# Patient Record
Sex: FEMALE | Race: WHITE | NOT HISPANIC OR LATINO | Employment: OTHER | URBAN - METROPOLITAN AREA
[De-identification: names, ages, dates, MRNs, and addresses within clinical notes are randomized per-mention and may not be internally consistent; named-entity substitution may affect disease eponyms.]

---

## 2017-01-03 ENCOUNTER — CLINICAL SUPPORT (OUTPATIENT)
Dept: REHABILITATION | Facility: HOSPITAL | Age: 47
End: 2017-01-03
Attending: FAMILY MEDICINE
Payer: COMMERCIAL

## 2017-01-03 DIAGNOSIS — M50.30 DEGENERATION OF CERVICAL INTERVERTEBRAL DISC: Primary | ICD-10-CM

## 2017-01-03 DIAGNOSIS — S39.012A STRAIN OF BACK, INITIAL ENCOUNTER: ICD-10-CM

## 2017-01-03 DIAGNOSIS — M79.18 MYOFASCIAL PAIN: ICD-10-CM

## 2017-01-03 PROCEDURE — 97014 ELECTRIC STIMULATION THERAPY: CPT | Performed by: PHYSICAL THERAPIST

## 2017-01-03 PROCEDURE — 97535 SELF CARE MNGMENT TRAINING: CPT | Performed by: PHYSICAL THERAPIST

## 2017-01-03 NOTE — PROGRESS NOTES
DATE OF THE INITIAL EVALUATION: 12/13/2016     REFERRING PROVIDER: Vibha Jay M.D.     DIAGNOSES: Lumbar strain, cervical degenerative disk disease and injury to   head.     ORDERS: Evaluate and treat as indicated.     SUBJECTIVE:   Patient states she is less symptomatic.  Reports only mild myofascial discomfort in the lumbar paraspinal region (1/10 pain rating).  Myofascial tenderness and tightness present in upper traps and cervical paraspinal musculature (3/10 pain rating).  Patient requesting heat and muscle stimulation; declines manual therapy today.       OBJECTIVE:   Ttransitioning from sit-to-stand no longer uncomfortable.     Standing posture is unremarkable. Gait is unremarkable.       Cervical spine range of motion limited into full right and left rotation with   minimal myofascial tightness and pain throughout the   trapezius, levator and rhomboid groups at the endpoint.   Cervical diagonal flexion is WNL's with discomfort at the end point.    Shoulder range of motion is within normal limits bilaterally with no specific complaints of pain   as she moves through the passive motion.     Lumbar spine range of motion is withhin normal limits into forward flexion with minimal reduction of the lumbar lordotic curve.   Extension remains slightly limited at the endpoint and painful in the central lumbosacral region.   Left and right side bending is slightly limited with reports of myofascial pain at the end point.     Patient has good flexibility of the hamstring groups bilaterally.   Stretching of the piriformis and gluteus medius, is uncomfortable but flexibility is not limited.     SI examination negative for postural rotation.   Straight leg raise test bilaterally is negative.     She does not present with any motor weaknesses or strength Imbalances throughout the extremities.    Patient has mild paraspinal guarding in the lumbar region with diffuse tenderness.   She is slightly guarded and tender in  the cervical paraspinals, traps, and levator muscle groups bilaterally.        ASSESSMENT: The patient is presenting decreased myofascial guarding and   tenderness throughout the lumbar paraspinals, and trapezius, cervical paraspinals, levator scapulae muscle groups bilaterally.  She would benefit from continuing with home stretching program and recommend increasing overall activity level.     TREATMENT PROVIDED: The patient received moist heat and muscle stimulation for   20 minutes applied to the cervical and lumbar paraspinal musculature. This was followed by gentle guided stretching   Of the cervical musculature.  Patient instructed in guidelines and recommendations for a home walking program, and recommended conditioning an strengthening activities at the health club.  Self-care instructions with therapist - 10 mins     PLAN: The patient is scheduled to return to the clinic 1 to 2 times a week to   follow up with manual therapy for myofascial release and gentle spinal   mobilization to improve mobility and decrease pain of needed. She will continue with the   home stretching exercises previously taught to her for the cervical and lumbar   spine muscle groups.       GOALS:  1. The patient will report a myofascial pain rating of 1/10 for the cervical   and lumbar muscle groups.  Goal partially met  2. The patient will be compliant with home stretching exercises working on her   previous program on a daily basis.  3. Resolution of muscle guarding and tenderness throughout the paraspinal   musculature, trapezius and levator and rhomboid muscle groups.     Thank you for this referral.

## 2017-01-11 DIAGNOSIS — M35.9 UNDIFFERENTIATED CONNECTIVE TISSUE DISEASE: Chronic | ICD-10-CM

## 2017-01-11 RX ORDER — HYDROXYCHLOROQUINE SULFATE 200 MG/1
TABLET, FILM COATED ORAL
Qty: 60 TABLET | Refills: 3 | Status: SHIPPED | OUTPATIENT
Start: 2017-01-11 | End: 2017-01-20

## 2017-01-17 ENCOUNTER — PATIENT MESSAGE (OUTPATIENT)
Dept: REHABILITATION | Facility: HOSPITAL | Age: 47
End: 2017-01-17

## 2017-01-20 ENCOUNTER — OFFICE VISIT (OUTPATIENT)
Dept: INTERNAL MEDICINE | Facility: CLINIC | Age: 47
End: 2017-01-20
Payer: COMMERCIAL

## 2017-01-20 VITALS
HEART RATE: 92 BPM | SYSTOLIC BLOOD PRESSURE: 102 MMHG | TEMPERATURE: 97 F | DIASTOLIC BLOOD PRESSURE: 68 MMHG | HEIGHT: 63 IN | BODY MASS INDEX: 22.69 KG/M2 | WEIGHT: 128.06 LBS | OXYGEN SATURATION: 100 %

## 2017-01-20 DIAGNOSIS — E78.00 PURE HYPERCHOLESTEROLEMIA: Primary | ICD-10-CM

## 2017-01-20 PROCEDURE — 99214 OFFICE O/P EST MOD 30 MIN: CPT | Mod: S$GLB,,, | Performed by: FAMILY MEDICINE

## 2017-01-20 PROCEDURE — 99999 PR PBB SHADOW E&M-EST. PATIENT-LVL III: CPT | Mod: PBBFAC,,, | Performed by: FAMILY MEDICINE

## 2017-01-20 PROCEDURE — 1159F MED LIST DOCD IN RCRD: CPT | Mod: S$GLB,,, | Performed by: FAMILY MEDICINE

## 2017-01-20 NOTE — MR AVS SNAPSHOT
[facility]  [address] [facility] [address] [place] [address]  Phone: [phone]  Fax: [phone]                  Sonia Melo   2017 7:20 AM   Office Visit    Description:  Female : 1970   Provider:  Leonardo Frey MD   Department:  [facility]           Reason for Visit     Establish Care           Diagnoses this Visit        Comments    Pure hypercholesterolemia    -  Primary            To Do List           Goals (5 Years of Data)     None      Follow-Up and Disposition     Return in about 1 year (around 2018) for Annual Exam.      [facility] On Call     [facility] On Call Nurse Care Line -  Assistance  Registered nurses in the [facility] On Call Center provide clinical advisement, health education, appointment booking, and other advisory services.  Call for this free service at [phone].             Medications           Message regarding Medications     Verify the changes and/or additions to your medication regime listed below are the same as discussed with your clinician today.  If any of these changes or additions are incorrect, please notify your healthcare provider.        STOP taking these medications     ondansetron (ZOFRAN-ODT) 8 MG TbDL Take 1 tablet (8 mg total) by mouth every 6 (six) hours as needed.           Verify that the below list of medications is an accurate representation of the medications you are currently taking.  If none reported, the list may be blank. If incorrect, please contact your healthcare provider. Carry this list with you in case of emergency.           Current Medications     cyclobenzaprine (FLEXERIL) 5 MG tablet Take 5-10 mg by mouth.    hydroxychloroquine (PLAQUENIL) 200 mg tablet Take 200 mg by mouth 2 (two) times daily.     ibuprofen (ADVIL,MOTRIN) 800 MG tablet Take 1 tablet (800 mg total) by mouth 3 (three) times daily.    levothyroxine (SYNTHROID) 112 MCG tablet Take 1 tablet (112 mcg total) by mouth once daily.  "   multivitamin (ONE DAILY MULTIVITAMIN) per tablet Take 1 tablet by mouth once daily.    penciclovir (DENAVIR) 1 % cream Apply topically every 2 (two) hours.           Clinical Reference Information           Vital Signs - Last Recorded  Most recent update: 1/20/2017  7:39 AM by Selena Torres LPN    BP Pulse Temp Ht    102/68 (BP Location: Right arm, Patient Position: Sitting, BP Method: Manual) 92 97.2 °F (36.2 °C) (Tympanic) 5' 3" (1.6 m)    Wt LMP SpO2 BMI    58.1 kg (128 lb 1.4 oz) 12/30/2016 100% 22.69 kg/m2      Blood Pressure          Most Recent Value    BP  102/68      Allergies as of 1/20/2017     No Known Allergies      Immunizations Administered on Date of Encounter - 1/20/2017     None      "

## 2017-01-20 NOTE — PROGRESS NOTES
"Subjective:   Patient ID: Sonia Melo is a 46 y.o. female.  Chief Complaint:  Establish Care    HPI Comments: Patient presents to establish care with PCP.  Medical history for mixed connective tissue disorder sees rheumatology and iatrogenic hypothyroidism stable on supplementation.  CBC and CMP done December 2016 normal.  TSH, free T4 and free T3 acceptable May 2016.  Lipids in 2015 with 10 year risk less than 1.5%.  Recent lipids at work flags for elevation.  Reports total cholesterol 240-250 range. HDL normal.  - 140. Similar to 2015 results.  No complaints or concerns today.  No recent tetanus booster on file.  Denies ever receiving pneumonia vaccines.  Received flu vaccine this season.  Up-to-date female care with Dr. Lucy Crockett.  No family history colon cancer, colonoscopy not indicated to 50 years old.    Review of Systems   Constitutional: Negative for chills, fatigue and fever.   HENT: Negative for congestion, dental problem, ear pain, postnasal drip, sinus pressure and sore throat.    Eyes: Negative for visual disturbance.   Respiratory: Negative for cough, chest tightness, shortness of breath and wheezing.    Cardiovascular: Negative for chest pain, palpitations and leg swelling.   Gastrointestinal: Negative for abdominal pain, blood in stool, constipation, diarrhea, nausea and vomiting.   Endocrine: Negative for polydipsia, polyphagia and polyuria.   Genitourinary: Negative for difficulty urinating, dysuria, flank pain, hematuria and pelvic pain.   Musculoskeletal: Negative for myalgias.   Skin: Negative for rash.   Neurological: Negative for dizziness, syncope, weakness, light-headedness and headaches.   Hematological: Negative for adenopathy.   Psychiatric/Behavioral: Negative.      Objective:     Visit Vitals    /68 (BP Location: Right arm, Patient Position: Sitting, BP Method: Manual)    Pulse 92    Temp 97.2 °F (36.2 °C) (Tympanic)    Ht 5' 3" (1.6 m)    Wt 58.1 kg (128 lb 1.4 " oz)    LMP 12/30/2016    SpO2 100%    BMI 22.69 kg/m2     Physical Exam   Constitutional: Vital signs are normal. She appears well-developed and well-nourished.   Eyes: No scleral icterus.   Neck: Carotid bruit is not present.   Cardiovascular: Normal rate, regular rhythm and normal heart sounds.  Exam reveals no gallop and no friction rub.    No murmur heard.  Pulmonary/Chest: Effort normal and breath sounds normal. She has no wheezes. She has no rhonchi. She has no rales.   Abdominal: Soft. She exhibits no distension. There is no hepatosplenomegaly. There is no tenderness. There is no rebound and no guarding.   Skin: No rash noted.   Psychiatric: She has a normal mood and affect.     Assessment:     1. Pure hypercholesterolemia      Plan:   Patient is not diabetic, LDL less than 190, no known cardiovascular disease.  Discussed and reviewed AHA/ACC cardiac risk calculator with patient.  Based on reported levels this year, similar to 2015, risk is less than 7.5%.  Daily aspirin or cholesterol medication not indicated.  Still advised low-fat low-cholesterol diet.  Keep any follow-up appointments with her specialist.  Return to clinic 1 year for annual exam or sooner if needed.  Considering tetanus immunization and pneumonia vaccination at her annual exam. Declines today

## 2017-01-30 ENCOUNTER — CLINICAL SUPPORT (OUTPATIENT)
Dept: REHABILITATION | Facility: HOSPITAL | Age: 47
End: 2017-01-30
Attending: FAMILY MEDICINE
Payer: COMMERCIAL

## 2017-01-30 DIAGNOSIS — M79.18 MYOFASCIAL PAIN: ICD-10-CM

## 2017-01-30 DIAGNOSIS — S39.012D STRAIN OF BACK, SUBSEQUENT ENCOUNTER: ICD-10-CM

## 2017-01-30 DIAGNOSIS — M50.30 DEGENERATION OF CERVICAL INTERVERTEBRAL DISC: Primary | ICD-10-CM

## 2017-01-30 PROCEDURE — 97110 THERAPEUTIC EXERCISES: CPT | Performed by: PHYSICAL THERAPIST

## 2017-01-30 PROCEDURE — 97014 ELECTRIC STIMULATION THERAPY: CPT | Performed by: PHYSICAL THERAPIST

## 2017-01-30 PROCEDURE — 97012 MECHANICAL TRACTION THERAPY: CPT | Performed by: PHYSICAL THERAPIST

## 2017-01-31 NOTE — PROGRESS NOTES
DATE OF THE INITIAL EVALUATION: 12/13/2016     REFERRING PROVIDER: Vibha Jay M.D.     DIAGNOSES: Lumbar strain, cervical degenerative disk disease and injury to   head.     ORDERS: Evaluate and treat as indicated.     SUBJECTIVE:   Patient returns to therapy today complaining of lumbar spine stiffness with radiating discomfort into the right LE.   Patient also complaining of myofascial pain in the mid thoracic paraspinal region/rhomboids bilaterally.    Patient states she has continued with the stretching exercises as directed; however, she recently began to experience worsening symtoms.    She specifically request lumbar traction to address lumbar spine stiffness as this was helpful in the past.  Also specifically requesting MFR of the shoulder girdle musculature to address soft tissue pain.    Pain rating for lumbar symptoms:  5/10  Pain rating for scapulothoracic symptoms:  5/10     OBJECTIVE:   Ttransitioning from sit-to-stand reported to be slightly uncomfortable.   Standing posture is unremarkable. Gait is unremarkable.       Cervical spine range of motion slightly limited into full right and left rotation with reported myofascial tightness and discomfort throughout the   trapezius, levator and rhomboid groups bilaterally at the endpoint.   Cervical diagonal flexion is WNL's with discomfort at the end point in the traps.    Shoulder range of motion is within normal limits bilaterally with no specific complaints of pain   as she moves through the passive motion.     Lumbar spine range of motion is limited to 50% of expected motion into forward flexion with minimal reduction of the lumbar lordotic curve.   Extension limited to 50% of expected motion with the endpoint reported to be painful in the central lumbosacral region.   Left and right side bending is slightly limited with reports of lumbar myofascial pain at the end point.     Patient has good flexibility of the hamstring groups bilaterally.    Stretching of the piriformis and gluteus medius, is uncomfortable but flexibility is not limited.     SI examination negative for postural rotation.   Straight leg raise test bilaterally is negative.     She does not present with any motor weaknesses or strength imbalances throughout the extremities.    Patient presents with moderate paraspinal guarding in the lumbar region with diffuse tenderness.   She is slightly guarded and tender in the cervical paraspinals, traps, and levator muscle groups bilaterally.    Patient has moderate muscle guarding with tenderness in the rhomboids bilaterally.      ASSESSMENT:   Patient presents today with moderate muscle guarding with tenderness in the rhomboids bilaterally.  The patient also presents with myofascial guarding and tenderness throughout the lumbar paraspinals.  There is mild muscle guarding and tenderness in the trapezius, cervical paraspinals, levator scapulae muscle groups bilaterally.  Recommend resuming MFR to scapulothoracic muscle groups; gentle lumbar traction to address lumbar spine stiffness.     TREATMENT PROVIDED:  -moist heat and muscle stimulation x 20 minutes applied to the rhomboid and lumbar paraspinal musculature.   -MFR (tool assisted) to rhomboids bilaterally x 15 mins  -gentle lumbar traction x 20 mins at 55 lbs in supine    Patient reported significant decrease in scapulothoracic myofascial pain post treatment.  Patient reported decrease in lumbar spine stiffness following the traction although radiating pain into the right leg did not change.       PLAN: The patient is scheduled to return to the clinic 1 to 2 times a week to   follow up with manual therapy for myofascial release and gentle spinal   Mobilization and traction to improve mobility and decrease pain as needed. She will continue with the   home stretching exercises previously taught to her for the cervical and lumbar   spine muscle groups.       GOALS:  1. The patient will report a  myofascial pain rating of 1/10 for the cervical, thoracolumbar muscle groups.    2. The patient will be compliant with home stretching exercises working on her   previous program on a daily basis.  3. Resolution of muscle guarding and tenderness throughout the paraspinal   musculature, trapezius and levator and rhomboid muscle groups.     Thank you for this referral.

## 2017-02-06 ENCOUNTER — CLINICAL SUPPORT (OUTPATIENT)
Dept: REHABILITATION | Facility: HOSPITAL | Age: 47
End: 2017-02-06
Attending: FAMILY MEDICINE
Payer: COMMERCIAL

## 2017-02-06 DIAGNOSIS — S39.012D STRAIN OF BACK, SUBSEQUENT ENCOUNTER: ICD-10-CM

## 2017-02-06 DIAGNOSIS — M79.18 MYOFASCIAL PAIN: ICD-10-CM

## 2017-02-06 DIAGNOSIS — M50.30 DEGENERATION OF CERVICAL INTERVERTEBRAL DISC: Primary | ICD-10-CM

## 2017-02-06 PROCEDURE — 97014 ELECTRIC STIMULATION THERAPY: CPT | Performed by: PHYSICAL THERAPIST

## 2017-02-06 PROCEDURE — 97140 MANUAL THERAPY 1/> REGIONS: CPT | Performed by: PHYSICAL THERAPIST

## 2017-02-09 NOTE — PROGRESS NOTES
DATE OF THE INITIAL EVALUATION: 12/13/2016     REFERRING PROVIDER: Vibha Jay M.D.     DIAGNOSES: Lumbar strain, cervical degenerative disk disease and injury to   head.     ORDERS: Evaluate and treat as indicated.     SUBJECTIVE:     Patient states she did not experience any rebound soreness from the gentle lumbar traction provided during her last visit.  Although the lumbar stiffness was less post treatment, she did not experience any significant improvement in pain.   Today she is complaining of myofascial pain and tenderness in the right hip girdle musculature with diffuse radiating pain into the right LE.    Patient express concern about recent changes in complete bladder control.  She denies incontinence, but states she has experienced some leaking intermittently which is new.  Patient was instructed to contact her neurosurgeon immediately to discuss her concerns.  Patient reported she would place a call immediately following her PT appt today.    Patient evaluated; negative for motor weaknesses in LE, reflexes normal, negative for saddle paresthesias.    Patient reports she is only experiencing mild myofascial discomfort in the cervical spine and shoulder girdle regions at this time.      Pain rating for hip girdle symptoms:  5/10  Pain rating for scapulothoracic symptoms:  2/10     OBJECTIVE:   Ttransitioning from sit-to-stand reported to be slightly uncomfortable.   Standing posture is unremarkable. Gait is unremarkable.       Cervical spine range of motion slightly limited into full right and left rotation with less myofascial tightness and discomfort throughout the   trapezius, levator and rhomboid groups bilaterally at the endpoint reported.  Cervical diagonal flexion is WNL's with discomfort at the end point in the traps.    Shoulder range of motion is within normal limits bilaterally with no specific complaints of pain   as she moves through the passive motion.       From previous  assessment:  Lumbar spine range of motion is limited to 50% of expected motion into forward flexion with minimal reduction of the lumbar lordotic curve.   Extension limited to 50% of expected motion with the endpoint reported to be painful in the central lumbosacral region.   Left and right side bending is slightly limited with reports of lumbar myofascial pain at the end point.     Patient has good flexibility of the hamstring groups bilaterally.   Stretching of the piriformis and gluteus medius, is uncomfortable but flexibility is not limited.     SI examination negative for postural rotation.   Straight leg raise test bilaterally is negative.     She does not present with any motor weaknesses or strength imbalances throughout the extremities.        Today the patient presents with acute TP in the right gluteus medius muscle belly.    Complains of acute tenderness over the right trochanteric bursa with multiple tender points along the IT Band.      ASSESSMENT:   Patient presents today with acute tenderness in the right gluteus medius muscle belly, over the right trochanteric bursa with multiple tender points along the IT Band.  Recommend MFR to right gluteus medius muscle belly and IT Band to address acute myofascial pain.       TREATMENT PROVIDED:  -moist heat and muscle stimulation x 20 minutes applied to the right gluteus medius and IT Band   -MFR (tool assisted) to right gluteus medius and IT Band x 20 mins  -passive stretching of the right hip girdle musculature and IT Band  (less then 8 mins)    Patient reported significant decrease in right hip girdle and LE myofascial pain post treatment.  Patient was encouraged to continue with passive stretching at home.       PLAN: The patient is scheduled to return to the clinic 1 to 2 times a week to   follow up with manual therapy for myofascial release as indicated to address myofascial pain complaints.    Patient will contact neurosurgeon today.     GOALS:  1. The  patient will report a myofascial pain rating of 1/10 for the cervical, thoracolumbar muscle groups.    2. The patient will be compliant with home stretching exercises working on her   previous program on a daily basis.  3. Resolution of muscle guarding and tenderness throughout the paraspinal   musculature, trapezius and levator and rhomboid muscle groups.  4.  Resolution of myofascial pain in the right hip girdle and LE muscle groups     Thank you for this referral.

## 2017-02-13 ENCOUNTER — CLINICAL SUPPORT (OUTPATIENT)
Dept: REHABILITATION | Facility: HOSPITAL | Age: 47
End: 2017-02-13
Attending: FAMILY MEDICINE
Payer: COMMERCIAL

## 2017-02-13 DIAGNOSIS — M50.30 DEGENERATION OF CERVICAL INTERVERTEBRAL DISC: Primary | ICD-10-CM

## 2017-02-13 DIAGNOSIS — M79.18 MYOFASCIAL PAIN: ICD-10-CM

## 2017-02-13 PROCEDURE — 97014 ELECTRIC STIMULATION THERAPY: CPT | Performed by: PHYSICAL THERAPIST

## 2017-02-13 PROCEDURE — 97140 MANUAL THERAPY 1/> REGIONS: CPT | Performed by: PHYSICAL THERAPIST

## 2017-02-13 NOTE — PROGRESS NOTES
Bladder urgency completely resolved now  Hip myofascial pain minimal  farshad c/o MF pain in thoracic paraspinals   H/E/MT    DATE OF THE INITIAL EVALUATION: 12/13/2016     REFERRING PROVIDER: Vibha Jay M.D.     DIAGNOSES: Lumbar strain, cervical degenerative disk disease and injury to   head.     ORDERS: Evaluate and treat as indicated.     SUBJECTIVE:     Patient states she is now experiencing very little myofascial pain and tenderness in the right hip girdle musculature.     Patient states recent urinary urgency issues have resolved completely.  She denied incontinence, but had reported experiencing some leaking intermittently.  Patient was encouraged to contact her neurosurgeon immediately to discuss her concerns if any symptoms return.      Patient's primary complaint today is myofascial discomfort in the thoracic paraspinal region.    Pain rating for hip girdle symptoms:  1/10  Pain rating for scapulothoracic symptoms:  5/10     OBJECTIVE:   Ttransitioning from sit-to-stand only slightly uncomfortable.   Standing posture is unremarkable. Gait is unremarkable.       Cervical spine range of motion slightly limited into full right and left rotation with less myofascial tightness and discomfort throughout the   trapezius, levator and rhomboid groups bilaterally at the endpoint reported.  Cervical diagonal flexion is WNL's with discomfort at the end point in the traps.    Shoulder range of motion is within normal limits bilaterally with no specific complaints of pain   as she moves through the passive motion.       From previous assessment:  Lumbar spine range of motion is limited to 50% of expected motion into forward flexion with minimal reduction of the lumbar lordotic curve.   Extension limited to 50% of expected motion with the endpoint reported to be painful in the central lumbosacral region.   Left and right side bending is slightly limited with reports of lumbar myofascial pain at the end point.      Patient has good flexibility of the hamstring groups bilaterally.   Stretching of the piriformis and gluteus medius, is uncomfortable but flexibility is not limited.     SI examination negative for postural rotation.   Straight leg raise test bilaterally is negative.     She does not present with any motor weaknesses or strength imbalances throughout the extremities.    Patient presents with muscle guarding throughout the thoracic paraspinal muscle groups including rhomboids/middle traps.    ASSESSMENT:   Patient requesting MFR to thoracic paraspinals and rhomboids to address myofascial pain.       TREATMENT PROVIDED:  -moist heat and muscle stimulation x 20 minutes applied to the thoracic paraspinals and rhomboids bilaterally  -MFR (tool assisted) to x 20 mins to thoracic paraspinals and rhomboids bilaterally    Patient was encouraged to continue with passive stretching at home.       PLAN: The patient is scheduled to return to the clinic 1 to 2 times a week to   follow up with manual therapy for myofascial release as indicated to address myofascial pain complaints.         GOALS:  1. The patient will report a myofascial pain rating of 1/10 for the cervical, thoracolumbar muscle groups.    2. The patient will be compliant with home stretching exercises working on her   previous program on a daily basis.  3. Resolution of muscle guarding and tenderness throughout the paraspinal   musculature, trapezius and levator and rhomboid muscle groups.  4.  Resolution of myofascial pain in the right hip girdle and LE muscle groups     Thank you for this referral.

## 2017-03-01 ENCOUNTER — LAB VISIT (OUTPATIENT)
Dept: LAB | Facility: HOSPITAL | Age: 47
End: 2017-03-01
Attending: PEDIATRICS
Payer: COMMERCIAL

## 2017-03-01 ENCOUNTER — OFFICE VISIT (OUTPATIENT)
Dept: INTERNAL MEDICINE | Facility: CLINIC | Age: 47
End: 2017-03-01
Payer: COMMERCIAL

## 2017-03-01 VITALS
TEMPERATURE: 96 F | WEIGHT: 128.5 LBS | DIASTOLIC BLOOD PRESSURE: 70 MMHG | HEART RATE: 85 BPM | SYSTOLIC BLOOD PRESSURE: 100 MMHG | BODY MASS INDEX: 22.77 KG/M2 | HEIGHT: 63 IN

## 2017-03-01 DIAGNOSIS — N39.0 URINARY TRACT INFECTION WITHOUT HEMATURIA, SITE UNSPECIFIED: Primary | ICD-10-CM

## 2017-03-01 DIAGNOSIS — N39.0 URINARY TRACT INFECTION WITH HEMATURIA, SITE UNSPECIFIED: Primary | ICD-10-CM

## 2017-03-01 DIAGNOSIS — N39.0 URINARY TRACT INFECTION WITHOUT HEMATURIA, SITE UNSPECIFIED: ICD-10-CM

## 2017-03-01 DIAGNOSIS — R31.9 URINARY TRACT INFECTION WITH HEMATURIA, SITE UNSPECIFIED: Primary | ICD-10-CM

## 2017-03-01 LAB
BACTERIA #/AREA URNS HPF: ABNORMAL /HPF
BILIRUB UR QL STRIP: NEGATIVE
CLARITY UR: ABNORMAL
COLOR UR: YELLOW
GLUCOSE UR QL STRIP: NEGATIVE
HGB UR QL STRIP: ABNORMAL
KETONES UR QL STRIP: NEGATIVE
LEUKOCYTE ESTERASE UR QL STRIP: ABNORMAL
MICROSCOPIC COMMENT: ABNORMAL
NITRITE UR QL STRIP: NEGATIVE
PH UR STRIP: 7 [PH] (ref 5–8)
PROT UR QL STRIP: ABNORMAL
RBC #/AREA URNS HPF: >100 /HPF (ref 0–4)
SP GR UR STRIP: >=1.03 (ref 1–1.03)
SQUAMOUS #/AREA URNS HPF: 15 /HPF
URN SPEC COLLECT METH UR: ABNORMAL
WBC #/AREA URNS HPF: 80 /HPF (ref 0–5)

## 2017-03-01 PROCEDURE — 99999 PR PBB SHADOW E&M-EST. PATIENT-LVL III: CPT | Mod: PBBFAC,,, | Performed by: PHYSICIAN ASSISTANT

## 2017-03-01 PROCEDURE — 87088 URINE BACTERIA CULTURE: CPT

## 2017-03-01 PROCEDURE — 87186 SC STD MICRODIL/AGAR DIL: CPT

## 2017-03-01 PROCEDURE — 87077 CULTURE AEROBIC IDENTIFY: CPT

## 2017-03-01 PROCEDURE — 1160F RVW MEDS BY RX/DR IN RCRD: CPT | Mod: S$GLB,,, | Performed by: PHYSICIAN ASSISTANT

## 2017-03-01 PROCEDURE — 87086 URINE CULTURE/COLONY COUNT: CPT

## 2017-03-01 PROCEDURE — 99213 OFFICE O/P EST LOW 20 MIN: CPT | Mod: S$GLB,,, | Performed by: PHYSICIAN ASSISTANT

## 2017-03-01 PROCEDURE — 81000 URINALYSIS NONAUTO W/SCOPE: CPT | Mod: PO

## 2017-03-01 RX ORDER — PHENAZOPYRIDINE HYDROCHLORIDE 200 MG/1
200 TABLET, FILM COATED ORAL EVERY 8 HOURS PRN
Qty: 12 TABLET | Refills: 0 | Status: SHIPPED | OUTPATIENT
Start: 2017-03-01 | End: 2017-05-15

## 2017-03-01 RX ORDER — CYCLOBENZAPRINE HCL 5 MG
5 TABLET ORAL NIGHTLY
COMMUNITY
End: 2017-05-15

## 2017-03-01 RX ORDER — HYDROXYCHLOROQUINE SULFATE 200 MG/1
200 TABLET, FILM COATED ORAL 2 TIMES DAILY
COMMUNITY
End: 2017-06-05 | Stop reason: SDUPTHER

## 2017-03-01 RX ORDER — SULFAMETHOXAZOLE AND TRIMETHOPRIM 800; 160 MG/1; MG/1
1 TABLET ORAL 2 TIMES DAILY
Qty: 20 TABLET | Refills: 0 | Status: SHIPPED | OUTPATIENT
Start: 2017-03-01 | End: 2017-03-06

## 2017-03-01 NOTE — PROGRESS NOTES
Subjective:       Patient ID: Sonia Melo is a 46 y.o.W/ female.    Chief Complaint: Urinary Tract Infection    HPI         She comes in by herself today and has the above problem.  She started having symptoms overnight and had to get up about every 30 minutes after 4 AM to urinate.  She says there has been some frequency and dysuria ever since that time.  She usually has to go about every 15-30 minutes.  She denies any hematuria, pyuria, vaginal itch or discharge, suprapubic or flank pain, fever, sweats, or diaphoresis.        Her last UTI was about 1 year ago and that's about how often she normally has these.  She hasn't been taking any OTC meds such as Azo.    Review of Systems    Otherwise negative concerning the RENAL, , GYN, system review.    Objective:      Physical Exam    There was no physical exam.  Her urinalysis on dipstick show that she had protein, RBCs, and WBC.  Her microscopic showed that she had a large quantity of RBCs greater than 100 and about 80 WBCs per high-powered field.  She also has a moderate amount of bacteria in her urine.    Assessment:       1. Urinary tract infection with hematuria, site unspecified        Plan:     1.  Start her on Pyridium 200 mg every 8 hours as needed for dysuria.  Warned that this will change her urine or just red with sustaining potential for light-colored clothing.  She should wear panty liner to protect clothing.  2.  Start Bactrim  mg twice a day ×10 days.  3.  She should also taken cranberry juice 2 ounces 3 times a day to help acidify the bladder or cranberry tablets.  It was explained to her how this helps the process.  4.  Culture of her urine is pending.

## 2017-03-04 LAB — BACTERIA UR CULT: NORMAL

## 2017-03-06 DIAGNOSIS — N39.0 ESCHERICHIA COLI URINARY TRACT INFECTION: Primary | ICD-10-CM

## 2017-03-06 DIAGNOSIS — B96.20 ESCHERICHIA COLI URINARY TRACT INFECTION: Primary | ICD-10-CM

## 2017-03-06 RX ORDER — CIPROFLOXACIN 500 MG/1
500 TABLET ORAL 2 TIMES DAILY
Qty: 20 TABLET | Refills: 0 | Status: SHIPPED | OUTPATIENT
Start: 2017-03-06 | End: 2017-03-16

## 2017-03-08 ENCOUNTER — PATIENT MESSAGE (OUTPATIENT)
Dept: INTERNAL MEDICINE | Facility: CLINIC | Age: 47
End: 2017-03-08

## 2017-03-22 DIAGNOSIS — E03.9 HYPOTHYROIDISM, UNSPECIFIED TYPE: Chronic | ICD-10-CM

## 2017-03-22 RX ORDER — LEVOTHYROXINE SODIUM 112 UG/1
TABLET ORAL
Qty: 30 TABLET | Refills: 5 | OUTPATIENT
Start: 2017-03-22

## 2017-03-23 RX ORDER — LEVOTHYROXINE SODIUM 112 UG/1
112 TABLET ORAL DAILY
Qty: 90 TABLET | Refills: 1 | Status: SHIPPED | OUTPATIENT
Start: 2017-03-23 | End: 2017-07-28

## 2017-05-15 ENCOUNTER — OFFICE VISIT (OUTPATIENT)
Dept: INTERNAL MEDICINE | Facility: CLINIC | Age: 47
End: 2017-05-15
Payer: COMMERCIAL

## 2017-05-15 VITALS
WEIGHT: 127.19 LBS | TEMPERATURE: 97 F | HEIGHT: 63 IN | SYSTOLIC BLOOD PRESSURE: 110 MMHG | BODY MASS INDEX: 22.54 KG/M2 | DIASTOLIC BLOOD PRESSURE: 62 MMHG

## 2017-05-15 DIAGNOSIS — R11.0 NAUSEA: Primary | ICD-10-CM

## 2017-05-15 PROCEDURE — 99999 PR PBB SHADOW E&M-EST. PATIENT-LVL III: CPT | Mod: PBBFAC,,, | Performed by: FAMILY MEDICINE

## 2017-05-15 PROCEDURE — 1160F RVW MEDS BY RX/DR IN RCRD: CPT | Mod: S$GLB,,, | Performed by: FAMILY MEDICINE

## 2017-05-15 PROCEDURE — 99214 OFFICE O/P EST MOD 30 MIN: CPT | Mod: S$GLB,,, | Performed by: FAMILY MEDICINE

## 2017-05-15 RX ORDER — ONDANSETRON 8 MG/1
8 TABLET, ORALLY DISINTEGRATING ORAL EVERY 6 HOURS PRN
Qty: 6 TABLET | Refills: 3 | Status: SHIPPED | OUTPATIENT
Start: 2017-05-15 | End: 2017-06-15

## 2017-05-15 RX ORDER — ESOMEPRAZOLE MAGNESIUM 40 MG/1
40 CAPSULE, DELAYED RELEASE ORAL
Qty: 60 CAPSULE | Refills: 0 | Status: SHIPPED | OUTPATIENT
Start: 2017-05-15 | End: 2017-08-03

## 2017-05-15 NOTE — PROGRESS NOTES
Subjective:   Patient ID: Sonia Melo is a 47 y.o. female.  Chief Complaint:  Nausea    HPI Comments: Presents with nausea.  No new medications or dose changes  December 2016 evaluated for nausea.  CBC, CMP, and abdominal ultrasound all normal.  Treated and did well with Zofran ODT  Similar pattern  Only related to eating.  Some esophageal dysphasia.  No heartburn or pain.    Nausea   This is a recurrent problem. The current episode started 1 to 4 weeks ago. The problem occurs 2 to 4 times per day. The problem has been unchanged. Associated symptoms include anorexia, myalgias and nausea. Pertinent negatives include no abdominal pain, arthralgias, change in bowel habit, chest pain, chills, congestion, coughing, diaphoresis, fatigue, fever, headaches, joint swelling, neck pain, numbness, rash, sore throat, swollen glands, urinary symptoms, vertigo, visual change, vomiting or weakness. The symptoms are aggravated by eating. She has tried nothing for the symptoms.     Review of Systems   Constitutional: Negative for chills, diaphoresis, fatigue and fever.   HENT: Negative for congestion and sore throat.    Respiratory: Negative for cough.    Cardiovascular: Negative for chest pain.   Gastrointestinal: Positive for anorexia and nausea. Negative for abdominal pain, change in bowel habit, diarrhea and vomiting.   Musculoskeletal: Positive for myalgias. Negative for arthralgias, joint swelling and neck pain.   Skin: Negative for rash.   Neurological: Negative for vertigo, weakness, numbness and headaches.       Current Outpatient Prescriptions:     hydroxychloroquine (PLAQUENIL) 200 mg tablet, Take 200 mg by mouth 2 (two) times daily., Disp: , Rfl:     levothyroxine (SYNTHROID) 112 MCG tablet, Take 1 tablet (112 mcg total) by mouth once daily., Disp: 90 tablet, Rfl: 1    multivitamin (ONE DAILY MULTIVITAMIN) per tablet, Take 1 tablet by mouth once daily., Disp: , Rfl:     penciclovir (DENAVIR) 1 % cream, Apply  "topically every 2 (two) hours., Disp: 1.5 g, Rfl: 2    esomeprazole (NEXIUM) 40 MG capsule, Take 1 capsule (40 mg total) by mouth 2 (two) times daily before meals., Disp: 60 capsule, Rfl: 0    ondansetron (ZOFRAN-ODT) 8 MG TbDL, Take 1 tablet (8 mg total) by mouth every 6 (six) hours as needed., Disp: 6 tablet, Rfl: 3    Objective:   /62 (BP Location: Right arm, Patient Position: Sitting, BP Method: Manual)  Temp 97.1 °F (36.2 °C) (Tympanic)   Ht 5' 3" (1.6 m)  Wt 57.7 kg (127 lb 3.3 oz)  LMP 05/08/2017 (Approximate)  BMI 22.53 kg/m2    Physical Exam   Constitutional: She is oriented to person, place, and time. Vital signs are normal. She appears well-developed and well-nourished.   Neck: No JVD present. No thyroid mass and no thyromegaly present.   Cardiovascular: Normal rate, regular rhythm and normal heart sounds.  Exam reveals no gallop and no friction rub.    No murmur heard.  Pulses:       Radial pulses are 2+ on the right side, and 2+ on the left side.   Pulmonary/Chest: Effort normal and breath sounds normal. She has no wheezes. She has no rhonchi. She has no rales.   Abdominal: Soft. She exhibits no distension. There is no tenderness. There is no rebound, no guarding and no CVA tenderness.   Musculoskeletal: Normal range of motion. She exhibits no edema.   Neurological: She is oriented to person, place, and time. She displays a negative Romberg sign. Coordination and gait normal.   Skin: Skin is warm and dry. No rash noted.   Psychiatric: She has a normal mood and affect. Her behavior is normal. Judgment and thought content normal.   Nursing note and vitals reviewed.    Assessment:     1. Nausea      Plan:   Nausea  -     ondansetron (ZOFRAN-ODT) 8 MG TbDL; Take 1 tablet (8 mg total) by mouth every 6 (six) hours as needed.  Dispense: 6 tablet; Refill: 3  -     esomeprazole (NEXIUM) 40 MG capsule; Take 1 capsule (40 mg total) by mouth 2 (two) times daily before meals.  Dispense: 60 capsule; " Refill: 0    Esophagitis/gastritis versus Biliary dyskinesia versus Esophageal dysmotility related to autoimmune disorder.  Zofran prior to meals for nausea prevention for one week, then try as needed  Double dose proton pump inhibitor for 2 weeks, then daily for 1 month treatment  If unable to stop Zofran after one week PPI treatment, referral to GI.  Otherwise RTC 6 months for annual exam as dwayne

## 2017-05-15 NOTE — MR AVS SNAPSHOT
[facility]  [address]  [place] [address]  Phone: [phone]  Fax: [phone]                  Sonia Melo   5/15/2017 8:00 AM   Office Visit    Description:  Female : 1970   Provider:  Leonardo Frey MD   Department:  [facility]           Reason for Visit     Nausea           Diagnoses this Visit        Comments    Nausea    -  Primary            To Do List           Goals (5 Years of Data)     None       These Medications        Disp Refills Start End    ondansetron (ZOFRAN-ODT) 8 MG TbDL 6 tablet 3 5/15/2017     Take 1 tablet (8 mg total) by mouth every 6 (six) hours as needed. - Oral    Pharmacy: [facility] [address] Ph #: [phone]       esomeprazole (NEXIUM) 40 MG capsule 60 capsule 0 5/15/2017 5/15/2018    Take 1 capsule (40 mg total) by mouth 2 (two) times daily before meals. - Oral    Pharmacy: [facility] [address] Ph #: [phone]         [facility]     [facility] Care [facility] -  Assistance  Unless otherwise directed by your provider, please contact [facility], our nurse care line that is available for  assistance.     Registered nurses in the [facility] Center provide: appointment scheduling, clinical advisement, health education, and other advisory services.  Call: [phone] (toll free)               Medications           Message regarding Medications     Verify the changes and/or additions to your medication regime listed below are the same as discussed with your clinician today.  If any of these changes or additions are incorrect, please notify your healthcare provider.        START taking these NEW medications        Refills    ondansetron (ZOFRAN-ODT) 8 MG TbDL 3    Sig: Take 1 tablet (8 mg total) by mouth every 6 (six) hours as needed.    Class: Normal    Route: Oral    esomeprazole (NEXIUM) 40 MG capsule 0    Sig: Take 1 capsule  "(40 mg total) by mouth 2 (two) times daily before meals.    Class: Normal    Route: Oral      STOP taking these medications     cyclobenzaprine (FLEXERIL) 5 MG tablet Take 5 mg by mouth nightly.    ibuprofen (ADVIL,MOTRIN) 800 MG tablet Take 1 tablet (800 mg total) by mouth 3 (three) times daily.    phenazopyridine (PYRIDIUM) 200 MG tablet Take 1 tablet (200 mg total) by mouth every 8 (eight) hours as needed.           Verify that the below list of medications is an accurate representation of the medications you are currently taking.  If none reported, the list may be blank. If incorrect, please contact your healthcare provider. Carry this list with you in case of emergency.           Current Medications     hydroxychloroquine (PLAQUENIL) 200 mg tablet Take 200 mg by mouth 2 (two) times daily.    levothyroxine (SYNTHROID) 112 MCG tablet Take 1 tablet (112 mcg total) by mouth once daily.    multivitamin (ONE DAILY MULTIVITAMIN) per tablet Take 1 tablet by mouth once daily.    penciclovir (DENAVIR) 1 % cream Apply topically every 2 (two) hours.    esomeprazole (NEXIUM) 40 MG capsule Take 1 capsule (40 mg total) by mouth 2 (two) times daily before meals.    ondansetron (ZOFRAN-ODT) 8 MG TbDL Take 1 tablet (8 mg total) by mouth every 6 (six) hours as needed.           Clinical Reference Information           Your Vitals Were     BP Temp Height    110/62 (BP Location: Right arm, Patient Position: Sitting, BP Method: Manual) 97.1 °F (36.2 °C) (Tympanic) 5' 3" (1.6 m)    Weight Last Period BMI    57.7 kg (127 lb 3.3 oz) 05/08/2017 (Approximate) 22.53 kg/m2      Blood Pressure          Most Recent Value    BP  110/62      Allergies as of 5/15/2017     No Known Allergies      Immunizations Administered on Date of Encounter - 5/15/2017     None      Language Assistance Services     ATTENTION: Language assistance services are available, free of charge. Please call [phone].      ATENCIÓN: chito Kwan " disposición servicios gratuitos de asistencia lingüística. [place] al [phone].     MANJU Ý: N?u b?n nói Ti?ng Vi?t, có các d?ch v? h? tr? ngôn ng? mi?n phí dành cho b?n. G?i s? [phone].         [facility] complies with applicable Federal civil rights laws and does not discriminate on the basis of race, color, national origin, age, disability, or sex.

## 2017-05-23 ENCOUNTER — PATIENT MESSAGE (OUTPATIENT)
Dept: INTERNAL MEDICINE | Facility: CLINIC | Age: 47
End: 2017-05-23

## 2017-05-23 DIAGNOSIS — R39.89 ABNORMAL URINE COLOR: Primary | ICD-10-CM

## 2017-05-23 DIAGNOSIS — Z87.440 HISTORY OF UTI: ICD-10-CM

## 2017-05-24 ENCOUNTER — TELEPHONE (OUTPATIENT)
Dept: INTERNAL MEDICINE | Facility: CLINIC | Age: 47
End: 2017-05-24

## 2017-05-24 ENCOUNTER — LAB VISIT (OUTPATIENT)
Dept: LAB | Facility: HOSPITAL | Age: 47
End: 2017-05-24
Attending: FAMILY MEDICINE
Payer: COMMERCIAL

## 2017-05-24 DIAGNOSIS — R39.89 ABNORMAL URINE COLOR: ICD-10-CM

## 2017-05-24 DIAGNOSIS — Z87.440 HISTORY OF UTI: ICD-10-CM

## 2017-05-24 LAB
BILIRUB UR QL STRIP: NEGATIVE
CLARITY UR: CLEAR
COLOR UR: YELLOW
GLUCOSE UR QL STRIP: NEGATIVE
HGB UR QL STRIP: NEGATIVE
KETONES UR QL STRIP: ABNORMAL
LEUKOCYTE ESTERASE UR QL STRIP: NEGATIVE
NITRITE UR QL STRIP: NEGATIVE
PH UR STRIP: 6 [PH] (ref 5–8)
PROT UR QL STRIP: NEGATIVE
SP GR UR STRIP: >=1.03 (ref 1–1.03)
URN SPEC COLLECT METH UR: ABNORMAL

## 2017-05-24 PROCEDURE — 81003 URINALYSIS AUTO W/O SCOPE: CPT | Mod: PO

## 2017-05-24 PROCEDURE — 87086 URINE CULTURE/COLONY COUNT: CPT

## 2017-05-24 NOTE — TELEPHONE ENCOUNTER
----- Message from Leonardo Frey MD sent at 5/24/2017 12:38 PM CDT -----  Urine is very concentrated but negative for infection on dipstick.  Needs to drink more water.  No indication for antibiotics  Urine culture should be negative, if positive will sen out appropriate antibiotic

## 2017-05-25 ENCOUNTER — PATIENT MESSAGE (OUTPATIENT)
Dept: INTERNAL MEDICINE | Facility: CLINIC | Age: 47
End: 2017-05-25

## 2017-05-25 LAB — BACTERIA UR CULT: NO GROWTH

## 2017-06-05 ENCOUNTER — OFFICE VISIT (OUTPATIENT)
Dept: RHEUMATOLOGY | Facility: CLINIC | Age: 47
End: 2017-06-05
Payer: COMMERCIAL

## 2017-06-05 ENCOUNTER — LAB VISIT (OUTPATIENT)
Dept: LAB | Facility: HOSPITAL | Age: 47
End: 2017-06-05
Attending: INTERNAL MEDICINE
Payer: COMMERCIAL

## 2017-06-05 VITALS
HEART RATE: 82 BPM | WEIGHT: 127.88 LBS | DIASTOLIC BLOOD PRESSURE: 85 MMHG | SYSTOLIC BLOOD PRESSURE: 124 MMHG | BODY MASS INDEX: 22.65 KG/M2

## 2017-06-05 DIAGNOSIS — R13.19 ESOPHAGEAL DYSPHAGIA: ICD-10-CM

## 2017-06-05 DIAGNOSIS — M35.9 UNDIFFERENTIATED CONNECTIVE TISSUE DISEASE: ICD-10-CM

## 2017-06-05 DIAGNOSIS — M35.9 UNDIFFERENTIATED CONNECTIVE TISSUE DISEASE: Primary | ICD-10-CM

## 2017-06-05 LAB
ALBUMIN SERPL BCP-MCNC: 3.9 G/DL
ALP SERPL-CCNC: 47 U/L
ALT SERPL W/O P-5'-P-CCNC: 16 U/L
ANION GAP SERPL CALC-SCNC: 7 MMOL/L
AST SERPL-CCNC: 22 U/L
BASOPHILS # BLD AUTO: 0.02 K/UL
BASOPHILS NFR BLD: 0.2 %
BILIRUB SERPL-MCNC: 0.9 MG/DL
BUN SERPL-MCNC: 13 MG/DL
CALCIUM SERPL-MCNC: 10 MG/DL
CHLORIDE SERPL-SCNC: 105 MMOL/L
CO2 SERPL-SCNC: 29 MMOL/L
CREAT SERPL-MCNC: 0.7 MG/DL
DIFFERENTIAL METHOD: ABNORMAL
EOSINOPHIL # BLD AUTO: 0.1 K/UL
EOSINOPHIL NFR BLD: 1.1 %
ERYTHROCYTE [DISTWIDTH] IN BLOOD BY AUTOMATED COUNT: 12.7 %
EST. GFR  (AFRICAN AMERICAN): >60 ML/MIN/1.73 M^2
EST. GFR  (NON AFRICAN AMERICAN): >60 ML/MIN/1.73 M^2
GLUCOSE SERPL-MCNC: 94 MG/DL
HCT VFR BLD AUTO: 37.5 %
HGB BLD-MCNC: 12.5 G/DL
LYMPHOCYTES # BLD AUTO: 2.1 K/UL
LYMPHOCYTES NFR BLD: 25 %
MCH RBC QN AUTO: 31.6 PG
MCHC RBC AUTO-ENTMCNC: 33.3 %
MCV RBC AUTO: 95 FL
MONOCYTES # BLD AUTO: 0.4 K/UL
MONOCYTES NFR BLD: 4.2 %
NEUTROPHILS # BLD AUTO: 5.7 K/UL
NEUTROPHILS NFR BLD: 69.5 %
PLATELET # BLD AUTO: 214 K/UL
PMV BLD AUTO: 9 FL
POTASSIUM SERPL-SCNC: 4.5 MMOL/L
PROT SERPL-MCNC: 7.1 G/DL
RBC # BLD AUTO: 3.96 M/UL
SODIUM SERPL-SCNC: 141 MMOL/L
WBC # BLD AUTO: 8.27 K/UL

## 2017-06-05 PROCEDURE — 80053 COMPREHEN METABOLIC PANEL: CPT | Mod: PO

## 2017-06-05 PROCEDURE — 86038 ANTINUCLEAR ANTIBODIES: CPT

## 2017-06-05 PROCEDURE — 86039 ANTINUCLEAR ANTIBODIES (ANA): CPT

## 2017-06-05 PROCEDURE — 36415 COLL VENOUS BLD VENIPUNCTURE: CPT | Mod: PO

## 2017-06-05 PROCEDURE — 99214 OFFICE O/P EST MOD 30 MIN: CPT | Mod: S$GLB,,, | Performed by: INTERNAL MEDICINE

## 2017-06-05 PROCEDURE — 99999 PR PBB SHADOW E&M-EST. PATIENT-LVL III: CPT | Mod: PBBFAC,,, | Performed by: INTERNAL MEDICINE

## 2017-06-05 PROCEDURE — 86235 NUCLEAR ANTIGEN ANTIBODY: CPT

## 2017-06-05 PROCEDURE — 86235 NUCLEAR ANTIGEN ANTIBODY: CPT | Mod: 59

## 2017-06-05 PROCEDURE — 85025 COMPLETE CBC W/AUTO DIFF WBC: CPT | Mod: PO

## 2017-06-05 RX ORDER — HYDROXYCHLOROQUINE SULFATE 200 MG/1
200 TABLET, FILM COATED ORAL 2 TIMES DAILY
Qty: 180 TABLET | Refills: 2 | Status: SHIPPED | OUTPATIENT
Start: 2017-06-05 | End: 2017-11-21

## 2017-06-05 NOTE — PROGRESS NOTES
RHEUMATOLOGY CLINIC FOLLOW UP VISIT  Chief complaints:-  To follow up for autoimmune disease.     HPI:-  Sonia Muñoz a 47 y.o. pleasant female comes in for a follow up visit with above chief complaints. 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 She was recently diagnosed with GERD and started on nexium by  which is controlling her symptoms. She denies any changes in her symptoms since last visit. She denies any joint pain or joint swelling. No skin tightening.       Review of Systems   Constitutional: Positive for malaise/fatigue. Negative for chills, fever and weight loss.   HENT: Negative for ear discharge, ear pain, hearing loss, nosebleeds and sore throat.    Eyes: Negative for blurred vision, double vision, photophobia, discharge and redness.   Respiratory: Negative for cough, hemoptysis, sputum production and shortness of breath.    Cardiovascular: Negative for chest pain, palpitations and claudication.   Gastrointestinal: Positive for diarrhea and vomiting. Negative for abdominal pain, constipation, melena and nausea.   Genitourinary: Negative for dysuria, frequency, hematuria and urgency.   Musculoskeletal: Positive for back pain, joint pain, myalgias and neck pain. Negative for falls.   Skin: Negative for itching and rash.   Neurological: Positive for tingling and sensory change. Negative for dizziness, tremors, speech change, focal weakness, seizures, loss of consciousness, weakness and headaches.   Endo/Heme/Allergies: Negative for environmental allergies. Does not bruise/bleed easily.   Psychiatric/Behavioral: Negative for hallucinations and memory loss. The patient does not have insomnia.        Past Medical History:   Diagnosis Date    Fibromyalgia     Hyperthyroidism     Iatrogenic hypothyroidism      Undifferentiated connective tissue disease        Past Surgical History:   Procedure Laterality Date    BREAST IMPLANTS      STAPEDES SURGERY      sMart Stapedes Piston (Safe to 3T magnet)        Social History   Substance Use Topics    Smoking status: Never Smoker    Smokeless tobacco: Never Used    Alcohol use 0.0 oz/week      Comment: socially        Family History   Problem Relation Age of Onset    Hypertension Mother     Hypertension Father     Heart disease Father     Diabetes Maternal Grandmother        Review of patient's allergies indicates:  No Known Allergies        Physical examination:-    Vitals:    06/05/17 1003   BP: 124/85   Pulse: 82   Weight: 58 kg (127 lb 13.9 oz)   PainSc: 0-No pain       Physical Exam   Constitutional: She is oriented to person, place, and time and well-developed, well-nourished, and in no distress. No distress.   HENT:   Head: Normocephalic.   Mouth/Throat: Oropharynx is clear and moist. No oropharyngeal exudate.   Eyes: Conjunctivae and EOM are normal. Pupils are equal, round, and reactive to light. Right eye exhibits no discharge. Left eye exhibits no discharge. No scleral icterus.   Neck: Normal range of motion. Neck supple.   Cardiovascular: Normal rate and intact distal pulses.    Pulmonary/Chest: Effort normal. No respiratory distress. She exhibits no tenderness.   Abdominal: Soft. There is no tenderness.   Musculoskeletal:   Multiple tender points. No synovitis in small joints. No effusion in large joints. No sclerodactyly or telangiectasias.    Lymphadenopathy:     She has no cervical adenopathy.   Neurological: She is alert and oriented to person, place, and time. No cranial nerve deficit.   Skin: Skin is warm. No rash noted. She is not diaphoretic. No erythema. No pallor.   Psychiatric: Affect and judgment normal.   Nursing note and vitals reviewed.      Labs:-  Results for YVES WARD (MRN [id number]) as of 6/5/2017 09:06   Ref. Range 7/21/2016 11:45    SCL-70 Antibody Latest Ref Range: <1.0 (Negative) U >8.0 (H)     Results for YVES WARD (MRN [id number]) as of 6/5/2017 09:06   Ref. Range 6/30/2016 16:28   MARYAM HEP-2 Titer Unknown Positive 1:640 Ho...   Anti-SSA Antibody Latest Ref Range: 0.00 - 19.99 EU 1.64   Anti-SSA Interpretation Latest Ref Range: Negative  Negative   Anti-SSB Antibody Latest Ref Range: 0.00 - 19.99 EU 0.85   Anti-SSB Interpretation Latest Ref Range: Negative  Negative   ds DNA Ab Latest Ref Range: Negative 1:10  Negative 1:10   Anti Sm Antibody Latest Ref Range: 0.00 - 19.99 EU 5.73   Anti-Sm Interpretation Latest Ref Range: Negative  Negative   Anti Sm/RNP Antibody Latest Ref Range: 0.00 - 19.99 EU 0.91   Anti-Sm/RNP Interpretation Latest Ref Range: Negative  Negative       Medication List with Changes/Refills   Current Medications    ESOMEPRAZOLE (NEXIUM) 40 MG CAPSULE    Take 1 capsule (40 mg total) by mouth 2 (two) times daily before meals.    HYDROXYCHLOROQUINE (PLAQUENIL) 200 MG TABLET    Take 200 mg by mouth 2 (two) times daily.    LEVOTHYROXINE (SYNTHROID) 112 MCG TABLET    Take 1 tablet (112 mcg total) by mouth once daily.    MULTIVITAMIN (ONE DAILY MULTIVITAMIN) PER TABLET    Take 1 tablet by mouth once daily.    ONDANSETRON (ZOFRAN-ODT) 8 MG TBDL    Take 1 tablet (8 mg total) by mouth every 6 (six) hours as needed.    PENCICLOVIR (DENAVIR) 1 % CREAM    Apply topically every 2 (two) hours.       Assessment/Plans:-  # Undifferentiated connective tissue disease:-  Positive MARYAM, High titer scleroderma antibody positivity associated with fatigue, arthralgias, myalgias, GERD, normal High res lung CT and PFT. Tolerating plaquenil without any problem. Continue with yearly retinal examination.  Refer to GI for endoscopy and evaluation.  - MARYAM; Future  - Anti-scleroderma antibody; Future  - CBC auto differential; Standing  - Comprehensive metabolic panel; Standing    # Esophageal dysphagia:-  New onset worsening esophageal dysphagia  responding to Nexium . In consideration of her High titer Scleroderma antibody and Undifferentiated connective disease, I would refer her to GI for evaluation of esophageal involvement of her CTD.    - Ambulatory Referral to Gastroenterology    # Return in about 6 months (around 12/5/2017).      Time spent: 30 minutes in face to face discussion concerning diagnosis, prognosis, review of lab and test results, benefits of treatment as well as management of disease, counseling of patient and coordination of care between various health care providers . Greater than half the time spent was used for coordination of care and counseling of patient.      Disclaimer: This note was prepared using voice recognition system and is likely to have sound alike errors and is not proof read.  Please call me with any questions.

## 2017-06-05 NOTE — ASSESSMENT & PLAN NOTE
Positive MARYAM, High titer scleroderma antibody positivity associated with fatigue, arthralgias, myalgias, GERD, normal High res lung CT and PFT. Tolerating plaquenil without any problem. Continue with yearly retinal examination.  Refer to GI for endoscopy and evaluation .

## 2017-06-05 NOTE — ASSESSMENT & PLAN NOTE
New onset worsening esophageal dysphagia responding to Nexium . In consideration of her High titer Scleroderma antibody and Undifferentiated connective disease, I would refer her to GI for evaluation of esophageal involvement of her CTD.

## 2017-06-06 LAB
ANA SER QL IF: POSITIVE
ANA TITR SER IF: NORMAL {TITER}

## 2017-06-07 LAB — ENA SCL70 AB SER-ACNC: 4 UNITS

## 2017-06-08 LAB
ANTI SM ANTIBODY: 1.64 EU
ANTI SM/RNP ANTIBODY: 1.72 EU
ANTI-SM INTERPRETATION: NEGATIVE
ANTI-SM/RNP INTERPRETATION: NEGATIVE
ANTI-SSA ANTIBODY: 7.8 EU
ANTI-SSA INTERPRETATION: NEGATIVE
ANTI-SSB ANTIBODY: 0.67 EU
ANTI-SSB INTERPRETATION: NEGATIVE
DSDNA AB SER-ACNC: NORMAL [IU]/ML

## 2017-06-13 ENCOUNTER — PATIENT MESSAGE (OUTPATIENT)
Dept: RHEUMATOLOGY | Facility: CLINIC | Age: 47
End: 2017-06-13

## 2017-06-15 ENCOUNTER — OFFICE VISIT (OUTPATIENT)
Dept: INTERNAL MEDICINE | Facility: CLINIC | Age: 47
End: 2017-06-15
Payer: COMMERCIAL

## 2017-06-15 ENCOUNTER — LAB VISIT (OUTPATIENT)
Dept: LAB | Facility: HOSPITAL | Age: 47
End: 2017-06-15
Attending: FAMILY MEDICINE
Payer: COMMERCIAL

## 2017-06-15 VITALS
BODY MASS INDEX: 22.19 KG/M2 | HEIGHT: 63 IN | TEMPERATURE: 98 F | SYSTOLIC BLOOD PRESSURE: 120 MMHG | WEIGHT: 125.25 LBS | DIASTOLIC BLOOD PRESSURE: 82 MMHG

## 2017-06-15 DIAGNOSIS — N32.81 OVERACTIVE BLADDER: Primary | ICD-10-CM

## 2017-06-15 DIAGNOSIS — K20.90 ESOPHAGITIS: ICD-10-CM

## 2017-06-15 DIAGNOSIS — N32.81 OVERACTIVE BLADDER: ICD-10-CM

## 2017-06-15 DIAGNOSIS — R05.9 COUGH: ICD-10-CM

## 2017-06-15 DIAGNOSIS — R30.0 DYSURIA: ICD-10-CM

## 2017-06-15 LAB
BILIRUB UR QL STRIP: NEGATIVE
CLARITY UR: CLEAR
COLOR UR: YELLOW
GLUCOSE UR QL STRIP: NEGATIVE
HGB UR QL STRIP: NEGATIVE
KETONES UR QL STRIP: ABNORMAL
LEUKOCYTE ESTERASE UR QL STRIP: ABNORMAL
MICROSCOPIC COMMENT: NORMAL
NITRITE UR QL STRIP: NEGATIVE
PH UR STRIP: 7 [PH] (ref 5–8)
PROT UR QL STRIP: NEGATIVE
SP GR UR STRIP: 1.01 (ref 1–1.03)
SQUAMOUS #/AREA URNS HPF: 3 /HPF
URN SPEC COLLECT METH UR: ABNORMAL
WBC #/AREA URNS HPF: 3 /HPF (ref 0–5)

## 2017-06-15 PROCEDURE — 87086 URINE CULTURE/COLONY COUNT: CPT

## 2017-06-15 PROCEDURE — 81000 URINALYSIS NONAUTO W/SCOPE: CPT | Mod: PO

## 2017-06-15 PROCEDURE — 99999 PR PBB SHADOW E&M-EST. PATIENT-LVL III: CPT | Mod: PBBFAC,,, | Performed by: FAMILY MEDICINE

## 2017-06-15 PROCEDURE — 99214 OFFICE O/P EST MOD 30 MIN: CPT | Mod: S$GLB,,, | Performed by: FAMILY MEDICINE

## 2017-06-15 RX ORDER — PENCICLOVIR 10 MG/G
CREAM TOPICAL
Qty: 1.5 G | Refills: 2 | Status: CANCELLED | OUTPATIENT
Start: 2017-06-15

## 2017-06-15 RX ORDER — BENZONATATE 200 MG/1
200 CAPSULE ORAL 3 TIMES DAILY PRN
Qty: 21 CAPSULE | Refills: 0 | Status: SHIPPED | OUTPATIENT
Start: 2017-06-15 | End: 2017-07-27

## 2017-06-15 NOTE — PROGRESS NOTES
Subjective:   Patient ID: Sonia Melo is a 47 y.o. female.  Chief Complaint:  No chief complaint on file.      Presents for follow-up on chronic recurrent /overactive bladder symptoms.  UTI positive March.  Treated Cipro.  Repeat culture negative.  Recurrent/persistent symptoms with repeat urine dip and culture negative.  Questions what else to do.  Saw rheumatology.  Testing positive for unspecific connective tissue disorder.  Possibly scleroderma.  Initial GI symptoms improved with PPI.  Now with persistent cough and more heartburn substernally.      Review of Systems   Constitutional: Positive for chills.   Cardiovascular: Positive for chest pain. Negative for palpitations and leg swelling.   Gastrointestinal: Positive for nausea. Negative for blood in stool, constipation and vomiting.   Endocrine: Negative for polydipsia, polyphagia and polyuria.   Genitourinary: Positive for dysuria, flank pain, frequency and urgency. Negative for hematuria, pelvic pain, vaginal bleeding, vaginal discharge and vaginal pain.   Musculoskeletal: Negative for myalgias.   Skin: Negative for rash.   Hematological: Negative for adenopathy.   Psychiatric/Behavioral: Negative for sleep disturbance. The patient is not nervous/anxious.        Current Outpatient Prescriptions:     esomeprazole (NEXIUM) 40 MG capsule, Take 1 capsule (40 mg total) by mouth 2 (two) times daily before meals., Disp: 60 capsule, Rfl: 0    hydroxychloroquine (PLAQUENIL) 200 mg tablet, Take 1 tablet (200 mg total) by mouth 2 (two) times daily., Disp: 180 tablet, Rfl: 2    levothyroxine (SYNTHROID) 112 MCG tablet, Take 1 tablet (112 mcg total) by mouth once daily., Disp: 90 tablet, Rfl: 1    multivitamin (ONE DAILY MULTIVITAMIN) per tablet, Take 1 tablet by mouth once daily., Disp: , Rfl:     penciclovir (DENAVIR) 1 % cream, Apply topically every 2 (two) hours., Disp: 1.5 g, Rfl: 2    benzonatate (TESSALON) 200 MG capsule, Take 1 capsule (200 mg total)  "by mouth 3 (three) times daily as needed for Cough., Disp: 21 capsule, Rfl: 0    Objective:   /82 (BP Location: Right arm, Patient Position: Sitting, BP Method: Manual)   Temp 98.3 °F (36.8 °C) (Tympanic)   Ht 5' 3" (1.6 m)   Wt 56.8 kg (125 lb 3.5 oz)   LMP 06/01/2017   BMI 22.18 kg/m²     Physical Exam   Constitutional: She is oriented to person, place, and time. Vital signs are normal. She appears well-developed and well-nourished.   Neck: No JVD present. No thyroid mass and no thyromegaly present.   Cardiovascular: Normal rate, regular rhythm and normal heart sounds.  Exam reveals no gallop and no friction rub.    No murmur heard.  Pulses:       Radial pulses are 2+ on the right side, and 2+ on the left side.   Pulmonary/Chest: Effort normal and breath sounds normal. She has no wheezes. She has no rhonchi. She has no rales.   Abdominal: Soft. She exhibits no distension. There is tenderness in the suprapubic area. There is no rebound, no guarding and no CVA tenderness. No hernia.   Musculoskeletal: Normal range of motion. She exhibits no edema.   Neurological: She is oriented to person, place, and time. She displays a negative Romberg sign. Coordination and gait normal.   Skin: Skin is warm and dry. No rash noted.   Psychiatric: She has a normal mood and affect. Her behavior is normal. Judgment and thought content normal.   Nursing note and vitals reviewed.    Assessment:     1. Overactive bladder    2. Dysuria    3. Cough    4. Esophagitis      Plan:   Overactive bladder/Dysuria  -     Urinalysis; Future; Expected date: 06/15/2017  -     Urine culture; Future; Expected date: 06/15/2017  -     Ambulatory referral to Urology  Persistent symptoms despite resolution of previous UTI and recent normal urinalysis/culture.  Repeat testing today.  Refer to urology for additional evaluation.    Cough/Esophagitis  -     benzonatate (TESSALON) 200 MG capsule; Take 1 capsule (200 mg total) by mouth 3 (three) times " daily as needed for Cough.  Dispense: 21 capsule; Refill: 0  Try Tessalon.  Continue PPI.  Has referral to GI.  Tums/Mylanta/Maalox when necessary.  Questionable esophageal dysphagia/dysmotility related to possible scleroderma.  Continue Plaquenil per rheumatology.    RTC prn

## 2017-06-16 LAB — BACTERIA UR CULT: NORMAL

## 2017-06-20 ENCOUNTER — PATIENT MESSAGE (OUTPATIENT)
Dept: INTERNAL MEDICINE | Facility: CLINIC | Age: 47
End: 2017-06-20

## 2017-06-20 ENCOUNTER — TELEPHONE (OUTPATIENT)
Dept: INTERNAL MEDICINE | Facility: CLINIC | Age: 47
End: 2017-06-20

## 2017-06-20 NOTE — TELEPHONE ENCOUNTER
----- Message from Leonardo Fery MD sent at 6/19/2017  5:41 PM CDT -----  Urinalysis negative.  Urine culture no growth/no infection.  Keep referral with Dr. Rosario as advised.

## 2017-06-21 ENCOUNTER — PATIENT MESSAGE (OUTPATIENT)
Dept: INTERNAL MEDICINE | Facility: CLINIC | Age: 47
End: 2017-06-21

## 2017-06-21 RX ORDER — ALBUTEROL SULFATE 90 UG/1
2 AEROSOL, METERED RESPIRATORY (INHALATION) EVERY 4 HOURS PRN
Qty: 1 INHALER | Refills: 0 | Status: SHIPPED | OUTPATIENT
Start: 2017-06-21 | End: 2017-12-29

## 2017-06-23 ENCOUNTER — TELEPHONE (OUTPATIENT)
Dept: GASTROENTEROLOGY | Facility: CLINIC | Age: 47
End: 2017-06-23

## 2017-06-23 ENCOUNTER — INITIAL CONSULT (OUTPATIENT)
Dept: GASTROENTEROLOGY | Facility: CLINIC | Age: 47
End: 2017-06-23
Payer: COMMERCIAL

## 2017-06-23 VITALS
SYSTOLIC BLOOD PRESSURE: 122 MMHG | WEIGHT: 125.88 LBS | HEART RATE: 80 BPM | DIASTOLIC BLOOD PRESSURE: 80 MMHG | BODY MASS INDEX: 22.3 KG/M2 | HEIGHT: 63 IN

## 2017-06-23 DIAGNOSIS — R13.19 ESOPHAGEAL DYSPHAGIA: Primary | ICD-10-CM

## 2017-06-23 DIAGNOSIS — M15.9 OSTEOARTHRITIS OF MULTIPLE JOINTS, UNSPECIFIED OSTEOARTHRITIS TYPE: ICD-10-CM

## 2017-06-23 DIAGNOSIS — M34.9 SCLERODERMA: ICD-10-CM

## 2017-06-23 PROCEDURE — 99999 PR PBB SHADOW E&M-EST. PATIENT-LVL V: CPT | Mod: PBBFAC,,, | Performed by: INTERNAL MEDICINE

## 2017-06-23 PROCEDURE — 99204 OFFICE O/P NEW MOD 45 MIN: CPT | Mod: S$GLB,,, | Performed by: INTERNAL MEDICINE

## 2017-06-23 RX ORDER — SOLIFENACIN SUCCINATE 10 MG/1
TABLET, FILM COATED ORAL DAILY
COMMUNITY
End: 2017-07-27

## 2017-06-23 NOTE — LETTER
June 23, 2017      Reinier French MD  [address] [place] [address]           [facility]  [address] [place] [address]  [place] [address]  Phone: [phone]  Fax: [phone]          Patient: Sonia Melo   MR Number: [id number]   YOB: 1970   Date of Visit: 6/23/2017       Dear Dr. Reinier French:    Thank you for referring Sonia Melo to me for evaluation. Attached you will find relevant portions of my assessment and plan of care.    If you have questions, please do not hesitate to call me. I look forward to following Sonia Melo along with you.    Sincerely,    Roshan Chopra III, MD    Enclosure  CC:  No Recipients    If you would like to receive this communication electronically, please contact [email or web address] or [phone] to request more information on [employer] Link access.    For providers and/or their staff who would like to refer a patient to [facility], please contact us through our one-stop-shop provider referral line, [facility], at [phone].    If you feel you have received this communication in error or would no longer like to receive these types of communications, please e-mail [email or web address]

## 2017-06-23 NOTE — PROGRESS NOTES
Subjective:       Patient ID: Sonia Melo is a 47 y.o. female.    Chief Complaint: Dysphagia    The patient is presenting with complaint of dysphagia. She is suspected of possibly having Scleroderma, and feels that it may be playing a role in her dysphagia. She is seeing Rheumatology and they have suspected scleroderma and now desire the esophageal assessment. The patient's initial complaints had to do with some facial tingling and eventialy some back issues. On testing for CTD she had high scleroderma antibody. We are asked to evaluate her for esophageal involvement.      Review of Systems   Constitutional: Negative for activity change, appetite change, chills, diaphoresis, fatigue, fever and unexpected weight change.   HENT: Negative for congestion, ear discharge, ear pain, hearing loss, nosebleeds, postnasal drip and tinnitus.    Eyes: Positive for visual disturbance. Negative for photophobia.   Respiratory: Positive for cough. Negative for apnea, choking, chest tightness, shortness of breath and wheezing.    Cardiovascular: Negative for chest pain, palpitations and leg swelling.   Gastrointestinal: Negative for abdominal distention, abdominal pain, anal bleeding, blood in stool, constipation, diarrhea, nausea, rectal pain and vomiting.   Genitourinary: Positive for flank pain. Negative for difficulty urinating, dyspareunia, dysuria, frequency, hematuria, menstrual problem, pelvic pain, urgency, vaginal bleeding and vaginal discharge.   Musculoskeletal: Negative for arthralgias, back pain, gait problem, joint swelling, myalgias and neck stiffness.   Skin: Negative for pallor and rash.   Neurological: Negative for dizziness, tremors, seizures, syncope, speech difficulty, weakness, numbness and headaches.   Hematological: Negative for adenopathy.   Psychiatric/Behavioral: Negative for agitation, confusion, hallucinations, sleep disturbance and suicidal ideas.       Objective:      Physical Exam    Constitutional: She is oriented to person, place, and time. She appears well-developed and well-nourished.   HENT:   Head: Normocephalic and atraumatic.   Bilateral turbinate congestion   Eyes: Conjunctivae and EOM are normal. Pupils are equal, round, and reactive to light. Right eye exhibits no discharge. Left eye exhibits no discharge. No scleral icterus.   Neck: Normal range of motion. Neck supple. No JVD present. No thyromegaly present.   Cardiovascular: Normal rate, regular rhythm, normal heart sounds and intact distal pulses.  Exam reveals no gallop and no friction rub.    No murmur heard.  Pulmonary/Chest: Effort normal and breath sounds normal. No respiratory distress. She has no wheezes. She has no rales. She exhibits no tenderness.   Abdominal: Soft. Bowel sounds are normal. She exhibits no distension and no mass. There is tenderness. There is no rebound and no guarding.   Mild Left sided abdominal trenderness   Musculoskeletal: Normal range of motion. She exhibits no edema.   Lymphadenopathy:     She has no cervical adenopathy.   Neurological: She is alert and oriented to person, place, and time. She has normal reflexes. She exhibits normal muscle tone. Coordination normal.   Skin: Skin is warm and dry. No rash noted. No erythema. No pallor.   Psychiatric: She has a normal mood and affect. Her behavior is normal. Judgment and thought content normal.   Vitals reviewed.      Assessment:    Dysphagia   Undifferentiated CTD          High scleroderma antibody   No diagnosis found.    Plan:     EGD with Manometry   Reviewed testing and why/how it is to be done.

## 2017-06-23 NOTE — TELEPHONE ENCOUNTER
Please call and schedule patient. ESOPHAGOGASTRODUODENOSCOPY (EGD), MANOMETRY-ESOPHAGEAL-HIGH RESOLUTION [GI5

## 2017-06-26 ENCOUNTER — PATIENT MESSAGE (OUTPATIENT)
Dept: GASTROENTEROLOGY | Facility: CLINIC | Age: 47
End: 2017-06-26

## 2017-06-26 ENCOUNTER — PATIENT MESSAGE (OUTPATIENT)
Dept: INTERNAL MEDICINE | Facility: CLINIC | Age: 47
End: 2017-06-26

## 2017-06-26 ENCOUNTER — HOSPITAL ENCOUNTER (OUTPATIENT)
Dept: RADIOLOGY | Facility: HOSPITAL | Age: 47
Discharge: HOME | End: 2017-06-26
Attending: FAMILY MEDICINE
Payer: COMMERCIAL

## 2017-06-26 DIAGNOSIS — R05.9 COUGH: ICD-10-CM

## 2017-06-26 DIAGNOSIS — R05.9 COUGH: Primary | ICD-10-CM

## 2017-06-26 PROCEDURE — 71020 XR CHEST PA AND LATERAL: CPT | Mod: 26,,, | Performed by: RADIOLOGY

## 2017-06-26 PROCEDURE — 71020 XR CHEST PA AND LATERAL: CPT | Mod: TC,PO

## 2017-06-30 ENCOUNTER — PATIENT MESSAGE (OUTPATIENT)
Dept: INTERNAL MEDICINE | Facility: CLINIC | Age: 47
End: 2017-06-30

## 2017-06-30 DIAGNOSIS — R05.9 COUGH: Primary | ICD-10-CM

## 2017-07-05 ENCOUNTER — ANESTHESIA EVENT (OUTPATIENT)
Dept: ENDOSCOPY | Facility: HOSPITAL | Age: 47
End: 2017-07-05
Payer: COMMERCIAL

## 2017-07-05 ENCOUNTER — ANESTHESIA (OUTPATIENT)
Dept: ENDOSCOPY | Facility: HOSPITAL | Age: 47
End: 2017-07-05
Payer: COMMERCIAL

## 2017-07-05 ENCOUNTER — SURGERY (OUTPATIENT)
Age: 47
End: 2017-07-05

## 2017-07-05 ENCOUNTER — HOSPITAL ENCOUNTER (OUTPATIENT)
Facility: HOSPITAL | Age: 47
Discharge: HOME | End: 2017-07-05
Attending: INTERNAL MEDICINE | Admitting: INTERNAL MEDICINE
Payer: COMMERCIAL

## 2017-07-05 VITALS
HEART RATE: 87 BPM | OXYGEN SATURATION: 100 % | SYSTOLIC BLOOD PRESSURE: 121 MMHG | RESPIRATION RATE: 16 BRPM | TEMPERATURE: 98 F | HEIGHT: 63 IN | WEIGHT: 128 LBS | DIASTOLIC BLOOD PRESSURE: 87 MMHG | BODY MASS INDEX: 22.68 KG/M2

## 2017-07-05 DIAGNOSIS — R13.19 ESOPHAGEAL DYSPHAGIA: Primary | ICD-10-CM

## 2017-07-05 DIAGNOSIS — R13.10 DYSPHAGIA: ICD-10-CM

## 2017-07-05 DIAGNOSIS — K29.30 SUPERFICIAL GASTRITIS WITHOUT HEMORRHAGE, UNSPECIFIED CHRONICITY: ICD-10-CM

## 2017-07-05 LAB
B-HCG UR QL: NEGATIVE
CTP QC/QA: YES

## 2017-07-05 PROCEDURE — 88305 TISSUE EXAM BY PATHOLOGIST: CPT | Performed by: PATHOLOGY

## 2017-07-05 PROCEDURE — 63600175 PHARM REV CODE 636 W HCPCS: Performed by: NURSE ANESTHETIST, CERTIFIED REGISTERED

## 2017-07-05 PROCEDURE — 37000009 HC ANESTHESIA EA ADD 15 MINS: Performed by: INTERNAL MEDICINE

## 2017-07-05 PROCEDURE — 91037 ESOPH IMPED FUNCTION TEST: CPT | Performed by: INTERNAL MEDICINE

## 2017-07-05 PROCEDURE — 91010 ESOPHAGUS MOTILITY STUDY: CPT | Performed by: INTERNAL MEDICINE

## 2017-07-05 PROCEDURE — 88305 TISSUE EXAM BY PATHOLOGIST: CPT | Mod: 26,,, | Performed by: PATHOLOGY

## 2017-07-05 PROCEDURE — 81025 URINE PREGNANCY TEST: CPT | Performed by: INTERNAL MEDICINE

## 2017-07-05 PROCEDURE — 27201012 HC FORCEPS, HOT/COLD, DISP: Performed by: INTERNAL MEDICINE

## 2017-07-05 PROCEDURE — 25000003 PHARM REV CODE 250: Performed by: NURSE ANESTHETIST, CERTIFIED REGISTERED

## 2017-07-05 PROCEDURE — 25000003 PHARM REV CODE 250: Performed by: INTERNAL MEDICINE

## 2017-07-05 PROCEDURE — 37000008 HC ANESTHESIA 1ST 15 MINUTES: Performed by: INTERNAL MEDICINE

## 2017-07-05 PROCEDURE — 43239 EGD BIOPSY SINGLE/MULTIPLE: CPT | Performed by: INTERNAL MEDICINE

## 2017-07-05 PROCEDURE — 43239 EGD BIOPSY SINGLE/MULTIPLE: CPT | Mod: ,,, | Performed by: INTERNAL MEDICINE

## 2017-07-05 RX ORDER — SODIUM CHLORIDE, SODIUM LACTATE, POTASSIUM CHLORIDE, CALCIUM CHLORIDE 600; 310; 30; 20 MG/100ML; MG/100ML; MG/100ML; MG/100ML
INJECTION, SOLUTION INTRAVENOUS CONTINUOUS PRN
Status: DISCONTINUED | OUTPATIENT
Start: 2017-07-05 | End: 2017-07-05

## 2017-07-05 RX ORDER — LIDOCAINE HCL/PF 100 MG/5ML
SYRINGE (ML) INTRAVENOUS
Status: DISCONTINUED | OUTPATIENT
Start: 2017-07-05 | End: 2017-07-05

## 2017-07-05 RX ORDER — PROPOFOL 10 MG/ML
INJECTION, EMULSION INTRAVENOUS
Status: DISCONTINUED | OUTPATIENT
Start: 2017-07-05 | End: 2017-07-05

## 2017-07-05 RX ORDER — GLYCOPYRROLATE 0.2 MG/ML
INJECTION INTRAMUSCULAR; INTRAVENOUS
Status: DISCONTINUED | OUTPATIENT
Start: 2017-07-05 | End: 2017-07-05

## 2017-07-05 RX ORDER — SODIUM CHLORIDE, SODIUM LACTATE, POTASSIUM CHLORIDE, CALCIUM CHLORIDE 600; 310; 30; 20 MG/100ML; MG/100ML; MG/100ML; MG/100ML
INJECTION, SOLUTION INTRAVENOUS CONTINUOUS
Status: DISCONTINUED | OUTPATIENT
Start: 2017-07-05 | End: 2017-07-05 | Stop reason: HOSPADM

## 2017-07-05 RX ADMIN — SODIUM CHLORIDE, SODIUM LACTATE, POTASSIUM CHLORIDE, AND CALCIUM CHLORIDE: 600; 310; 30; 20 INJECTION, SOLUTION INTRAVENOUS at 07:07

## 2017-07-05 RX ADMIN — PROPOFOL 40 MG: 10 INJECTION, EMULSION INTRAVENOUS at 08:07

## 2017-07-05 RX ADMIN — LIDOCAINE HYDROCHLORIDE 50 MG: 20 INJECTION, SOLUTION INTRAVENOUS at 07:07

## 2017-07-05 RX ADMIN — GLYCOPYRROLATE 0.2 MG: 0.2 INJECTION INTRAMUSCULAR; INTRAVENOUS at 07:07

## 2017-07-05 RX ADMIN — PROPOFOL 30 MG: 10 INJECTION, EMULSION INTRAVENOUS at 08:07

## 2017-07-05 RX ADMIN — PROPOFOL 40 MG: 10 INJECTION, EMULSION INTRAVENOUS at 07:07

## 2017-07-05 RX ADMIN — PROPOFOL 80 MG: 10 INJECTION, EMULSION INTRAVENOUS at 07:07

## 2017-07-05 NOTE — DISCHARGE INSTRUCTIONS
Gastritis (Adult)    Gastritis is inflammation and irritation of the stomach lining. It can be present for a short time (acute) or be long lasting (chronic). Gastritis is often caused by infection with bacteria called H pylori. More than a third of people in the [place] have this bacteria in their bodies. In many cases, H pylori causes no problems or symptoms. In some people, though, the infection irritates the stomach lining and causes gastritis. Other causes of stomach irritation include drinking alcohol or taking pain-relieving medicines called NSAIDs (such as aspirin or ibuprofen).   Symptoms of gastritis can include:  · Abdominal pain or bloating  · Loss of appetite  · Nausea or vomiting  · Vomiting blood or having black stools  · Feeling more tired than usual  An inflamed and irritated stomach lining is more likely to develop a sore called an ulcer. To help prevent this, gastritis should be treated.  Home care  If needed, medicines may be prescribed. If you have H pylori infection, treating it will likely relieve your symptoms. Other changes can help reduce stomach irritation and help it heal.  · If you have been prescribed medicines for H pylori infection, take them as directed. Take all of the medicine until it is finished or your healthcare provider tells you to stop, even if you feel better.  · Your healthcare provider may recommend avoiding NSAIDs. If you take daily aspirin for your heart or other medical reasons, do not stop without talking to your healthcare provider first.  · Avoid drinking alcohol.  · Stop smoking. Smoking can irritate the stomach and delay healing. As much as possible, stay away from second hand smoke.  Follow-up care  Follow up with your healthcare provider, or as advised by our staff. Testing may be needed to check for inflammation or an ulcer.  When to seek medical advice  Call your healthcare provider for any of the following:  · Stomach pain that gets worse or moves to the lower  right abdomen (appendix area)  · Chest pain that appears or gets worse, or spreads to the back, neck, shoulder, or arm  · Frequent vomiting (cant keep down liquids)  · Blood in the stool or vomit (red or black in color)  · Feeling weak or dizzy  · Fever of 100.4ºF (38ºC) or higher, or as directed by your healthcare provider  Date Last Reviewed: 6/22/2015  © 6026-2673 [employer] [address] [place] [address]. All rights reserved. This information is not intended as a substitute for professional medical care. Always follow your healthcare professional's instructions.

## 2017-07-05 NOTE — ANESTHESIA POSTPROCEDURE EVALUATION
"Anesthesia Post Evaluation    Patient: Sonia Melo    Procedure(s) Performed: Procedure(s) (LRB):  ESOPHAGOGASTRODUODENOSCOPY (EGD) (N/A)  MANOMETRY-ESOPHAGEAL-HIGH RESOLUTION (N/A)    Final Anesthesia Type: MAC  Patient location during evaluation: PACU  Patient participation: Yes- Able to Participate  Level of consciousness: awake, awake and alert and oriented  Post-procedure vital signs: reviewed and stable  Pain management: adequate  Airway patency: patent  PONV status at discharge: No PONV  Anesthetic complications: no      Cardiovascular status: blood pressure returned to baseline  Respiratory status: spontaneous ventilation, unassisted and room air  Hydration status: euvolemic  Follow-up not needed.        Visit Vitals  /71 (BP Location: Left arm, Patient Position: Lying, BP Method: Automatic)   Pulse 77   Temp 36.7 °C (98 °F) (Oral)   Resp 18   Ht 5' 3" (1.6 m)   Wt 58.1 kg (128 lb)   LMP 06/21/2017 (Exact Date)   SpO2 99%   Breastfeeding? No   BMI 22.67 kg/m²       Pain/Thanh Score: Pain Assessment Performed: Yes (7/5/2017  7:17 AM)  Presence of Pain: denies (7/5/2017  7:17 AM)      "

## 2017-07-05 NOTE — INTERVAL H&P NOTE
The patient has been examined and the H&P has been reviewed:  Family History   Problem Relation Age of Onset    Hypertension Mother     Hypertension Father     Heart disease Father     Diabetes Maternal Grandmother     Cancer Maternal Uncle      stomach cancer     Past Medical History:   Diagnosis Date    Fibromyalgia     Hyperthyroidism     Iatrogenic hypothyroidism     Undifferentiated connective tissue disease      Past Surgical History:   Procedure Laterality Date    BREAST IMPLANTS      STAPEDES SURGERY      sMart Stapedes Piston (Safe to 3T magnet)     Social History     Social History    Marital status:      Spouse name: N/A    Number of children: 2    Years of education: N/A     Occupational History     [employer]     Social History Main Topics    Smoking status: Never Smoker    Smokeless tobacco: Never Used    Alcohol use 0.0 oz/week      Comment: socially     Drug use: No    Sexual activity: Yes     Partners: Male     Birth control/ protection: None     Other Topics Concern    Not on file     Social History Narrative    No narrative on file     Review of patient's allergies indicates:   Allergen Reactions    Latex, natural rubber Rash     No current facility-administered medications on file prior to encounter.      Current Outpatient Prescriptions on File Prior to Encounter   Medication Sig Dispense Refill    albuterol 90 mcg/actuation inhaler Inhale 2 puffs into the lungs every 4 (four) hours as needed for Wheezing or Shortness of Breath (Or Cough). 1 Inhaler 0    benzonatate (TESSALON) 200 MG capsule Take 1 capsule (200 mg total) by mouth 3 (three) times daily as needed for Cough. 21 capsule 0    esomeprazole (NEXIUM) 40 MG capsule Take 1 capsule (40 mg total) by mouth 2 (two) times daily before meals. 60 capsule 0    hydroxychloroquine (PLAQUENIL) 200 mg tablet Take 1 tablet (200 mg total) by mouth 2 (two) times daily. 180 tablet 2    levothyroxine  (SYNTHROID) 112 MCG tablet Take 1 tablet (112 mcg total) by mouth once daily. 90 tablet 1    multivitamin (ONE DAILY MULTIVITAMIN) per tablet Take 1 tablet by mouth once daily.      penciclovir (DENAVIR) 1 % cream Apply topically every 2 (two) hours. 1.5 g 2    solifenacin (VESICARE) 10 MG tablet Take by mouth once daily.             Anesthesia/Surgery risks, benefits and alternative options discussed and understood by patient/family.          Active Hospital Problems    Diagnosis  POA    Dysphagia [R13.10]  Yes      Resolved Hospital Problems    Diagnosis Date Resolved POA   No resolved problems to display.

## 2017-07-05 NOTE — DISCHARGE SUMMARY
[facility]  Brief Operative Note     SUMMARY     Surgery Date: 7/5/2017     Surgeon(s) and Role:     * Roshan Chopra III, MD - Primary    Assisting Surgeon: None    Pre-op Diagnosis:  Scleroderma [M34.9]  Esophageal dysphagia [R13.14]    Post-op Diagnosis:  Post-Op Diagnosis Codes:     * Scleroderma [M34.9]     * Esophageal dysphagia [R13.14]      - Gastritis  Procedure(s) (LRB):  ESOPHAGOGASTRODUODENOSCOPY (EGD) (N/A)  MANOMETRY-ESOPHAGEAL-HIGH RESOLUTION (N/A)    Anesthesia: Monitor Anesthesia Care    Description of the findings of the procedure: Procedures completed. See Procedure note for full details.    Findings/Key Components: Procedures completed. See Procedure note for full details.    Prosthetics/Devices: None    Estimated Blood Loss: * No values recorded between 7/5/2017 12:00 AM and 7/5/2017  8:23 AM *         Specimens:   Specimen (12h ago through future)    Start     Ordered    07/05/17 0804  Specimen to Pathology - Surgery  Once     Comments:  1. Antrum biopsies- r/o H. Pylori, gastrits      07/05/17 0809          Discharge Note    SUMMARY     Admit Date: 7/5/2017    Discharge Date and Time: 7/5/2017    Hospital Course (synopsis of major diagnoses, care, treatment, and services provided during the course of the hospital stay):  Procedures completed. See Procedure note for full details. Discharge patient when discharge criteria met.    Final Diagnosis: Post-Op Diagnosis Codes:     * Scleroderma [M34.9]     * Esophageal dysphagia [R13.14]     - Gastritis  Disposition: Discharge patient when discharge criteria met.    Follow Up/Patient Instructions:       Medications:  Reconciled Home Medications: Current Discharge Medication List      CONTINUE these medications which have NOT CHANGED    Details   albuterol 90 mcg/actuation inhaler Inhale 2 puffs into the lungs every 4 (four) hours as needed for Wheezing or Shortness of Breath (Or Cough).  Qty: 1 Inhaler, Refills: 0      benzonatate  (TESSALON) 200 MG capsule Take 1 capsule (200 mg total) by mouth 3 (three) times daily as needed for Cough.  Qty: 21 capsule, Refills: 0    Associated Diagnoses: Cough      esomeprazole (NEXIUM) 40 MG capsule Take 1 capsule (40 mg total) by mouth 2 (two) times daily before meals.  Qty: 60 capsule, Refills: 0    Associated Diagnoses: Nausea      hydroxychloroquine (PLAQUENIL) 200 mg tablet Take 1 tablet (200 mg total) by mouth 2 (two) times daily.  Qty: 180 tablet, Refills: 2      levothyroxine (SYNTHROID) 112 MCG tablet Take 1 tablet (112 mcg total) by mouth once daily.  Qty: 90 tablet, Refills: 1    Associated Diagnoses: Hypothyroidism, unspecified type      multivitamin (ONE DAILY MULTIVITAMIN) per tablet Take 1 tablet by mouth once daily.      penciclovir (DENAVIR) 1 % cream Apply topically every 2 (two) hours.  Qty: 1.5 g, Refills: 2    Associated Diagnoses: Routine general medical examination at a health care facility; Hypothyroidism due to acquired atrophy of thyroid; Mixed hyperlipidemia      solifenacin (VESICARE) 10 MG tablet Take by mouth once daily.              Discharge Procedure Orders  Diet general     Activity as tolerated

## 2017-07-05 NOTE — INTERVAL H&P NOTE
The patient has been examined and the H&P has been reviewed:I have reviewed this note and I agree with this assessment. The patient was seen in the GI office and remains stable for endoscopy at the time of this present evaluation.         Anesthesia/Surgery risks, benefits and alternative options discussed and understood by patient/family.          Active Hospital Problems    Diagnosis  POA    Dysphagia [R13.10]  Yes      Resolved Hospital Problems    Diagnosis Date Resolved POA   No resolved problems to display.

## 2017-07-05 NOTE — OR NURSING
Manometry probe placed during EGD. Pt finished with recovery, will begin manometry.     Manometry probe [id number] inserted into pt's R nares and advanced to position while asleep during EGD. Pt tolerated insertion well, no immediate complications noted. Procedure was completed. Manometry probe was then removed, intact, and no immediate complications noted. Pt tolerated procedure well.

## 2017-07-05 NOTE — TRANSFER OF CARE
"Anesthesia Transfer of Care Note    Patient: Sonia Melo    Procedure(s) Performed: Procedure(s) (LRB):  ESOPHAGOGASTRODUODENOSCOPY (EGD) (N/A)  MANOMETRY-ESOPHAGEAL-HIGH RESOLUTION (N/A)    Patient location: PACU    Anesthesia Type: MAC    Transport from OR: Transported from OR on room air with adequate spontaneous ventilation    Post pain: adequate analgesia    Post assessment: no apparent anesthetic complications    Post vital signs: stable    Level of consciousness: responds to stimulation    Nausea/Vomiting: no nausea/vomiting    Complications: none    Transfer of care protocol was followed      Last vitals:   Visit Vitals  /71 (BP Location: Left arm, Patient Position: Lying, BP Method: Automatic)   Pulse 77   Temp 36.7 °C (98 °F) (Oral)   Resp 18   Ht 5' 3" (1.6 m)   Wt 58.1 kg (128 lb)   LMP 06/21/2017 (Exact Date)   SpO2 99%   Breastfeeding? No   BMI 22.67 kg/m²     "

## 2017-07-05 NOTE — ANESTHESIA PREPROCEDURE EVALUATION
07/05/2017  Sonia Melo is a 47 y.o., female.    Anesthesia Evaluation    I have reviewed the Patient Summary Reports.    I have reviewed the Nursing Notes.   I have reviewed the Medications.     Review of Systems  Anesthesia Hx:  No problems with previous Anesthesia  Denies Family Hx of Anesthesia complications.   Denies Personal Hx of Anesthesia complications.   Social:  Non-Smoker    Hematology/Oncology:  Hematology Normal   Oncology Normal     EENT/Dental:EENT/Dental Normal   Cardiovascular:   Exercise tolerance: good ECG has been reviewed.    Pulmonary:  Pulmonary Normal    Renal/:  Renal/ Normal     Hepatic/GI:  Hepatic/GI Normal    Musculoskeletal:   Arthritis     Neurological:  Neurology Normal    Endocrine:   Hypothyroidism    Dermatological:  Skin Normal    Psych:  Psychiatric Normal           Physical Exam  General:  Well nourished    Airway/Jaw/Neck:  Airway Findings: Mouth Opening: Normal Tongue: Normal  General Airway Assessment: Adult, Average  Mallampati: II  TM Distance: Normal, at least 6 cm      Dental:  Dental Findings: In tact   Chest/Lungs:  Chest/Lungs Findings: Clear to auscultation, Normal Respiratory Rate     Heart/Vascular:  Heart Findings: Rate: Normal  Rhythm: Regular Rhythm  Sounds: Normal        Mental Status:  Mental Status Findings:  Cooperative, Alert and Oriented         Anesthesia Plan  Type of Anesthesia, risks & benefits discussed:  Anesthesia Type:  MAC  Patient's Preference:   Intra-op Monitoring Plan:   Intra-op Monitoring Plan Comments:   Post Op Pain Control Plan:   Post Op Pain Control Plan Comments:   Induction:   IV  Beta Blocker:  Patient is not currently on a Beta-Blocker (No further documentation required).       Informed Consent: Patient understands risks and agrees with Anesthesia plan.  Questions answered. Anesthesia consent signed with  patient.  ASA Score: 2     Day of Surgery Review of History & Physical: I have interviewed and examined the patient. I have reviewed the patient's H&P dated: 7/5/17. There are no significant changes.  H&P update referred to the surgeon.         Ready For Surgery From Anesthesia Perspective.

## 2017-07-05 NOTE — ANESTHESIA RELEASE NOTE
"Anesthesia Release from PACU Note    Patient: Sonia Melo    Procedure(s) Performed: Procedure(s) (LRB):  ESOPHAGOGASTRODUODENOSCOPY (EGD) (N/A)  MANOMETRY-ESOPHAGEAL-HIGH RESOLUTION (N/A)    Anesthesia type: MAC    Post pain: Adequate analgesia    Post assessment: no apparent anesthetic complications, tolerated procedure well and no evidence of recall    Last Vitals:   Visit Vitals  /71 (BP Location: Left arm, Patient Position: Lying, BP Method: Automatic)   Pulse 77   Temp 36.7 °C (98 °F) (Oral)   Resp 18   Ht 5' 3" (1.6 m)   Wt 58.1 kg (128 lb)   LMP 06/21/2017 (Exact Date)   SpO2 99%   Breastfeeding? No   BMI 22.67 kg/m²       Post vital signs: stable    Level of consciousness: responds to stimulation    Nausea/Vomiting: no nausea/no vomiting    Complications: none    Airway Patency: patent    Respiratory: unassisted    Cardiovascular: stable and blood pressure at baseline    Hydration: euvolemic  "

## 2017-07-06 PROCEDURE — 91010 ESOPHAGUS MOTILITY STUDY: CPT | Mod: 26,,, | Performed by: INTERNAL MEDICINE

## 2017-07-11 ENCOUNTER — PATIENT MESSAGE (OUTPATIENT)
Dept: GASTROENTEROLOGY | Facility: CLINIC | Age: 47
End: 2017-07-11

## 2017-07-13 ENCOUNTER — PATIENT MESSAGE (OUTPATIENT)
Dept: GASTROENTEROLOGY | Facility: CLINIC | Age: 47
End: 2017-07-13

## 2017-07-17 ENCOUNTER — PATIENT MESSAGE (OUTPATIENT)
Dept: INTERNAL MEDICINE | Facility: CLINIC | Age: 47
End: 2017-07-17

## 2017-07-17 DIAGNOSIS — M50.30 DDD (DEGENERATIVE DISC DISEASE), CERVICAL: ICD-10-CM

## 2017-07-17 DIAGNOSIS — R13.19 ESOPHAGEAL DYSPHAGIA: ICD-10-CM

## 2017-07-17 DIAGNOSIS — M35.9 UNDIFFERENTIATED CONNECTIVE TISSUE DISEASE: Primary | Chronic | ICD-10-CM

## 2017-07-17 DIAGNOSIS — M47.817 OSTEOARTHRITIS OF LUMBOSACRAL SPINE WITHOUT MYELOPATHY: ICD-10-CM

## 2017-07-17 DIAGNOSIS — M47.812 OSTEOARTHRITIS OF CERVICAL SPINE WITHOUT MYELOPATHY: ICD-10-CM

## 2017-07-20 ENCOUNTER — PATIENT MESSAGE (OUTPATIENT)
Dept: GASTROENTEROLOGY | Facility: CLINIC | Age: 47
End: 2017-07-20

## 2017-07-27 ENCOUNTER — LAB VISIT (OUTPATIENT)
Dept: LAB | Facility: HOSPITAL | Age: 47
End: 2017-07-27
Attending: NURSE PRACTITIONER
Payer: COMMERCIAL

## 2017-07-27 ENCOUNTER — OFFICE VISIT (OUTPATIENT)
Dept: OBSTETRICS AND GYNECOLOGY | Facility: CLINIC | Age: 47
End: 2017-07-27
Payer: COMMERCIAL

## 2017-07-27 VITALS
HEIGHT: 63 IN | DIASTOLIC BLOOD PRESSURE: 70 MMHG | BODY MASS INDEX: 22.73 KG/M2 | SYSTOLIC BLOOD PRESSURE: 120 MMHG | WEIGHT: 128.31 LBS

## 2017-07-27 DIAGNOSIS — R10.2 PELVIC PAIN IN FEMALE: ICD-10-CM

## 2017-07-27 DIAGNOSIS — M35.9 UNDIFFERENTIATED CONNECTIVE TISSUE DISEASE: ICD-10-CM

## 2017-07-27 DIAGNOSIS — N92.6 IRREGULAR MENSES: Primary | ICD-10-CM

## 2017-07-27 DIAGNOSIS — N92.6 IRREGULAR MENSES: ICD-10-CM

## 2017-07-27 LAB
ALBUMIN SERPL BCP-MCNC: 4 G/DL
ALP SERPL-CCNC: 54 U/L
ALT SERPL W/O P-5'-P-CCNC: 13 U/L
ANION GAP SERPL CALC-SCNC: 9 MMOL/L
AST SERPL-CCNC: 20 U/L
BASOPHILS # BLD AUTO: 0.03 K/UL
BASOPHILS NFR BLD: 0.4 %
BILIRUB SERPL-MCNC: 0.6 MG/DL
BUN SERPL-MCNC: 16 MG/DL
CALCIUM SERPL-MCNC: 9.7 MG/DL
CHLORIDE SERPL-SCNC: 105 MMOL/L
CO2 SERPL-SCNC: 26 MMOL/L
CREAT SERPL-MCNC: 0.7 MG/DL
DIFFERENTIAL METHOD: ABNORMAL
EOSINOPHIL # BLD AUTO: 0.2 K/UL
EOSINOPHIL NFR BLD: 2.6 %
ERYTHROCYTE [DISTWIDTH] IN BLOOD BY AUTOMATED COUNT: 12.6 %
EST. GFR  (AFRICAN AMERICAN): >60 ML/MIN/1.73 M^2
EST. GFR  (NON AFRICAN AMERICAN): >60 ML/MIN/1.73 M^2
GLUCOSE SERPL-MCNC: 101 MG/DL
HCG INTACT+B SERPL-ACNC: <2 MIU/ML
HCT VFR BLD AUTO: 37.4 %
HGB BLD-MCNC: 12.7 G/DL
LYMPHOCYTES # BLD AUTO: 2.2 K/UL
LYMPHOCYTES NFR BLD: 32.2 %
MCH RBC QN AUTO: 31.6 PG
MCHC RBC AUTO-ENTMCNC: 34 G/DL
MCV RBC AUTO: 93 FL
MONOCYTES # BLD AUTO: 0.4 K/UL
MONOCYTES NFR BLD: 6 %
NEUTROPHILS # BLD AUTO: 4.1 K/UL
NEUTROPHILS NFR BLD: 58.8 %
PLATELET # BLD AUTO: 222 K/UL
PMV BLD AUTO: 8.9 FL
POTASSIUM SERPL-SCNC: 4.8 MMOL/L
PROT SERPL-MCNC: 7.5 G/DL
RBC # BLD AUTO: 4.02 M/UL
SODIUM SERPL-SCNC: 140 MMOL/L
T3 SERPL-MCNC: 73 NG/DL
T4 FREE SERPL-MCNC: 1.14 NG/DL
TSH SERPL DL<=0.005 MIU/L-ACNC: 11.79 UIU/ML
WBC # BLD AUTO: 6.89 K/UL

## 2017-07-27 PROCEDURE — 36415 COLL VENOUS BLD VENIPUNCTURE: CPT | Mod: PO

## 2017-07-27 PROCEDURE — 84480 ASSAY TRIIODOTHYRONINE (T3): CPT

## 2017-07-27 PROCEDURE — 84439 ASSAY OF FREE THYROXINE: CPT

## 2017-07-27 PROCEDURE — 85025 COMPLETE CBC W/AUTO DIFF WBC: CPT | Mod: PO

## 2017-07-27 PROCEDURE — 80053 COMPREHEN METABOLIC PANEL: CPT | Mod: PO

## 2017-07-27 PROCEDURE — 84702 CHORIONIC GONADOTROPIN TEST: CPT | Mod: PO

## 2017-07-27 PROCEDURE — 99203 OFFICE O/P NEW LOW 30 MIN: CPT | Mod: S$GLB,,, | Performed by: NURSE PRACTITIONER

## 2017-07-27 PROCEDURE — 99999 PR PBB SHADOW E&M-EST. PATIENT-LVL III: CPT | Mod: PBBFAC,,, | Performed by: NURSE PRACTITIONER

## 2017-07-27 PROCEDURE — 84443 ASSAY THYROID STIM HORMONE: CPT

## 2017-07-27 NOTE — PROGRESS NOTES
"  Sonia Melo is a 47 y.o. female  presents for irregular bleeding for this month of July - will bleed for few days and then stop, then start again.  Now seems to be bleeding heavy about time of her cycle. Never had this issue in past - if has thyroid issues typically does not bleed.  Having some pelvic pain with movement.  Was diagnosed with a ovarian cyst in December after having a u/s due to having an accident.  Having similar pain.  Not on birth control.  Using timing and lubrication to prevent pregnancy.     LMP: Patient's last menstrual period was 2017..        Past Medical History:   Diagnosis Date    Fibromyalgia     Hyperthyroidism     Iatrogenic hypothyroidism     Undifferentiated connective tissue disease      Past Surgical History:   Procedure Laterality Date    BREAST IMPLANTS      STAPEDES SURGERY      [employer] Stapedes Piston (Safe to 3T magnet)     Social History     Social History    Marital status:      Spouse name: N/A    Number of children: 2    Years of education: N/A     Occupational History     [employer]     Social History Main Topics    Smoking status: Never Smoker    Smokeless tobacco: Never Used    Alcohol use 0.0 oz/week      Comment: socially     Drug use: No    Sexual activity: Yes     Partners: Male     Birth control/ protection: None     Other Topics Concern    Not on file     Social History Narrative    No narrative on file     Family History   Problem Relation Age of Onset    Hypertension Mother     Hypertension Father     Heart disease Father     Diabetes Maternal Grandmother     Cancer Maternal Uncle      stomach cancer     OB History      Para Term  AB Living    2         2    SAB TAB Ectopic Multiple Live Births            2          /70 (BP Location: Left arm)   Ht 5' 3" (1.6 m)   Wt 58.2 kg (128 lb 4.9 oz)   LMP 2017   BMI 22.73 kg/m²       ROS:  GENERAL: Denies weight gain or weight loss. " Feeling well overall.   SKIN: Denies rash or lesions.   HEAD: Denies head injury or headache.   NODES: Denies enlarged lymph nodes.   CHEST: Denies chest pain or shortness of breath.   CARDIOVASCULAR: Denies palpitations or left sided chest pain.   ABDOMEN: No abdominal pain, constipation, diarrhea, nausea, vomiting or rectal bleeding.   URINARY: No frequency, dysuria, hematuria, or burning on urination.  REPRODUCTIVE: See HPI.   BREASTS: The patient performs breast self-examination and denies pain, lumps, or nipple discharge.   HEMATOLOGIC: No easy bruisability or excessive bleeding.   MUSCULOSKELETAL: Denies joint pain or swelling.   NEUROLOGIC: Denies syncope or weakness.   PSYCHIATRIC: Denies depression, anxiety or mood swings.    PHYSICAL EXAM:  APPEARANCE: Well nourished, well developed, in no acute distress.  AFFECT: WNL, alert and oriented x 3  SKIN: No acne or hirsutism  Deferred today - will see back in 1 week hopefully cycle will be off to do exam and collect nathanael  Physical Exam    1. Irregular menses  hCG, quantitative    CBC auto differential    TSH    T4, free    T3    US Pelvis Comp with Transvag NON-OB (xpd   2. Pelvic pain in female  hCG, quantitative    CBC auto differential    TSH    T4, free    T3    US Pelvis Comp with Transvag NON-OB (xpd    AND PLAN:    Patient was counseled today on A.C.S. Pap guidelines and recommendations for yearly pelvic exams, mammograms and monthly self breast exams; to see her PCP for other health maintenance.     Check labs  Check u/s and see back in one week

## 2017-07-28 ENCOUNTER — TELEPHONE (OUTPATIENT)
Dept: OBSTETRICS AND GYNECOLOGY | Facility: CLINIC | Age: 47
End: 2017-07-28

## 2017-07-28 DIAGNOSIS — E03.2 IATROGENIC HYPOTHYROIDISM: Primary | ICD-10-CM

## 2017-07-28 RX ORDER — LEVOTHYROXINE SODIUM 150 UG/1
150 TABLET ORAL DAILY
Qty: 30 TABLET | Refills: 11 | Status: SHIPPED | OUTPATIENT
Start: 2017-07-28 | End: 2017-08-24

## 2017-07-28 NOTE — TELEPHONE ENCOUNTER
Pt aware increasing her synthroid to 150 mcg due to tsh is 11.7    Please give her a call to set up an appt to see Dr. Starr in endocrine ASAP

## 2017-08-01 ENCOUNTER — OFFICE VISIT (OUTPATIENT)
Dept: ENDOCRINOLOGY | Facility: CLINIC | Age: 47
End: 2017-08-01
Payer: COMMERCIAL

## 2017-08-01 VITALS
SYSTOLIC BLOOD PRESSURE: 120 MMHG | HEART RATE: 74 BPM | TEMPERATURE: 98 F | BODY MASS INDEX: 21.87 KG/M2 | DIASTOLIC BLOOD PRESSURE: 80 MMHG | HEIGHT: 63 IN | WEIGHT: 123.44 LBS

## 2017-08-01 DIAGNOSIS — E03.9 ACQUIRED HYPOTHYROIDISM: Primary | ICD-10-CM

## 2017-08-01 PROCEDURE — 99999 PR PBB SHADOW E&M-EST. PATIENT-LVL III: CPT | Mod: PBBFAC,,, | Performed by: INTERNAL MEDICINE

## 2017-08-01 PROCEDURE — 99204 OFFICE O/P NEW MOD 45 MIN: CPT | Mod: S$GLB,,, | Performed by: INTERNAL MEDICINE

## 2017-08-01 RX ORDER — IBUPROFEN 200 MG
200 TABLET ORAL
COMMUNITY
End: 2017-12-29

## 2017-08-01 NOTE — PROGRESS NOTES
""This note will be shared with the patient"Subjective:       Patient ID: Sonia Melo is a 47 y.o. female.  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    Previous radiology tests:  Thyroid ultrasound : unknown  NM uptake and scan: unknown    Previous thyroid surgery: No      Thyroid symptoms:fatigue- however seeing Dr Baxter in Rheumatology at [facility] who aslo had been checking TFTs- pt has undifferentiated connective tissue disease and + MARYAM, fibromyalgia and carpal tunnel syndrome      Thyroid medications: synthroid      Takes medication appropriately: No-takes name brand Synthroid with peanut butter and coffee every day and also taking multivitamin and TUMS cclose to her Synthroid      In review of past thyroid function tests has had multiple varying TSH levels, sometimes high and sometimes low, most recent one shows TSH of 11  Her gynecologist increased her Synthroid 112 to 150 µg    Review of Systems   Constitutional: Positive for fatigue. Negative for appetite change, fever and unexpected weight change.   HENT: Positive for trouble swallowing. Negative for sore throat.    Eyes: Negative for visual disturbance.   Respiratory: Negative for shortness of breath and wheezing.    Cardiovascular: Negative for chest pain, palpitations and leg swelling.   Gastrointestinal: Negative for diarrhea, nausea and vomiting.   Endocrine: Negative for cold intolerance, heat intolerance, polydipsia, polyphagia and polyuria.   Genitourinary: Negative for difficulty urinating, dysuria and menstrual problem.   Musculoskeletal: Positive for arthralgias and neck pain. Negative for joint swelling.        Muscle cramps   Skin: Negative for rash.   Neurological: Negative for dizziness, weakness, numbness and headaches.   Psychiatric/Behavioral: Negative for confusion, dysphoric mood and sleep " "disturbance.       Objective:      Physical Exam   Constitutional: She is oriented to person, place, and time. She appears well-developed and well-nourished. No distress.   HENT:   Head: Normocephalic and atraumatic.   Right Ear: External ear normal.   Left Ear: External ear normal.   Nose: Nose normal.   Mouth/Throat: Oropharynx is clear and moist. No oropharyngeal exudate.   Eyes: Conjunctivae and EOM are normal. Pupils are equal, round, and reactive to light. No scleral icterus.   Neck: No JVD present. No tracheal deviation present. No thyromegaly present.   Cardiovascular: Normal rate, regular rhythm, normal heart sounds and intact distal pulses.  Exam reveals no gallop and no friction rub.    No murmur heard.  Pulmonary/Chest: Effort normal and breath sounds normal. No respiratory distress. She has no wheezes. She has no rales.   Abdominal: Soft. Bowel sounds are normal. She exhibits no distension and no mass. There is no tenderness. There is no rebound and no guarding. No hernia.   Musculoskeletal: She exhibits no edema or deformity.   Lymphadenopathy:     She has no cervical adenopathy.   Neurological: She is alert and oriented to person, place, and time. She has normal reflexes. No cranial nerve deficit.   Skin: Skin is warm. No rash noted. No erythema.   Psychiatric: She has a normal mood and affect. Her behavior is normal.   Vitals reviewed.        Lab Review:   Lab Results   Component Value Date    TSH 11.793 (H) 07/27/2017    TSH 0.238 (L) 05/30/2016    TSH 4.306 (H) 04/29/2016    TSH 0.531 07/09/2015     Lab Results   Component Value Date    FREET4 1.14 07/27/2017    FREET4 1.48 05/30/2016    FREET4 1.23 04/29/2016    FREET4 1.38 07/09/2015     No components found for: FREET3      Appointment on 3/8/2017  [facility]")' href="[email or web address]/">3/8/2017   Appointment on 11/8/2016  [facility] " "[facility]")' href="[email or web address]/">11/8/2016   Appoint[facility] 8/24/2016  [facility]")' href="[email or web address]/">8/24/2016       Specimen: Serum - Vein")'>1.20   Specimen: Serum")'>1.4   Specimen: Serum")'>1.88 (H)   View Detailed Result Report  TSH  3/8/2017")' href="[id number]  Specimen: Serum - Vein")'>7.60 (H) View Detailed Result Report  TSH  11/8/2016")' href="[id number]  Specimen: Serum")'>2.66 View Detailed Result Report  TSH  8/24/2016")' href="[id number]/">  Specimen: Serum")'>0.23 (L)       Assessment:     1. Acquired hypothyroidism  TSH    T4, free    T3, free    Anti-thyroglobulin antibody    Thyroid peroxidase antibody        It is likely that patient is getting  Unpredictable absorption of Synthroid because she always takes it with food and coffee and also taking multivitamin and TUMS cclose to her Synthroid  She will start taking correctly. It may be that the 150 µg dose is too much.  She is to let me know if she gets  Hyperthyroid symptoms.  We will recheck TFTs in 3 weeks  Plan:   Acquired hypothyroidism  -     TSH; Future; Expected date: 08/22/2017  -     T4, free; Future; Expected date: 08/22/2017  -     T3, free; Future; Expected date: 08/22/2017  -     Anti-thyroglobulin antibody; Future; Expected date: 08/22/2017  -     Thyroid peroxidase antibody; Future; Expected date: 08/22/2017        Return in about 3 months (around 11/1/2017).     Thank you to Haylee Daigle for this consultation      "

## 2017-08-01 NOTE — LETTER
August 1, 2017      Haylee Daigle, NP  [address]  [place] [address]           [facility] - Endocrinology  [address]  [address]  [place] [address]  Phone: [phone]  Fax: [phone]          Patient: Sonia Melo   MR Number: [id number]   YOB: 1970   Date of Visit: 8/1/2017       Dear Haylee Daigle:    Thank you for referring Sonia Melo to me for evaluation. Attached you will find relevant portions of my assessment and plan of care.    If you have questions, please do not hesitate to call me. I look forward to following Sonia Melo along with you.    Sincerely,    Jess Starr MD    Enclosure  CC:  No Recipients    If you would like to receive this communication electronically, please contact [email or web address] or [phone] to request more information on [employer] Link access.    For providers and/or their staff who would like to refer a patient to [facility], please contact us through our one-stop-shop provider referral line, [facility] , at [phone].    If you feel you have received this communication in error or would no longer like to receive these types of communications, please e-mail [email or web address]

## 2017-08-03 ENCOUNTER — OFFICE VISIT (OUTPATIENT)
Dept: OBSTETRICS AND GYNECOLOGY | Facility: CLINIC | Age: 47
End: 2017-08-03
Payer: COMMERCIAL

## 2017-08-03 VITALS
HEIGHT: 63 IN | BODY MASS INDEX: 22.77 KG/M2 | SYSTOLIC BLOOD PRESSURE: 110 MMHG | WEIGHT: 128.5 LBS | DIASTOLIC BLOOD PRESSURE: 80 MMHG

## 2017-08-03 DIAGNOSIS — Z12.31 ENCOUNTER FOR SCREENING MAMMOGRAM FOR BREAST CANCER: ICD-10-CM

## 2017-08-03 DIAGNOSIS — Z01.419 ENCOUNTER FOR GYNECOLOGICAL EXAMINATION WITHOUT ABNORMAL FINDING: Primary | ICD-10-CM

## 2017-08-03 PROCEDURE — 99396 PREV VISIT EST AGE 40-64: CPT | Mod: S$GLB,,, | Performed by: NURSE PRACTITIONER

## 2017-08-03 PROCEDURE — 99999 PR PBB SHADOW E&M-EST. PATIENT-LVL III: CPT | Mod: PBBFAC,,, | Performed by: NURSE PRACTITIONER

## 2017-08-03 PROCEDURE — 88175 CYTOPATH C/V AUTO FLUID REDO: CPT

## 2017-08-03 NOTE — PROGRESS NOTES
"CC: Well woman exam    Sonia eMlo is a 47 y.o. female  presents for well woman exam.  LMP: Patient's last menstrual period was 2017..    Bleeding has stopped, u/s scheduled for 17, saw Dr. Starr - feels she is not taking synthroid correctly so poor absorption.      Past Medical History:   Diagnosis Date    Abnormal Pap smear of cervix     Fibromyalgia     Hyperthyroidism     Iatrogenic hypothyroidism     Undifferentiated connective tissue disease      Past Surgical History:   Procedure Laterality Date    BREAST IMPLANTS      STAPEDES SURGERY      sMart Stapedes Piston (Safe to 3T magnet)     Social History     Social History    Marital status:      Spouse name: N/A    Number of children: 2    Years of education: N/A     Occupational History     [employer]     Social History Main Topics    Smoking status: Never Smoker    Smokeless tobacco: Never Used    Alcohol use 0.0 oz/week      Comment: socially     Drug use: No    Sexual activity: Yes     Partners: Male     Birth control/ protection: None     Other Topics Concern    Not on file     Social History Narrative    No narrative on file     Family History   Problem Relation Age of Onset    Hypertension Mother     Hypertension Father     Heart disease Father     Cancer Maternal Uncle      stomach cancer     OB History      Para Term  AB Living    2         2    SAB TAB Ectopic Multiple Live Births            2          /80   Ht 5' 3" (1.6 m)   Wt 58.3 kg (128 lb 8.5 oz)   LMP 2017   BMI 22.77 kg/m²       ROS:  GENERAL: Denies weight gain or weight loss. Feeling well overall.   SKIN: Denies rash or lesions.   HEAD: Denies head injury or headache.   NODES: Denies enlarged lymph nodes.   CHEST: Denies chest pain or shortness of breath.   CARDIOVASCULAR: Denies palpitations or left sided chest pain.   ABDOMEN: No abdominal pain, constipation, diarrhea, nausea, vomiting or rectal " bleeding.   URINARY: No frequency, dysuria, hematuria, or burning on urination.  REPRODUCTIVE: See HPI.   BREASTS: The patient performs breast self-examination and denies pain, lumps, or nipple discharge.   HEMATOLOGIC: No easy bruisability or excessive bleeding.   MUSCULOSKELETAL: Denies joint pain or swelling.   NEUROLOGIC: Denies syncope or weakness.   PSYCHIATRIC: Denies depression, anxiety or mood swings.    PHYSICAL EXAM:  APPEARANCE: Well nourished, well developed, in no acute distress.  AFFECT: WNL, alert and oriented x 3  SKIN: No acne or hirsutism  NECK: Neck symmetric without masses or thyromegaly  NODES: No inguinal, cervical, axillary, or femoral lymph node enlargement  CHEST: Good respiratory effect  ABDOMEN: Soft.  No tenderness or masses.  No hepatosplenomegaly.  No hernias.  BREASTS: Symmetrical, no skin changes or visible lesions.  No palpable masses, nipple discharge bilaterally.  PELVIC: Normal external genitalia without lesions.  Normal hair distribution.  Adequate perineal body, normal urethral meatus.  Vagina moist and well rugated without lesions or discharge.  Cervix pink, without lesions, discharge or tenderness.  No significant cystocele or rectocele.  Bimanual exam shows uterus to be normal size, regular, mobile and nontender.  Adnexa without masses or tenderness.    EXTREMITIES: No edema.  Physical Exam    1. Encounter for gynecological examination without abnormal finding  Liquid-based pap smear, screening   2. Encounter for screening mammogram for breast cancer  Mammo Digital Screening Bilat with CAD    AND PLAN:    Patient was counseled today on A.C.S. Pap guidelines and recommendations for yearly pelvic exams, mammograms and monthly self breast exams; to see her PCP for other health maintenance.     See back in one year or pending u/s findings.

## 2017-08-04 ENCOUNTER — OFFICE VISIT (OUTPATIENT)
Dept: INTERNAL MEDICINE | Facility: CLINIC | Age: 47
End: 2017-08-04
Payer: COMMERCIAL

## 2017-08-04 VITALS
OXYGEN SATURATION: 99 % | HEART RATE: 73 BPM | TEMPERATURE: 98 F | RESPIRATION RATE: 16 BRPM | HEIGHT: 63 IN | BODY MASS INDEX: 22.5 KG/M2 | SYSTOLIC BLOOD PRESSURE: 106 MMHG | WEIGHT: 127 LBS | DIASTOLIC BLOOD PRESSURE: 80 MMHG

## 2017-08-04 DIAGNOSIS — R13.19 ESOPHAGEAL DYSPHAGIA: ICD-10-CM

## 2017-08-04 DIAGNOSIS — R05.3 PERSISTENT COUGH FOR 3 WEEKS OR LONGER: Primary | ICD-10-CM

## 2017-08-04 PROCEDURE — 99999 PR PBB SHADOW E&M-EST. PATIENT-LVL IV: CPT | Mod: PBBFAC,,, | Performed by: PHYSICIAN ASSISTANT

## 2017-08-04 PROCEDURE — 3008F BODY MASS INDEX DOCD: CPT | Mod: S$GLB,,, | Performed by: PHYSICIAN ASSISTANT

## 2017-08-04 PROCEDURE — 99213 OFFICE O/P EST LOW 20 MIN: CPT | Mod: S$GLB,,, | Performed by: PHYSICIAN ASSISTANT

## 2017-08-04 RX ORDER — AZITHROMYCIN 500 MG/1
500 TABLET, FILM COATED ORAL DAILY
Qty: 7 TABLET | Refills: 0 | Status: SHIPPED | OUTPATIENT
Start: 2017-08-04 | End: 2017-08-09 | Stop reason: DRUGHIGH

## 2017-08-04 RX ORDER — ESOMEPRAZOLE MAGNESIUM 40 MG/1
40 CAPSULE, DELAYED RELEASE ORAL
Qty: 30 CAPSULE | Refills: 11 | Status: SHIPPED | OUTPATIENT
Start: 2017-08-04 | End: 2017-08-22

## 2017-08-04 RX ORDER — PROMETHAZINE HYDROCHLORIDE AND DEXTROMETHORPHAN HYDROBROMIDE 6.25; 15 MG/5ML; MG/5ML
5 SYRUP ORAL EVERY 6 HOURS PRN
Qty: 118 ML | Refills: 1 | Status: SHIPPED | OUTPATIENT
Start: 2017-08-04 | End: 2017-08-09

## 2017-08-04 NOTE — PROGRESS NOTES
Subjective:       Patient ID: Sonia Melo is a 47 y.o.W/ female.    Chief Complaint: Nasal Congestion; Cough; and Anorexia    HPI         She comes in today by herself and has the above problems.  She has been having a cough now for 6 or more weeks that is very dry and very frequent and has occasional sputum come up.  It is not discolored it is usually clear to whitish in color.  She denies any problem with CP, pleurisy, dyspnea of any modality, excess fatigue, GI symptoms of nausea, vomiting, or diarrhea.  This has taken her appetite away.  She is a little worried that her chronic cough could be an early sign of scleroderma.  She has been seeing Dr. Chopra in gastroenterology and did have endoscopy done.  There were also biopsies taken but they were negative.  She is told that her manometric esophageal swallowing test were abnormal but they didn't tell her how to take care of this.  She's kind of unhappy with how she was informed of the results.  She wants to go back to gastroenterology but not to that particular physician.  The physician is fine it's his support staff she doesn't like.  She is supposed to be on Nexium but ran out.  She is not taking any antiacids because they supposedly interfere with taking her thyroid medicine.  Antiacids such as Tums have calcium and that is supposed to interfere with her Synthroid.        She has also been having cold symptoms now for about 2 weeks in addition to her cough.  She has picked up a little nasal congestion and PND.  She doesn't really have any sinus pressure or headaches with this.  She hasn't lost her sense of smell or taste.  There has been no sore throat or tender glands in her neck.  She is really not having any fever, sweats, chills, rigors, diaphoresis etc.    Review of Systems    Otherwise negative concerning the ENDOCRINE, ENT, RESPIRATORY, PULMONARY, GI and CV system review.    Objective:      Physical Exam   EARS: TMs are transparent and clear with  normal concavity.  There is no posterior fluid.  Canals are normal without swelling or redness and there is normal amount of wax present.  NOSE: Clear and open without any turbinate redness or edema.  There is no rhinorrhea or mucopurulence seen.  THROAT: Appears normal without any redness or swelling of the pharynx or soft palate.  There is no exudates or halitosis.  Glands are not tender and she has no cervical adenopathy.  CHEST: Clear BS anterior to posterior.  Intensity is normal.  There is no wheeze, rhonchi, or rales.  She's not in any respiratory distress.  ABDOMEN: Flat and thin with no tenderness.  Bowel sounds are normal.  No organomegaly or mass felt palpation percussion.  Her skin texture and color are all normal and she shows no sign of dehydration.     Assessment:       1. Persistent cough for 3 weeks or longer    2. Esophageal dysphagia        Plan:     1.  Recommended some antihistamine/decongestant of choice and also a product to loosen her chest congestion such as Mucinex DM 1200 mg.  She is to take that twice a day.  2.  Start on promethazine DM cough syrup because the Tessalon Perles did not stop of coughing.  3.  Also start her on Zithromax 500 mg ×7 days with food.  4.  Start on Gaviscon ES chewable tablets to take every 3 hours if needed for heartburn.  Refilled her Nexium.  5.  Make her a follow-up appointment with Dr. Haq in gastroenterology.  Follow-up here if cough persists more than 10 days.

## 2017-08-08 ENCOUNTER — TELEPHONE (OUTPATIENT)
Dept: RADIOLOGY | Facility: HOSPITAL | Age: 47
End: 2017-08-08

## 2017-08-08 ENCOUNTER — PATIENT MESSAGE (OUTPATIENT)
Dept: OBSTETRICS AND GYNECOLOGY | Facility: CLINIC | Age: 47
End: 2017-08-08

## 2017-08-09 ENCOUNTER — TELEPHONE (OUTPATIENT)
Dept: INTERNAL MEDICINE | Facility: CLINIC | Age: 47
End: 2017-08-09

## 2017-08-09 ENCOUNTER — OFFICE VISIT (OUTPATIENT)
Dept: INTERNAL MEDICINE | Facility: CLINIC | Age: 47
End: 2017-08-09
Payer: COMMERCIAL

## 2017-08-09 ENCOUNTER — HOSPITAL ENCOUNTER (OUTPATIENT)
Dept: RADIOLOGY | Facility: HOSPITAL | Age: 47
Discharge: HOME | End: 2017-08-09
Attending: NURSE PRACTITIONER
Payer: COMMERCIAL

## 2017-08-09 VITALS
BODY MASS INDEX: 22.46 KG/M2 | WEIGHT: 126.75 LBS | TEMPERATURE: 98 F | OXYGEN SATURATION: 98 % | HEIGHT: 63 IN | RESPIRATION RATE: 16 BRPM | SYSTOLIC BLOOD PRESSURE: 114 MMHG | HEART RATE: 82 BPM | DIASTOLIC BLOOD PRESSURE: 82 MMHG

## 2017-08-09 DIAGNOSIS — N92.6 IRREGULAR MENSES: ICD-10-CM

## 2017-08-09 DIAGNOSIS — R10.2 PELVIC PAIN IN FEMALE: ICD-10-CM

## 2017-08-09 DIAGNOSIS — R05.3 PERSISTENT COUGH FOR 3 WEEKS OR LONGER: Primary | ICD-10-CM

## 2017-08-09 PROCEDURE — 76856 US EXAM PELVIC COMPLETE: CPT | Mod: 26,,, | Performed by: RADIOLOGY

## 2017-08-09 PROCEDURE — 99213 OFFICE O/P EST LOW 20 MIN: CPT | Mod: S$GLB,,, | Performed by: PHYSICIAN ASSISTANT

## 2017-08-09 PROCEDURE — 3008F BODY MASS INDEX DOCD: CPT | Mod: S$GLB,,, | Performed by: PHYSICIAN ASSISTANT

## 2017-08-09 PROCEDURE — 76856 US EXAM PELVIC COMPLETE: CPT | Mod: TC,PO

## 2017-08-09 PROCEDURE — 76830 TRANSVAGINAL US NON-OB: CPT | Mod: 26,,, | Performed by: RADIOLOGY

## 2017-08-09 PROCEDURE — 86580 TB INTRADERMAL TEST: CPT | Mod: S$GLB,,, | Performed by: PHYSICIAN ASSISTANT

## 2017-08-09 PROCEDURE — 99999 PR PBB SHADOW E&M-EST. PATIENT-LVL IV: CPT | Mod: PBBFAC,,, | Performed by: PHYSICIAN ASSISTANT

## 2017-08-09 RX ORDER — AZITHROMYCIN 250 MG/1
TABLET, FILM COATED ORAL
Refills: 0 | COMMUNITY
Start: 2017-08-04 | End: 2017-08-22

## 2017-08-09 RX ORDER — HYDROCODONE POLISTIREX AND CHLORPHENIRAMINE POLISTIREX 10; 8 MG/5ML; MG/5ML
5 SUSPENSION, EXTENDED RELEASE ORAL EVERY 12 HOURS PRN
Qty: 115 ML | Refills: 0 | Status: SHIPPED | OUTPATIENT
Start: 2017-08-09 | End: 2017-08-22

## 2017-08-09 NOTE — PROGRESS NOTES
Subjective:       Patient ID: Sonia Melo is a 47 y.o.W/ female.    Chief Complaint: Cough (Non-productive, now with chest discomfort)    HPI         She comes in today by herself in follow-up from a visit on May 4 of August.  At that time she was seen for a chronic cough that she had for about 6 weeks.  She was placed on Zithromax 500 mg daily and also promethazine DM to suppress the coughing.  Neither medicine of helped at this point and she still having the same dry cough very frequently daytime and nighttime.  She has no other new symptoms to report at this time.  She is getting very frustrated and not being able to get rid of this cough after having seen several practitioners because of this problem.        She is had no exposure to anyone with tuberculosis and she's never had a tuberculosis test turned positive on her.  She still thinks this may be related to early onset scleroderma.  She has had all the collagen lab test ordered in the past and she did have a chest x-ray run the end of July that was negative.  She says she had a pulmonary function test done about a year ago but I can't find it in the EMR today.    Review of Systems    Otherwise negative concerning the ENT, RESPIRATORY, PULMONARY, CV, VASCULAR, NEUROLOGIC, and GI system review.    Objective:      Physical Exam    No change from previous exam.  Her chest sounds clear without any wheeze, rhonchi, or rales.  Intensity is normal.  She did cough several times here in the office the was very dry.  She did not get into any cough spasms.    Assessment:       1. Persistent cough for 3 weeks or longer        Plan:     1.  Will place a PPD on her forearm today and read it on Friday.  Also schedule her to have PFTs done with spirometry in the pulmonary section.  She has an appointment for tomorrow with her PCP Dr. Frey.  She doesn't know yet whether she'll keep that or not.

## 2017-08-09 NOTE — TELEPHONE ENCOUNTER
Left message for pt advising her PFT appt sched with Pulmo Lab on Friday 08/11/17 after Nurse visit for TB test read that same day @ 2:30PM.

## 2017-08-10 ENCOUNTER — TELEPHONE (OUTPATIENT)
Dept: OBSTETRICS AND GYNECOLOGY | Facility: CLINIC | Age: 47
End: 2017-08-10

## 2017-08-10 NOTE — TELEPHONE ENCOUNTER
----- Message from Arline Cifuentes sent at 8/10/2017  4:00 PM CDT -----  Contact: [phone]  Patient returning call. Please call back at [phone].      Thanks,  Arline Cifuentes

## 2017-08-11 ENCOUNTER — OFFICE VISIT (OUTPATIENT)
Dept: INTERNAL MEDICINE | Facility: CLINIC | Age: 47
End: 2017-08-11
Payer: COMMERCIAL

## 2017-08-11 ENCOUNTER — PATIENT MESSAGE (OUTPATIENT)
Dept: INTERNAL MEDICINE | Facility: CLINIC | Age: 47
End: 2017-08-11

## 2017-08-11 VITALS
OXYGEN SATURATION: 99 % | BODY MASS INDEX: 22.28 KG/M2 | WEIGHT: 125.75 LBS | SYSTOLIC BLOOD PRESSURE: 116 MMHG | HEART RATE: 82 BPM | DIASTOLIC BLOOD PRESSURE: 92 MMHG | HEIGHT: 63 IN | TEMPERATURE: 98 F | RESPIRATION RATE: 16 BRPM

## 2017-08-11 DIAGNOSIS — Z11.1 ENCOUNTER FOR PPD SKIN TEST READING: ICD-10-CM

## 2017-08-11 DIAGNOSIS — R07.9 CHEST PAIN, UNSPECIFIED: ICD-10-CM

## 2017-08-11 DIAGNOSIS — S29.011A PECTORALIS MUSCLE STRAIN, INITIAL ENCOUNTER: ICD-10-CM

## 2017-08-11 DIAGNOSIS — R07.9 CHEST PAIN, UNSPECIFIED: Primary | ICD-10-CM

## 2017-08-11 LAB
TB INDURATION - 48 HR READ: 0 MM
TB INDURATION - 72 HR READ: 0 MM
TB SKIN TEST - 48 HR READ: NEGATIVE
TB SKIN TEST - 72 HR READ: NEGATIVE

## 2017-08-11 PROCEDURE — 99213 OFFICE O/P EST LOW 20 MIN: CPT | Mod: S$GLB,,, | Performed by: PHYSICIAN ASSISTANT

## 2017-08-11 PROCEDURE — 99999 PR PBB SHADOW E&M-EST. PATIENT-LVL IV: CPT | Mod: PBBFAC,,, | Performed by: PHYSICIAN ASSISTANT

## 2017-08-11 PROCEDURE — 3008F BODY MASS INDEX DOCD: CPT | Mod: S$GLB,,, | Performed by: PHYSICIAN ASSISTANT

## 2017-08-11 PROCEDURE — 93000 ELECTROCARDIOGRAM COMPLETE: CPT | Mod: S$GLB,,, | Performed by: NUCLEAR MEDICINE

## 2017-08-11 NOTE — PROGRESS NOTES
"Subjective:       Patient ID: oSnia Melo is a 47 y.o.W/ female.    Chief Complaint: Sore Throat; Chest Pain; and PPD Reading (Negative. JRF, ANDREW 03:02PM)    HPI         I just saw her 48 hours ago with her chronic cough that she's had for several weeks and months.  She comes in by herself today.  She developed some chest pain yesterday while she was at her cardiologist for a visit.  Is occurring in her left upper anterior chest but it doesn't seem to be aggravated by body position, lifting, twisting, cough, palpitations, sweats, anything she eats etc.  When her cardiologist saw her for "2" minutes he wanted to do a stress test on her.  She had already waited 1 hour and they made her wait another hour and she got up and left.  Apparently they were going to do the stress test during that visit but never got back to her.       It just occurs quickly and its a 9/10 level of pain.  Then it passes his quickly as a came.  She denies any problems with palpitations, tachyarrhythmia, fever, chills, rigors, sweats, diaphoresis, night sweats, recent accidents or falls, heavy lifting, etc.    Review of Systems    Otherwise negative concerning the ENT, RESPIRATORY, PULMONARY, CV, VASCULAR, GI, and RENAL system review.    Objective:      Physical Exam    SHOULDERS: When I asked her to raise her arms up over her head she had immediate pain and wanted to lower her left arm because of pain between the glenohumeral joint and the upper anterior chest.  That did not occur on the right side at all.  Then when I tried to reproduce it by palpating the area she had no pain.  Also test for rotator cuff and palpation of the anterior and posterior bursal areas were pain-free.  She seemed to have pretty good strength of her pectoralis and also her trapezius.   None of these were tender with muscle stress test.  CHEST: Clear BS anterior to posterior.  Deep breathing did not provoke cough here in the office and in fact this is the first " time of her cough only once during the visit.  I can't really palpate any pain on the chest wall all the way up to the shoulder.  HEART: S1 is greater than S2.  Pulse rate is 74 bpm and regular.  RSR.  EKG was done that showed no acute ST or T-wave changes and normal sinus rhythm.  ABDOMEN: All benign and normal with normal bowel sounds.  Nontender in the abdomen.    Assessment:       1. Chest pain, unspecified    2. Pectoralis muscle strain, initial encounter    3. Encounter for PPD skin test reading        Plan:     1.  She needs to take 1-2 Aleve 220 mg every 12 hours with food.  She also needs to use hot or cold applications to the anterior upper shoulder as needed.  2.  I looked at her forearm today and her PPD test was negative.  3.  Recheck as needed.

## 2017-08-14 ENCOUNTER — INITIAL CONSULT (OUTPATIENT)
Dept: GASTROENTEROLOGY | Facility: CLINIC | Age: 47
End: 2017-08-14
Payer: COMMERCIAL

## 2017-08-14 ENCOUNTER — TELEPHONE (OUTPATIENT)
Dept: GASTROENTEROLOGY | Facility: CLINIC | Age: 47
End: 2017-08-14

## 2017-08-14 ENCOUNTER — TELEPHONE (OUTPATIENT)
Dept: OBSTETRICS AND GYNECOLOGY | Facility: CLINIC | Age: 47
End: 2017-08-14

## 2017-08-14 ENCOUNTER — CLINICAL SUPPORT (OUTPATIENT)
Dept: CARDIOLOGY | Facility: CLINIC | Age: 47
End: 2017-08-14
Payer: COMMERCIAL

## 2017-08-14 ENCOUNTER — PATIENT MESSAGE (OUTPATIENT)
Dept: GASTROENTEROLOGY | Facility: CLINIC | Age: 47
End: 2017-08-14

## 2017-08-14 ENCOUNTER — OFFICE VISIT (OUTPATIENT)
Dept: RHEUMATOLOGY | Facility: CLINIC | Age: 47
End: 2017-08-14
Payer: COMMERCIAL

## 2017-08-14 VITALS
DIASTOLIC BLOOD PRESSURE: 64 MMHG | HEART RATE: 77 BPM | HEIGHT: 63 IN | WEIGHT: 126.13 LBS | SYSTOLIC BLOOD PRESSURE: 118 MMHG | BODY MASS INDEX: 22.35 KG/M2

## 2017-08-14 VITALS
BODY MASS INDEX: 22.26 KG/M2 | SYSTOLIC BLOOD PRESSURE: 111 MMHG | HEART RATE: 88 BPM | WEIGHT: 125.69 LBS | DIASTOLIC BLOOD PRESSURE: 80 MMHG

## 2017-08-14 DIAGNOSIS — K21.9 GASTROESOPHAGEAL REFLUX DISEASE WITHOUT ESOPHAGITIS: ICD-10-CM

## 2017-08-14 DIAGNOSIS — R13.19 ESOPHAGEAL DYSPHAGIA: Primary | ICD-10-CM

## 2017-08-14 DIAGNOSIS — M35.9 UNDIFFERENTIATED CONNECTIVE TISSUE DISEASE: ICD-10-CM

## 2017-08-14 DIAGNOSIS — R05.3 CHRONIC COUGH: ICD-10-CM

## 2017-08-14 DIAGNOSIS — K22.4 ESOPHAGEAL DYSFUNCTION: ICD-10-CM

## 2017-08-14 DIAGNOSIS — R10.13 DYSPEPSIA: ICD-10-CM

## 2017-08-14 DIAGNOSIS — M35.9 UNDIFFERENTIATED CONNECTIVE TISSUE DISEASE: Primary | ICD-10-CM

## 2017-08-14 DIAGNOSIS — M79.18 MYOFASCIAL PAIN: ICD-10-CM

## 2017-08-14 LAB
DIASTOLIC DYSFUNCTION: NO
ESTIMATED PA SYSTOLIC PRESSURE: 22.36
RETIRED EF AND QEF - SEE NOTES: 58 (ref 55–65)

## 2017-08-14 PROCEDURE — 3008F BODY MASS INDEX DOCD: CPT | Mod: S$GLB,,, | Performed by: INTERNAL MEDICINE

## 2017-08-14 PROCEDURE — 99214 OFFICE O/P EST MOD 30 MIN: CPT | Mod: S$GLB,,, | Performed by: INTERNAL MEDICINE

## 2017-08-14 PROCEDURE — 93306 TTE W/DOPPLER COMPLETE: CPT | Mod: S$GLB,,, | Performed by: NUCLEAR MEDICINE

## 2017-08-14 PROCEDURE — 99999 PR PBB SHADOW E&M-EST. PATIENT-LVL III: CPT | Mod: PBBFAC,,, | Performed by: INTERNAL MEDICINE

## 2017-08-14 RX ORDER — CYCLOBENZAPRINE HCL 10 MG
10 TABLET ORAL 3 TIMES DAILY PRN
Qty: 30 TABLET | Refills: 1 | Status: SHIPPED | OUTPATIENT
Start: 2017-08-14 | End: 2017-08-22

## 2017-08-14 RX ORDER — METOCLOPRAMIDE 10 MG/1
10 TABLET ORAL
Qty: 90 TABLET | Refills: 3 | Status: SHIPPED | OUTPATIENT
Start: 2017-08-14 | End: 2017-08-22

## 2017-08-14 NOTE — PATIENT INSTRUCTIONS
Esophageal dysphagia  -     metoclopramide HCl (REGLAN) 10 MG tablet; Take 1 tablet (10 mg total) by mouth 3 (three) times daily before meals.  Dispense: 90 tablet; Refill: 3  -     Ambulatory consult to Gastroenterology    Gastroesophageal reflux disease without esophagitis  -     metoclopramide HCl (REGLAN) 10 MG tablet; Take 1 tablet (10 mg total) by mouth 3 (three) times daily before meals.  Dispense: 90 tablet; Refill: 3  -     Ambulatory consult to Gastroenterology    Dyspepsia    Esophageal dysfunction  -     metoclopramide HCl (REGLAN) 10 MG tablet; Take 1 tablet (10 mg total) by mouth 3 (three) times daily before meals.  Dispense: 90 tablet; Refill: 3  -     Ambulatory consult to Gastroenterology      Thanks for trusting us with your healthcare needs and using [facility]. If you want to ask us a question, you can do so by replying to this message or by calling [phone].    Sincerely,    Dontae Haq M.D.      To rate your experience with Dr. Haq please click on the link below:    [email or web address]          Tips to Control Acid Reflux    To control acid reflux, youll need to make some basic diet and lifestyle changes. The simple steps outlined below may be all youll need to ease discomfort.  Watch what you eat  · Avoid fatty foods and spicy foods.  · Eat fewer acidic foods, such as citrus and tomato-based foods. These can increase symptoms.  · Limit drinking alcohol, caffeine, and fizzy beverages. All increase acid reflux.  · Try limiting chocolate, peppermint, and spearmint. These can worsen acid reflux in some people.  Watch when you eat  · Avoid lying down for 3 hours after eating.  · Do not snack before going to bed.  Raise your head  Raising your head and upper body by 4 to 6 inches helps limit reflux when youre lying down. Put blocks under the head of your bed frame to raise it.  Other changes  · Lose weight, if you need to  · Dont exercise  "near bedtime  · Avoid tight-fitting clothes  · Limit aspirin and ibuprofen  · Stop smoking   Date Last Reviewed: 7/1/2016 © 2000-2016 [employer]. [address] [place] [address], [place] [address]. All rights reserved. This information is not intended as a substitute for professional medical care. Always follow your healthcare professional's instructions.        Esophageal Spasm    The esophagus is a muscular tube that joins your mouth to your stomach. Contractions in the muscles in the esophagus help food move down to the stomach. When these muscles tighten or contract abnormally, it is known as spasm.   When the muscles spasm, it may feel like food is stuck and wont go down. It may cause a feeling of heartburn or a squeezing type of chest pain. The pain may spread to the neck, arm or back. If you try to swallow more food or liquid during a spasm, it may come back up within seconds.  Esophageal spasm is more common in people with gastroesophageal reflux disease (also called GERD). Very hot or very cold foods or foods that are not chewed enough before swallowing may trigger a spasm.  Home care  Medicines  Your healthcare provider may prescribe medicines to help reduce your symptoms. If you have an underlying condition, such as GERD, your healthcare provider may prescribe medicines to help manage it.   Take all medicines as prescribed. Do not take any medicines without talking to your healthcare provider first.  Diet  · Avoid any foods that seem to cause spasm. This may include very hot or very cold foods.  · Eat slowly and chew food well before swallowing.   · Avoid alcohol, caffeine, and tobacco, which can delay healing and worsen the problem.  · Try eating smaller meals. Have small snacks in between.  Follow-up care  Follow up with your healthcare provider or as advised by our staff.  When to seek medical advice  Call your healthcare provider if any of the following occur:  · Food that feels "stuck" in the " esophagus for more than 30 minutes  · Inability to swallow solid or liquids for more than 30 minutes  · Symptoms that feel like esophageal spasm but occur with heavy sweating, dizziness, or shortness of breath  · Change in the usual patterns of your symptoms of esophageal spasm (new pattern of spreading to the neck, back, shoulder or arm; pain that is more severe than usual)  Date Last Reviewed: 6/22/2015  © 8841-4261 [employer]. [address] [place] [address], [place] [address]. All rights reserved. This information is not intended as a substitute for professional medical care. Always follow your healthcare professional's instructions.        GERD (Adult)    The esophagus is a tube that carries food from the mouth to the stomach. A valve at the lower end of the esophagus prevents stomach acid from flowing upward. When this valve doesn't work properly, stomach contents may repeatedly flow back up (reflux) into the esophagus. This is called gastroesophageal reflux disease (GERD). GERD can irritate the esophagus. It can cause problems with swallowing or breathing. In severe cases, GERD can cause recurrent pneumonia or other serious problems.  Symptoms of reflux include burning, pressure or sharp pain in the upper abdomen or mid to lower chest. The pain can spread to the neck, back, or shoulder. There may be belching, an acid taste in the back of the throat, chronic cough, or sore throat or hoarseness. GERD symptoms often occur during the day after a big meal. They can also occur at night when lying down.   Home care  Lifestyle changes can help reduce symptoms. If needed, medicines may be prescribed. Symptoms often improve with treatment, but if treatment is stopped, the symptoms often return after a few months. So most persons with GERD will need to continue treatment.  Lifestyle changes  · Limit or avoid fatty, fried, and spicy foods, as well as coffee, chocolate, mint, and foods with high acid content such as  "tomatoes and citrus fruit and juices (orange, grapefruit, lemon).  · Dont eat large meals, especially at night. Frequent, smaller meals are best. Do not lie down right after eating. And dont eat anything 3 hours before going to bed.  · Avoid drinking alcohol and smoking. As much as possible, stay away from second hand smoke.  · If you are overweight, losing weight will reduce symptoms.   · Avoid wearing tight clothing around your stomach area.  · If your symptoms occur during sleep, use a foam wedge to elevate your upper body (not just your head.) Or, place 4" blocks under the head of your bed.  Medicines  If needed, medicines can help relieve the symptoms of GERD and prevent damage to the esophagus. Discuss a medicine plan with your healthcare provider. This may include one or more of the following medicines:  · Antacids to help neutralize the normal acids in your stomach.  · Acid blockers (H2 blockers) to decrease acid production.  · Acid inhibitors (PPIs) to decrease acid production in a different way than the blockers. They may work better, but can take a little longer to take effect.  Take an antacid 30-60 minutes after eating and at bedtime, but not at the same time as an acid blocker.  Try not to take medicines such as ibuprofen and aspirin. If you are taking aspirin for your heart or other medical reasons, talk to your healthcare provider about stopping it.  Follow-up care  Follow up with your healthcare provider or as advised by our staff.  When to seek medical advice  Call your healthcare provider if any of the following occur:  · Stomach pain gets worse or moves to the lower right abdomen (appendix area)  · Chest pain appears or gets worse, or spreads to the back, neck, shoulder, or arm  · Frequent vomiting (cant keep down liquids)  · Blood in the stool or vomit (red or black in color)  · Feeling weak or dizzy  · Fever of 100.4ºF (38ºC) or higher, or as directed by your healthcare provider  Date Last " Reviewed: 6/23/2015 © 2000-2016 [employer]. [address] [place] [address], [place] [address]. All rights reserved. This information is not intended as a substitute for professional medical care. Always follow your healthcare professional's instructions.        Metoclopramide tablets  What is this medicine?  METOCLOPRAMIDE (met oh kloe PRA mide) is used to treat the symptoms of gastroesophageal reflux disease (GERD) like heartburn. It is also used to treat people with slow emptying of the stomach and intestinal tract.  How should I use this medicine?  Take this medicine by mouth with a glass of water. Follow the directions on the prescription label. Take this medicine on an empty stomach, about 30 minutes before eating. Take your doses at regular intervals. Do not take your medicine more often than directed. Do not stop taking except on the advice of your doctor or health care professional.  A special MedGuide will be given to you by the pharmacist with each prescription and refill. Be sure to read this information carefully each time.  Talk to your pediatrician regarding the use of this medicine in children. Special care may be needed.  What side effects may I notice from receiving this medicine?  Side effects that you should report to your doctor or health care professional as soon as possible:  · allergic reactions like skin rash, itching or hives, swelling of the face, lips, or tongue  · abnormal production of milk in females  · breast enlargement in both males and females  · change in the way you walk  · difficulty moving, speaking or swallowing  · drooling, lip smacking, or rapid movements of the tongue  · excessive sweating  · fever  · involuntary or uncontrollable movements of the eyes, head, arms and legs  · irregular heartbeat or palpitations  · muscle twitches and spasms  · unusually weak or tired  Side effects that usually do not require medical attention (report to your doctor or health care  professional if they continue or are bothersome):  · change in sex drive or performance  · depressed mood  · diarrhea  · difficulty sleeping  · headache  · menstrual changes  · restless or nervous  What may interact with this medicine?  · acetaminophen  · cyclosporine  · digoxin  · medicines for blood pressure  · medicines for diabetes, including insulin  · medicines for hay fever and other allergies  · medicines for depression, especially an Monoamine Oxidase Inhibitor (MAOI)  · medicines for Parkinson's disease, like levodopa  · medicines for sleep or for pain  · tetracycline  What if I miss a dose?  If you miss a dose, take it as soon as you can. If it is almost time for your next dose, take only that dose. Do not take double or extra doses.  Where should I keep my medicine?  Keep out of the reach of children.  Store at room temperature between 20 and 25 degrees C (68 and 77 degrees F). Protect from light. Keep container tightly closed. Throw away any unused medicine after the expiration date.  What should I tell my health care provider before I take this medicine?  They need to know if you have any of these conditions:  · breast cancer  · depression  · diabetes  · heart failure  · high blood pressure  · kidney disease  · liver disease  · Parkinson's disease or a movement disorder  · pheochromocytoma  · seizures  · stomach obstruction, bleeding, or perforation  · an unusual or allergic reaction to metoclopramide, procainamide, sulfites, other medicines, foods, dyes, or preservatives  · pregnant or trying to get pregnant  · breast-feeding  What should I watch for while using this medicine?  It may take a few weeks for your stomach condition to start to get better. However, do not take this medicine for longer than 12 weeks. The longer you take this medicine, and the more you take it, the greater your chances are of developing serious side effects. If you are an elderly patient, a female patient, or you have  diabetes, you may be at an increased risk for side effects from this medicine. Contact your doctor immediately if you start having movements you cannot control such as lip smacking, rapid movements of the tongue, involuntary or uncontrollable movements of the eyes, head, arms and legs, or muscle twitches and spasms.  Patients and their families should watch out for worsening depression or thoughts of suicide. Also watch out for any sudden or severe changes in feelings such as feeling anxious, agitated, panicky, irritable, hostile, aggressive, impulsive, severely restless, overly excited and hyperactive, or not being able to sleep. If this happens, especially at the beginning of treatment or after a change in dose, call your doctor.  Do not treat yourself for high fever. Ask your doctor or health care professional for advice.  You may get drowsy or dizzy. Do not drive, use machinery, or do anything that needs mental alertness until you know how this drug affects you. Do not stand or sit up quickly, especially if you are an older patient. This reduces the risk of dizzy or fainting spells. Alcohol can make you more drowsy and dizzy. Avoid alcoholic drinks.  Date Last Reviewed:   NOTE:This sheet is a summary. It may not cover all possible information. If you have questions about this medicine, talk to your doctor, pharmacist, or health care provider. Copyright© 2016 [employer]

## 2017-08-14 NOTE — TELEPHONE ENCOUNTER
----- Message from Janice Wade LPN sent at 8/10/2017  4:57 PM CDT -----  Left message for pt to return my call.

## 2017-08-14 NOTE — TELEPHONE ENCOUNTER
Spoke with patient regarding becoming a new patient.    Patient scheduled appt for 8/22 at 8am. Appt slip mailed.

## 2017-08-14 NOTE — LETTER
August 16, 2017      Fuad Wilkins PA-C  [address] [facility] [address]           [facility]  [id number] [place] [address]  Phone: [phone]  Fax: [phone]          Patient: Sonia Melo   MR Number: [id number]   YOB: 1970   Date of Visit: 8/14/2017       Dear Fuad Wilkins:    Thank you for referring Sonia Melo to me for evaluation. Attached you will find relevant portions of my assessment and plan of care.    If you have questions, please do not hesitate to call me. I look forward to following Sonia Melo along with you.    Sincerely,    Dontae Haq MD    Enclosure  CC:  No Recipients    If you would like to receive this communication electronically, please contact [email or web address] or [phone] to request more information on [employer] access.    For providers and/or their staff who would like to refer a patient to [facility], please contact us through our one-stop-shop provider referral line, [facility] , at [phone].    If you feel you have received this communication in error or would no longer like to receive these types of communications, please e-mail [email or web address]

## 2017-08-14 NOTE — PROGRESS NOTES
RHEUMATOLOGY CLINIC FOLLOW UP VISIT  Chief complaints:-  I have a constant cough.    HPI:-  Sonia Muñoz a 47 y.o. pleasant female comes in for a follow up visit with above chief complaints. 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  She complains of constant cough for the past month-acute onset, progressive in nature, persistent, failing to respond to any over-the-counter cough suppressants, not responding to any prescription medications given by Dr. Frey, recently diagnosed with gastritis and is on proton 4 inhibitors with no change in the cough.  She was given metoclopramide to increase gastric motility but she haven't started taking yet..  She is waiting to see the gastroenterologist in [place] before beginning the therapy.  She also complains of worsening frequency of urination.  She has seen a urologist who had recommended some medication but she is not sure taking it because of the potential adverse effects. She denies any joint pain or joint swelling. No skin tightening.       Review of Systems   Constitutional: Positive for malaise/fatigue. Negative for chills, fever and weight loss.   HENT: Positive for sore throat. Negative for ear discharge, ear pain, hearing loss and nosebleeds.    Eyes: Negative for blurred vision, double vision, photophobia, discharge and redness.   Respiratory: Positive for cough. Negative for hemoptysis, sputum production and shortness of breath.    Cardiovascular: Negative for chest pain, palpitations and claudication.   Gastrointestinal: Positive for diarrhea, heartburn and nausea. Negative for abdominal pain, constipation, melena and vomiting.   Genitourinary: Positive for frequency and urgency. Negative for dysuria and hematuria.   Musculoskeletal: Positive for back pain, joint pain, myalgias and  neck pain. Negative for falls.   Skin: Negative for itching and rash.   Neurological: Positive for tingling and sensory change. Negative for dizziness, tremors, speech change, focal weakness, seizures, loss of consciousness, weakness and headaches.   Endo/Heme/Allergies: Negative for environmental allergies. Does not bruise/bleed easily.   Psychiatric/Behavioral: Negative for hallucinations and memory loss. The patient does not have insomnia.        Past Medical History:   Diagnosis Date    Abnormal Pap smear of cervix     Fibromyalgia     Hyperthyroidism     Iatrogenic hypothyroidism     Undifferentiated connective tissue disease        Past Surgical History:   Procedure Laterality Date    BREAST IMPLANTS      STAPEDES SURGERY      sMart Stapedes Piston (Safe to 3T magnet)        Social History   Substance Use Topics    Smoking status: Never Smoker    Smokeless tobacco: Never Used    Alcohol use 0.0 oz/week      Comment: socially        Family History   Problem Relation Age of Onset    Hypertension Mother     Hypertension Father     Heart disease Father     Cancer Maternal Uncle      stomach cancer       Review of patient's allergies indicates:  No Known Allergies        Physical examination:-    Vitals:    08/14/17 1037   BP: 111/80   Pulse: 88   Weight: 57 kg (125 lb 10.6 oz)   PainSc: 0-No pain       Physical Exam   Constitutional: She is oriented to person, place, and time and well-developed, well-nourished, and in no distress. No distress.   HENT:   Head: Normocephalic.   Mouth/Throat: Oropharynx is clear and moist. No oropharyngeal exudate.   Eyes: Conjunctivae and EOM are normal. Pupils are equal, round, and reactive to light. Right eye exhibits no discharge. Left eye exhibits no discharge. No scleral icterus.   Neck: Normal range of motion. Neck supple.   Cardiovascular: Normal rate and intact distal pulses.    Pulmonary/Chest: Effort normal. No respiratory distress. She exhibits no  tenderness.   Abdominal: Soft. There is no tenderness.   Musculoskeletal:   Multiple tender points. No synovitis in small joints. No effusion in large joints. No sclerodactyly or telangiectasias.    Lymphadenopathy:     She has no cervical adenopathy.   Neurological: She is alert and oriented to person, place, and time. No cranial nerve deficit.   Skin: Skin is warm. No rash noted. She is not diaphoretic. No erythema. No pallor.   Psychiatric: Affect and judgment normal.   Nursing note and vitals reviewed.      Labs:-  Results for YVES WARD (MRN [id number]) as of 6/5/2017 09:06   Ref. Range 7/21/2016 11:45   SCL-70 Antibody Latest Ref Range: <1.0 (Negative) U >8.0 (H)     Results for YVES WARD (MRN [id number]) as of 6/5/2017 09:06   Ref. Range 6/30/2016 16:28   MARYAM HEP-2 Titer Unknown Positive 1:640 Ho...   Anti-SSA Antibody Latest Ref Range: 0.00 - 19.99 EU 1.64   Anti-SSA Interpretation Latest Ref Range: Negative  Negative   Anti-SSB Antibody Latest Ref Range: 0.00 - 19.99 EU 0.85   Anti-SSB Interpretation Latest Ref Range: Negative  Negative   ds DNA Ab Latest Ref Range: Negative 1:10  Negative 1:10   Anti Sm Antibody Latest Ref Range: 0.00 - 19.99 EU 5.73   Anti-Sm Interpretation Latest Ref Range: Negative  Negative   Anti Sm/RNP Antibody Latest Ref Range: 0.00 - 19.99 EU 0.91   Anti-Sm/RNP Interpretation Latest Ref Range: Negative  Negative       Medication List with Changes/Refills   New Medications    CYCLOBENZAPRINE (FLEXERIL) 10 MG TABLET    Take 1 tablet (10 mg total) by mouth 3 (three) times daily as needed for Muscle spasms.   Current Medications    ALBUTEROL 90 MCG/ACTUATION INHALER    Inhale 2 puffs into the lungs every 4 (four) hours as needed for Wheezing or Shortness of Breath (Or Cough).    AZITHROMYCIN (Z-VINCENT) 250 MG TABLET    take 1 tablet TWICE DAILY with food    ESOMEPRAZOLE (NEXIUM) 40 MG CAPSULE    Take 1 capsule (40 mg total) by mouth before breakfast.     HYDROCODONE-CHLORPHENIRAMINE (TUSSIONEX) 10-8 MG/5 ML SUSPENSION    Take 5 mLs by mouth every 12 (twelve) hours as needed for Cough.    HYDROXYCHLOROQUINE (PLAQUENIL) 200 MG TABLET    Take 1 tablet (200 mg total) by mouth 2 (two) times daily.    IBUPROFEN (ADVIL,MOTRIN) 200 MG TABLET    Take 200 mg by mouth as needed.     LEVOTHYROXINE (SYNTHROID) 150 MCG TABLET    Take 1 tablet (150 mcg total) by mouth once daily.    METOCLOPRAMIDE HCL (REGLAN) 10 MG TABLET    Take 1 tablet (10 mg total) by mouth 3 (three) times daily before meals.    MULTIVITAMIN (ONE DAILY MULTIVITAMIN) PER TABLET    Take 1 tablet by mouth once daily.    PENCICLOVIR (DENAVIR) 1 % CREAM    Apply topically every 2 (two) hours.       Assessment/Plans:-  # Undifferentiated connective tissue disease:-  Positive MARYAM, High titer scleroderma antibody positivity associated with fatigue, arthralgias, myalgias, GERD, normal High res lung CT and PFT. Tolerating plaquenil without any problem.  Worsening gastroesophageal reflux disease with associated gastritis.  Denies any improvement with Nexium.  Chronic cough raises the question of any underlying pulmonary artery hypertension.  Check echo and if elevated pressures referred to PAH specialist.  - 2D echo with color flow doppler; Future    # Esophageal dysphagia:-  Worsening gastroesophageal reflux disease with associated gastritis.  Denies any improvement with Nexium.  She would like to consult with a gastroenterologist and he only had before starting motility agents.    # Myofascial pain:-  Associated with intermittent muscle spasm. Try flexeril.   - cyclobenzaprine (FLEXERIL) 10 MG tablet; Take 1 tablet (10 mg total) by mouth 3 (three) times daily as needed for Muscle spasms.  Dispense: 30 tablet; Refill: 1     # Return in about 6 months (around 2/14/2018).    Disclaimer: This note was prepared using voice recognition system and is likely to have sound alike errors and is not proof read.  Please call me  with any questions.

## 2017-08-14 NOTE — TELEPHONE ENCOUNTER
----- Message from Ruma Hooper sent at 8/14/2017  7:43 AM CDT -----  Contact: Dr Severino Bautista Nurse  Patient id being referred by Dr Haq for acid reflux and needs an appointment as soon as possible but there are no openings, please call patient back at [phone]. Thank you

## 2017-08-14 NOTE — TELEPHONE ENCOUNTER
Notes Recorded by Haylee Daigle NP on 8/10/2017 at 10:29 AM CDT  Pt needs a procedure appointment for an EMB due to lining is so thick on ultra sound and her irregular bleeding - book in a procedure time slot

## 2017-08-15 ENCOUNTER — PATIENT MESSAGE (OUTPATIENT)
Dept: INTERNAL MEDICINE | Facility: CLINIC | Age: 47
End: 2017-08-15

## 2017-08-16 NOTE — PROGRESS NOTES
Subjective:       Patient ID: Sonia Melo is a 47 y.o. female.    Chief Complaint: Gastroesophageal Reflux    Sonia comes today to discuss results of esophageal manometry. The findings were consistent with poor peristalsis and resembling scleroderma affecting the esophagus. We discussed treatment options and natural history of scleroderma affecting the GI system.       Gastroesophageal Reflux   She complains of belching, dysphagia, globus sensation, heartburn and nausea. She reports no abdominal pain, no chest pain, no choking, no coughing, no early satiety, no sore throat, no stridor, no tooth decay, no water brash or no wheezing. This is a chronic problem. The current episode started more than 1 month ago. The problem occurs frequently. The problem has been unchanged. The heartburn duration is several minutes. The heartburn is located in the substernum. The heartburn is of moderate intensity. The heartburn does not wake her from sleep. The heartburn limits her activity. The heartburn changes with position. The symptoms are aggravated by certain foods and lying down. Pertinent negatives include no fatigue, melena, muscle weakness or weight loss. There are no known risk factors. She has tried a PPI for the symptoms. The treatment provided mild relief. Past procedures include an EGD and esophageal manometry.     Past Medical History:   Diagnosis Date    Abnormal Pap smear of cervix     Fibromyalgia     Hyperthyroidism     Iatrogenic hypothyroidism     Undifferentiated connective tissue disease      Past Surgical History:   Procedure Laterality Date    BREAST IMPLANTS      STAPEDES SURGERY      sMart Stapedes Piston (Safe to 3T magnet)     Current Outpatient Prescriptions on File Prior to Visit   Medication Sig Dispense Refill    albuterol 90 mcg/actuation inhaler Inhale 2 puffs into the lungs every 4 (four) hours as needed for Wheezing or Shortness of Breath (Or Cough). 1 Inhaler 0    azithromycin  (Z-VINCENT) 250 MG tablet take 1 tablet TWICE DAILY with food  0    esomeprazole (NEXIUM) 40 MG capsule Take 1 capsule (40 mg total) by mouth before breakfast. 30 capsule 11    hydroxychloroquine (PLAQUENIL) 200 mg tablet Take 1 tablet (200 mg total) by mouth 2 (two) times daily. 180 tablet 2    ibuprofen (ADVIL,MOTRIN) 200 MG tablet Take 200 mg by mouth as needed.       levothyroxine (SYNTHROID) 150 MCG tablet Take 1 tablet (150 mcg total) by mouth once daily. 30 tablet 11    multivitamin (ONE DAILY MULTIVITAMIN) per tablet Take 1 tablet by mouth once daily.      penciclovir (DENAVIR) 1 % cream Apply topically every 2 (two) hours. (Patient taking differently: Apply 1 application topically as needed. ) 1.5 g 2    hydrocodone-chlorpheniramine (TUSSIONEX) 10-8 mg/5 mL suspension Take 5 mLs by mouth every 12 (twelve) hours as needed for Cough. 115 mL 0     No current facility-administered medications on file prior to visit.      Review of patient's allergies indicates:   Allergen Reactions    Adhesive Rash     Social History     Social History    Marital status:      Spouse name: N/A    Number of children: 2    Years of education: N/A     Occupational History     [employer]     Social History Main Topics    Smoking status: Never Smoker    Smokeless tobacco: Never Used    Alcohol use 0.0 oz/week      Comment: socially     Drug use: No    Sexual activity: Yes     Partners: Male     Birth control/ protection: None     Other Topics Concern    Not on file     Social History Narrative    No narrative on file     Family History   Problem Relation Age of Onset    Hypertension Mother     Hypertension Father     Heart disease Father     Cancer Maternal Uncle      stomach cancer         Review of Systems   Constitutional: Negative for activity change, chills, diaphoresis, fatigue, fever and weight loss.   HENT: Positive for trouble swallowing. Negative for ear pain, facial swelling,  nosebleeds, rhinorrhea, sneezing and sore throat.    Eyes: Negative for photophobia, pain and visual disturbance.   Respiratory: Negative for apnea, cough, choking, chest tightness, shortness of breath and wheezing.    Cardiovascular: Negative for chest pain.   Gastrointestinal: Positive for dysphagia, heartburn and nausea. Negative for abdominal pain, blood in stool, constipation, melena, rectal pain and vomiting.   Genitourinary: Negative for decreased urine volume, difficulty urinating, dysuria, flank pain and hematuria.   Musculoskeletal: Negative for arthralgias, back pain, gait problem, muscle weakness, neck pain and neck stiffness.   Skin: Negative for pallor and rash.   Neurological: Negative for seizures, syncope, speech difficulty, weakness and headaches.   Hematological: Negative for adenopathy. Does not bruise/bleed easily.   Psychiatric/Behavioral: Negative for behavioral problems, confusion and hallucinations.       Objective:      Physical Exam   Constitutional: She is oriented to person, place, and time. She appears well-developed and well-nourished.   HENT:   Head: Normocephalic and atraumatic.   Eyes: EOM are normal. Pupils are equal, round, and reactive to light.   Neck: Normal range of motion. Neck supple. No thyromegaly present.   Cardiovascular: Normal rate, regular rhythm, normal heart sounds and intact distal pulses.    No murmur heard.  Pulmonary/Chest: Effort normal and breath sounds normal. No respiratory distress. She has no wheezes. She has no rales.   Abdominal: Soft. Bowel sounds are normal. She exhibits no distension and no mass. There is no tenderness.   Musculoskeletal: Normal range of motion. She exhibits no edema or tenderness.   Lymphadenopathy:     She has no cervical adenopathy.   Neurological: She is alert and oriented to person, place, and time. She has normal reflexes. No cranial nerve deficit.   Skin: Skin is warm and dry. No erythema.   Psychiatric: She has a normal mood  and affect. Her behavior is normal.       Assessment:       1. Esophageal dysphagia    2. Gastroesophageal reflux disease without esophagitis    3. Dyspepsia    4. Esophageal dysfunction        Plan:       Esophageal dysphagia  -     metoclopramide HCl (REGLAN) 10 MG tablet; Take 1 tablet (10 mg total) by mouth 3 (three) times daily before meals.  Dispense: 90 tablet; Refill: 3  -     Ambulatory consult to Gastroenterology    Gastroesophageal reflux disease without esophagitis  -     metoclopramide HCl (REGLAN) 10 MG tablet; Take 1 tablet (10 mg total) by mouth 3 (three) times daily before meals.  Dispense: 90 tablet; Refill: 3  -     Ambulatory consult to Gastroenterology    Dyspepsia    Esophageal dysfunction  -     metoclopramide HCl (REGLAN) 10 MG tablet; Take 1 tablet (10 mg total) by mouth 3 (three) times daily before meals.  Dispense: 90 tablet; Refill: 3  -     Ambulatory consult to Gastroenterology           I will refer her to our motility specialist in [place], Dr. Lopez.       Dontae Haq

## 2017-08-17 ENCOUNTER — PROCEDURE VISIT (OUTPATIENT)
Dept: OBSTETRICS AND GYNECOLOGY | Facility: CLINIC | Age: 47
End: 2017-08-17
Payer: COMMERCIAL

## 2017-08-17 VITALS
SYSTOLIC BLOOD PRESSURE: 102 MMHG | DIASTOLIC BLOOD PRESSURE: 70 MMHG | HEIGHT: 63 IN | BODY MASS INDEX: 22.19 KG/M2 | WEIGHT: 125.25 LBS

## 2017-08-17 DIAGNOSIS — N92.6 IRREGULAR MENSES: Primary | ICD-10-CM

## 2017-08-17 PROCEDURE — 88305 TISSUE EXAM BY PATHOLOGIST: CPT | Mod: 26,,, | Performed by: PATHOLOGY

## 2017-08-17 PROCEDURE — 88305 TISSUE EXAM BY PATHOLOGIST: CPT | Performed by: PATHOLOGY

## 2017-08-17 PROCEDURE — 81025 URINE PREGNANCY TEST: CPT | Mod: QW,S$GLB,, | Performed by: NURSE PRACTITIONER

## 2017-08-17 PROCEDURE — 58100 BIOPSY OF UTERUS LINING: CPT | Mod: S$GLB,,, | Performed by: NURSE PRACTITIONER

## 2017-08-17 NOTE — PROCEDURES
Biopsy (Gynecological)  Date/Time: 8/17/2017 1:26 PM  Performed by: JORDAN MARIN.  Authorized by: JORDAN MARIN      Patient was prepped and draped in the normal sterile fashion.  Local anesthesia used?: No      Biopsy Location:  Uterus  Estimated blood loss (cc):  0   Patient tolerated the procedure well with no immediate complications.      Procedure Details   UPT prior to procedure was negative.   A speculum was placed and cervix was prepped with betadine. A sharp tenaculum was applied to the cervix for stabilization. A pipelle was used to sample the endometrium. Uterus sounded to 8. Sample was sent for pathologic examination.    A moderate amount of tissue was obtained. Patient tolerated procedure well.        Complications:  None      Plan:    The patient was advised to call for any fever or for prolonged or severe pain or bleeding. She was advised to use OTC analgesics as needed for mild to moderate pain. Patient instructed to call office for results in 1 to 2 weeks.

## 2017-08-22 ENCOUNTER — HOSPITAL ENCOUNTER (OUTPATIENT)
Dept: RADIOLOGY | Facility: HOSPITAL | Age: 47
Discharge: HOME | End: 2017-08-22
Attending: NURSE PRACTITIONER
Payer: COMMERCIAL

## 2017-08-22 ENCOUNTER — PROCEDURE VISIT (OUTPATIENT)
Dept: PULMONOLOGY | Facility: CLINIC | Age: 47
End: 2017-08-22
Payer: COMMERCIAL

## 2017-08-22 ENCOUNTER — TELEPHONE (OUTPATIENT)
Dept: OBSTETRICS AND GYNECOLOGY | Facility: CLINIC | Age: 47
End: 2017-08-22

## 2017-08-22 ENCOUNTER — TELEPHONE (OUTPATIENT)
Dept: GASTROENTEROLOGY | Facility: CLINIC | Age: 47
End: 2017-08-22

## 2017-08-22 ENCOUNTER — OFFICE VISIT (OUTPATIENT)
Dept: GASTROENTEROLOGY | Facility: CLINIC | Age: 47
End: 2017-08-22
Payer: COMMERCIAL

## 2017-08-22 ENCOUNTER — TELEPHONE (OUTPATIENT)
Dept: ENDOSCOPY | Facility: HOSPITAL | Age: 47
End: 2017-08-22

## 2017-08-22 VITALS
HEART RATE: 82 BPM | HEIGHT: 63 IN | BODY MASS INDEX: 22.58 KG/M2 | WEIGHT: 127.44 LBS | SYSTOLIC BLOOD PRESSURE: 111 MMHG | DIASTOLIC BLOOD PRESSURE: 77 MMHG

## 2017-08-22 VITALS — BODY MASS INDEX: 22.5 KG/M2 | WEIGHT: 127 LBS | HEIGHT: 63 IN

## 2017-08-22 DIAGNOSIS — R19.7 DIARRHEA, UNSPECIFIED TYPE: ICD-10-CM

## 2017-08-22 DIAGNOSIS — R05.3 PERSISTENT COUGH FOR 3 WEEKS OR LONGER: ICD-10-CM

## 2017-08-22 DIAGNOSIS — R11.0 NAUSEA: ICD-10-CM

## 2017-08-22 DIAGNOSIS — K21.9 GASTROESOPHAGEAL REFLUX DISEASE, ESOPHAGITIS PRESENCE NOT SPECIFIED: ICD-10-CM

## 2017-08-22 DIAGNOSIS — R68.81 EARLY SATIETY: ICD-10-CM

## 2017-08-22 DIAGNOSIS — K62.5 RECTAL BLEEDING: ICD-10-CM

## 2017-08-22 DIAGNOSIS — M34.9 SCLERODERMA: ICD-10-CM

## 2017-08-22 DIAGNOSIS — Z12.31 ENCOUNTER FOR SCREENING MAMMOGRAM FOR BREAST CANCER: ICD-10-CM

## 2017-08-22 DIAGNOSIS — R13.10 DYSPHAGIA, UNSPECIFIED TYPE: Primary | ICD-10-CM

## 2017-08-22 DIAGNOSIS — R10.9 ABDOMINAL PAIN, UNSPECIFIED LOCATION: ICD-10-CM

## 2017-08-22 DIAGNOSIS — R14.0 BLOATING: ICD-10-CM

## 2017-08-22 LAB
POST FEF 25 75: 2.99 L/S (ref 2.29–3.49)
POST FET 100: 7.59 S
POST FEV1 FVC: 81 %
POST FEV1: 2.56 L (ref 2.54–3.1)
POST FIF 50: 3.36 L/S
POST FVC: 3.16 L (ref 3.17–3.84)
POST PEF: 5.93 L/S (ref 5.9–7.55)
PRE DLCO: 20.49 ML/MMHG/MIN (ref 15.81–24.1)
PRE ERV: 1.25 L
PRE FEF 25 75: 2.12 L/S (ref 2.29–3.49)
PRE FET 100: 9.42 S
PRE FEV1 FVC: 77 %
PRE FEV1: 2.47 L (ref 2.54–3.1)
PRE FIF 50: 2.93 L/S
PRE FRC PL: 2.39 L (ref 2.31–3.26)
PRE FVC: 3.21 L (ref 3.17–3.84)
PRE KROGHS K: 4.16 1/MIN
PRE PEF: 6.55 L/S (ref 5.9–7.55)
PRE RV: 1.36 L (ref 1.27–1.98)
PRE SVC: 3.21 L
PRE TLC: 4.57 L (ref 4.43–5.2)
PREDICTED DLCO: 19.96 ML/MMHG/MIN (ref 15.81–24.1)
PREDICTED FEV1 FVC: 81.25 % (ref 76.35–86.14)
PREDICTED FEV1: 2.82 L (ref 2.54–3.1)
PREDICTED FRC N2: 2.79 L (ref 2.31–3.26)
PREDICTED FRC PL: 2.79 L (ref 2.31–3.26)
PREDICTED FVC: 3.5 L (ref 3.17–3.84)
PREDICTED RV: 1.63 L (ref 1.27–1.98)
PREDICTED SVC: 3.15 L
PREDICTED TLC: 4.81 L (ref 4.43–5.2)
PROVOCATION PROTOCOL: ABNORMAL

## 2017-08-22 PROCEDURE — 77067 SCR MAMMO BI INCL CAD: CPT | Mod: 26,,, | Performed by: RADIOLOGY

## 2017-08-22 PROCEDURE — 94729 DIFFUSING CAPACITY: CPT | Mod: S$GLB,,, | Performed by: INTERNAL MEDICINE

## 2017-08-22 PROCEDURE — 3008F BODY MASS INDEX DOCD: CPT | Mod: S$GLB,,, | Performed by: INTERNAL MEDICINE

## 2017-08-22 PROCEDURE — 77067 SCR MAMMO BI INCL CAD: CPT | Mod: TC

## 2017-08-22 PROCEDURE — 99215 OFFICE O/P EST HI 40 MIN: CPT | Mod: S$GLB,,, | Performed by: INTERNAL MEDICINE

## 2017-08-22 PROCEDURE — 94060 EVALUATION OF WHEEZING: CPT | Mod: S$GLB,,, | Performed by: INTERNAL MEDICINE

## 2017-08-22 PROCEDURE — 99999 PR PBB SHADOW E&M-EST. PATIENT-LVL III: CPT | Mod: PBBFAC,,, | Performed by: INTERNAL MEDICINE

## 2017-08-22 PROCEDURE — 77063 BREAST TOMOSYNTHESIS BI: CPT | Mod: 26,,, | Performed by: RADIOLOGY

## 2017-08-22 PROCEDURE — 94726 PLETHYSMOGRAPHY LUNG VOLUMES: CPT | Mod: 51,S$GLB,, | Performed by: INTERNAL MEDICINE

## 2017-08-22 RX ORDER — ESOMEPRAZOLE MAGNESIUM 40 MG/1
40 GRANULE, DELAYED RELEASE ORAL
Qty: 2400 MG | Refills: 6 | Status: SHIPPED | OUTPATIENT
Start: 2017-08-22 | End: 2017-08-24

## 2017-08-22 NOTE — TELEPHONE ENCOUNTER
Patient in office to schedule EGD and colonoscopy.  She is requesting to have procedures done in [place].  Order transferred to [facility].  Phone number given to call that location to schedule.  Also provided main line phone number here for any questions.

## 2017-08-22 NOTE — TELEPHONE ENCOUNTER
----- Message from Trice Helms sent at 8/22/2017 11:22 AM CDT -----  Contact: [facility] - [phone]  miky - has questions re the nexium - do you want capsules or packets - please call [facility] - [phone]

## 2017-08-22 NOTE — TELEPHONE ENCOUNTER
Spoke with Ede from Patient's pharmacy.    Rx changed to Nexium capsules instead of packets BID.

## 2017-08-22 NOTE — LETTER
August 27, 2017      Dontae Haq MD  [address] [place] [address]           [facility] Gastroenterology  [address]  [place] [address]  Phone: [phone]  Fax: [phone]          Patient: Sonia Melo   MR Number: [id number]   YOB: 1970   Date of Visit: 8/22/2017       Dear Dr. Dontae Haq:    Thank you for referring Sonia Melo to me for evaluation. Attached you will find relevant portions of my assessment and plan of care.    If you have questions, please do not hesitate to call me. I look forward to following Sonia Melo along with you.    Sincerely,    Marisela Lopez MD    Enclosure  CC:  No Recipients    If you would like to receive this communication electronically, please contact [email or web address] or [phone] to request more information on [employer] access.    For providers and/or their staff who would like to refer a patient to [facility], please contact us through our one-stop-shop provider referral line, [facility], at [phone].    If you feel you have received this communication in error or would no longer like to receive these types of communications, please e-mail [email or web address]

## 2017-08-22 NOTE — TELEPHONE ENCOUNTER
----- Message from Nancy Christine MA sent at 8/22/2017  3:02 PM CDT -----  Contact: Kami with Dr Emy Brewer's office ([phone])   John Andersen   Re: The referral to  Dr Emy Brewer's office ([phone]), her nurse Kami   is faxing you a letter that she said she needs for you to forward all the info that's on the letter request for pt to be seen.

## 2017-08-22 NOTE — TELEPHONE ENCOUNTER
Pt notified of normal EMB results - if bleeding continues to contact me.  Otherwise f/u was needed.

## 2017-08-22 NOTE — PROGRESS NOTES
[facility] Consultation Note    Reason for Consult:    Chief Complaint   Patient presents with    Heartburn    Dysphagia    Nausea    Abdominal Pain    Gas    Bloated         PCP:   Leonardo Frey   [address] [place] [address]    Referring MD:  Dontae Haq Md  [address] [address]      HPI:  Sonia Melo is a 47 y.o. female here for evaluation of the following GI problems:    GERD.  Patient reports bothersome heartburn and regurgitation of acidic liquids.   Symtoms started: a year ago  Occur: daily  Improve with avoidance of trigger foods     Denies nocturnal symptoms.   Avoids eating prior to bedtime.         Atypical Symptoms:  Hoarseness: no  Cough: yes  Throat clearing: occasionally  Chest pain: yes     Dysphagia.  Patient reports difficulty swallowing solids, no problems with liquids.  Feels that food gets lodged in cervical esophagus.    Symptoms started: few months ago    Occur: daily, with every meal  Denies food impactions requiring ED visit.  Have not tried reglan that was prescribed by Dr. Alexus Carlson.  Patient reports constant nausea.  Symptoms occur daily after each meal.  Started a year ago.  Getting worse in the past few months.   Takes pepto and zofran SL.  Emesis.  Rare    Early satiety.     Abdominal pain.  Reports dull and sharp abd discomfort.  Located in b/l lower abdomen.    Started: many years ago  Occurs: few times per year   Nothing makes it worse.   Associated with explosive, watery stools.    Gets better w BM.   Nocturnal pain: infrequent  Never tried Bentyl, Levsin, Levbid, IBgurd, TCA, SSRI   Using narcotic pain medication: no          Gas and bloating. Patient reports occasional gas and bloating with abdominal distension.  Started many years ago. Consumes milk products, occasional artificial sugars.         Diarrhea.  Reports occasional loose to watery stools.  Few times per year  Nocturnal symptoms:  no  Fecal incontinence: no     BRBPR.  Occasional trace of blood on tissue toilet present for years.     History of anal fissure in high school     Denies constipation, melena, weight loss.       Rheum. Concern for connective tissue disease, possible scleroderma. Reports joint pain, fatigue, tingling in fingers and feet and face,  joint swelling, dysphagia, oral ulcers, sicca symptoms, photosensitivity, miscarriages, thromboses. On pleqeunil 1.5 years ago.     Previous Studies:   Manometry 7/6/17: Weak peristalsis w lg defects suggestive of Scleroderma.   Fragmented perisistalsis. Personally reviewed by me.  Incomplete bolus clearance 80% of the time.   EGD 7/5/17: Nl esophagus. Gastritis (-). Nl duodenum.   US 12/23/16: nl   US pelvis 8/9/17: No sign abn  CT chest 8/11/16: nl   MRI 7/8/16: 4.3x1.8x1.6 complex cystic sturcture in pectineus mm, unchanged. Adnexal cyst. 3.2cm R adnexal cyst/simple  CT 7/29/15: <1cm kidney lesion, possible cyst. Hypodense lesion in pectineus mm.   TSH 11.7/T4 1.14  TB 1.2/DB 0.4 in 2016, now nl   Hp IGG-    ROS:  ROS   Constitutional: No fevers, no chills, no night sweats, no weight loss  ENT: No congestion, no rhinorrhea, no chronic sinus problems  CV: No chest pain, no palpitations  Pulm: +cough, no shortness of breath  Ophtho: No blurry vision, no eye redness  GI: see HPI  Derm: No rash  Heme: No lymphadenopathy, no bruising  MSK: No arthritis, no joint swelling, no Raynauds  : No dysuria, + frequent urination, no blood in urine  Endo: No hot or cold intolerance  Neuro: No dizziness, no syncope, no seizure  Psych: No anxiety, no depression        Medical History:   Past Medical History:   Diagnosis Date    Abnormal Pap smear of cervix     Cervical spondylolysis     DDD (degenerative disc disease), lumbosacral     Fibromyalgia     Hyperthyroidism     Iatrogenic hypothyroidism     Undifferentiated connective tissue disease         Surgical History:   Past Surgical History:    Procedure Laterality Date    BREAST IMPLANTS      BREAST SURGERY Bilateral 2009    EAR MASTOIDECTOMY W/ COCHLEAR IMPLANT W/ LANDMARK      STAPEDES SURGERY      sMart Stapedes Piston (Safe to 3T magnet)    UPPER GASTROINTESTINAL ENDOSCOPY          Family History:   Family History   Problem Relation Age of Onset    Hypertension Mother     Hypertension Father     Heart disease Father     Cancer Maternal Uncle      stomach cancer    Stomach cancer Maternal Uncle     Celiac disease Maternal Aunt     Breast cancer Paternal Aunt     Cirrhosis Neg Hx     Colon cancer Neg Hx     Colon polyps Neg Hx     Crohn's disease Neg Hx     Cystic fibrosis Neg Hx     Esophageal cancer Neg Hx     Hemochromatosis Neg Hx     Inflammatory bowel disease Neg Hx     Irritable bowel syndrome Neg Hx     Liver cancer Neg Hx     Liver disease Neg Hx     Rectal cancer Neg Hx     Ulcerative colitis Neg Hx     Marin's disease Neg Hx     Lymphoma Neg Hx     Tuberculosis Neg Hx     Scleroderma Neg Hx     Rheum arthritis Neg Hx     Multiple sclerosis Neg Hx     Melanoma Neg Hx     Lupus Neg Hx     Psoriasis Neg Hx     Skin cancer Neg Hx         Social History:   Social History     Social History    Marital status:      Spouse name: N/A    Number of children: 2    Years of education: N/A     Occupational History     [employer]     Social History Main Topics    Smoking status: Never Smoker    Smokeless tobacco: Never Used    Alcohol use 0.0 oz/week      Comment: socially     Drug use: No    Sexual activity: Yes     Partners: Male     Birth control/ protection: None     Other Topics Concern    None     Social History Narrative    None        Review of patient's allergies indicates:   Allergen Reactions    Adhesive Rash       Current Outpatient Prescriptions   Medication Sig Dispense Refill    albuterol 90 mcg/actuation inhaler Inhale 2 puffs into the lungs every 4 (four) hours as  "needed for Wheezing or Shortness of Breath (Or Cough). 1 Inhaler 0    hydroxychloroquine (PLAQUENIL) 200 mg tablet Take 1 tablet (200 mg total) by mouth 2 (two) times daily. 180 tablet 2    ibuprofen (ADVIL,MOTRIN) 200 MG tablet Take 200 mg by mouth as needed.       multivitamin (ONE DAILY MULTIVITAMIN) per tablet Take 1 tablet by mouth once daily.      penciclovir (DENAVIR) 1 % cream Apply topically every 2 (two) hours. (Patient taking differently: Apply 1 application topically as needed. ) 1.5 g 2    esomeprazole (NEXIUM PACKET) 40 mg GrPS Take 40 mg by mouth 2 (two) times daily before meals. 2400 mg 6    levothyroxine (SYNTHROID) 125 MCG tablet Take 1 tablet (125 mcg total) by mouth once daily. 30 tablet 2     No current facility-administered medications for this visit.         Objective Findings:  Vital Signs:  /77   Pulse 82   Ht 5' 3" (1.6 m)   Wt 57.8 kg (127 lb 6.8 oz)   BMI 22.57 kg/m²   Body mass index is 22.57 kg/m².    Physical Exam:  General appearance: alert, cooperative, no distress  HENT: Normocephalic, atraumatic, neck symmetrical, no nasal discharge  Eyes: conjunctivae/corneas clear, PERRL, EOM's intact  Lungs: clear to auscultation bilaterally, no dullness to percussion bilaterally  Heart: regular rate and rhythm without rub; no displacement of the PMI  Abdomen: soft, non-tender; bowel sounds normoactive; no organomegaly  Extremities: extremities symmetric; no clubbing, cyanosis, or edema  Integument: Skin color, texture, turgor normal; no rashes; hair distrubution normal  Neurologic: Alert and oriented X 3, normal strength, normal coordination and gait  Psychiatric: no pressured speech; normal affect; no evidence of impaired cognition    Labs:  Lab Results   Component Value Date    WBC 6.89 07/27/2017    HGB 12.7 07/27/2017    HCT 37.4 07/27/2017    MCV 93 07/27/2017     07/27/2017     No results found for: FERRITIN  Lab Results   Component Value Date     07/27/2017 "    K 4.8 07/27/2017     07/27/2017    CO2 26 07/27/2017     07/27/2017    BUN 16 07/27/2017    CREATININE 0.7 07/27/2017    CALCIUM 9.7 07/27/2017    PROT 7.5 07/27/2017    ALBUMIN 4.0 07/27/2017    BILITOT 0.6 07/27/2017    ALKPHOS 54 (L) 07/27/2017    AST 20 07/27/2017    ALT 13 07/27/2017     Lab Results   Component Value Date    TSH 0.133 (L) 08/22/2017     Lab Results   Component Value Date    SEDRATE 7 06/30/2016     Lab Results   Component Value Date    CRP 1.0 06/30/2016     Lab Results   Component Value Date    HGBA1C 5.4 05/05/2015           Assessment and Plan:  Sonia Melo is a 47 y.o. female with undiferentiated connective tissue disorder, DDD, fibromyalgia, thyroid disease here with the following problems:     GERD.  Regurgitation   -Start nexium 40mg BID     Dysphagia to solids at the level of cervical esophagus.  Manometry w fragmented peristalsis and 80% incomplete clearance. Manometry not diagnostic of scleroderma esophagus, consistent with minor disorder of peristalsis, often seen with GERD, that can be seen in asymptomatic patients. However patients with scleroderma can have variety of non-specific findings on manometry, and do not always have absent contractility.   -EGD w EoE, duod bx on PPI BID x 8 weeks    Nausea. Early satiety. Rare emesis.   -Cont zofran Sl prn   -Check GES    Abdominal pain in b/l lower abdomen associated w diarrhea.  Gas and bloating.    -Start IBguard prn   -TTg/iGA  - Will consider rifaximin        BRBPR.  Occasional trace of blood on tissue toilet present for years.   -Colonoscopy w R/l bx     Rheum. Concern for connective tissue disease, possible scleroderma.  On pleqeunil 1.5 years ago.  -PT requesting second opinion from rheumatologist w expertise in Scleroderma.  Will ref to Dr. Brewer    Return in about 3 months (around 11/22/2017).    1. Dysphagia, unspecified type    2. Gastroesophageal reflux disease, esophagitis presence not specified    3.  Bloating    4. Abdominal pain, unspecified location    5. Diarrhea, unspecified type    6. Nausea    7. Early satiety    8. Rectal bleeding    9. Scleroderma          Order summary:  Orders Placed This Encounter    NM Gastric Emptying    Tissue transglutaminase, IgA    IgA    Ambulatory referral to Rheumatology    esomeprazole (NEXIUM PACKET) 40 mg GrPS    Case request GI: Colonoscopy, ESOPHAGOGASTRODUODENOSCOPY (EGD)         Thank you so much for allowing me to participate in the care of Sonia Taverafanny Lopez MD

## 2017-08-23 ENCOUNTER — PATIENT MESSAGE (OUTPATIENT)
Dept: GASTROENTEROLOGY | Facility: CLINIC | Age: 47
End: 2017-08-23

## 2017-08-24 ENCOUNTER — PATIENT MESSAGE (OUTPATIENT)
Dept: ENDOCRINOLOGY | Facility: CLINIC | Age: 47
End: 2017-08-24

## 2017-08-24 ENCOUNTER — TELEPHONE (OUTPATIENT)
Dept: GASTROENTEROLOGY | Facility: CLINIC | Age: 47
End: 2017-08-24

## 2017-08-24 RX ORDER — LEVOTHYROXINE SODIUM 125 UG/1
125 TABLET ORAL DAILY
Qty: 30 TABLET | Refills: 2 | Status: SHIPPED | OUTPATIENT
Start: 2017-08-24 | End: 2017-11-08

## 2017-08-24 RX ORDER — ESOMEPRAZOLE MAGNESIUM 40 MG/1
40 CAPSULE, DELAYED RELEASE ORAL
Qty: 60 CAPSULE | Refills: 6 | Status: SHIPPED | OUTPATIENT
Start: 2017-08-24 | End: 2018-09-26 | Stop reason: SDUPTHER

## 2017-08-24 NOTE — TELEPHONE ENCOUNTER
Pls call the pt and let her know that I prefer to do the procedures myself to make sure that everything I need gets done.  Offer to schedule on [address] if she agrees

## 2017-08-25 NOTE — TELEPHONE ENCOUNTER
Spoke with patient and informed her the new prescription was sent to [facility].    Patient reported she wants to wait til she gets the Nexium before scheduling the EGD/Colon and has the number to the schedulers to call and have procedure scheduled.

## 2017-08-28 ENCOUNTER — PATIENT MESSAGE (OUTPATIENT)
Dept: INTERNAL MEDICINE | Facility: CLINIC | Age: 47
End: 2017-08-28

## 2017-08-28 ENCOUNTER — TELEPHONE (OUTPATIENT)
Dept: GASTROENTEROLOGY | Facility: CLINIC | Age: 47
End: 2017-08-28

## 2017-08-28 DIAGNOSIS — M47.812 OSTEOARTHRITIS OF CERVICAL SPINE WITHOUT MYELOPATHY: Primary | ICD-10-CM

## 2017-08-28 DIAGNOSIS — M47.817 OSTEOARTHRITIS OF LUMBOSACRAL SPINE WITHOUT MYELOPATHY: ICD-10-CM

## 2017-08-28 NOTE — TELEPHONE ENCOUNTER
----- Message from Marisela Lopez MD sent at 8/27/2017  8:12 PM CDT -----  Pls fax my note and rheum consult to Dr. Brewer to [phone]

## 2017-08-29 ENCOUNTER — PATIENT MESSAGE (OUTPATIENT)
Dept: OBSTETRICS AND GYNECOLOGY | Facility: CLINIC | Age: 47
End: 2017-08-29

## 2017-08-31 ENCOUNTER — TELEPHONE (OUTPATIENT)
Dept: OBSTETRICS AND GYNECOLOGY | Facility: CLINIC | Age: 47
End: 2017-08-31

## 2017-08-31 DIAGNOSIS — N92.6 IRREGULAR MENSES: Primary | ICD-10-CM

## 2017-08-31 RX ORDER — LEVONORGESTREL AND ETHINYL ESTRADIOL 0.1-0.02MG
1 KIT ORAL DAILY
Qty: 28 TABLET | Refills: 6 | Status: SHIPPED | OUTPATIENT
Start: 2017-08-31 | End: 2017-11-21

## 2017-08-31 NOTE — TELEPHONE ENCOUNTER
Back with irregular menses - case discussed wth  Dr. Reynoso at this time will try ocp to see if regulates cycle as she gets thyroid under control   Called and discussed with pt - she is open to this option but does not want to be on for too long - instructed will need at least 3-4 months - she stated understanding.

## 2017-09-20 ENCOUNTER — PATIENT MESSAGE (OUTPATIENT)
Dept: INTERNAL MEDICINE | Facility: CLINIC | Age: 47
End: 2017-09-20

## 2017-09-22 ENCOUNTER — TELEPHONE (OUTPATIENT)
Dept: GASTROENTEROLOGY | Facility: CLINIC | Age: 47
End: 2017-09-22

## 2017-09-22 NOTE — TELEPHONE ENCOUNTER
----- Message from Trice Helms sent at 9/22/2017  9:08 AM CDT -----  Contact: daniel/dr flores - [phone]  [facility] - pt has refused the referral - please call daniel/dr flores - [phone]

## 2017-09-27 RX ORDER — SODIUM, POTASSIUM,MAG SULFATES 17.5-3.13G
SOLUTION, RECONSTITUTED, ORAL ORAL
Qty: 354 ML | Refills: 0 | Status: ON HOLD | OUTPATIENT
Start: 2017-09-27 | End: 2017-10-13 | Stop reason: ALTCHOICE

## 2017-09-28 ENCOUNTER — CLINICAL SUPPORT (OUTPATIENT)
Dept: REHABILITATION | Facility: HOSPITAL | Age: 47
End: 2017-09-28
Attending: FAMILY MEDICINE
Payer: COMMERCIAL

## 2017-09-28 DIAGNOSIS — M47.812 CERVICAL SPONDYLOSIS WITHOUT MYELOPATHY: Primary | ICD-10-CM

## 2017-09-28 DIAGNOSIS — M47.817 LUMBOSACRAL SPONDYLOSIS WITHOUT MYELOPATHY: ICD-10-CM

## 2017-09-28 PROCEDURE — 97014 ELECTRIC STIMULATION THERAPY: CPT | Performed by: PHYSICAL THERAPIST

## 2017-09-28 PROCEDURE — 97140 MANUAL THERAPY 1/> REGIONS: CPT | Performed by: PHYSICAL THERAPIST

## 2017-09-28 PROCEDURE — 97164 PT RE-EVAL EST PLAN CARE: CPT | Performed by: PHYSICAL THERAPIST
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
## 2017-10-03 ENCOUNTER — PATIENT MESSAGE (OUTPATIENT)
Dept: INTERNAL MEDICINE | Facility: CLINIC | Age: 47
End: 2017-10-03

## 2017-10-09 ENCOUNTER — CLINICAL SUPPORT (OUTPATIENT)
Dept: REHABILITATION | Facility: HOSPITAL | Age: 47
End: 2017-10-09
Attending: FAMILY MEDICINE
Payer: COMMERCIAL

## 2017-10-09 DIAGNOSIS — M79.18 MYOFASCIAL PAIN: ICD-10-CM

## 2017-10-09 DIAGNOSIS — M47.817 LUMBOSACRAL SPONDYLOSIS WITHOUT MYELOPATHY: ICD-10-CM

## 2017-10-09 DIAGNOSIS — M47.812 CERVICAL SPONDYLOSIS WITHOUT MYELOPATHY: Primary | ICD-10-CM

## 2017-10-09 DIAGNOSIS — M50.30 DEGENERATION OF CERVICAL INTERVERTEBRAL DISC: ICD-10-CM

## 2017-10-09 PROCEDURE — 97140 MANUAL THERAPY 1/> REGIONS: CPT | Performed by: PHYSICAL THERAPIST

## 2017-10-09 PROCEDURE — 97014 ELECTRIC STIMULATION THERAPY: CPT | Performed by: PHYSICAL THERAPIST

## 2017-10-09 NOTE — PROGRESS NOTES
"     DATE OF INITIAL PHYSICAL THERAPY EVALUATION:             2017      REFERRING PROVIDER:       Leonardo Frey MD      REFERRING DIAGNOSIS:     Osteoarthritis of cervical spine without myelopathy [M47.812]        Osteoarthritis of lumbosacral spine without myelopathy [M47.817]      ORDERS:   Evaluate and treat as indicated 3 x week x 30 days    Orders will    2017    VISIT    #2      SUBJECTIVE:    Patient states she benefited from her initial therapy treatment.  However, she reports waking two mornings ago experiencing an increase in myofascial pain and tenderness throughout the right scapulothoracic and lumbar paraspinal region.  She is unable to identify a reason for this.    Patients primary complaint(s):  Myofascial pain and tenderness throughout the scapulothoracic and lumbar muscle groups on the right side.    History of present condition:    Patient reports ongoing symptoms of myofascial pain and tenderness throughout the cervical, thoracic, and lumbar paraspinal muscle groups. She reports symptoms are aggravated by activity including prolonged standing, sitting, lifting, and carrying.  Myofascial pain and tenderness is more localized in the mid scapular region and throughout the paraspinal musculature at the thoracolumbar junction.  She reports continuing issues with "tingling" in the fingers and toes bilaterally.  She reports previous imaging studies indicated disc bulges at the C4-5 and C5-6 levels.  She is also currently under the care of a neurologist.    Pain Rating:    Increased today to 6/10.   Previous visit:  4/10      Patient reports the following functional activity limitations:  Prolonged standing and walking is impaired due to back pain.  Unable to lie on her left side due to shoulder pain.  Bending, lifting, and carrying impaired.    (FUNCTIONAL ASSESSMENT TOOL)    At the time of the initial evaluation, the patient reported the following self limitations on " the Optimal Instrument functional assessment tool:  (Rating Scale: #1=Able to do without difficulty, #2=Able to do with little difficulty, #3=Able to do with moderate difficulty, #4=Able to do with much difficulty, #5=Unable to do, #9=Not applicable)    Optimal Instrument Scores reported by the patient:  Completed on September 28, 2017    1.  Lying flat    3  2.  Rolling over   1  3.  Moving - lying to sitting  2  4.  Sitting    3  5.  Squatting    1  6.  Bending/stooping   2  7.  Balancing    1  8.  Kneeling    1  9.  Standing    3  10.  Walking - short distance  2  11.  Walking - long distance  3  12.  Walking - outdoors  3  13.  Climbing stairs   2  14.  Hopping    2  15.  Jumping    2  16.  Running    4  17.  Pushing    2  18.  Pulling    2  19.  Reaching    2  20.  Grasping    1  21.  Lifting    2  22.  Carrying    2    Total Impairment Rating  27%      Past Medical History and co-morbidities:  Past Medical History:   Diagnosis Date    Abnormal Pap smear of cervix     Cervical spondylolysis     DDD (degenerative disc disease), lumbosacral     Fibromyalgia     Hyperthyroidism     Iatrogenic hypothyroidism     Undifferentiated connective tissue disease      Patient Active Problem List   Diagnosis    Iatrogenic hypothyroidism    Mixed hyperlipidemia    DDD (degenerative disc disease), cervical    Undifferentiated connective tissue disease    Conductive hearing loss of left ear    Osteoarthritis of cervical spine without myelopathy    Osteoarthritis of lumbosacral spine without myelopathy    Esophageal dysphagia    Myofascial pain    Chronic cough       Current Outpatient Prescriptions:     albuterol 90 mcg/actuation inhaler, Inhale 2 puffs into the lungs every 4 (four) hours as needed for Wheezing or Shortness of Breath (Or Cough)., Disp: 1 Inhaler, Rfl: 0    esomeprazole (NEXIUM) 40 MG capsule, Take 1 capsule (40 mg total) by mouth 2 (two) times daily before meals., Disp: 60 capsule, Rfl: 6     hydroxychloroquine (PLAQUENIL) 200 mg tablet, Take 1 tablet (200 mg total) by mouth 2 (two) times daily., Disp: 180 tablet, Rfl: 2    ibuprofen (ADVIL,MOTRIN) 200 MG tablet, Take 200 mg by mouth as needed. , Disp: , Rfl:     levonorgestrel-ethinyl estradiol (AVIANE,ALESSE,LESSINA) 0.1-20 mg-mcg per tablet, Take 1 tablet by mouth once daily., Disp: 28 tablet, Rfl: 6    levothyroxine (SYNTHROID) 125 MCG tablet, Take 1 tablet (125 mcg total) by mouth once daily., Disp: 30 tablet, Rfl: 2    multivitamin (ONE DAILY MULTIVITAMIN) per tablet, Take 1 tablet by mouth once daily., Disp: , Rfl:     penciclovir (DENAVIR) 1 % cream, Apply topically every 2 (two) hours. (Patient taking differently: Apply 1 application topically as needed. ), Disp: 1.5 g, Rfl: 2    sodium,potassium,mag sulfates (SUPREP BOWEL PREP KIT) 17.5-3.13-1.6 gram SolR, As directed, Disp: 354 mL, Rfl: 0      Previous physical therapy and treatments:  Patient received therapy recently at another clinic in ; reports stretching exercises were aggressive and resulted in an increase in neck pain.      Occupational/psychosocial/educational profile:  Works in an office setting.    OBJECTIVE:    Musculoskeletal Exam:    Postural Exam  No postural deviations noted.  SI exam negative for a postural rotation    ROM  Cervical Spine ROM is WNL's; patient reports discomfort with extension.  Lumbar spine ROM is WNL's; patient complains of myofascial pain throughout the thoracic and lumbar paraspinals at the end point into full flexion.    Flexibility  No limitations in flexibility noted in the hip girdle or LE muscle groups.    Functional Strength Testing  No strength deficits or imbalances noted throughout the extremities     Palpation  Moderate muscle guarding and tenderness present in the lower thoracic and lumbar paraspinal muscle groups bilaterally.  Localized tenderness present over the supraspinous ligament at the thoracolumbar junction.      Neuromuscular  Exam    Gait  No gait deviations observed.    Transitional Movements  Independent with all transitional movements; patient reports thoracolumbar discomfort with supine to sit.    Sensation  Patient reports mild paresthesias present in the digits and toes bilaterally.      PROBLEM LIST - ASSESSMENT  Patient presents today with moderate muscle guarding and tenderness throughout the lower thoracic and lumbar paraspinal muscle groups bilaterally.  She also presents with localized tenderness over the supraspinous ligament at the thoracolumbar junction with underlying limitations in spinal mobility.    Activity Limitations, Participation Restrictions, and CO-MORBIDITIES which may impact the plan of care and potentially impede the patient's progress in therapy include:  none    The patient does not present with any learning or communication barriers which may impact the plan of care and potentially impede the patient's progress in therapy.      CLINICAL PRESENTATION:  Patient's Clinical Presentation is STABLE.       RECOMMENDED PLAN OF CARE:  Manual therapy for myofascial release of the thoracolumbar paraspinal muscle groups with gentle spinal mobilization at the thoracolumbar junction.  Patient to continue stretching exercises for the lumbar spine, ,hip girdle, and LE muscle groups as previously instructed.  Patient to continue strengthening exercises for the RTC and scapulothoracic muscle groups.  Patient declines dry needling    TREATMENT PROVIDED:     -moist heat and muscle stimulation x 20 mins applied to the paraspinal muscle groups at the thoracolumbar junction.  -manual therapy for myofascial release of the thoracolumbar paraspinal muscle groups with gentle spinal mobilization at the thoracolumbar junction.  (provided by therapist - 15 mins)    Patient reported being less symptomatic post treatment today.        PLAN:   Patient to attend out-patient physical therapy 2 x week to continue the     Recommended Plan of  Care                                                                          SHORT TERM GOALS:    1. Patient will report a pain level of 2/10 or less  2. Patient to continue HEP for RTC strengthening      LONG TERM GOALS:  1. Patient will report a pain level of 0/10  2. Resolution of muscle guarding and tenderness throughout the thoracolumbar paraspinal muscle groups.  3. Patient will report and decrease in functional impairment on the Optimal tool of 10% or greater      These services are reasonable and necessary for the conditions set forth above while under my care.

## 2017-10-12 ENCOUNTER — PATIENT MESSAGE (OUTPATIENT)
Dept: GASTROENTEROLOGY | Facility: CLINIC | Age: 47
End: 2017-10-12

## 2017-10-12 ENCOUNTER — CLINICAL SUPPORT (OUTPATIENT)
Dept: REHABILITATION | Facility: HOSPITAL | Age: 47
End: 2017-10-12
Attending: FAMILY MEDICINE
Payer: COMMERCIAL

## 2017-10-12 DIAGNOSIS — M79.18 MYOFASCIAL PAIN: ICD-10-CM

## 2017-10-12 DIAGNOSIS — M50.30 DEGENERATION OF CERVICAL INTERVERTEBRAL DISC: ICD-10-CM

## 2017-10-12 DIAGNOSIS — M47.812 CERVICAL SPONDYLOSIS WITHOUT MYELOPATHY: Primary | ICD-10-CM

## 2017-10-12 DIAGNOSIS — M47.817 LUMBOSACRAL SPONDYLOSIS WITHOUT MYELOPATHY: ICD-10-CM

## 2017-10-12 PROCEDURE — 97140 MANUAL THERAPY 1/> REGIONS: CPT | Performed by: PHYSICAL THERAPIST

## 2017-10-12 PROCEDURE — 97014 ELECTRIC STIMULATION THERAPY: CPT | Performed by: PHYSICAL THERAPIST

## 2017-10-12 NOTE — PROGRESS NOTES
"     DATE OF INITIAL PHYSICAL THERAPY EVALUATION:             2017      REFERRING PROVIDER:       Leonardo Frey MD      REFERRING DIAGNOSIS:     Osteoarthritis of cervical spine without myelopathy [M47.812]        Osteoarthritis of lumbosacral spine without myelopathy [M47.817]      ORDERS:   Evaluate and treat as indicated 3 x week x 30 days    Orders will    2017    VISIT    #3      SUBJECTIVE:    Patient states she has been scheduled for a cervical and lumbar spine MRI.     She is again complaining of an increase in myofascial pain and tenderness throughout the right scapulothoracic and lumbar paraspinal regions.    Patient is requesting modalities and MFR for pain relief in preparation for the MRI tomorrow.    Patients primary complaint(s):  Myofascial pain and tenderness throughout the scapulothoracic and lumbar muscle groups on the right side.    History of present condition:    Patient reports ongoing symptoms of myofascial pain and tenderness throughout the cervical, thoracic, and lumbar paraspinal muscle groups. She reports symptoms are aggravated by activity including prolonged standing, sitting, lifting, and carrying.  Myofascial pain and tenderness is more localized in the mid scapular region and throughout the paraspinal musculature at the thoracolumbar junction.  She reports continuing issues with "tingling" in the fingers and toes bilaterally.  She reports previous imaging studies indicated disc bulges at the C4-5 and C5-6 levels.  She is also currently under the care of a neurologist.    Pain Ratin/10.   Previous visit:      Patient reports the following functional activity limitations:  Prolonged standing and walking is impaired due to back pain.  Unable to lie on her left side due to shoulder pain.  Bending, lifting, and carrying impaired.    (FUNCTIONAL ASSESSMENT TOOL)    At the time of the initial evaluation, the patient reported the following self " limitations on the Optimal Instrument functional assessment tool:  (Rating Scale: #1=Able to do without difficulty, #2=Able to do with little difficulty, #3=Able to do with moderate difficulty, #4=Able to do with much difficulty, #5=Unable to do, #9=Not applicable)    Optimal Instrument Scores reported by the patient:  Completed on September 28, 2017    1.  Lying flat    3  2.  Rolling over   1  3.  Moving - lying to sitting  2  4.  Sitting    3  5.  Squatting    1  6.  Bending/stooping   2  7.  Balancing    1  8.  Kneeling    1  9.  Standing    3  10.  Walking - short distance  2  11.  Walking - long distance  3  12.  Walking - outdoors  3  13.  Climbing stairs   2  14.  Hopping    2  15.  Jumping    2  16.  Running    4  17.  Pushing    2  18.  Pulling    2  19.  Reaching    2  20.  Grasping    1  21.  Lifting    2  22.  Carrying    2    Total Impairment Rating  27%      Past Medical History and co-morbidities:  Past Medical History:   Diagnosis Date    Abnormal Pap smear of cervix     Cervical spondylolysis     DDD (degenerative disc disease), lumbosacral     Fibromyalgia     Hyperthyroidism     Iatrogenic hypothyroidism     Undifferentiated connective tissue disease      Patient Active Problem List   Diagnosis    Iatrogenic hypothyroidism    Mixed hyperlipidemia    DDD (degenerative disc disease), cervical    Undifferentiated connective tissue disease    Conductive hearing loss of left ear    Osteoarthritis of cervical spine without myelopathy    Osteoarthritis of lumbosacral spine without myelopathy    Esophageal dysphagia    Myofascial pain    Chronic cough       Current Outpatient Prescriptions:     albuterol 90 mcg/actuation inhaler, Inhale 2 puffs into the lungs every 4 (four) hours as needed for Wheezing or Shortness of Breath (Or Cough)., Disp: 1 Inhaler, Rfl: 0    esomeprazole (NEXIUM) 40 MG capsule, Take 1 capsule (40 mg total) by mouth 2 (two) times daily before meals., Disp: 60  capsule, Rfl: 6    hydroxychloroquine (PLAQUENIL) 200 mg tablet, Take 1 tablet (200 mg total) by mouth 2 (two) times daily., Disp: 180 tablet, Rfl: 2    ibuprofen (ADVIL,MOTRIN) 200 MG tablet, Take 200 mg by mouth as needed. , Disp: , Rfl:     levonorgestrel-ethinyl estradiol (AVIANE,ALESSE,LESSINA) 0.1-20 mg-mcg per tablet, Take 1 tablet by mouth once daily., Disp: 28 tablet, Rfl: 6    levothyroxine (SYNTHROID) 125 MCG tablet, Take 1 tablet (125 mcg total) by mouth once daily., Disp: 30 tablet, Rfl: 2    multivitamin (ONE DAILY MULTIVITAMIN) per tablet, Take 1 tablet by mouth once daily., Disp: , Rfl:     penciclovir (DENAVIR) 1 % cream, Apply topically every 2 (two) hours. (Patient taking differently: Apply 1 application topically as needed. ), Disp: 1.5 g, Rfl: 2    sodium,potassium,mag sulfates (SUPREP BOWEL PREP KIT) 17.5-3.13-1.6 gram SolR, As directed, Disp: 354 mL, Rfl: 0      Previous physical therapy and treatments:  Patient received therapy recently at another clinic in ; reports stretching exercises were aggressive and resulted in an increase in neck pain.      Occupational/psychosocial/educational profile:  Works in an office setting.    OBJECTIVE:    Musculoskeletal Exam:    Postural Exam  No postural deviations noted.  SI exam negative for a postural rotation    ROM  Cervical Spine ROM is WNL's; patient reports discomfort with extension.  Lumbar spine ROM is WNL's; patient complains of myofascial pain throughout the thoracic and lumbar paraspinals at the end point into full flexion.    Flexibility  No limitations in flexibility noted in the hip girdle or LE muscle groups.    Functional Strength Testing  No strength deficits or imbalances noted throughout the extremities     Palpation  Moderate muscle guarding and acute tenderness present in the lower thoracic and lumbar paraspinal muscle groups bilaterally.  Localized acute tenderness present over the supraspinous ligament at the  thoracolumbar junction.      Neuromuscular Exam    Gait  No gait deviations observed.    Transitional Movements  Independent with all transitional movements; patient reports thoracolumbar discomfort with supine to sit.    Sensation  Patient reports mild paresthesias present in the digits and toes bilaterally.      PROBLEM LIST - ASSESSMENT  Patient presents today with moderate muscle guarding and tenderness throughout the lower thoracic and lumbar paraspinal muscle groups bilaterally.  She also presents with localized tenderness over the supraspinous ligament at the thoracolumbar junction with underlying limitations in spinal mobility.    Activity Limitations, Participation Restrictions, and CO-MORBIDITIES which may impact the plan of care and potentially impede the patient's progress in therapy include:  none    The patient does not present with any learning or communication barriers which may impact the plan of care and potentially impede the patient's progress in therapy.      CLINICAL PRESENTATION:  Patient's Clinical Presentation is STABLE.       RECOMMENDED PLAN OF CARE:  Manual therapy for myofascial release of the thoracolumbar paraspinal muscle groups with gentle spinal mobilization at the thoracolumbar junction.  Patient to continue stretching exercises for the lumbar spine, ,hip girdle, and LE muscle groups as previously instructed.  Patient to continue strengthening exercises for the RTC and scapulothoracic muscle groups.  Patient declines dry needling    TREATMENT PROVIDED:     -moist heat and muscle stimulation x 20 mins applied to the paraspinal muscle groups at the thoracolumbar junction.  -manual therapy for myofascial release of the thoracolumbar paraspinal muscle groups with gentle spinal mobilization at the thoracolumbar junction.  (provided by therapist - 20 mins)      PLAN:   Patient to attend out-patient physical therapy 2 x week to continue the     Recommended Plan of Care                                                                    SHORT TERM GOALS:    1. Patient will report a pain level of 2/10 or less  2. Patient to continue HEP for RTC strengthening      LONG TERM GOALS:  1. Patient will report a pain level of 0/10  2. Resolution of muscle guarding and tenderness throughout the thoracolumbar paraspinal muscle groups.  3. Patient will report and decrease in functional impairment on the Optimal tool of 10% or greater      These services are reasonable and necessary for the conditions set forth above while under my care.

## 2017-10-13 ENCOUNTER — TELEPHONE (OUTPATIENT)
Dept: GASTROENTEROLOGY | Facility: CLINIC | Age: 47
End: 2017-10-13

## 2017-10-13 ENCOUNTER — ANESTHESIA (OUTPATIENT)
Dept: ENDOSCOPY | Facility: HOSPITAL | Age: 47
End: 2017-10-13
Payer: COMMERCIAL

## 2017-10-13 ENCOUNTER — HOSPITAL ENCOUNTER (OUTPATIENT)
Facility: HOSPITAL | Age: 47
Discharge: HOME | End: 2017-10-13
Attending: INTERNAL MEDICINE | Admitting: INTERNAL MEDICINE
Payer: COMMERCIAL

## 2017-10-13 ENCOUNTER — ANESTHESIA EVENT (OUTPATIENT)
Dept: ENDOSCOPY | Facility: HOSPITAL | Age: 47
End: 2017-10-13
Payer: COMMERCIAL

## 2017-10-13 ENCOUNTER — PATIENT MESSAGE (OUTPATIENT)
Dept: GASTROENTEROLOGY | Facility: CLINIC | Age: 47
End: 2017-10-13

## 2017-10-13 ENCOUNTER — SURGERY (OUTPATIENT)
Age: 47
End: 2017-10-13

## 2017-10-13 VITALS
DIASTOLIC BLOOD PRESSURE: 68 MMHG | SYSTOLIC BLOOD PRESSURE: 107 MMHG | RESPIRATION RATE: 18 BRPM | TEMPERATURE: 98 F | HEIGHT: 63 IN | BODY MASS INDEX: 21.09 KG/M2 | OXYGEN SATURATION: 100 % | HEART RATE: 74 BPM | WEIGHT: 119 LBS

## 2017-10-13 DIAGNOSIS — R13.10 DYSPHAGIA: ICD-10-CM

## 2017-10-13 LAB
B-HCG UR QL: NEGATIVE
CTP QC/QA: YES

## 2017-10-13 PROCEDURE — 88305 TISSUE EXAM BY PATHOLOGIST: CPT | Performed by: PATHOLOGY

## 2017-10-13 PROCEDURE — 81025 URINE PREGNANCY TEST: CPT | Performed by: INTERNAL MEDICINE

## 2017-10-13 PROCEDURE — 27201012 HC FORCEPS, HOT/COLD, DISP: Performed by: INTERNAL MEDICINE

## 2017-10-13 PROCEDURE — 43239 EGD BIOPSY SINGLE/MULTIPLE: CPT | Mod: 51,,, | Performed by: INTERNAL MEDICINE

## 2017-10-13 PROCEDURE — 45380 COLONOSCOPY AND BIOPSY: CPT | Performed by: INTERNAL MEDICINE

## 2017-10-13 PROCEDURE — 43239 EGD BIOPSY SINGLE/MULTIPLE: CPT | Performed by: INTERNAL MEDICINE

## 2017-10-13 PROCEDURE — 88305 TISSUE EXAM BY PATHOLOGIST: CPT | Mod: 26,,, | Performed by: PATHOLOGY

## 2017-10-13 PROCEDURE — 45380 COLONOSCOPY AND BIOPSY: CPT | Mod: ,,, | Performed by: INTERNAL MEDICINE

## 2017-10-13 PROCEDURE — 37000009 HC ANESTHESIA EA ADD 15 MINS: Performed by: INTERNAL MEDICINE

## 2017-10-13 PROCEDURE — 25000003 PHARM REV CODE 250: Performed by: INTERNAL MEDICINE

## 2017-10-13 PROCEDURE — 37000008 HC ANESTHESIA 1ST 15 MINUTES: Performed by: INTERNAL MEDICINE

## 2017-10-13 RX ORDER — SODIUM CHLORIDE, SODIUM LACTATE, POTASSIUM CHLORIDE, CALCIUM CHLORIDE 600; 310; 30; 20 MG/100ML; MG/100ML; MG/100ML; MG/100ML
INJECTION, SOLUTION INTRAVENOUS CONTINUOUS
Status: DISCONTINUED | OUTPATIENT
Start: 2017-10-13 | End: 2017-10-13 | Stop reason: HOSPADM

## 2017-10-13 RX ADMIN — SODIUM CHLORIDE, SODIUM LACTATE, POTASSIUM CHLORIDE, AND CALCIUM CHLORIDE: 600; 310; 30; 20 INJECTION, SOLUTION INTRAVENOUS at 10:10

## 2017-10-13 RX ADMIN — SODIUM CHLORIDE, SODIUM LACTATE, POTASSIUM CHLORIDE, AND CALCIUM CHLORIDE: 600; 310; 30; 20 INJECTION, SOLUTION INTRAVENOUS at 11:10

## 2017-10-13 NOTE — DISCHARGE INSTRUCTIONS
Hemorrhoids    Hemorrhoids are swollen and inflamed veins inside the rectum and near the anus. The rectum is the last several inches of the colon. The anus is the passage between the rectum and the outside of the body.  Causes  The veins can become swollen due to increased pressure in them. This is most often caused by:  · Chronic constipation or diarrhea  · Straining when having a bowel movement  · Sitting too long on the toilet  · A low-fiber diet  · Pregnancy  Symptoms  · Bleeding from the rectum (this may be noticeable after bowel movements)  · Lump near the anus  · Itching around the anus  · Pain around the anus  There are different types of hemorrhoids. Depending on the type you have and the severity, you may be able to treat yourself at home. In some cases, a procedure may be the best treatment option. Your healthcare provider can tell you more about this, if needed.  Home care  General care  · To get relief from pain or itching, try:  ¨ Topical products. Your healthcare provider may prescribe or recommend creams, ointments, or pads that can be applied to the hemorrhoid. Use these exactly as directed.  ¨ Medicines. Your healthcare provider may recommend stool softeners, suppositories, or laxatives to help manage constipation. Use these exactly as directed.  ¨ Sitz baths. A sitz bath involves sitting in a few inches of warm bath water. Be careful not to make the water so hot that you burn yourself--test it before sitting in it. Soak for about 10 to 15 minutes a few times a day. This may help relieve pain.  Tips to help prevent hemorrhoids  · Eat more fiber. Fiber adds bulk to stool and absorbs water as it moves through your colon. This makes stool softer and easier to pass.  ¨ Increase the fiber in your diet with more fiber-rich foods. These include fresh fruit, vegetables, and whole grains.  ¨ Take a fiber supplement or bulking agent, if advised to by your provider. These include products such as psyllium  or methylcellulose.  · Drink plenty of water, if directed to by your provider. This can help keep stool soft.  · Be more active. Frequent exercise aids digestion and helps prevent constipation. It may also help make bowel movements more regular.  · Dont strain during bowel movements. This can make hemorrhoids more likely. Also, dont sit on the toilet for long periods of time.  Follow-up care  Follow up with your healthcare provider, or as advised. If a culture or imaging tests were done, you will be notified of the results when they are ready. This may take a few days or longer.  When to seek medical advice  Call your healthcare provider right away if any of these occur:  · Increased bleeding from the rectum  · Increased pain around the rectum or anus  · Weakness or dizziness  Call 911  Call 911 or return to the emergency department right away if any of these occur:  · Trouble breathing or swallowing  · Fainting or loss of consciousness  · Unusually fast heart rate  · Vomiting blood  · Large amounts of blood in stool  Date Last Reviewed: 6/22/2015 © 2000-2017 [employer]. [address] [place] [address], [place] [address]. All rights reserved. This information is not intended as a substitute for professional medical care. Always follow your healthcare professional's instructions.

## 2017-10-13 NOTE — ANESTHESIA POSTPROCEDURE EVALUATION
"Anesthesia Post Evaluation    Patient: Sonia Melo    Procedure(s) Performed: Procedure(s) (LRB):  ESOPHAGOGASTRODUODENOSCOPY (EGD) (N/A)  COLONOSCOPY (N/A)    Final Anesthesia Type: MAC  Patient location during evaluation: PACU  Patient participation: Yes- Able to Participate  Level of consciousness: awake and alert and oriented  Post-procedure vital signs: reviewed and stable  Pain management: adequate  Airway patency: patent  PONV status at discharge: No PONV  Anesthetic complications: no      Cardiovascular status: blood pressure returned to baseline, hemodynamically stable and stable  Respiratory status: unassisted, spontaneous ventilation and room air  Hydration status: euvolemic  Follow-up not needed.        Visit Vitals  /68 (BP Location: Left arm, Patient Position: Lying)   Pulse 74   Temp 36.9 °C (98.4 °F) (Oral)   Resp 18   Ht 5' 3" (1.6 m)   Wt 54 kg (119 lb)   SpO2 100%   Breastfeeding? No   BMI 21.08 kg/m²       Pain/Thanh Score: Pain Assessment Performed: Yes (10/13/2017 10:38 AM)  Presence of Pain: denies (10/13/2017 12:40 PM)  Thanh Score: 10 (10/13/2017 12:40 PM)      "

## 2017-10-13 NOTE — TELEPHONE ENCOUNTER
Attempted to contact patient without success.    Left message for patient to return call to the clinic when available.

## 2017-10-13 NOTE — H&P
Short Stay Endoscopy History and Physical    PCP - Leonardo Frey MD    Procedure - EGD and colonoscopy  ASA - 2  Mallampati - per anesthesia  History of Anesthesia problems - no  Family history Anesthesia problems -  no     HPI:  This is a 47 y.o. female here for evaluation of :     EGD  Reflux - no  Dysphagia - yes  Abdominal pain - no  Diarrhea - yes  Anemia - no  GI bleeding - no  Other - no    Colonoscopy  Screening - no  History of polyps - no  Diarrhea - yes  Anemia - no  Blood in stools - yes  Abdominal pain - no  Other - no    ROS:  CONSTITUTIONAL: Denies weight change,  fatigue, fevers, chills, night sweats.  CARDIOVASCULAR: Denies chest pain, shortness of breath, orthopnea and edema.  RESPIRATORY: Denies cough, hemoptysis, dyspnea, and wheezing.  GI: See HPI.    Medical History:   Past Medical History:   Diagnosis Date    Abnormal Pap smear of cervix     Cervical spondylolysis     DDD (degenerative disc disease), lumbosacral     Fibromyalgia     Hyperthyroidism     Iatrogenic hypothyroidism     Spondylosis     Undifferentiated connective tissue disease        Surgical History:   Past Surgical History:   Procedure Laterality Date    BREAST IMPLANTS      BREAST SURGERY Bilateral 2009    EAR MASTOIDECTOMY W/ COCHLEAR IMPLANT W/ LANDMARK      STAPEDES SURGERY      sMart Stapedes Piston (Safe to 3T magnet)    UPPER GASTROINTESTINAL ENDOSCOPY         Family History:   Family History   Problem Relation Age of Onset    Hypertension Mother     Hypertension Father     Heart disease Father     Cancer Maternal Uncle      stomach cancer    Stomach cancer Maternal Uncle     Celiac disease Maternal Aunt     Breast cancer Paternal Aunt     Cirrhosis Neg Hx     Colon cancer Neg Hx     Colon polyps Neg Hx     Crohn's disease Neg Hx     Cystic fibrosis Neg Hx     Esophageal cancer Neg Hx     Hemochromatosis Neg Hx     Inflammatory bowel disease Neg Hx     Irritable bowel syndrome Neg Hx      Liver cancer Neg Hx     Liver disease Neg Hx     Rectal cancer Neg Hx     Ulcerative colitis Neg Hx     Marin's disease Neg Hx     Lymphoma Neg Hx     Tuberculosis Neg Hx     Scleroderma Neg Hx     Rheum arthritis Neg Hx     Multiple sclerosis Neg Hx     Melanoma Neg Hx     Lupus Neg Hx     Psoriasis Neg Hx     Skin cancer Neg Hx        Social History:   Social History   Substance Use Topics    Smoking status: Never Smoker    Smokeless tobacco: Never Used    Alcohol use 1.2 oz/week     2 Cans of beer per week      Comment: socially        Allergies: Reviewed    Medications:   No current facility-administered medications on file prior to encounter.      Current Outpatient Prescriptions on File Prior to Encounter   Medication Sig Dispense Refill    multivitamin (ONE DAILY MULTIVITAMIN) per tablet Take 1 tablet by mouth once daily.      penciclovir (DENAVIR) 1 % cream Apply topically every 2 (two) hours. (Patient taking differently: Apply 1 application topically as needed. ) 1.5 g 2    albuterol 90 mcg/actuation inhaler Inhale 2 puffs into the lungs every 4 (four) hours as needed for Wheezing or Shortness of Breath (Or Cough). 1 Inhaler 0    hydroxychloroquine (PLAQUENIL) 200 mg tablet Take 1 tablet (200 mg total) by mouth 2 (two) times daily. 180 tablet 2    ibuprofen (ADVIL,MOTRIN) 200 MG tablet Take 200 mg by mouth as needed.          Physical Exam:  Vital Signs:   Vitals:    10/13/17 1041   BP: 113/81   Pulse: 80   Resp: 16   Temp: 98.4 °F (36.9 °C)     General Appearance: Well appearing in no acute distress  ENT: OP clear  Chest: CTA B  CV: RRR, no m/r/g  Abd: s/nt/nd/nabs  Ext: no edema    Labs:Reviewed    Plan:   I have explained the risks and benefits of upper endoscopy and colonoscopy to the patient including but not limited to bleeding, perforation, infection, and death. The patient wishes to proceed.

## 2017-10-13 NOTE — ANESTHESIA PREPROCEDURE EVALUATION
10/13/2017  Sonia Melo is a 47 y.o., female.    Pre-op Assessment    I have reviewed the Patient Summary Reports.     I have reviewed the Nursing Notes.   I have reviewed the Medications.     Review of Systems  Anesthesia Hx:  No problems with previous Anesthesia  Denies Family Hx of Anesthesia complications.   Denies Personal Hx of Anesthesia complications.   Social:  Non-Smoker    Hematology/Oncology:  Hematology Normal   Oncology Normal     EENT/Dental:EENT/Dental Normal   Cardiovascular:   Exercise tolerance: good    Pulmonary:  Pulmonary Normal    Renal/:  Renal/ Normal     Hepatic/GI:  Hepatic/GI Normal Bowel Prep. Last bowel prep at 0730   Musculoskeletal:   Arthritis     Neurological:  Neurology Normal    Endocrine:   Hypothyroidism    Dermatological:  Skin Normal    Psych:  Psychiatric Normal           Physical Exam  General:  Well nourished    Airway/Jaw/Neck:  Airway Findings: Mouth Opening: Normal Tongue: Normal  General Airway Assessment: Adult, Average  Mallampati: II  TM Distance: Normal, at least 6 cm      Dental:  Dental Findings: In tact   Chest/Lungs:  Chest/Lungs Findings: Clear to auscultation, Normal Respiratory Rate     Heart/Vascular:  Heart Findings: Rate: Normal  Rhythm: Regular Rhythm  Sounds: Normal        Mental Status:  Mental Status Findings:  Cooperative, Alert and Oriented         Anesthesia Plan  Type of Anesthesia, risks & benefits discussed:  Anesthesia Type:  MAC  Patient's Preference:   Intra-op Monitoring Plan:   Intra-op Monitoring Plan Comments:   Post Op Pain Control Plan:   Post Op Pain Control Plan Comments:   Induction:   IV  Beta Blocker:  Patient is not currently on a Beta-Blocker (No further documentation required).       Informed Consent: Patient understands risks and agrees with Anesthesia plan.  Questions answered. Anesthesia consent signed with  patient.  ASA Score: 2     Day of Surgery Review of History & Physical: I have interviewed and examined the patient. I have reviewed the patient's H&P dated: 7/5/17. There are no significant changes.  H&P update referred to the surgeon.         Ready For Surgery From Anesthesia Perspective.

## 2017-10-13 NOTE — DISCHARGE SUMMARY
[facility]  Brief Operative Note     SUMMARY     Surgery Date: 10/13/2017     Surgeon(s) and Role:     * Hugh Luciano MD - Primary    Assisting Surgeon: None    Pre-op Diagnosis:  Rectal bleeding [K62.5]  Dysphagia, unspecified type [R13.10]    Post-op Diagnosis:  Post-Op Diagnosis Codes:     * Rectal bleeding [K62.5]     * Dysphagia, unspecified type [R13.10]    Procedure(s) (LRB):  ESOPHAGOGASTRODUODENOSCOPY (EGD) (N/A)  COLONOSCOPY (N/A)    Anesthesia: General    Description of the findings of the procedure: Procedure completed. See Procedure note for details.     Findings/Key Components: Procedure completed. See Procedure note for details.     Prosthesis/Implants: None    Estimated Blood Loss: less than 10         Specimens:   Specimen (12h ago through future)    Start     Ordered    10/13/17 1141  Specimen to Pathology - Surgery  Once     Comments:  1.  Duodenum biopsy -R/O Celiac Disease2.  Esophagus biopsy - R/O Eosinophilic esophagitis3. Random colon biopsy -R/O Microscopic colitis      10/13/17 1204          Discharge Note    SUMMARY     Admit Date: 10/13/2017    Discharge Date and Time:  10/13/2017 12:05 PM    Hospital Course (synopsis of major diagnoses, care, treatment, and services provided during the course of the hospital stay): Procedure completed. See Procedure note for details.      Final Diagnosis: Post-Op Diagnosis Codes:     * Rectal bleeding [K62.5]     * Dysphagia, unspecified type [R13.10]    Disposition: Home or Self Care    Follow Up/Patient Instructions:     Medications:  Reconciled Home Medications:   Current Discharge Medication List      CONTINUE these medications which have NOT CHANGED    Details   esomeprazole (NEXIUM) 40 MG capsule Take 1 capsule (40 mg total) by mouth 2 (two) times daily before meals.  Qty: 60 capsule, Refills: 6      levothyroxine (SYNTHROID) 125 MCG tablet Take 1 tablet (125 mcg total) by mouth once daily.  Qty: 30 tablet, Refills: 2       multivitamin (ONE DAILY MULTIVITAMIN) per tablet Take 1 tablet by mouth once daily.      penciclovir (DENAVIR) 1 % cream Apply topically every 2 (two) hours.  Qty: 1.5 g, Refills: 2    Associated Diagnoses: Routine general medical examination at a health care facility; Hypothyroidism due to acquired atrophy of thyroid; Mixed hyperlipidemia      albuterol 90 mcg/actuation inhaler Inhale 2 puffs into the lungs every 4 (four) hours as needed for Wheezing or Shortness of Breath (Or Cough).  Qty: 1 Inhaler, Refills: 0      hydroxychloroquine (PLAQUENIL) 200 mg tablet Take 1 tablet (200 mg total) by mouth 2 (two) times daily.  Qty: 180 tablet, Refills: 2      ibuprofen (ADVIL,MOTRIN) 200 MG tablet Take 200 mg by mouth as needed.       levonorgestrel-ethinyl estradiol (AVIANE,ALESSE,LESSINA) 0.1-20 mg-mcg per tablet Take 1 tablet by mouth once daily.  Qty: 28 tablet, Refills: 6    Associated Diagnoses: Irregular menses             Discharge Procedure Orders  Diet general     Activity as tolerated       Follow-up Information     Leonardo Frey MD.    Specialty:  Family Medicine  Contact information:  [address]  [place] [address] [phone]

## 2017-10-13 NOTE — ANESTHESIA RELEASE NOTE
"Anesthesia Release from PACU Note    Patient: Sonia Melo    Procedure(s) Performed: Procedure(s) (LRB):  ESOPHAGOGASTRODUODENOSCOPY (EGD) (N/A)  COLONOSCOPY (N/A)    Anesthesia type: MAC    Post pain: Adequate analgesia    Post assessment: no apparent anesthetic complications, tolerated procedure well and no evidence of recall    Last Vitals:   Visit Vitals  /68 (BP Location: Left arm, Patient Position: Lying)   Pulse 74   Temp 36.9 °C (98.4 °F) (Oral)   Resp 18   Ht 5' 3" (1.6 m)   Wt 54 kg (119 lb)   SpO2 100%   Breastfeeding? No   BMI 21.08 kg/m²       Post vital signs: stable    Level of consciousness: awake, alert  and oriented    Nausea/Vomiting: no nausea/no vomiting    Complications: none    Airway Patency: patent    Respiratory: unassisted, spontaneous ventilation, room air    Cardiovascular: stable and blood pressure at baseline    Hydration: euvolemic  "

## 2017-10-13 NOTE — TRANSFER OF CARE
"Anesthesia Transfer of Care Note    Patient: Sonia Melo    Procedure(s) Performed: Procedure(s) (LRB):  ESOPHAGOGASTRODUODENOSCOPY (EGD) (N/A)  COLONOSCOPY (N/A)    Patient location: PACU    Anesthesia Type: MAC    Transport from OR: Transported from OR on room air with adequate spontaneous ventilation    Post pain: adequate analgesia    Post assessment: no apparent anesthetic complications and tolerated procedure well    Post vital signs: stable    Level of consciousness: awake and responds to stimulation    Nausea/Vomiting: no nausea/vomiting    Complications: none    Transfer of care protocol was followed      Last vitals:   Visit Vitals  /81 (BP Location: Left arm)   Pulse 80   Temp 36.9 °C (98.4 °F) (Oral)   Resp 16   Ht 5' 3" (1.6 m)   Wt 54 kg (119 lb)   SpO2 96%   Breastfeeding? No   BMI 21.08 kg/m²     "

## 2017-10-13 NOTE — TELEPHONE ENCOUNTER
----- Message from Loly Alvares sent at 10/13/2017  2:05 PM CDT -----  Contact: Self- [phone]  John- pt is returning a missed call to Nathaly- please call pt back at [phone]

## 2017-10-16 ENCOUNTER — CLINICAL SUPPORT (OUTPATIENT)
Dept: REHABILITATION | Facility: HOSPITAL | Age: 47
End: 2017-10-16
Attending: FAMILY MEDICINE
Payer: COMMERCIAL

## 2017-10-16 DIAGNOSIS — M47.817 LUMBOSACRAL SPONDYLOSIS WITHOUT MYELOPATHY: ICD-10-CM

## 2017-10-16 DIAGNOSIS — M50.30 DEGENERATION OF CERVICAL INTERVERTEBRAL DISC: ICD-10-CM

## 2017-10-16 DIAGNOSIS — M47.812 CERVICAL SPONDYLOSIS WITHOUT MYELOPATHY: Primary | ICD-10-CM

## 2017-10-16 DIAGNOSIS — M79.18 MYOFASCIAL PAIN: ICD-10-CM

## 2017-10-16 PROCEDURE — 97140 MANUAL THERAPY 1/> REGIONS: CPT | Performed by: PHYSICAL THERAPIST

## 2017-10-16 PROCEDURE — 97014 ELECTRIC STIMULATION THERAPY: CPT | Performed by: PHYSICAL THERAPIST

## 2017-10-18 ENCOUNTER — TELEPHONE (OUTPATIENT)
Dept: GASTROENTEROLOGY | Facility: CLINIC | Age: 47
End: 2017-10-18

## 2017-10-18 NOTE — TELEPHONE ENCOUNTER
Spoke with patient via phone and let her know I was able to get the GES and Appointment with Dr. Lopez on the same day.    Patient verbalizes understanding.    Patient is getting emails about test results being ready in the portal but nothing comes up. Is there any test results for her?    Message forwarded to Dr. Lopez.

## 2017-10-18 NOTE — PROGRESS NOTES
"     DATE OF INITIAL PHYSICAL THERAPY EVALUATION:             2017      REFERRING PROVIDER:       Leonardo Frey MD      REFERRING DIAGNOSIS:     Osteoarthritis of cervical spine without myelopathy [M47.812]        Osteoarthritis of lumbosacral spine without myelopathy [M47.817]      ORDERS:   Evaluate and treat as indicated 3 x week x 30 days    Orders will    2017    VISIT    #4      SUBJECTIVE:    Patient states she has been scheduled for a cervical and lumbar spine MRI later this week.   She states her most acute pain is in the mid thoracic region; therefore she is planning to call her provider to discuss imaging in this area.      Patient is requesting modalities and MFR for pain relief in preparation for the MRI.    Patients primary complaint(s):  Myofascial pain and tenderness throughout the scapulothoracic and lumbar muscle groups on the right side.    History of present condition:    Patient reports ongoing symptoms of myofascial pain and tenderness throughout the cervical, thoracic, and lumbar paraspinal muscle groups. She reports symptoms are aggravated by activity including prolonged standing, sitting, lifting, and carrying.  Myofascial pain and tenderness is more localized in the mid scapular region and throughout the paraspinal musculature at the thoracolumbar junction.  She reports continuing issues with "tingling" in the fingers and toes bilaterally.  She reports previous imaging studies indicated disc bulges at the C4-5 and C5-6 levels.  She is also currently under the care of a neurologist.    Pain Ratin/10.       Patient reports the following functional activity limitations:  Prolonged standing and walking is impaired due to back pain.  Unable to lie on her left side due to shoulder pain.  Bending, lifting, and carrying impaired.    (FUNCTIONAL ASSESSMENT TOOL)    At the time of the initial evaluation, the patient reported the following self limitations on " the Optimal Instrument functional assessment tool:  (Rating Scale: #1=Able to do without difficulty, #2=Able to do with little difficulty, #3=Able to do with moderate difficulty, #4=Able to do with much difficulty, #5=Unable to do, #9=Not applicable)    Optimal Instrument Scores reported by the patient:  Completed on September 28, 2017    1.  Lying flat    3  2.  Rolling over   1  3.  Moving - lying to sitting  2  4.  Sitting    3  5.  Squatting    1  6.  Bending/stooping   2  7.  Balancing    1  8.  Kneeling    1  9.  Standing    3  10.  Walking - short distance  2  11.  Walking - long distance  3  12.  Walking - outdoors  3  13.  Climbing stairs   2  14.  Hopping    2  15.  Jumping    2  16.  Running    4  17.  Pushing    2  18.  Pulling    2  19.  Reaching    2  20.  Grasping    1  21.  Lifting    2  22.  Carrying    2    Total Impairment Rating  27%      Past Medical History and co-morbidities:  Past Medical History:   Diagnosis Date    Abnormal Pap smear of cervix     Cervical spondylolysis     DDD (degenerative disc disease), lumbosacral     Fibromyalgia     Hyperthyroidism     Iatrogenic hypothyroidism     Spondylosis     Undifferentiated connective tissue disease      Patient Active Problem List   Diagnosis    Iatrogenic hypothyroidism    Mixed hyperlipidemia    DDD (degenerative disc disease), cervical    Undifferentiated connective tissue disease    Conductive hearing loss of left ear    Osteoarthritis of cervical spine without myelopathy    Osteoarthritis of lumbosacral spine without myelopathy    Esophageal dysphagia    Myofascial pain    Chronic cough    Dysphagia       Current Outpatient Prescriptions:     albuterol 90 mcg/actuation inhaler, Inhale 2 puffs into the lungs every 4 (four) hours as needed for Wheezing or Shortness of Breath (Or Cough)., Disp: 1 Inhaler, Rfl: 0    esomeprazole (NEXIUM) 40 MG capsule, Take 1 capsule (40 mg total) by mouth 2 (two) times daily before  meals., Disp: 60 capsule, Rfl: 6    hydroxychloroquine (PLAQUENIL) 200 mg tablet, Take 1 tablet (200 mg total) by mouth 2 (two) times daily., Disp: 180 tablet, Rfl: 2    ibuprofen (ADVIL,MOTRIN) 200 MG tablet, Take 200 mg by mouth as needed. , Disp: , Rfl:     levonorgestrel-ethinyl estradiol (AVIANE,ALESSE,LESSINA) 0.1-20 mg-mcg per tablet, Take 1 tablet by mouth once daily., Disp: 28 tablet, Rfl: 6    levothyroxine (SYNTHROID) 125 MCG tablet, Take 1 tablet (125 mcg total) by mouth once daily., Disp: 30 tablet, Rfl: 2    multivitamin (ONE DAILY MULTIVITAMIN) per tablet, Take 1 tablet by mouth once daily., Disp: , Rfl:     penciclovir (DENAVIR) 1 % cream, Apply topically every 2 (two) hours. (Patient taking differently: Apply 1 application topically as needed. ), Disp: 1.5 g, Rfl: 2      Previous physical therapy and treatments:  Patient received therapy recently at another clinic in ; reports stretching exercises were aggressive and resulted in an increase in neck pain.      Occupational/psychosocial/educational profile:  Works in an office setting.    OBJECTIVE:    Musculoskeletal Exam:    Postural Exam  No postural deviations noted.  SI exam negative for a postural rotation    ROM  Cervical Spine ROM is WNL's; patient reports discomfort with extension.  Lumbar spine ROM is WNL's; patient complains of myofascial pain throughout the thoracic and lumbar paraspinals at the end point into full flexion.    Flexibility  No limitations in flexibility noted in the hip girdle or LE muscle groups.    Functional Strength Testing  No strength deficits or imbalances noted throughout the extremities     Palpation  Moderate muscle guarding and acute tenderness present in the lower thoracic and lumbar paraspinal muscle groups bilaterally.  Localized acute tenderness present over the supraspinous ligament at the thoracolumbar junction.      Neuromuscular Exam    Gait  No gait deviations observed.    Transitional  Movements  Independent with all transitional movements; patient reports thoracolumbar discomfort with supine to sit.    Sensation  Patient reports mild paresthesias present in the digits and toes bilaterally.      PROBLEM LIST - ASSESSMENT  Patient presents today with moderate muscle guarding and tenderness throughout the lower thoracic and lumbar paraspinal muscle groups bilaterally.  She also presents with localized tenderness over the supraspinous ligament at the thoracolumbar junction with underlying limitations in spinal mobility.    Activity Limitations, Participation Restrictions, and CO-MORBIDITIES which may impact the plan of care and potentially impede the patient's progress in therapy include:  none    The patient does not present with any learning or communication barriers which may impact the plan of care and potentially impede the patient's progress in therapy.      CLINICAL PRESENTATION:  Patient's Clinical Presentation is STABLE.       RECOMMENDED PLAN OF CARE:  Manual therapy for myofascial release of the thoracolumbar paraspinal muscle groups with gentle spinal mobilization at the thoracolumbar junction.  Patient to continue stretching exercises for the lumbar spine, ,hip girdle, and LE muscle groups as previously instructed.  Patient to continue strengthening exercises for the RTC and scapulothoracic muscle groups.  Patient declines dry needling    TREATMENT PROVIDED:     -moist heat and muscle stimulation x 20 mins applied to the paraspinal muscle groups at the thoracolumbar junction.  -manual therapy for myofascial release of the thoracolumbar paraspinal muscle groups with gentle spinal mobilization at the thoracolumbar junction.  (provided by therapist - 20 mins)      PLAN:   Patient to attend out-patient physical therapy 2 x week to continue the     Recommended Plan of Care                                                                   SHORT TERM GOALS:    1. Patient will report a pain  level of 2/10 or less  2. Patient to continue HEP for RTC strengthening      LONG TERM GOALS:  1. Patient will report a pain level of 0/10  2. Resolution of muscle guarding and tenderness throughout the thoracolumbar paraspinal muscle groups.  3. Patient will report and decrease in functional impairment on the Optimal tool of 10% or greater      These services are reasonable and necessary for the conditions set forth above while under my care.

## 2017-10-19 NOTE — TELEPHONE ENCOUNTER
Spoke with patient and let her know all the biopsies from procedures were normal.    Patient verbalizes understanding.

## 2017-10-20 ENCOUNTER — CLINICAL SUPPORT (OUTPATIENT)
Dept: REHABILITATION | Facility: HOSPITAL | Age: 47
End: 2017-10-20
Attending: FAMILY MEDICINE
Payer: COMMERCIAL

## 2017-10-20 DIAGNOSIS — M47.817 LUMBOSACRAL SPONDYLOSIS WITHOUT MYELOPATHY: ICD-10-CM

## 2017-10-20 DIAGNOSIS — M50.30 DEGENERATION OF CERVICAL INTERVERTEBRAL DISC: ICD-10-CM

## 2017-10-20 DIAGNOSIS — M47.812 CERVICAL SPONDYLOSIS WITHOUT MYELOPATHY: Primary | ICD-10-CM

## 2017-10-20 DIAGNOSIS — M79.18 MYOFASCIAL PAIN: ICD-10-CM

## 2017-10-20 PROCEDURE — 97140 MANUAL THERAPY 1/> REGIONS: CPT | Performed by: PHYSICAL THERAPIST

## 2017-10-20 NOTE — PROGRESS NOTES
"     DATE OF INITIAL PHYSICAL THERAPY EVALUATION:             2017      REFERRING PROVIDER:       Leonardo Frey MD      REFERRING DIAGNOSIS:     Osteoarthritis of cervical spine without myelopathy [M47.812]        Osteoarthritis of lumbosacral spine without myelopathy [M47.817]      ORDERS:   Evaluate and treat as indicated 3 x week x 30 days    Orders will    2017    VISIT    #5      SUBJECTIVE:    Patient states she has been scheduled for a cervical and lumbar spine MRI.   She states her most acute pain remains in the mid thoracic region in the right paraspinal region and over the mid thoracic spine.        Patient is requesting modalities and MFR for pain relief in preparation for the MRI.    Patients primary complaint(s):  Myofascial pain and tenderness throughout the scapulothoracic and lumbar muscle groups on the right side.    History of present condition:    Patient reports ongoing symptoms of myofascial pain and tenderness throughout the cervical, thoracic, and lumbar paraspinal muscle groups. She reports symptoms are aggravated by activity including prolonged standing, sitting, lifting, and carrying.  Myofascial pain and tenderness is more localized in the mid scapular region and throughout the paraspinal musculature at the thoracolumbar junction.  She reports continuing issues with "tingling" in the fingers and toes bilaterally.  She reports previous imaging studies indicated disc bulges at the C4-5 and C5-6 levels.  She is also currently under the care of a neurologist.    Pain Ratin/10.       Patient reports the following functional activity limitations:  Prolonged standing and walking is impaired due to back pain.  Unable to lie on her left side due to shoulder pain.  Bending, lifting, and carrying impaired.    (FUNCTIONAL ASSESSMENT TOOL)    At the time of the initial evaluation, the patient reported the following self limitations on the Optimal Instrument " functional assessment tool:  (Rating Scale: #1=Able to do without difficulty, #2=Able to do with little difficulty, #3=Able to do with moderate difficulty, #4=Able to do with much difficulty, #5=Unable to do, #9=Not applicable)    Optimal Instrument Scores reported by the patient:  Completed on September 28, 2017    1.  Lying flat    3  2.  Rolling over   1  3.  Moving - lying to sitting  2  4.  Sitting    3  5.  Squatting    1  6.  Bending/stooping   2  7.  Balancing    1  8.  Kneeling    1  9.  Standing    3  10.  Walking - short distance  2  11.  Walking - long distance  3  12.  Walking - outdoors  3  13.  Climbing stairs   2  14.  Hopping    2  15.  Jumping    2  16.  Running    4  17.  Pushing    2  18.  Pulling    2  19.  Reaching    2  20.  Grasping    1  21.  Lifting    2  22.  Carrying    2    Total Impairment Rating  27%      Past Medical History and co-morbidities:  Past Medical History:   Diagnosis Date    Abnormal Pap smear of cervix     Cervical spondylolysis     DDD (degenerative disc disease), lumbosacral     Fibromyalgia     Hyperthyroidism     Iatrogenic hypothyroidism     Spondylosis     Undifferentiated connective tissue disease      Patient Active Problem List   Diagnosis    Iatrogenic hypothyroidism    Mixed hyperlipidemia    DDD (degenerative disc disease), cervical    Undifferentiated connective tissue disease    Conductive hearing loss of left ear    Osteoarthritis of cervical spine without myelopathy    Osteoarthritis of lumbosacral spine without myelopathy    Esophageal dysphagia    Myofascial pain    Chronic cough    Dysphagia       Current Outpatient Prescriptions:     albuterol 90 mcg/actuation inhaler, Inhale 2 puffs into the lungs every 4 (four) hours as needed for Wheezing or Shortness of Breath (Or Cough)., Disp: 1 Inhaler, Rfl: 0    esomeprazole (NEXIUM) 40 MG capsule, Take 1 capsule (40 mg total) by mouth 2 (two) times daily before meals., Disp: 60 capsule,  Rfl: 6    hydroxychloroquine (PLAQUENIL) 200 mg tablet, Take 1 tablet (200 mg total) by mouth 2 (two) times daily., Disp: 180 tablet, Rfl: 2    ibuprofen (ADVIL,MOTRIN) 200 MG tablet, Take 200 mg by mouth as needed. , Disp: , Rfl:     levonorgestrel-ethinyl estradiol (AVIANE,ALESSE,LESSINA) 0.1-20 mg-mcg per tablet, Take 1 tablet by mouth once daily., Disp: 28 tablet, Rfl: 6    levothyroxine (SYNTHROID) 125 MCG tablet, Take 1 tablet (125 mcg total) by mouth once daily., Disp: 30 tablet, Rfl: 2    multivitamin (ONE DAILY MULTIVITAMIN) per tablet, Take 1 tablet by mouth once daily., Disp: , Rfl:     penciclovir (DENAVIR) 1 % cream, Apply topically every 2 (two) hours. (Patient taking differently: Apply 1 application topically as needed. ), Disp: 1.5 g, Rfl: 2      Previous physical therapy and treatments:  Patient received therapy recently at another clinic in ; reports stretching exercises were aggressive and resulted in an increase in neck pain.      Occupational/psychosocial/educational profile:  Works in an office setting.    OBJECTIVE:    Musculoskeletal Exam:    Postural Exam  No postural deviations noted.  SI exam negative for a postural rotation    ROM  Cervical Spine ROM is WNL's; patient reports discomfort with extension.  Lumbar spine ROM is WNL's; patient complains of myofascial pain throughout the thoracic and lumbar paraspinals at the end point into full flexion.    Flexibility  No limitations in flexibility noted in the hip girdle or LE muscle groups.    Functional Strength Testing  No strength deficits or imbalances noted throughout the extremities     Palpation  Moderate muscle guarding and acute tenderness present in the lower thoracic and lumbar paraspinal muscle groups bilaterally.  Localized acute tenderness present over the supraspinous ligament at the thoracolumbar junction.      Neuromuscular Exam    Gait  No gait deviations observed.    Transitional Movements  Independent with all  transitional movements; patient reports thoracolumbar discomfort with supine to sit.    Sensation  Patient reports mild paresthesias present in the digits and toes bilaterally.      PROBLEM LIST - ASSESSMENT  Patient presents today with moderate muscle guarding and tenderness throughout the lower thoracic and lumbar paraspinal muscle groups bilaterally.  She also presents with localized tenderness over the supraspinous ligament at the thoracolumbar junction with underlying limitations in spinal mobility.    Activity Limitations, Participation Restrictions, and CO-MORBIDITIES which may impact the plan of care and potentially impede the patient's progress in therapy include:  none    The patient does not present with any learning or communication barriers which may impact the plan of care and potentially impede the patient's progress in therapy.      CLINICAL PRESENTATION:  Patient's Clinical Presentation is STABLE.       RECOMMENDED PLAN OF CARE:  Manual therapy for myofascial release of the thoracolumbar paraspinal muscle groups with gentle spinal mobilization at the thoracolumbar junction.  Patient to continue stretching exercises for the lumbar spine, ,hip girdle, and LE muscle groups as previously instructed.  Patient to continue strengthening exercises for the RTC and scapulothoracic muscle groups.  Patient declines dry needling    TREATMENT PROVIDED:     -moist heat and muscle stimulation x 20 mins applied to the paraspinal muscle groups at the thoracolumbar junction.  -manual therapy for myofascial release of the thoracolumbar paraspinal muscle groups with gentle spinal mobilization at the thoracolumbar junction.  (provided by therapist - 25 mins)      PLAN:   Patient to attend out-patient physical therapy 2 x week to continue the     Recommended Plan of Care                                                                   SHORT TERM GOALS:    1. Patient will report a pain level of 2/10 or less  2. Patient to  continue HEP for RTC strengthening      LONG TERM GOALS:  1. Patient will report a pain level of 0/10  2. Resolution of muscle guarding and tenderness throughout the thoracolumbar paraspinal muscle groups.  3. Patient will report and decrease in functional impairment on the Optimal tool of 10% or greater      These services are reasonable and necessary for the conditions set forth above while under my care.

## 2017-10-23 ENCOUNTER — CLINICAL SUPPORT (OUTPATIENT)
Dept: REHABILITATION | Facility: HOSPITAL | Age: 47
End: 2017-10-23
Attending: FAMILY MEDICINE
Payer: COMMERCIAL

## 2017-10-23 DIAGNOSIS — M79.18 MYOFASCIAL PAIN: ICD-10-CM

## 2017-10-23 DIAGNOSIS — M47.812 CERVICAL SPONDYLOSIS WITHOUT MYELOPATHY: Primary | ICD-10-CM

## 2017-10-23 DIAGNOSIS — M50.30 DEGENERATION OF CERVICAL INTERVERTEBRAL DISC: ICD-10-CM

## 2017-10-23 DIAGNOSIS — M47.817 LUMBOSACRAL SPONDYLOSIS WITHOUT MYELOPATHY: ICD-10-CM

## 2017-10-23 PROCEDURE — 97140 MANUAL THERAPY 1/> REGIONS: CPT | Performed by: PHYSICAL THERAPIST

## 2017-10-23 PROCEDURE — 97014 ELECTRIC STIMULATION THERAPY: CPT | Performed by: PHYSICAL THERAPIST

## 2017-10-24 NOTE — PROGRESS NOTES
"     DATE OF INITIAL PHYSICAL THERAPY EVALUATION:             2017      REFERRING PROVIDER:       Leonardo Frey MD      REFERRING DIAGNOSIS:     Osteoarthritis of cervical spine without myelopathy [M47.812]        Osteoarthritis of lumbosacral spine without myelopathy [M47.817]      ORDERS:   Evaluate and treat as indicated 3 x week x 30 days    Orders will    2017    VISIT    #6      SUBJECTIVE:    Patient states she is scheduled to see her neurologist tomorrow to discuss the results of the cervical and lumbar spine MRI.   She states acute pain and tenderness is localized over the mid thoracic region in the right paraspinal muscle groups and over the mid thoracic spine.        Patient is requesting modalities and MFR for pain relief.  She states she has not continued with the scapulothoracic strengthening exercises given to her previously.                     Patients primary complaint(s):  Myofascial pain and tenderness throughout the scapulothoracic and lumbar muscle groups on the right side.    History of present condition:    Patient reports ongoing symptoms of myofascial pain and tenderness throughout the cervical, thoracic, and lumbar paraspinal muscle groups. She reports symptoms are aggravated by activity including prolonged standing, sitting, lifting, and carrying.  Myofascial pain and tenderness is more localized in the mid scapular region and throughout the paraspinal musculature at the thoracolumbar junction.  She reports continuing issues with "tingling" in the fingers and toes bilaterally.  She reports previous imaging studies indicated disc bulges at the C4-5 and C5-6 levels.  She is also currently under the care of a neurologist.    Pain Ratin/10.       Patient reports the following functional activity limitations:  Prolonged standing and walking is impaired due to back pain.  Unable to lie on her left side due to shoulder pain.  Bending, lifting, and " carrying impaired.    (FUNCTIONAL ASSESSMENT TOOL)    At the time of the initial evaluation, the patient reported the following self limitations on the Optimal Instrument functional assessment tool:  (Rating Scale: #1=Able to do without difficulty, #2=Able to do with little difficulty, #3=Able to do with moderate difficulty, #4=Able to do with much difficulty, #5=Unable to do, #9=Not applicable)    Optimal Instrument Scores reported by the patient:  Completed on September 28, 2017    1.  Lying flat    3  2.  Rolling over   1  3.  Moving - lying to sitting  2  4.  Sitting    3  5.  Squatting    1  6.  Bending/stooping   2  7.  Balancing    1  8.  Kneeling    1  9.  Standing    3  10.  Walking - short distance  2  11.  Walking - long distance  3  12.  Walking - outdoors  3  13.  Climbing stairs   2  14.  Hopping    2  15.  Jumping    2  16.  Running    4  17.  Pushing    2  18.  Pulling    2  19.  Reaching    2  20.  Grasping    1  21.  Lifting    2  22.  Carrying    2    Total Impairment Rating  27%      Past Medical History and co-morbidities:  Past Medical History:   Diagnosis Date    Abnormal Pap smear of cervix     Cervical spondylolysis     DDD (degenerative disc disease), lumbosacral     Fibromyalgia     Hyperthyroidism     Iatrogenic hypothyroidism     Spondylosis     Undifferentiated connective tissue disease      Patient Active Problem List   Diagnosis    Iatrogenic hypothyroidism    Mixed hyperlipidemia    DDD (degenerative disc disease), cervical    Undifferentiated connective tissue disease    Conductive hearing loss of left ear    Osteoarthritis of cervical spine without myelopathy    Osteoarthritis of lumbosacral spine without myelopathy    Esophageal dysphagia    Myofascial pain    Chronic cough    Dysphagia       Current Outpatient Prescriptions:     albuterol 90 mcg/actuation inhaler, Inhale 2 puffs into the lungs every 4 (four) hours as needed for Wheezing or Shortness of Breath  (Or Cough)., Disp: 1 Inhaler, Rfl: 0    esomeprazole (NEXIUM) 40 MG capsule, Take 1 capsule (40 mg total) by mouth 2 (two) times daily before meals., Disp: 60 capsule, Rfl: 6    hydroxychloroquine (PLAQUENIL) 200 mg tablet, Take 1 tablet (200 mg total) by mouth 2 (two) times daily., Disp: 180 tablet, Rfl: 2    ibuprofen (ADVIL,MOTRIN) 200 MG tablet, Take 200 mg by mouth as needed. , Disp: , Rfl:     levonorgestrel-ethinyl estradiol (AVIANE,ALESSE,LESSINA) 0.1-20 mg-mcg per tablet, Take 1 tablet by mouth once daily., Disp: 28 tablet, Rfl: 6    levothyroxine (SYNTHROID) 125 MCG tablet, Take 1 tablet (125 mcg total) by mouth once daily., Disp: 30 tablet, Rfl: 2    multivitamin (ONE DAILY MULTIVITAMIN) per tablet, Take 1 tablet by mouth once daily., Disp: , Rfl:     penciclovir (DENAVIR) 1 % cream, Apply topically every 2 (two) hours. (Patient taking differently: Apply 1 application topically as needed. ), Disp: 1.5 g, Rfl: 2      Previous physical therapy and treatments:  Patient received therapy recently at another clinic in ; reports stretching exercises were aggressive and resulted in an increase in neck pain.      Occupational/psychosocial/educational profile:  Works in an office setting.    OBJECTIVE:    Musculoskeletal Exam:    Postural Exam  No postural deviations noted.  SI exam negative for a postural rotation    ROM  Cervical Spine ROM is WNL's; patient reports discomfort with extension.  Lumbar spine ROM is WNL's; patient complains of myofascial pain throughout the thoracic and lumbar paraspinals at the end point into full flexion.    Flexibility  No limitations in flexibility noted in the hip girdle or LE muscle groups.    Functional Strength Testing  No strength deficits or imbalances noted throughout the extremities     Palpation  Moderate muscle guarding and acute tenderness present in the lower thoracic and lumbar paraspinal muscle groups bilaterally.  Localized acute tenderness present over  the supraspinous ligament at the thoracolumbar junction.      Neuromuscular Exam    Gait  No gait deviations observed.    Transitional Movements  Independent with all transitional movements; patient reports thoracolumbar discomfort with supine to sit.    Sensation  Patient reports mild paresthesias present in the digits and toes bilaterally.      PROBLEM LIST - ASSESSMENT  Patient presents today with moderate muscle guarding and tenderness throughout the lower thoracic and lumbar paraspinal muscle groups bilaterally.  She also presents with localized tenderness over the supraspinous ligament at the thoracolumbar junction with underlying limitations in spinal mobility.    Activity Limitations, Participation Restrictions, and CO-MORBIDITIES which may impact the plan of care and potentially impede the patient's progress in therapy include:  none    The patient does not present with any learning or communication barriers which may impact the plan of care and potentially impede the patient's progress in therapy.      CLINICAL PRESENTATION:  Patient's Clinical Presentation is STABLE.       RECOMMENDED PLAN OF CARE:  Manual therapy for myofascial release of the thoracolumbar paraspinal muscle groups with gentle spinal mobilization at the thoracolumbar junction.  Patient to continue stretching exercises for the lumbar spine, ,hip girdle, and LE muscle groups as previously instructed.  Patient to continue strengthening exercises for the RTC and scapulothoracic muscle groups.  Patient declines dry needling    TREATMENT PROVIDED:     -moist heat and muscle stimulation x 20 mins applied to the paraspinal muscle groups at the thoracolumbar junction.  -manual therapy for myofascial release of the thoracolumbar paraspinal muscle groups with gentle spinal mobilization at the thoracolumbar junction.  (provided by therapist - 20 mins)      PLAN:   Patient to attend out-patient physical therapy 2 x week to continue the     Recommended  Plan of Care                                                                   SHORT TERM GOALS:    1. Patient will report a pain level of 2/10 or less  2. Patient to continue HEP for RTC strengthening      LONG TERM GOALS:  1. Patient will report a pain level of 0/10  2. Resolution of muscle guarding and tenderness throughout the thoracolumbar paraspinal muscle groups.  3. Patient will report and decrease in functional impairment on the Optimal tool of 10% or greater      These services are reasonable and necessary for the conditions set forth above while under my care.

## 2017-10-27 ENCOUNTER — CLINICAL SUPPORT (OUTPATIENT)
Dept: REHABILITATION | Facility: HOSPITAL | Age: 47
End: 2017-10-27
Attending: FAMILY MEDICINE
Payer: COMMERCIAL

## 2017-10-27 DIAGNOSIS — M50.30 DEGENERATION OF CERVICAL INTERVERTEBRAL DISC: ICD-10-CM

## 2017-10-27 DIAGNOSIS — M79.18 MYOFASCIAL PAIN: ICD-10-CM

## 2017-10-27 DIAGNOSIS — M47.812 CERVICAL SPONDYLOSIS WITHOUT MYELOPATHY: Primary | ICD-10-CM

## 2017-10-27 DIAGNOSIS — M47.817 LUMBOSACRAL SPONDYLOSIS WITHOUT MYELOPATHY: ICD-10-CM

## 2017-10-27 PROCEDURE — 97014 ELECTRIC STIMULATION THERAPY: CPT | Performed by: PHYSICAL THERAPIST

## 2017-10-27 PROCEDURE — 97140 MANUAL THERAPY 1/> REGIONS: CPT | Performed by: PHYSICAL THERAPIST

## 2017-10-27 NOTE — PROGRESS NOTES
"     DATE OF INITIAL PHYSICAL THERAPY EVALUATION:             2017      REFERRING PROVIDER:       Leonardo Frey MD      REFERRING DIAGNOSIS:     Osteoarthritis of cervical spine without myelopathy [M47.812]        Osteoarthritis of lumbosacral spine without myelopathy [M47.817]      ORDERS:   Evaluate and treat as indicated 3 x week x 30 days    Orders will    2017    VISIT    #7      SUBJECTIVE:    Patient states cervical spine surgery has been recommended.   MRI of the thoracic spine has also been ordered.  Patient is requesting modalities and MFR for pain relief.  She states she has not continued with the scapulothoracic strengthening exercises given to her previously.        Myofascial pain more localized in the right thoracic paraspinal muscle groups.               Patients primary complaint(s):  Myofascial pain and tenderness throughout the scapulothoracic and lumbar muscle groups on the right side.    History of present condition:    Patient reports ongoing symptoms of myofascial pain and tenderness throughout the cervical, thoracic, and lumbar paraspinal muscle groups. She reports symptoms are aggravated by activity including prolonged standing, sitting, lifting, and carrying.  Myofascial pain and tenderness is more localized in the mid scapular region and throughout the paraspinal musculature at the thoracolumbar junction.  She reports continuing issues with "tingling" in the fingers and toes bilaterally.  She reports previous imaging studies indicated disc bulges at the C4-5 and C5-6 levels.  She is also currently under the care of a neurologist.    Pain Ratin/10.       Patient reports the following functional activity limitations:  Prolonged standing and walking is impaired due to back pain.  Unable to lie on her left side due to shoulder pain.  Bending, lifting, and carrying impaired.    (FUNCTIONAL ASSESSMENT TOOL)    At the time of the initial evaluation, the " patient reported the following self limitations on the Optimal Instrument functional assessment tool:  (Rating Scale: #1=Able to do without difficulty, #2=Able to do with little difficulty, #3=Able to do with moderate difficulty, #4=Able to do with much difficulty, #5=Unable to do, #9=Not applicable)    Optimal Instrument Scores reported by the patient:  Completed on September 28, 2017    1.  Lying flat    3  2.  Rolling over   1  3.  Moving - lying to sitting  2  4.  Sitting    3  5.  Squatting    1  6.  Bending/stooping   2  7.  Balancing    1  8.  Kneeling    1  9.  Standing    3  10.  Walking - short distance  2  11.  Walking - long distance  3  12.  Walking - outdoors  3  13.  Climbing stairs   2  14.  Hopping    2  15.  Jumping    2  16.  Running    4  17.  Pushing    2  18.  Pulling    2  19.  Reaching    2  20.  Grasping    1  21.  Lifting    2  22.  Carrying    2    Total Impairment Rating  27%      Past Medical History and co-morbidities:  Past Medical History:   Diagnosis Date    Abnormal Pap smear of cervix     Cervical spondylolysis     DDD (degenerative disc disease), lumbosacral     Fibromyalgia     Hyperthyroidism     Iatrogenic hypothyroidism     Spondylosis     Undifferentiated connective tissue disease      Patient Active Problem List   Diagnosis    Iatrogenic hypothyroidism    Mixed hyperlipidemia    DDD (degenerative disc disease), cervical    Undifferentiated connective tissue disease    Conductive hearing loss of left ear    Osteoarthritis of cervical spine without myelopathy    Osteoarthritis of lumbosacral spine without myelopathy    Esophageal dysphagia    Myofascial pain    Chronic cough    Dysphagia       Current Outpatient Prescriptions:     albuterol 90 mcg/actuation inhaler, Inhale 2 puffs into the lungs every 4 (four) hours as needed for Wheezing or Shortness of Breath (Or Cough)., Disp: 1 Inhaler, Rfl: 0    esomeprazole (NEXIUM) 40 MG capsule, Take 1 capsule (40  mg total) by mouth 2 (two) times daily before meals., Disp: 60 capsule, Rfl: 6    hydroxychloroquine (PLAQUENIL) 200 mg tablet, Take 1 tablet (200 mg total) by mouth 2 (two) times daily., Disp: 180 tablet, Rfl: 2    ibuprofen (ADVIL,MOTRIN) 200 MG tablet, Take 200 mg by mouth as needed. , Disp: , Rfl:     levonorgestrel-ethinyl estradiol (AVIANE,ALESSE,LESSINA) 0.1-20 mg-mcg per tablet, Take 1 tablet by mouth once daily., Disp: 28 tablet, Rfl: 6    levothyroxine (SYNTHROID) 125 MCG tablet, Take 1 tablet (125 mcg total) by mouth once daily., Disp: 30 tablet, Rfl: 2    multivitamin (ONE DAILY MULTIVITAMIN) per tablet, Take 1 tablet by mouth once daily., Disp: , Rfl:     penciclovir (DENAVIR) 1 % cream, Apply topically every 2 (two) hours. (Patient taking differently: Apply 1 application topically as needed. ), Disp: 1.5 g, Rfl: 2      Previous physical therapy and treatments:  Patient received therapy recently at another clinic in ; reports stretching exercises were aggressive and resulted in an increase in neck pain.      Occupational/psychosocial/educational profile:  Works in an office setting.    OBJECTIVE:    Musculoskeletal Exam:    Postural Exam  No postural deviations noted.  SI exam negative for a postural rotation    ROM  Cervical Spine ROM is WNL's; patient reports discomfort with extension.  Lumbar spine ROM is WNL's; patient complains of myofascial pain throughout the thoracic and lumbar paraspinals at the end point into full flexion.    Flexibility  No limitations in flexibility noted in the hip girdle or LE muscle groups.    Functional Strength Testing  No strength deficits or imbalances noted throughout the extremities     Palpation  Moderate muscle guarding and acute tenderness present in the lower thoracic and lumbar paraspinal muscle groups bilaterally.  Localized acute tenderness present over the supraspinous ligament at the thoracolumbar junction.      Neuromuscular Exam    Gait  No gait  deviations observed.    Transitional Movements  Independent with all transitional movements; patient reports thoracolumbar discomfort with supine to sit.    Sensation  Patient reports mild paresthesias present in the digits and toes bilaterally.      PROBLEM LIST - ASSESSMENT  Patient presents today with moderate muscle guarding and tenderness throughout the lower thoracic and lumbar paraspinal muscle groups bilaterally.  She also presents with localized tenderness over the supraspinous ligament at the thoracolumbar junction with underlying limitations in spinal mobility.    Activity Limitations, Participation Restrictions, and CO-MORBIDITIES which may impact the plan of care and potentially impede the patient's progress in therapy include:  none    The patient does not present with any learning or communication barriers which may impact the plan of care and potentially impede the patient's progress in therapy.      CLINICAL PRESENTATION:  Patient's Clinical Presentation is STABLE.       RECOMMENDED PLAN OF CARE:  Manual therapy for myofascial release of the thoracolumbar paraspinal muscle groups with gentle spinal mobilization at the thoracolumbar junction.  Patient to continue stretching exercises for the lumbar spine, ,hip girdle, and LE muscle groups as previously instructed.  Patient to continue strengthening exercises for the RTC and scapulothoracic muscle groups.  Patient declines dry needling    TREATMENT PROVIDED:     -moist heat and muscle stimulation x 20 mins applied to the paraspinal muscle groups at the thoracolumbar junction.  -manual therapy for myofascial release of the thoracolumbar paraspinal muscle groups with gentle spinal mobilization at the thoracolumbar junction.  (provided by therapist - 20 mins)      PLAN:   Patient to attend out-patient physical therapy 2 x week to continue the     Recommended Plan of Care                                                                   SHORT TERM GOALS:     1. Patient will report a pain level of 2/10 or less  2. Patient to continue HEP for RTC strengthening as tolerated      LONG TERM GOALS:  1. Patient will report a pain level of 0/10  2. Resolution of muscle guarding and tenderness throughout the thoracolumbar paraspinal muscle groups.  3. Patient will report and decrease in functional impairment on the Optimal tool of 10% or greater      These services are reasonable and necessary for the conditions set forth above while under my care.

## 2017-10-30 ENCOUNTER — CLINICAL SUPPORT (OUTPATIENT)
Dept: REHABILITATION | Facility: HOSPITAL | Age: 47
End: 2017-10-30
Payer: COMMERCIAL

## 2017-10-30 DIAGNOSIS — M47.812 CERVICAL SPONDYLOSIS WITHOUT MYELOPATHY: Primary | ICD-10-CM

## 2017-10-30 DIAGNOSIS — M47.817 LUMBOSACRAL SPONDYLOSIS WITHOUT MYELOPATHY: ICD-10-CM

## 2017-10-30 DIAGNOSIS — M79.18 MYOFASCIAL PAIN: ICD-10-CM

## 2017-10-30 DIAGNOSIS — S39.012D STRAIN OF BACK, SUBSEQUENT ENCOUNTER: ICD-10-CM

## 2017-10-30 DIAGNOSIS — M50.30 DEGENERATION OF CERVICAL INTERVERTEBRAL DISC: ICD-10-CM

## 2017-10-30 PROCEDURE — 97140 MANUAL THERAPY 1/> REGIONS: CPT | Performed by: PHYSICAL THERAPIST

## 2017-10-30 PROCEDURE — 97014 ELECTRIC STIMULATION THERAPY: CPT | Performed by: PHYSICAL THERAPIST

## 2017-10-30 NOTE — PROGRESS NOTES
"     DATE OF INITIAL PHYSICAL THERAPY EVALUATION:             2017      REFERRING PROVIDER:       Leonardo Frey MD      REFERRING DIAGNOSIS:     Osteoarthritis of cervical spine without myelopathy [M47.812]        Osteoarthritis of lumbosacral spine without myelopathy [M47.817]      ORDERS:   Evaluate and treat as indicated 3 x week x 30 days    Orders will    2017    VISIT    #8      SUBJECTIVE:    Patient states cervical spine surgery has been recommended.   MRI of the thoracic spine has also been ordered for later this week.  Patient to see physician after thoracic spine MRI to discuss surgery.    Patient reports back pain increases as the day progresses; especially days when she is at work.    Patient is requesting modalities and MFR for pain relief.  She states she has not continued with the scapulothoracic strengthening exercises given to her previously.        Myofascial pain more localized in the right thoracic paraspinal muscle groups, and lumbar paraspinal muscle groups bilaterally.           Patients primary complaint(s):  Myofascial pain and tenderness throughout the scapulothoracic and lumbar muscle groups on the right side.    History of present condition:    Patient reports ongoing symptoms of myofascial pain and tenderness throughout the cervical, thoracic, and lumbar paraspinal muscle groups. She reports symptoms are aggravated by activity including prolonged standing, sitting, lifting, and carrying.  Myofascial pain and tenderness is more localized in the mid scapular region and throughout the paraspinal musculature at the thoracolumbar junction.  She reports continuing issues with "tingling" in the fingers and toes bilaterally.  She reports previous imaging studies indicated disc bulges at the C4-5 and C5-6 levels.  She is also currently under the care of a neurologist.    Pain Ratin/10.       Patient reports the following functional activity " limitations:  Prolonged standing and walking is impaired due to back pain.  Unable to lie on her left side due to shoulder pain.  Bending, lifting, and carrying impaired.    (FUNCTIONAL ASSESSMENT TOOL)    At the time of the initial evaluation, the patient reported the following self limitations on the Optimal Instrument functional assessment tool:  (Rating Scale: #1=Able to do without difficulty, #2=Able to do with little difficulty, #3=Able to do with moderate difficulty, #4=Able to do with much difficulty, #5=Unable to do, #9=Not applicable)    Optimal Instrument Scores reported by the patient:  Completed on September 28, 2017    1.  Lying flat    3  2.  Rolling over   1  3.  Moving - lying to sitting  2  4.  Sitting    3  5.  Squatting    1  6.  Bending/stooping   2  7.  Balancing    1  8.  Kneeling    1  9.  Standing    3  10.  Walking - short distance  2  11.  Walking - long distance  3  12.  Walking - outdoors  3  13.  Climbing stairs   2  14.  Hopping    2  15.  Jumping    2  16.  Running    4  17.  Pushing    2  18.  Pulling    2  19.  Reaching    2  20.  Grasping    1  21.  Lifting    2  22.  Carrying    2    Total Impairment Rating  27%      Past Medical History and co-morbidities:  Past Medical History:   Diagnosis Date    Abnormal Pap smear of cervix     Cervical spondylolysis     DDD (degenerative disc disease), lumbosacral     Fibromyalgia     Hyperthyroidism     Iatrogenic hypothyroidism     Spondylosis     Undifferentiated connective tissue disease      Patient Active Problem List   Diagnosis    Iatrogenic hypothyroidism    Mixed hyperlipidemia    DDD (degenerative disc disease), cervical    Undifferentiated connective tissue disease    Conductive hearing loss of left ear    Osteoarthritis of cervical spine without myelopathy    Osteoarthritis of lumbosacral spine without myelopathy    Esophageal dysphagia    Myofascial pain    Chronic cough    Dysphagia       Current Outpatient  Prescriptions:     albuterol 90 mcg/actuation inhaler, Inhale 2 puffs into the lungs every 4 (four) hours as needed for Wheezing or Shortness of Breath (Or Cough)., Disp: 1 Inhaler, Rfl: 0    esomeprazole (NEXIUM) 40 MG capsule, Take 1 capsule (40 mg total) by mouth 2 (two) times daily before meals., Disp: 60 capsule, Rfl: 6    hydroxychloroquine (PLAQUENIL) 200 mg tablet, Take 1 tablet (200 mg total) by mouth 2 (two) times daily., Disp: 180 tablet, Rfl: 2    ibuprofen (ADVIL,MOTRIN) 200 MG tablet, Take 200 mg by mouth as needed. , Disp: , Rfl:     levonorgestrel-ethinyl estradiol (AVIANE,ALESSE,LESSINA) 0.1-20 mg-mcg per tablet, Take 1 tablet by mouth once daily., Disp: 28 tablet, Rfl: 6    levothyroxine (SYNTHROID) 125 MCG tablet, Take 1 tablet (125 mcg total) by mouth once daily., Disp: 30 tablet, Rfl: 2    multivitamin (ONE DAILY MULTIVITAMIN) per tablet, Take 1 tablet by mouth once daily., Disp: , Rfl:     penciclovir (DENAVIR) 1 % cream, Apply topically every 2 (two) hours. (Patient taking differently: Apply 1 application topically as needed. ), Disp: 1.5 g, Rfl: 2      Previous physical therapy and treatments:  Patient received therapy recently at another clinic in ; reports stretching exercises were aggressive and resulted in an increase in neck pain.      Occupational/psychosocial/educational profile:  Works in an office setting.    OBJECTIVE:    Musculoskeletal Exam:    Postural Exam  No postural deviations noted.  SI exam negative for a postural rotation    ROM  Cervical Spine ROM is WNL's; patient reports discomfort with extension.  Lumbar spine ROM is WNL's; patient complains of myofascial pain throughout the thoracic and lumbar paraspinals at the end point into full flexion.    Flexibility  No limitations in flexibility noted in the hip girdle or LE muscle groups.    Functional Strength Testing  No strength deficits or imbalances noted throughout the extremities     Palpation  Moderate muscle  guarding and acute tenderness present in the lower thoracic and lumbar paraspinal muscle groups bilaterally.  Localized acute tenderness present over the supraspinous ligament at the thoracolumbar junction.      Neuromuscular Exam    Gait  No gait deviations observed.    Transitional Movements  Independent with all transitional movements; patient reports thoracolumbar discomfort with supine to sit.    Sensation  Patient reports mild paresthesias present in the digits and toes bilaterally.      PROBLEM LIST - ASSESSMENT  Patient presents today with moderate muscle guarding and tenderness throughout the lower thoracic and lumbar paraspinal muscle groups bilaterally.  She also presents with localized tenderness over the supraspinous ligament at the thoracolumbar junction with underlying limitations in spinal mobility.    Activity Limitations, Participation Restrictions, and CO-MORBIDITIES which may impact the plan of care and potentially impede the patient's progress in therapy include:  none    The patient does not present with any learning or communication barriers which may impact the plan of care and potentially impede the patient's progress in therapy.      CLINICAL PRESENTATION:  Patient's Clinical Presentation is STABLE.       RECOMMENDED PLAN OF CARE:  Manual therapy for myofascial release of the thoracolumbar paraspinal muscle groups with gentle spinal mobilization at the thoracolumbar junction.  Patient to continue stretching exercises for the lumbar spine, ,hip girdle, and LE muscle groups as previously instructed.  Patient to continue strengthening exercises for the RTC and scapulothoracic muscle groups.  Patient declines dry needling    TREATMENT PROVIDED:     -moist heat and muscle stimulation x 20 mins applied to the paraspinal muscle groups at the thoracolumbar junction.  -manual therapy for myofascial release of the thoracolumbar paraspinal muscle groups with gentle spinal mobilization at the  thoracolumbar junction.  (provided by therapist - 26 mins)      PLAN:   Patient to attend out-patient physical therapy 2 x week to continue the     Recommended Plan of Care                                                                   SHORT TERM GOALS:    1. Patient will report a pain level of 2/10 or less  2. Patient to continue HEP for RTC strengthening as tolerated      LONG TERM GOALS:  1. Patient will report a pain level of 0/10  2. Resolution of muscle guarding and tenderness throughout the thoracolumbar paraspinal muscle groups.  3. Patient will report and decrease in functional impairment on the Optimal tool of 10% or greater      These services are reasonable and necessary for the conditions set forth above while under my care.

## 2017-11-02 ENCOUNTER — LAB VISIT (OUTPATIENT)
Dept: LAB | Facility: HOSPITAL | Age: 47
End: 2017-11-02
Attending: INTERNAL MEDICINE
Payer: COMMERCIAL

## 2017-11-02 ENCOUNTER — OFFICE VISIT (OUTPATIENT)
Dept: ENDOCRINOLOGY | Facility: CLINIC | Age: 47
End: 2017-11-02
Payer: COMMERCIAL

## 2017-11-02 VITALS
DIASTOLIC BLOOD PRESSURE: 76 MMHG | HEIGHT: 63 IN | TEMPERATURE: 98 F | BODY MASS INDEX: 21.79 KG/M2 | WEIGHT: 123 LBS | HEART RATE: 86 BPM | SYSTOLIC BLOOD PRESSURE: 114 MMHG

## 2017-11-02 DIAGNOSIS — E89.0 POSTABLATIVE HYPOTHYROIDISM: Primary | ICD-10-CM

## 2017-11-02 DIAGNOSIS — E89.0 POSTABLATIVE HYPOTHYROIDISM: ICD-10-CM

## 2017-11-02 LAB
T3FREE SERPL-MCNC: 2.7 PG/ML
T4 FREE SERPL-MCNC: 1.37 NG/DL
TSH SERPL DL<=0.005 MIU/L-ACNC: 0.11 UIU/ML

## 2017-11-02 PROCEDURE — 36415 COLL VENOUS BLD VENIPUNCTURE: CPT | Mod: PO

## 2017-11-02 PROCEDURE — 84481 FREE ASSAY (FT-3): CPT

## 2017-11-02 PROCEDURE — 99999 PR PBB SHADOW E&M-EST. PATIENT-LVL III: CPT | Mod: PBBFAC,,, | Performed by: INTERNAL MEDICINE

## 2017-11-02 PROCEDURE — 84443 ASSAY THYROID STIM HORMONE: CPT

## 2017-11-02 PROCEDURE — 84439 ASSAY OF FREE THYROXINE: CPT

## 2017-11-02 PROCEDURE — 99213 OFFICE O/P EST LOW 20 MIN: CPT | Mod: S$GLB,,, | Performed by: INTERNAL MEDICINE

## 2017-11-02 NOTE — PROGRESS NOTES
Patient ID: Sonia Melo is a 47 y.o. female.  Patient is here for follow up        Chief Complaint: Hypothyroidism      HPI  Consultation was requested by Haylee Daigle       Diagnosed: 20 years agp s/p GARCIA for hyperthyroidism due to Grave's    Previous radiology tests:  Thyroid ultrasound : unknown  NM uptake and scan: unknown    Previous thyroid surgery: No      Thyroid symptoms:fatigue- however seeing Dr Baxter in Rheumatology at [facility] who aslo had been checking TFTs- pt has undifferentiated connective tissue disease and + MARYAM, fibromyalgia and carpal tunnel syndrome      Thyroid medications: synthroid 125 µg reduced after last visit from 150mcg       Takes medication appropriately: Now she is however was not prior to last visit-previously was taking name brand Synthroid with peanut butter and coffee every day and also taking multivitamin and TUMS cclose to her Synthroid-I repeated her labs 3 weeks after she started taking medicine correctly and she was overmedicated so I reduced her to 125 µg      In review of past thyroid function tests has had multiple varying TSH levels, sometimes high and sometimes low, in July had TSH of 11  Her gynecologist increased her Synthroid 112 to 150 µg at that time    I have reviewed the past medical, family and social history    Review of Systems   Constitutional: Positive for fatigue. Negative for appetite change, fever and unexpected weight change.   HENT: Negative for sore throat and trouble swallowing.    Eyes: Negative for visual disturbance.   Respiratory: Negative for shortness of breath and wheezing.    Cardiovascular: Negative for chest pain, palpitations and leg swelling.   Gastrointestinal: Negative for diarrhea, nausea and vomiting.   Endocrine: Negative for cold intolerance, heat intolerance, polydipsia, polyphagia and polyuria.   Genitourinary: Negative for difficulty urinating, dysuria and menstrual problem.   Musculoskeletal: Positive for arthralgias. Negative  for joint swelling.   Skin: Negative for rash.   Neurological: Negative for dizziness, weakness, numbness and headaches.   Psychiatric/Behavioral: Negative for confusion, dysphoric mood and sleep disturbance.       Objective:      Physical Exam   Constitutional: She appears well-developed and well-nourished. No distress.   HENT:   Head: Normocephalic and atraumatic.   Eyes: Conjunctivae are normal.   Neck: No JVD present. No tracheal deviation present. No thyromegaly present.   Cardiovascular: Normal rate, regular rhythm, normal heart sounds and intact distal pulses.  Exam reveals no gallop and no friction rub.    No murmur heard.  Pulmonary/Chest: Effort normal and breath sounds normal. No stridor. No respiratory distress. She has no wheezes. She has no rales. She exhibits no tenderness.   Musculoskeletal: She exhibits no edema or deformity.   Lymphadenopathy:     She has no cervical adenopathy.   Neurological: She is alert.   Skin: She is not diaphoretic.   Vitals reviewed.        Lab Review:   Admission on 10/13/2017, Discharged on 10/13/2017   Component Date Value    POC Preg Test, Ur 10/13/2017 Negative      Acceptab* 10/13/2017 Yes    Orders Only on 08/22/2017   Component Date Value    Provocation Protocol 08/22/2017 ---     Pre FVC 08/22/2017 3.21     Pre FEV1 08/22/2017 2.47*    Pre FEF 25 75 08/22/2017 2.12*    Pre PEF 08/22/2017 6.55     Pre  08/22/2017 9.42     Pre FIF 50 08/22/2017 2.93     Pre SVC 08/22/2017 3.21     Pre TLC 08/22/2017 4.57     Pre RV 08/22/2017 1.36     Pre FRC PL 08/22/2017 2.39     Pre ERV 08/22/2017 1.25     Pre DLCO 08/22/2017 20.49     Pre Kroghs K 08/22/2017 4.16     Pre FEV1 FVC 08/22/2017 77     Post FVC 08/22/2017 3.16*    Post FEV1 08/22/2017 2.56     Post FEF 25 75 08/22/2017 2.99     Post PEF 08/22/2017 5.93     Post  08/22/2017 7.59     Post FIF 50 08/22/2017 3.36     Post FEV1 FVC 08/22/2017 81     Predicted FEV1 FVC  08/22/2017 81.25     Predicted DLCO 08/22/2017 19.96     Predicted FEV1 08/22/2017 2.82     Predicted FRC PL 08/22/2017 2.79     Predicted FRC N2 08/22/2017 2.79     Predicted RV 08/22/2017 1.63     Predicted TLC 08/22/2017 4.81     Predicted FVC 08/22/2017 3.5     Predicted SVC 08/22/2017 3.15    Lab Visit on 08/22/2017   Component Date Value    TTG IgA 08/24/2017 5     IgA 08/22/2017 164    Lab Visit on 08/22/2017   Component Date Value    TSH 08/22/2017 0.133*    Free T4 08/22/2017 1.62*    T3, Free 08/22/2017 2.9     Thyroglobulin Ab Screen 08/22/2017 17.4*    Thyroperoxidase Antibodi* 08/22/2017 <6.0    Clinical Support on 08/14/2017   Component Date Value    EF 08/14/2017 58     Diastolic Dysfunction 08/14/2017 No     Est. PA Systolic Pressure 08/14/2017 22.36    Office Visit on 08/09/2017   Component Date Value    TB Skin Test - 48 hr read 08/11/2017 Negative     TB Induration - 48 hr re* 08/11/2017 0     TB Skin Test - 72 hr read 08/11/2017 Negative     TB Induration - 72 hr re* 08/11/2017 0    Lab Visit on 07/27/2017   Component Date Value    Sodium 07/27/2017 140     Potassium 07/27/2017 4.8     Chloride 07/27/2017 105     CO2 07/27/2017 26     Glucose 07/27/2017 101     BUN, Bld 07/27/2017 16     Creatinine 07/27/2017 0.7     Calcium 07/27/2017 9.7     Total Protein 07/27/2017 7.5     Albumin 07/27/2017 4.0     Total Bilirubin 07/27/2017 0.6     Alkaline Phosphatase 07/27/2017 54*    AST 07/27/2017 20     ALT 07/27/2017 13     Anion Gap 07/27/2017 9     eGFR if  07/27/2017 >60     eGFR if non African Amer* 07/27/2017 >60     hCG Quant 07/27/2017 <2.0     WBC 07/27/2017 6.89     RBC 07/27/2017 4.02     Hemoglobin 07/27/2017 12.7     Hematocrit 07/27/2017 37.4     MCV 07/27/2017 93     MCH 07/27/2017 31.6*    MCHC 07/27/2017 34.0     RDW 07/27/2017 12.6     Platelets 07/27/2017 222     MPV 07/27/2017 8.9*    Gran # 07/27/2017 4.1     Lymph  # 07/27/2017 2.2     Mono # 07/27/2017 0.4     Eos # 07/27/2017 0.2     Baso # 07/27/2017 0.03     Gran% 07/27/2017 58.8     Lymph% 07/27/2017 32.2     Mono% 07/27/2017 6.0     Eosinophil% 07/27/2017 2.6     Basophil% 07/27/2017 0.4     Differential Method 07/27/2017 Automated     TSH 07/27/2017 11.793*    Free T4 07/27/2017 1.14     T3, Total 07/27/2017 73    Admission on 07/05/2017, Discharged on 07/05/2017   Component Date Value    POC Preg Test, Ur 07/05/2017 Negative      Acceptab* 07/05/2017 Yes    Lab Visit on 06/15/2017   Component Date Value    Specimen UA 06/15/2017 Urine, Clean Catch     Color, UA 06/15/2017 Yellow     Appearance, UA 06/15/2017 Clear     pH, UA 06/15/2017 7.0     Specific Gravity, UA 06/15/2017 1.015     Protein, UA 06/15/2017 Negative     Glucose, UA 06/15/2017 Negative     Ketones, UA 06/15/2017 Trace*    Bilirubin (UA) 06/15/2017 Negative     Occult Blood UA 06/15/2017 Negative     Nitrite, UA 06/15/2017 Negative     Leukocytes, UA 06/15/2017 Trace*    Urine Culture, Routine 06/16/2017 No significant growth     WBC, UA 06/15/2017 3     Squam Epithel, UA 06/15/2017 3     Microscopic Comment 06/15/2017 SEE COMMENT    Lab Visit on 06/05/2017   Component Date Value    MARYAM Screen 06/06/2017 Positive*    Scleroderma SCL- 06/07/2017 4     WBC 06/05/2017 8.27     RBC 06/05/2017 3.96*    Hemoglobin 06/05/2017 12.5     Hematocrit 06/05/2017 37.5     MCV 06/05/2017 95     MCH 06/05/2017 31.6*    MCHC 06/05/2017 33.3     RDW 06/05/2017 12.7     Platelets 06/05/2017 214     MPV 06/05/2017 9.0*    Gran # 06/05/2017 5.7     Lymph # 06/05/2017 2.1     Mono # 06/05/2017 0.4     Eos # 06/05/2017 0.1     Baso # 06/05/2017 0.02     Gran% 06/05/2017 69.5     Lymph% 06/05/2017 25.0     Mono% 06/05/2017 4.2     Eosinophil% 06/05/2017 1.1     Basophil% 06/05/2017 0.2     Differential Method 06/05/2017 Automated     Sodium 06/05/2017 141      Potassium 06/05/2017 4.5     Chloride 06/05/2017 105     CO2 06/05/2017 29     Glucose 06/05/2017 94     BUN, Bld 06/05/2017 13     Creatinine 06/05/2017 0.7     Calcium 06/05/2017 10.0     Total Protein 06/05/2017 7.1     Albumin 06/05/2017 3.9     Total Bilirubin 06/05/2017 0.9     Alkaline Phosphatase 06/05/2017 47*    AST 06/05/2017 22     ALT 06/05/2017 16     Anion Gap 06/05/2017 7*    eGFR if African American 06/05/2017 >60     eGFR if non African Amer* 06/05/2017 >60     MARYAM HEP-2 Titer 06/06/2017 Positive 1:320 Homogeneous      Anti Sm Antibody 06/08/2017 1.64     Anti-Sm Interpretation 06/08/2017 Negative     Anti-SSA Antibody 06/08/2017 7.80     Anti-SSA Interpretation 06/08/2017 Negative     Anti-SSB Antibody 06/08/2017 0.67     Anti-SSB Interpretation 06/08/2017 Negative     ds DNA Ab 06/08/2017 Negative 1:10     Anti Sm/RNP Antibody 06/08/2017 1.72     Anti-Sm/RNP Interpretati* 06/08/2017 Negative    There may be more visits with results that are not included.       Assessment:     1. Postablative hypothyroidism  T3, free    T4, free    TSH      Plan:   Postablative hypothyroidism  -     T3, free; Future; Expected date: 11/02/2017  -     T4, free; Future; Expected date: 11/02/2017  -     TSH; Future; Expected date: 11/02/2017      Check labs and adjust dose as needed    Return in about 6 months (around 5/2/2018).    Labs prior to appointment? yes     Disclaimer:  This note may have been partially prepared using voice recognition software and  it may have not been extensively proofed, as such there could be errors within the text such as sound alike errors.

## 2017-11-03 ENCOUNTER — PATIENT MESSAGE (OUTPATIENT)
Dept: REHABILITATION | Facility: HOSPITAL | Age: 47
End: 2017-11-03

## 2017-11-08 ENCOUNTER — TELEPHONE (OUTPATIENT)
Dept: ENDOCRINOLOGY | Facility: CLINIC | Age: 47
End: 2017-11-08

## 2017-11-08 ENCOUNTER — PATIENT MESSAGE (OUTPATIENT)
Dept: ENDOCRINOLOGY | Facility: CLINIC | Age: 47
End: 2017-11-08

## 2017-11-08 DIAGNOSIS — E89.0 POSTABLATIVE HYPOTHYROIDISM: Primary | ICD-10-CM

## 2017-11-08 RX ORDER — LEVOTHYROXINE SODIUM 112 UG/1
112 TABLET ORAL DAILY
Qty: 30 TABLET | Refills: 5 | Status: SHIPPED | OUTPATIENT
Start: 2017-11-08 | End: 2017-11-21

## 2017-11-15 ENCOUNTER — PATIENT MESSAGE (OUTPATIENT)
Dept: INTERNAL MEDICINE | Facility: CLINIC | Age: 47
End: 2017-11-15

## 2017-11-15 DIAGNOSIS — Z01.818 PREOP TESTING: Primary | ICD-10-CM

## 2017-11-16 ENCOUNTER — PATIENT MESSAGE (OUTPATIENT)
Dept: GASTROENTEROLOGY | Facility: CLINIC | Age: 47
End: 2017-11-16

## 2017-11-21 ENCOUNTER — LAB VISIT (OUTPATIENT)
Dept: LAB | Facility: HOSPITAL | Age: 47
End: 2017-11-21
Attending: FAMILY MEDICINE
Payer: COMMERCIAL

## 2017-11-21 ENCOUNTER — OFFICE VISIT (OUTPATIENT)
Dept: INTERNAL MEDICINE | Facility: CLINIC | Age: 47
End: 2017-11-21
Payer: COMMERCIAL

## 2017-11-21 VITALS
WEIGHT: 122.81 LBS | HEIGHT: 63 IN | DIASTOLIC BLOOD PRESSURE: 68 MMHG | BODY MASS INDEX: 21.76 KG/M2 | SYSTOLIC BLOOD PRESSURE: 126 MMHG | TEMPERATURE: 97 F

## 2017-11-21 DIAGNOSIS — Z01.818 PREOP EXAMINATION: ICD-10-CM

## 2017-11-21 DIAGNOSIS — M47.817 OSTEOARTHRITIS OF LUMBOSACRAL SPINE WITHOUT MYELOPATHY: Primary | ICD-10-CM

## 2017-11-21 LAB
APTT BLDCRRT: 24.6 SEC
BASOPHILS # BLD AUTO: 0.04 K/UL
BASOPHILS NFR BLD: 0.6 %
DIFFERENTIAL METHOD: NORMAL
EOSINOPHIL # BLD AUTO: 0.3 K/UL
EOSINOPHIL NFR BLD: 4.2 %
ERYTHROCYTE [DISTWIDTH] IN BLOOD BY AUTOMATED COUNT: 12.4 %
HCT VFR BLD AUTO: 37.8 %
HGB BLD-MCNC: 12.3 G/DL
IMM GRANULOCYTES # BLD AUTO: 0.01 K/UL
IMM GRANULOCYTES NFR BLD AUTO: 0.1 %
INR PPP: 0.9
LYMPHOCYTES # BLD AUTO: 2.6 K/UL
LYMPHOCYTES NFR BLD: 38.4 %
MCH RBC QN AUTO: 29.8 PG
MCHC RBC AUTO-ENTMCNC: 32.5 G/DL
MCV RBC AUTO: 92 FL
MONOCYTES # BLD AUTO: 0.6 K/UL
MONOCYTES NFR BLD: 8.6 %
NEUTROPHILS # BLD AUTO: 3.2 K/UL
NEUTROPHILS NFR BLD: 48.1 %
NRBC BLD-RTO: 0 /100 WBC
PLATELET # BLD AUTO: 230 K/UL
PMV BLD AUTO: 9.9 FL
PROTHROMBIN TIME: 10.1 SEC
RBC # BLD AUTO: 4.13 M/UL
WBC # BLD AUTO: 6.72 K/UL

## 2017-11-21 PROCEDURE — 99214 OFFICE O/P EST MOD 30 MIN: CPT | Mod: S$GLB,,, | Performed by: FAMILY MEDICINE

## 2017-11-21 PROCEDURE — 85730 THROMBOPLASTIN TIME PARTIAL: CPT | Mod: PO

## 2017-11-21 PROCEDURE — 85025 COMPLETE CBC W/AUTO DIFF WBC: CPT

## 2017-11-21 PROCEDURE — 36415 COLL VENOUS BLD VENIPUNCTURE: CPT | Mod: PO

## 2017-11-21 PROCEDURE — 99999 PR PBB SHADOW E&M-EST. PATIENT-LVL III: CPT | Mod: PBBFAC,,, | Performed by: FAMILY MEDICINE

## 2017-11-21 PROCEDURE — 85610 PROTHROMBIN TIME: CPT | Mod: PO

## 2017-11-21 RX ORDER — LEVOTHYROXINE SODIUM 125 UG/1
125 TABLET ORAL
COMMUNITY
Start: 2017-09-23 | End: 2018-05-02 | Stop reason: CLARIF

## 2017-11-22 ENCOUNTER — TELEPHONE (OUTPATIENT)
Dept: INTERNAL MEDICINE | Facility: CLINIC | Age: 47
End: 2017-11-22

## 2017-11-22 PROBLEM — R05.3 CHRONIC COUGH: Status: RESOLVED | Noted: 2017-08-14 | Resolved: 2017-11-22

## 2017-11-22 PROBLEM — R13.10 DYSPHAGIA: Status: RESOLVED | Noted: 2017-10-13 | Resolved: 2017-11-22

## 2017-11-22 PROBLEM — M79.18 MYOFASCIAL PAIN: Chronic | Status: ACTIVE | Noted: 2017-08-14

## 2017-11-22 NOTE — PROGRESS NOTES
Subjective:   Patient ID: Sonia Melo is a 47 y.o. female.  Chief Complaint:  No chief complaint on file.      Presents for preoperative evaluation.  Pars injection.  Dr. Mohan Sims.  Needs written clearance and CBC, PT, PTT.  Complains of persistent myofascial pain with significant abnormalities on cervical, thoracic, and lumbar MRIs  No limiting medications.  Negative pulmonary and cardiac review of symptoms.  No active rash        Current Outpatient Prescriptions:     esomeprazole (NEXIUM) 40 MG capsule, Take 1 capsule (40 mg total) by mouth 2 (two) times daily before meals. (Patient taking differently: Take 40 mg by mouth before breakfast. ), Disp: 60 capsule, Rfl: 6    ibuprofen (ADVIL,MOTRIN) 200 MG tablet, Take 200 mg by mouth as needed. , Disp: , Rfl:     levothyroxine (SYNTHROID) 125 MCG tablet, Take 125 mcg by mouth., Disp: , Rfl:     multivitamin (ONE DAILY MULTIVITAMIN) per tablet, Take 1 tablet by mouth once daily., Disp: , Rfl:     albuterol 90 mcg/actuation inhaler, Inhale 2 puffs into the lungs every 4 (four) hours as needed for Wheezing or Shortness of Breath (Or Cough)., Disp: 1 Inhaler, Rfl: 0    penciclovir (DENAVIR) 1 % cream, Apply topically every 2 (two) hours. (Patient taking differently: Apply 1 application topically as needed. ), Disp: 1.5 g, Rfl: 2     Review of Systems   Constitutional: Negative for chills, fatigue and fever.   HENT: Negative for congestion, dental problem, ear pain, postnasal drip, sinus pressure and sore throat.    Eyes: Negative for visual disturbance.   Respiratory: Negative for cough, chest tightness, shortness of breath and wheezing.    Cardiovascular: Negative for chest pain, palpitations and leg swelling.   Gastrointestinal: Negative for abdominal pain, blood in stool, constipation, diarrhea, nausea and vomiting.   Endocrine: Negative for polydipsia, polyphagia and polyuria.   Genitourinary: Negative for difficulty urinating, dysuria, flank pain,  "hematuria and pelvic pain.   Musculoskeletal: Positive for arthralgias, back pain, myalgias, neck pain and neck stiffness. Negative for gait problem and joint swelling.   Skin: Negative for rash.   Neurological: Positive for numbness and headaches. Negative for dizziness, syncope, weakness and light-headedness.   Hematological: Negative for adenopathy.   Psychiatric/Behavioral: Positive for sleep disturbance. The patient is not nervous/anxious.      Objective:   /68 (BP Location: Right arm, Patient Position: Sitting, BP Method: Small (Manual))   Temp 97.3 °F (36.3 °C) (Tympanic)   Ht 5' 3" (1.6 m)   Wt 55.7 kg (122 lb 12.7 oz)   LMP 09/21/2017 (Approximate)   BMI 21.75 kg/m²     Physical Exam   Constitutional: She is oriented to person, place, and time. Vital signs are normal. She appears well-developed and well-nourished.   Neck: No JVD present. No thyroid mass and no thyromegaly present.   Cardiovascular: Normal rate, regular rhythm and normal heart sounds.  Exam reveals no gallop and no friction rub.    No murmur heard.  Pulses:       Radial pulses are 2+ on the right side, and 2+ on the left side.   Pulmonary/Chest: Effort normal and breath sounds normal. She has no wheezes. She has no rhonchi. She has no rales.   Abdominal: Soft. She exhibits no distension. There is no tenderness. There is no rebound, no guarding and no CVA tenderness.   Musculoskeletal: Normal range of motion. She exhibits no edema.   Neurological: She is oriented to person, place, and time. She displays a negative Romberg sign. Coordination and gait normal.   Skin: Skin is warm and dry. No rash noted.   Psychiatric: She has a normal mood and affect. Her behavior is normal. Judgment and thought content normal.   Nursing note and vitals reviewed.    Assessment:     1. Osteoarthritis of lumbosacral spine without myelopathy    2. Preop examination      Plan:   Osteoarthritis of lumbosacral spine without myelopathy  Preop examination  - "     CBC auto differential; Future; Expected date: 11/21/2017  -     APTT; Future; Expected date: 12/05/2017  -     Protime-INR; Future; Expected date: 11/21/2017  CBC with white blood cell 6.7, hemoglobin/hematocrit 12.3/37.8, platelet 2:30.  All normal.  PT/INR and PTT normal.  Medically stable for pars injection.  Return to clinic 1 month for full lab work and medical clearance of neck surgery anticipated in January.  Copy of labs and note will be sent to Dr. Sims.

## 2017-11-22 NOTE — TELEPHONE ENCOUNTER
----- Message from Leonardo Frey MD sent at 11/22/2017 12:11 PM CST -----  CBC, PT, PTT normal.  Medically cleared for surgery.  Please print out copy of November 21 note and labs and fax to Dr. Sims's office.

## 2017-11-30 ENCOUNTER — PATIENT MESSAGE (OUTPATIENT)
Dept: INTERNAL MEDICINE | Facility: CLINIC | Age: 47
End: 2017-11-30

## 2017-12-05 ENCOUNTER — PATIENT MESSAGE (OUTPATIENT)
Dept: INTERNAL MEDICINE | Facility: CLINIC | Age: 47
End: 2017-12-05

## 2017-12-05 DIAGNOSIS — Z01.818 PREOP EXAMINATION: ICD-10-CM

## 2017-12-05 DIAGNOSIS — M47.812 OSTEOARTHRITIS OF CERVICAL SPINE WITHOUT MYELOPATHY: ICD-10-CM

## 2017-12-05 DIAGNOSIS — M50.30 DDD (DEGENERATIVE DISC DISEASE), CERVICAL: Primary | ICD-10-CM

## 2017-12-05 DIAGNOSIS — Z01.818 PREOP TESTING: ICD-10-CM

## 2017-12-12 ENCOUNTER — TELEPHONE (OUTPATIENT)
Dept: GASTROENTEROLOGY | Facility: CLINIC | Age: 47
End: 2017-12-12

## 2017-12-12 NOTE — TELEPHONE ENCOUNTER
----- Message from Loly Alvares sent at 12/12/2017  4:07 PM CST -----  Contact: Jaja Brewer's Office at [facility]- [phone]  Susan Mcclure called to speak with Nathaly regarding the pts referral to them- says pt is refusing because she has seen a Rheumatologist already- says she has sent several messages with no response this will be last time trying to reach the office- please call Kami back at [phone]

## 2017-12-12 NOTE — TELEPHONE ENCOUNTER
Spoke with daniel and patient refused to see Rheumatology at Dr. Brewer's office she stated she has seen one already.

## 2017-12-20 ENCOUNTER — CLINICAL SUPPORT (OUTPATIENT)
Dept: CARDIOLOGY | Facility: CLINIC | Age: 47
End: 2017-12-20
Payer: COMMERCIAL

## 2017-12-20 ENCOUNTER — HOSPITAL ENCOUNTER (OUTPATIENT)
Dept: RADIOLOGY | Facility: HOSPITAL | Age: 47
Discharge: HOME | End: 2017-12-20
Attending: FAMILY MEDICINE
Payer: COMMERCIAL

## 2017-12-20 ENCOUNTER — LAB VISIT (OUTPATIENT)
Dept: LAB | Facility: HOSPITAL | Age: 47
End: 2017-12-20
Attending: FAMILY MEDICINE
Payer: COMMERCIAL

## 2017-12-20 DIAGNOSIS — Z01.818 PREOP EXAMINATION: ICD-10-CM

## 2017-12-20 DIAGNOSIS — E89.0 POSTABLATIVE HYPOTHYROIDISM: ICD-10-CM

## 2017-12-20 DIAGNOSIS — M47.812 OSTEOARTHRITIS OF CERVICAL SPINE WITHOUT MYELOPATHY: ICD-10-CM

## 2017-12-20 DIAGNOSIS — Z01.818 PREOP TESTING: ICD-10-CM

## 2017-12-20 DIAGNOSIS — M50.30 DDD (DEGENERATIVE DISC DISEASE), CERVICAL: ICD-10-CM

## 2017-12-20 LAB
T3FREE SERPL-MCNC: 2.2 PG/ML
T4 FREE SERPL-MCNC: 1.2 NG/DL
TSH SERPL DL<=0.005 MIU/L-ACNC: 1.02 UIU/ML

## 2017-12-20 PROCEDURE — 71020 XR CHEST PA AND LATERAL: CPT | Mod: 26,,, | Performed by: RADIOLOGY

## 2017-12-20 PROCEDURE — 93000 ELECTROCARDIOGRAM COMPLETE: CPT | Mod: S$GLB,,, | Performed by: INTERNAL MEDICINE

## 2017-12-20 PROCEDURE — 84481 FREE ASSAY (FT-3): CPT

## 2017-12-20 PROCEDURE — 84443 ASSAY THYROID STIM HORMONE: CPT

## 2017-12-20 PROCEDURE — 36415 COLL VENOUS BLD VENIPUNCTURE: CPT | Mod: PO

## 2017-12-20 PROCEDURE — 71020 XR CHEST PA AND LATERAL: CPT | Mod: TC,PO

## 2017-12-20 PROCEDURE — 84439 ASSAY OF FREE THYROXINE: CPT

## 2017-12-21 ENCOUNTER — TELEPHONE (OUTPATIENT)
Dept: INTERNAL MEDICINE | Facility: CLINIC | Age: 47
End: 2017-12-21

## 2017-12-21 NOTE — TELEPHONE ENCOUNTER
----- Message from Leonardo Frey MD sent at 12/21/2017  8:14 AM CST -----  Blood Counts are normal. No significant anemia.  Sugar, Kidney, and Electrolyte tests are all normal.  Inflammation markers normal  Blood clotting tests normal  EKG and CXR were also normal  Labs stable for surgery under general anesthesia

## 2017-12-29 ENCOUNTER — OFFICE VISIT (OUTPATIENT)
Dept: INTERNAL MEDICINE | Facility: CLINIC | Age: 47
End: 2017-12-29
Payer: COMMERCIAL

## 2017-12-29 VITALS
DIASTOLIC BLOOD PRESSURE: 80 MMHG | BODY MASS INDEX: 21.99 KG/M2 | WEIGHT: 124.13 LBS | SYSTOLIC BLOOD PRESSURE: 110 MMHG | HEIGHT: 63 IN | TEMPERATURE: 97 F

## 2017-12-29 DIAGNOSIS — M35.9 UNDIFFERENTIATED CONNECTIVE TISSUE DISEASE: Chronic | ICD-10-CM

## 2017-12-29 DIAGNOSIS — R13.19 ESOPHAGEAL DYSPHAGIA: ICD-10-CM

## 2017-12-29 DIAGNOSIS — E78.2 MIXED HYPERLIPIDEMIA: Chronic | ICD-10-CM

## 2017-12-29 DIAGNOSIS — M47.812 OSTEOARTHRITIS OF CERVICAL SPINE WITHOUT MYELOPATHY: Primary | ICD-10-CM

## 2017-12-29 DIAGNOSIS — E03.2 IATROGENIC HYPOTHYROIDISM: Chronic | ICD-10-CM

## 2017-12-29 DIAGNOSIS — M79.18 MYOFASCIAL PAIN: Chronic | ICD-10-CM

## 2017-12-29 DIAGNOSIS — M50.30 DDD (DEGENERATIVE DISC DISEASE), CERVICAL: ICD-10-CM

## 2017-12-29 DIAGNOSIS — Z01.818 PREOP EXAMINATION: ICD-10-CM

## 2017-12-29 PROCEDURE — 99999 PR PBB SHADOW E&M-EST. PATIENT-LVL II: CPT | Mod: PBBFAC,,, | Performed by: FAMILY MEDICINE

## 2017-12-29 PROCEDURE — 99213 OFFICE O/P EST LOW 20 MIN: CPT | Mod: S$GLB,,, | Performed by: FAMILY MEDICINE

## 2017-12-29 NOTE — PROGRESS NOTES
Subjective:   Patient ID:  Sonia Melo is a 47 y.o. female.    Chief Complaint:  No chief complaint on file.    Past Medical History:   Diagnosis Date    Abnormal Pap smear of cervix     Cervical spondylolysis     DDD (degenerative disc disease), lumbosacral     Fibromyalgia     Hyperthyroidism     Iatrogenic hypothyroidism     Spondylosis     Undifferentiated connective tissue disease      Past Surgical History:   Procedure Laterality Date    BREAST IMPLANTS      BREAST SURGERY Bilateral 2009    COLONOSCOPY N/A 10/13/2017    Procedure: COLONOSCOPY;  Surgeon: Hugh Luciano MD;  Location: [facility];  Service: Endoscopy;  Laterality: N/A;    EAR MASTOIDECTOMY W/ COCHLEAR IMPLANT W/ LANDMARK      STAPEDES SURGERY      sMart Stapedes Piston (Safe to 3T magnet)    UPPER GASTROINTESTINAL ENDOSCOPY       Family History   Problem Relation Age of Onset    Hypertension Mother     Hypertension Father     Heart disease Father     Cancer Maternal Uncle      stomach cancer    Stomach cancer Maternal Uncle     Celiac disease Maternal Aunt     Breast cancer Paternal Aunt      Review of patient's allergies indicates:   Allergen Reactions    Adhesive Rash       Current Outpatient Prescriptions:     levothyroxine (SYNTHROID) 125 MCG tablet, Take 125 mcg by mouth., Disp: , Rfl:     multivitamin (ONE DAILY MULTIVITAMIN) per tablet, Take 1 tablet by mouth once daily., Disp: , Rfl:     penciclovir (DENAVIR) 1 % cream, Apply topically every 2 (two) hours. (Patient taking differently: Apply 1 application topically as needed. ), Disp: 1.5 g, Rfl: 2    esomeprazole (NEXIUM) 40 MG capsule, Take 1 capsule (40 mg total) by mouth 2 (two) times daily before meals. (Patient taking differently: Take 40 mg by mouth before breakfast. ), Disp: 60 capsule, Rfl: 6    Presents for medical evaluation and clearance for neck surgery.  Planned January 10.  Surgeon Dr. Mohan Sims.  No interval change in health status  since last visit.  No active symptoms of infection.  Other than neck pain and no new concerns today.  Did get significant relief from lumbar sacral epidural steroid injection.  Plans to stop Motrin prior to surgery.  No other high risk assessment limiting meds regarding surgery.  Tolerated multiple surgeries previously under general anesthesia.      Back Pain   This is a chronic problem. The current episode started more than 1 year ago. The problem occurs constantly. The problem has been gradually worsening since onset. The pain is present in the thoracic spine. The quality of the pain is described as aching, burning, cramping, shooting and stabbing. The pain radiates to the left foot and right foot. The pain is at a severity of 5/10. The pain is moderate. The pain is worse during the night. The symptoms are aggravated by position, sitting, standing and stress. Stiffness is present all day. Associated symptoms include headaches, leg pain, numbness, paresthesias, tingling, weakness and weight loss. Pertinent negatives include no abdominal pain, bladder incontinence, bowel incontinence, chest pain, dysuria, fever, paresis, pelvic pain or perianal numbness. She has tried analgesics, NSAIDs, heat, home exercises, ice, injection treatment, muscle relaxant and walking for the symptoms. The treatment provided mild relief.     Review of Systems   Constitutional: Positive for weight loss. Negative for chills, fatigue and fever.   HENT: Negative for congestion, dental problem, ear pain, postnasal drip, sinus pressure and sore throat.    Eyes: Negative for visual disturbance.   Respiratory: Negative for cough, chest tightness, shortness of breath and wheezing.    Cardiovascular: Negative for chest pain, palpitations and leg swelling.   Gastrointestinal: Negative for abdominal pain, blood in stool, bowel incontinence, constipation, diarrhea, nausea and vomiting.   Endocrine: Negative for polydipsia, polyphagia and polyuria.  "  Genitourinary: Negative for bladder incontinence, difficulty urinating, dysuria, flank pain, hematuria and pelvic pain.   Musculoskeletal: Positive for back pain. Negative for myalgias.   Skin: Negative for rash.   Neurological: Positive for tingling, weakness, numbness, headaches and paresthesias. Negative for dizziness, syncope and light-headedness.   Hematological: Negative for adenopathy.   Psychiatric/Behavioral: Negative for sleep disturbance. The patient is not nervous/anxious.        Objective:   /80 (BP Location: Right arm, Patient Position: Sitting, BP Method: Small (Manual))   Temp 97.1 °F (36.2 °C) (Tympanic)   Ht 5' 3" (1.6 m)   Wt 56.3 kg (124 lb 1.9 oz)   LMP 10/26/2017   BMI 21.99 kg/m²     Physical Exam   Constitutional: She is oriented to person, place, and time. Vital signs are normal. She appears well-developed and well-nourished. No distress.   Uncomfortable From Pain   HENT:   Head: Normocephalic and atraumatic.   Eyes: No scleral icterus.   Neck: Trachea normal. No JVD present. Spinous process tenderness and muscular tenderness present. Carotid bruit is not present. Neck rigidity present. No edema, no erythema and normal range of motion present. No thyroid mass and no thyromegaly present.   Cardiovascular: Normal rate, regular rhythm, S1 normal, S2 normal and normal heart sounds.  Exam reveals no gallop and no friction rub.    No murmur heard.  Pulmonary/Chest: Effort normal and breath sounds normal. She has no wheezes. She has no rhonchi. She has no rales.   Abdominal: Soft. She exhibits no distension. There is no hepatosplenomegaly. There is no tenderness. There is no rebound, no guarding and no CVA tenderness.   Musculoskeletal: She exhibits no edema.        Lumbar back: Normal. She exhibits normal range of motion, no tenderness, no bony tenderness, no swelling, no edema, no deformity, no laceration, no pain and no spasm.   Lymphadenopathy:     She has no cervical adenopathy. "   Neurological: She is oriented to person, place, and time. She has normal strength and normal reflexes. She displays a negative Romberg sign. Coordination and gait normal.   Skin: Skin is warm and dry. No rash noted.   Psychiatric: She has a normal mood and affect. Her speech is normal and behavior is normal. Judgment and thought content normal. Cognition and memory are normal.   Nursing note and vitals reviewed.    Reviewed lab work CBC, sed rate/CRP, BMP, PT/PTT, and urinalysis all normal.  EKG normal sinus rhythm.  No acute or chronic changes.  Chest x-ray normal.  No acute cardiopulmonary process.    Assessment:     1. Osteoarthritis of cervical spine without myelopathy    2. DDD (degenerative disc disease), cervical    3. Preop examination    4. Mixed hyperlipidemia    5. Iatrogenic hypothyroidism    6. Undifferentiated connective tissue disease    7. Myofascial pain    8. Esophageal dysphagia      Plan:   Osteoarthritis of cervical spine without myelopathy  DDD (degenerative disc disease), cervical  Preop examination  Medically stable.  Low pulmonary and cardiovascular risk.  Cleared for surgery under general anesthesia.    Mixed hyperlipidemia  Iatrogenic hypothyroidism  Undifferentiated connective tissue disease  Myofascial pain  Esophageal dysphagia  Stable.    Return to clinic next year for annual exam.  Follow-up with any specialist as scheduled.

## 2018-01-02 ENCOUNTER — PATIENT MESSAGE (OUTPATIENT)
Dept: ENDOCRINOLOGY | Facility: CLINIC | Age: 48
End: 2018-01-02

## 2018-02-16 ENCOUNTER — PATIENT MESSAGE (OUTPATIENT)
Dept: GASTROENTEROLOGY | Facility: CLINIC | Age: 48
End: 2018-02-16

## 2018-03-26 ENCOUNTER — OFFICE VISIT (OUTPATIENT)
Dept: INTERNAL MEDICINE | Facility: CLINIC | Age: 48
End: 2018-03-26
Payer: COMMERCIAL

## 2018-03-26 VITALS
SYSTOLIC BLOOD PRESSURE: 118 MMHG | TEMPERATURE: 97 F | HEIGHT: 63 IN | OXYGEN SATURATION: 98 % | BODY MASS INDEX: 21.84 KG/M2 | DIASTOLIC BLOOD PRESSURE: 80 MMHG | WEIGHT: 123.25 LBS | HEART RATE: 91 BPM

## 2018-03-26 DIAGNOSIS — M79.18 MYOFASCIAL PAIN: Primary | Chronic | ICD-10-CM

## 2018-03-26 DIAGNOSIS — R13.19 ESOPHAGEAL DYSPHAGIA: ICD-10-CM

## 2018-03-26 DIAGNOSIS — E03.2 IATROGENIC HYPOTHYROIDISM: Chronic | ICD-10-CM

## 2018-03-26 PROCEDURE — 99999 PR PBB SHADOW E&M-EST. PATIENT-LVL III: CPT | Mod: PBBFAC,,, | Performed by: FAMILY MEDICINE

## 2018-03-26 PROCEDURE — 99214 OFFICE O/P EST MOD 30 MIN: CPT | Mod: S$GLB,,, | Performed by: FAMILY MEDICINE

## 2018-03-26 RX ORDER — DULOXETIN HYDROCHLORIDE 30 MG/1
30 CAPSULE, DELAYED RELEASE ORAL DAILY
Qty: 30 CAPSULE | Refills: 0 | Status: SHIPPED | OUTPATIENT
Start: 2018-03-26 | End: 2018-09-17

## 2018-03-26 NOTE — PROGRESS NOTES
Subjective:   Patient ID: Sonia Melo is a 48 y.o. female.  Chief Complaint:  Pain      Presents for follow-up on persistent pain despite surgical intervention, sitting rheumatology, and multiple medications.  Questions if anything else I would recommend.  Overall review of symptoms seems chronic and stable.  Patient is more agreeable to mood medications at this time.        Current Outpatient Prescriptions:     levothyroxine (SYNTHROID) 125 MCG tablet, Take 125 mcg by mouth., Disp: , Rfl:     multivitamin (ONE DAILY MULTIVITAMIN) per tablet, Take 1 tablet by mouth once daily., Disp: , Rfl:     DULoxetine (CYMBALTA) 30 MG capsule, Take 1 capsule (30 mg total) by mouth once daily., Disp: 30 capsule, Rfl: 0    esomeprazole (NEXIUM) 40 MG capsule, Take 1 capsule (40 mg total) by mouth 2 (two) times daily before meals. (Patient taking differently: Take 40 mg by mouth before breakfast. ), Disp: 60 capsule, Rfl: 6    penciclovir (DENAVIR) 1 % cream, Apply topically every 2 (two) hours. (Patient taking differently: Apply 1 application topically as needed. ), Disp: 1.5 g, Rfl: 2     Review of Systems   Constitutional: Negative for activity change, appetite change, diaphoresis and fatigue.   Eyes: Negative for visual disturbance.   Respiratory: Negative for chest tightness and shortness of breath.    Cardiovascular: Negative for chest pain and palpitations.   Gastrointestinal: Negative for abdominal pain, constipation, diarrhea, nausea and vomiting.   Endocrine: Negative for cold intolerance and heat intolerance.   Genitourinary: Negative for menstrual problem and pelvic pain.   Musculoskeletal: Positive for arthralgias and myalgias. Negative for back pain and neck pain.   Skin: Negative for rash.   Neurological: Negative for dizziness, tremors, weakness, light-headedness, numbness and headaches.   Psychiatric/Behavioral: Positive for sleep disturbance. Negative for agitation, behavioral problems, confusion,  "decreased concentration, dysphoric mood, hallucinations, self-injury and suicidal ideas. The patient is not nervous/anxious and is not hyperactive.      Objective:   /80 (BP Location: Right arm, Patient Position: Sitting, BP Method: Small (Manual))   Pulse 91   Temp 97 °F (36.1 °C) (Tympanic)   Ht 5' 3" (1.6 m)   Wt 55.9 kg (123 lb 3.8 oz)   LMP  (LMP Unknown)   SpO2 98%   BMI 21.83 kg/m²     Physical Exam   Constitutional: She is oriented to person, place, and time. Vital signs are normal. She appears well-developed and well-nourished. She is cooperative. No distress.   Eyes: Conjunctivae are normal. Right conjunctiva is not injected. Left conjunctiva is not injected. No scleral icterus.   Neck: No thyroid mass and no thyromegaly present.   Cardiovascular: Normal rate, regular rhythm and normal heart sounds.  Exam reveals no gallop and no friction rub.    No murmur heard.  Pulmonary/Chest: Effort normal and breath sounds normal. She has no wheezes. She has no rhonchi. She has no rales.   Abdominal: Soft. She exhibits no distension. There is no tenderness. There is no rebound, no guarding and no CVA tenderness.   Musculoskeletal: She exhibits no edema.   Neurological: She is alert and oriented to person, place, and time. She displays a negative Romberg sign. Coordination and gait normal.   Skin: Skin is warm and dry. No rash noted. No erythema.   Psychiatric: Judgment and thought content normal. Her mood appears not anxious. Her affect is not angry, not blunt, not labile and not inappropriate. Her speech is delayed. Her speech is not rapid and/or pressured, not tangential and not slurred. She is slowed and withdrawn. She is not agitated, not aggressive, not hyperactive, not actively hallucinating and not combative. Thought content is not paranoid and not delusional. Cognition and memory are normal. She exhibits a depressed mood. She expresses no homicidal and no suicidal ideation. She is communicative. " She is attentive.   Nursing note and vitals reviewed.    Assessment:     1. Myofascial pain    2. Iatrogenic hypothyroidism    3. Esophageal dysphagia      Plan:   Myofascial pain  -     DULoxetine (CYMBALTA) 30 MG capsule; Take 1 capsule (30 mg total) by mouth once daily.  Dispense: 30 capsule; Refill: 0  Start Cymbalta 30 mg once a day.  Follow-up with specialist as scheduled.    Iatrogenic hypothyroidism  Overall stable.  Asymptomatic.  Continue present dose thyroid supplementation.    Esophageal dysphagia  Symptoms seem controlled/stable.  Continue PPI.

## 2018-04-09 DIAGNOSIS — M54.12 CERVICAL RADICULOPATHY: Primary | ICD-10-CM

## 2018-04-19 ENCOUNTER — CLINICAL SUPPORT (OUTPATIENT)
Dept: REHABILITATION | Facility: HOSPITAL | Age: 48
End: 2018-04-19
Payer: COMMERCIAL

## 2018-04-19 DIAGNOSIS — M54.12 CERVICAL RADICULOPATHY: Primary | ICD-10-CM

## 2018-04-19 PROCEDURE — 97161 PT EVAL LOW COMPLEX 20 MIN: CPT | Mod: PO | Performed by: PHYSICAL THERAPIST

## 2018-04-19 PROCEDURE — 97535 SELF CARE MNGMENT TRAINING: CPT | Mod: PO | Performed by: PHYSICAL THERAPIST

## 2018-04-19 NOTE — PROGRESS NOTES
DATE OF INITIAL PHYSICAL THERAPY EVALUATION:             2018      REFERRING PROVIDER:       Mohan Sims MD      REFERRING DIAGNOSIS:     Cervical radiculopathy        Post-op ACDF      ORDERS:   Evaluate and treat as indicated         VISIT    #1       SUBJECTIVE:      Patients primary complaint(s):  Radiating pain into the right shoulder shoulder and upper arm  Myofascial pain and tightness in the cervical paraspinal muscle groups    History of present condition:    Patient reports having an ACDF on 1/10/2018.  She states her radicular pain into the right upper quadrant is worse now than prior to surgery.  She states she had an MRI recently which indicated the internal fixation was stable and she was healing as expected.  She states she understand from Dr. Sims it may take an extended period of time for her neurogenic pain to lessen given the complexity of her cervical spine involvement prior to surgery.  She has been referred to rheumatology to address her ongoing issues with fibromyalgia.  She has been given a prescription for Cymbalta but has decided to delay starting the medication until her evaluation in rheumatology.      She is complaining of acute and constant pain in the right cervical paraspinal and right shoulder girdle region.  Occasionally the pain will radiate into the right upper arm.  She reports her pain sharply increases with any type of activity; particularly lifting and carrying objects, driving, working on the computer, and most basic ADL's.       Patient states she has been using TENS and heat at home.  She is specifically requesting today any recommendations for further home management as she does not wish to regularly attend out-patient therapy at this time.  She is scheduled to return to work in a few weeks and she is concerned about her tolerance to sitting at a computer station during her work day.    Pain Ratin/10 constant pain reported; pain increased to  "10/10 with activity      Patient reports the following functional activity limitations:  Unable to tolerate working at a computer  Lifting and carrying of object impaired  ADL's impaired  Driving is painful    (FUNCTIONAL ASSESSMENT TOOL)    Patient Reported Functional Limitations:      NECK PAIN DISABILITY INDEX QUESTIONNAIRE - The following scores are based on patient reported assessment, with "0" being least limited and "5" being most limited.  Completed by patient on Aprill 19, 2018    Section 1- Pain Intensity  2/5     Section 2 Personal Care  1/5  Section 3 Lifting  3/5     Section 4 Reading  3/5  Section 5 Headaches   2/5     Section 6 Concentration 2/5  Section 7 Work   3/5     Section 8 Driving  3/5  Section 9 Sleeping  4/5     Section 10 Recreation  4/5    Patient reports 54% disability on the Neck Pain Disability Index Questionnaire.     Past Medical History and co-morbidities:  Past Medical History:   Diagnosis Date    Abnormal Pap smear of cervix     Cervical spondylolysis     DDD (degenerative disc disease), lumbosacral     Fibromyalgia     Hyperthyroidism     Iatrogenic hypothyroidism     Spondylosis     Undifferentiated connective tissue disease      Patient Active Problem List   Diagnosis    Iatrogenic hypothyroidism    Mixed hyperlipidemia    DDD (degenerative disc disease), cervical    Undifferentiated connective tissue disease    Conductive hearing loss of left ear    Osteoarthritis of cervical spine without myelopathy    Osteoarthritis of lumbosacral spine without myelopathy    Esophageal dysphagia    Myofascial pain       Current Outpatient Prescriptions:     DULoxetine (CYMBALTA) 30 MG capsule, Take 1 capsule (30 mg total) by mouth once daily., Disp: 30 capsule, Rfl: 0    esomeprazole (NEXIUM) 40 MG capsule, Take 1 capsule (40 mg total) by mouth 2 (two) times daily before meals. (Patient taking differently: Take 40 mg by mouth before breakfast. ), Disp: 60 capsule, Rfl: 6   "  levothyroxine (SYNTHROID) 125 MCG tablet, Take 125 mcg by mouth., Disp: , Rfl:     multivitamin (ONE DAILY MULTIVITAMIN) per tablet, Take 1 tablet by mouth once daily., Disp: , Rfl:     penciclovir (DENAVIR) 1 % cream, Apply topically every 2 (two) hours. (Patient taking differently: Apply 1 application topically as needed. 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  Will be returning to work in a few weeks.    OBJECTIVE:    Musculoskeletal Exam:    Postural Exam  Patient has a slight forward head posture  Posture throughout the cervical spine region and upper trunk is guarded.    ROM  Cervical Spine:  Flexion: 75% of full expected motion; end point reported to be uncomfortable.  Complains of muscle tightness throughout the cervical paraspinals.     Extension: 75% of full expected motion; end point reported to be uncomfortable.   Right SB: 75% of full expected motion; end point reported to be uncomfortable.  Complains of muscle tightness involving the left scalene musculature.     Left SB 75% of full expected motion; end point reported to be uncomfortable.  Complains of muscle tightness involving the right scalene musculature.  .   Right rotation: 50% of full expected motion; end point is reported to be uncomfortable and stiff.   Left rotation: 50% of full expected motion; end point is reported to be uncomfortable and stiff.      Functional Strength Testing  Not tested due to post-operative state    Palpation  Moderate muscle guarding throughout the trapezius, levator, rhomboids, and scalene groups bilaterally.  Localized tender points at the scapular insertion of the levator.  Diffuse tenderness in the right trapezius, posterior deltoids.        Neuromuscular  Exam    Sensation  Light touch sensation intact throughout the UE's  Patient reports an extended history of paresthesias in the finger tips and toe    Integumentary Exam    Inspection  Incision healing well    PROBLEM LIST - ASSESSMENT  Patient presents with mild limitations in cervical spine ROM.  She has muscle guarding with myofascial tenderness in the right shoulder girdle musculature.  Acute residual radicular pain is inhibiting ADL's and work activities.  Recommend the patient continue with cervical ROM and gentle stretching exercises, TENS, and TP massage.  She is interested in swimming for exercise and this was encouraged; however she should only swim backstroke at this time.  Once the radicular pain subsides, it is recommended the patient resume the theraband rotator cuff strengthening exercises.    Activity Limitations, Participation Restrictions, and CO-MORBIDITIES which may impact the plan of care and potentially impede the patient's progress in therapy include:  none    The patient does not present with any learning or communication barriers which may impact the plan of care and potentially impede the patient's progress in therapy.      CLINICAL PRESENTATION:  Patient's Clinical Presentation is STABLE.      RECOMMENDED PLAN OF CARE:  Review of cervical spine ROM and gentle stretching exercises  Instruction in TP massage  Exercise guidelines for swimming program in the future  Treatment guidelines for use of TENS      TREATMENT PROVIDED:       (one on one with therapist for self-care home management program 15 Mins)    As requested by the patient, she was instructed in the following today:  -treatment parameters for use of TENS; discussed appropriate electrode placement, duration and length of treatment  -patient instructed in TP massage with a tennis ball against the wall  -cervical ROM and gentle cervical stretching exercises reviewed with the patient (she was taught these during a previous course of  therapy)  -advise patient on swimming activities; recommended back stroke to minimize stress on the cervical spine  -review of theraband rotator cuff strengthening exericses        PLAN: Patient would like to continue with home management at this time.  She was encouraged to call or return with any questions or concerns.                                                  SHORT TERM GOALS:    1. Patient compliant with daily ROM and gentle stretching exericses  2. Patient will report pain level of 7/10 or less with activity; 3/10 or less at rest  3. Cervical spine rotation will improve to 75% of full motion or greater    LONG TERM GOALS:  1. Patient will report pain level of 4/10 or less with activity; 1/10 or less at rest  2. Full ROM of the cervical spine  3. Patient will report a 30% or greater decrease in functional impairment on the NPDI      These services are reasonable and necessary for the conditions set forth above while under my care.

## 2018-05-02 ENCOUNTER — LAB VISIT (OUTPATIENT)
Dept: LAB | Facility: HOSPITAL | Age: 48
End: 2018-05-02
Attending: INTERNAL MEDICINE
Payer: COMMERCIAL

## 2018-05-02 ENCOUNTER — OFFICE VISIT (OUTPATIENT)
Dept: ENDOCRINOLOGY | Facility: CLINIC | Age: 48
End: 2018-05-02
Payer: COMMERCIAL

## 2018-05-02 VITALS
HEART RATE: 82 BPM | SYSTOLIC BLOOD PRESSURE: 104 MMHG | HEIGHT: 63 IN | BODY MASS INDEX: 22.58 KG/M2 | WEIGHT: 127.44 LBS | TEMPERATURE: 98 F | DIASTOLIC BLOOD PRESSURE: 74 MMHG

## 2018-05-02 DIAGNOSIS — E89.0 POSTABLATIVE HYPOTHYROIDISM: Primary | ICD-10-CM

## 2018-05-02 DIAGNOSIS — E89.0 POSTABLATIVE HYPOTHYROIDISM: ICD-10-CM

## 2018-05-02 LAB
T3FREE SERPL-MCNC: 2.7 PG/ML
T4 FREE SERPL-MCNC: 1.29 NG/DL
TSH SERPL DL<=0.005 MIU/L-ACNC: 0.05 UIU/ML

## 2018-05-02 PROCEDURE — 36415 COLL VENOUS BLD VENIPUNCTURE: CPT | Mod: PO

## 2018-05-02 PROCEDURE — 84439 ASSAY OF FREE THYROXINE: CPT

## 2018-05-02 PROCEDURE — 99213 OFFICE O/P EST LOW 20 MIN: CPT | Mod: S$GLB,,, | Performed by: INTERNAL MEDICINE

## 2018-05-02 PROCEDURE — 84443 ASSAY THYROID STIM HORMONE: CPT

## 2018-05-02 PROCEDURE — 99999 PR PBB SHADOW E&M-EST. PATIENT-LVL III: CPT | Mod: PBBFAC,,, | Performed by: INTERNAL MEDICINE

## 2018-05-02 PROCEDURE — 84481 FREE ASSAY (FT-3): CPT

## 2018-05-02 RX ORDER — LEVOTHYROXINE SODIUM 112 UG/1
TABLET ORAL
COMMUNITY
Start: 2018-04-24 | End: 2018-05-27

## 2018-05-02 NOTE — PROGRESS NOTES
Patient ID: Sonia Melo is a 48 y.o. female.  Patient is here for follow up        Chief Complaint: Hypothyroidism      HPI    Consultation was requested by Haylee Daigle       Diagnosed: 20 years ago s/p GARCIA for hyperthyroidism due to Grave's    Previous radiology tests:  Thyroid ultrasound : unknown  NM uptake and scan: unknown    Previous thyroid surgery: No      Thyroid symptoms: Has chronic fatigue- however seeing Dr Baxter in Rheumatology at [facility] - pt has undifferentiated connective tissue disease and + MARYAM, fibromyalgia and carpal tunnel syndrome      Thyroid medications: synthroid I reduce her Synthroid down to 112 µg after last visit and her repeat labs were normal on this dose      Takes medication appropriately: Now she is: however -previously was taking name brand Synthroid with peanut butter and coffee every day and also taking multivitamin and TUMS close to her Synthroid-I repeated her labs 3 weeks after she started taking medicine correctly and she was overmedicated so I reduced her to 125 µg      She had multiple varying TSH levels when she was taking her Synthroid incorrectly  Patient recently in January had neck surgery but still is having neck pain and numbness and tingling in her hands and feet      I have reviewed the past medical, family and social history    Review of Systems   Constitutional: Negative for appetite change, fatigue, fever and unexpected weight change.   HENT: Negative for sore throat and trouble swallowing.    Eyes: Negative for visual disturbance.   Respiratory: Negative for shortness of breath and wheezing.    Cardiovascular: Negative for chest pain, palpitations and leg swelling.   Gastrointestinal: Negative for diarrhea, nausea and vomiting.   Endocrine: Negative for cold intolerance, heat intolerance, polydipsia, polyphagia and polyuria.   Genitourinary: Negative for difficulty urinating, dysuria and menstrual problem.   Musculoskeletal: Positive for neck pain.  Negative for arthralgias and joint swelling.   Skin: Negative for rash.   Neurological: Positive for numbness. Negative for dizziness, weakness and headaches.   Psychiatric/Behavioral: Negative for confusion, dysphoric mood and sleep disturbance.       Objective:      Physical Exam   Constitutional: She appears well-developed and well-nourished. No distress.   HENT:   Head: Normocephalic and atraumatic.   Eyes: Conjunctivae are normal.   Neck: No JVD present. No tracheal deviation present. No thyromegaly present.   She has an anterior right lateral neck scar from her recent neck surgery that is healing well   Cardiovascular: Normal rate, regular rhythm, normal heart sounds and intact distal pulses.  Exam reveals no gallop and no friction rub.    No murmur heard.  Pulmonary/Chest: Effort normal and breath sounds normal. No stridor. No respiratory distress. She has no wheezes. She has no rales. She exhibits no tenderness.   Musculoskeletal: She exhibits no edema or deformity.   Lymphadenopathy:     She has no cervical adenopathy.   Neurological: She is alert.   Skin: She is not diaphoretic.   Vitals reviewed.        Lab Review:   No visits with results within 2 Month(s) from this visit.   Latest known visit with results is:   Lab Visit on 12/20/2017   Component Date Value    TSH 12/20/2017 1.019     Free T4 12/20/2017 1.20     T3, Free 12/20/2017 2.2*       Assessment:     1. Postablative hypothyroidism  TSH    T4, free    T3, free    continue Synthroid  Plan:   Postablative hypothyroidism  -     TSH; Future; Expected date: 05/02/2018  -     T4, free; Future; Expected date: 05/02/2018  -     T3, free; Future; Expected date: 05/02/2018          Follow-up in about 1 year (around 5/2/2019).    Labs prior to appointment? not applicable     Disclaimer:  This note may have been partially prepared using voice recognition software and  it may have not been extensively proofed, as such there could be errors within the text  such as sound alike errors.

## 2018-05-14 ENCOUNTER — TELEPHONE (OUTPATIENT)
Dept: RHEUMATOLOGY | Facility: CLINIC | Age: 48
End: 2018-05-14

## 2018-05-14 NOTE — TELEPHONE ENCOUNTER
Left message for pt to call clinic back regarding appointment being rescheduled from 5.28.18 to 6.1.18

## 2018-05-27 DIAGNOSIS — E89.0 POSTABLATIVE HYPOTHYROIDISM: Primary | ICD-10-CM

## 2018-05-27 RX ORDER — LEVOTHYROXINE SODIUM 88 UG/1
88 TABLET ORAL DAILY
Qty: 90 TABLET | Refills: 2 | Status: SHIPPED | OUTPATIENT
Start: 2018-05-27 | End: 2018-05-28

## 2018-05-28 ENCOUNTER — PATIENT MESSAGE (OUTPATIENT)
Dept: ENDOCRINOLOGY | Facility: CLINIC | Age: 48
End: 2018-05-28

## 2018-05-28 DIAGNOSIS — E03.9 ACQUIRED HYPOTHYROIDISM: Primary | ICD-10-CM

## 2018-05-28 RX ORDER — LEVOTHYROXINE SODIUM 100 UG/1
100 TABLET ORAL DAILY
Qty: 90 TABLET | Refills: 1 | Status: SHIPPED | OUTPATIENT
Start: 2018-05-28 | End: 2018-11-20 | Stop reason: SDUPTHER

## 2018-05-29 ENCOUNTER — PATIENT MESSAGE (OUTPATIENT)
Dept: ENDOCRINOLOGY | Facility: CLINIC | Age: 48
End: 2018-05-29

## 2018-07-26 ENCOUNTER — OFFICE VISIT (OUTPATIENT)
Dept: RHEUMATOLOGY | Facility: CLINIC | Age: 48
End: 2018-07-26
Payer: COMMERCIAL

## 2018-07-26 ENCOUNTER — PATIENT MESSAGE (OUTPATIENT)
Dept: ENDOCRINOLOGY | Facility: CLINIC | Age: 48
End: 2018-07-26

## 2018-07-26 ENCOUNTER — LAB VISIT (OUTPATIENT)
Dept: LAB | Facility: HOSPITAL | Age: 48
End: 2018-07-26
Attending: INTERNAL MEDICINE
Payer: COMMERCIAL

## 2018-07-26 VITALS
DIASTOLIC BLOOD PRESSURE: 80 MMHG | BODY MASS INDEX: 23.36 KG/M2 | WEIGHT: 131.81 LBS | SYSTOLIC BLOOD PRESSURE: 116 MMHG | HEIGHT: 63 IN | HEART RATE: 87 BPM

## 2018-07-26 DIAGNOSIS — E89.0 POSTABLATIVE HYPOTHYROIDISM: ICD-10-CM

## 2018-07-26 DIAGNOSIS — G62.9 SMALL FIBER NEUROPATHY: ICD-10-CM

## 2018-07-26 DIAGNOSIS — E03.9 ACQUIRED HYPOTHYROIDISM: Primary | ICD-10-CM

## 2018-07-26 DIAGNOSIS — M35.9 UNDIFFERENTIATED CONNECTIVE TISSUE DISEASE: Primary | Chronic | ICD-10-CM

## 2018-07-26 DIAGNOSIS — M35.9 UNDIFFERENTIATED CONNECTIVE TISSUE DISEASE: Chronic | ICD-10-CM

## 2018-07-26 LAB
ALBUMIN SERPL BCP-MCNC: 4 G/DL
ALP SERPL-CCNC: 65 U/L
ALT SERPL W/O P-5'-P-CCNC: 18 U/L
ANION GAP SERPL CALC-SCNC: 7 MMOL/L
AST SERPL-CCNC: 17 U/L
BASOPHILS # BLD AUTO: 0.01 K/UL
BASOPHILS NFR BLD: 0.2 %
BILIRUB SERPL-MCNC: 0.7 MG/DL
BUN SERPL-MCNC: 15 MG/DL
CALCIUM SERPL-MCNC: 9.5 MG/DL
CHLORIDE SERPL-SCNC: 106 MMOL/L
CO2 SERPL-SCNC: 27 MMOL/L
CREAT SERPL-MCNC: 0.7 MG/DL
DIFFERENTIAL METHOD: NORMAL
EOSINOPHIL # BLD AUTO: 0.1 K/UL
EOSINOPHIL NFR BLD: 2.4 %
ERYTHROCYTE [DISTWIDTH] IN BLOOD BY AUTOMATED COUNT: 13.9 %
EST. GFR  (AFRICAN AMERICAN): >60 ML/MIN/1.73 M^2
EST. GFR  (NON AFRICAN AMERICAN): >60 ML/MIN/1.73 M^2
GLUCOSE SERPL-MCNC: 90 MG/DL
HCT VFR BLD AUTO: 37.4 %
HGB BLD-MCNC: 12.4 G/DL
IGA SERPL-MCNC: 173 MG/DL
IGG SERPL-MCNC: 1315 MG/DL
IGM SERPL-MCNC: 98 MG/DL
LYMPHOCYTES # BLD AUTO: 2.2 K/UL
LYMPHOCYTES NFR BLD: 36.8 %
MCH RBC QN AUTO: 30.7 PG
MCHC RBC AUTO-ENTMCNC: 33.2 G/DL
MCV RBC AUTO: 93 FL
MONOCYTES # BLD AUTO: 0.4 K/UL
MONOCYTES NFR BLD: 6.3 %
NEUTROPHILS # BLD AUTO: 3.2 K/UL
NEUTROPHILS NFR BLD: 54.3 %
PLATELET # BLD AUTO: 194 K/UL
PMV BLD AUTO: 9.4 FL
POTASSIUM SERPL-SCNC: 4 MMOL/L
PROT SERPL-MCNC: 7.3 G/DL
RBC # BLD AUTO: 4.04 M/UL
SODIUM SERPL-SCNC: 140 MMOL/L
T3FREE SERPL-MCNC: 2.2 PG/ML
T4 FREE SERPL-MCNC: 1.22 NG/DL
TSH SERPL DL<=0.005 MIU/L-ACNC: 1.2 UIU/ML
WBC # BLD AUTO: 5.89 K/UL

## 2018-07-26 PROCEDURE — 99999 PR PBB SHADOW E&M-EST. PATIENT-LVL III: CPT | Mod: PBBFAC,,, | Performed by: INTERNAL MEDICINE

## 2018-07-26 PROCEDURE — 3008F BODY MASS INDEX DOCD: CPT | Mod: CPTII,S$GLB,, | Performed by: INTERNAL MEDICINE

## 2018-07-26 PROCEDURE — 80053 COMPREHEN METABOLIC PANEL: CPT | Mod: PO

## 2018-07-26 PROCEDURE — 36415 COLL VENOUS BLD VENIPUNCTURE: CPT | Mod: PO

## 2018-07-26 PROCEDURE — 84443 ASSAY THYROID STIM HORMONE: CPT

## 2018-07-26 PROCEDURE — 84481 FREE ASSAY (FT-3): CPT

## 2018-07-26 PROCEDURE — 86334 IMMUNOFIX E-PHORESIS SERUM: CPT | Mod: 26,,, | Performed by: PATHOLOGY

## 2018-07-26 PROCEDURE — 84165 PROTEIN E-PHORESIS SERUM: CPT

## 2018-07-26 PROCEDURE — 86039 ANTINUCLEAR ANTIBODIES (ANA): CPT

## 2018-07-26 PROCEDURE — 86146 BETA-2 GLYCOPROTEIN ANTIBODY: CPT

## 2018-07-26 PROCEDURE — 86038 ANTINUCLEAR ANTIBODIES: CPT

## 2018-07-26 PROCEDURE — 85613 RUSSELL VIPER VENOM DILUTED: CPT

## 2018-07-26 PROCEDURE — 99214 OFFICE O/P EST MOD 30 MIN: CPT | Mod: S$GLB,,, | Performed by: INTERNAL MEDICINE

## 2018-07-26 PROCEDURE — 84439 ASSAY OF FREE THYROXINE: CPT

## 2018-07-26 PROCEDURE — 86334 IMMUNOFIX E-PHORESIS SERUM: CPT

## 2018-07-26 PROCEDURE — 86235 NUCLEAR ANTIGEN ANTIBODY: CPT | Mod: 59

## 2018-07-26 PROCEDURE — 84165 PROTEIN E-PHORESIS SERUM: CPT | Mod: 26,,, | Performed by: PATHOLOGY

## 2018-07-26 PROCEDURE — 85025 COMPLETE CBC W/AUTO DIFF WBC: CPT | Mod: PO

## 2018-07-26 PROCEDURE — 86147 CARDIOLIPIN ANTIBODY EA IG: CPT

## 2018-07-26 PROCEDURE — 82784 ASSAY IGA/IGD/IGG/IGM EACH: CPT | Mod: 59

## 2018-07-26 RX ORDER — HYDROXYCHLOROQUINE SULFATE 200 MG/1
200 TABLET, FILM COATED ORAL 2 TIMES DAILY
Qty: 180 TABLET | Refills: 3 | Status: SHIPPED | OUTPATIENT
Start: 2018-07-26 | End: 2019-03-18 | Stop reason: SDUPTHER

## 2018-07-26 RX ORDER — ESOMEPRAZOLE MAGNESIUM 40 MG/1
40 CAPSULE, DELAYED RELEASE ORAL
Refills: 6 | COMMUNITY
Start: 2018-06-29 | End: 2018-09-26

## 2018-07-26 NOTE — PROGRESS NOTES
RHEUMATOLOGY CLINIC FOLLOW UP VISIT  Chief complaints:-  My hands and legs tingle all the time.    HPI:-  Sonia Muñoz a 48 y.o. pleasant female comes in for a follow up visit with above chief complaints.  She has undifferentiated connective tissue disease with positive MARYAM panel, positive scleroderma antibody, arthralgias, myalgias, possible small fiber neuropathy like pain in her hands and feet without any associated diabetes.  She is on hydroxychloroquine with no adverse effects.  She reports other than her numbness and tingling pain over her extremities she is not having any other problem in her joints.  She complains of worsening dry eyes and dry mouth not responding to the therapies.    Review of Systems   Constitutional: Positive for malaise/fatigue. Negative for chills, fever and weight loss.   HENT: Positive for sore throat. Negative for ear discharge, ear pain, hearing loss and nosebleeds.    Eyes: Negative for blurred vision, double vision, photophobia, discharge and redness.   Respiratory: Positive for cough. Negative for hemoptysis, sputum production and shortness of breath.    Cardiovascular: Negative for chest pain, palpitations and claudication.   Gastrointestinal: Positive for diarrhea, heartburn and nausea. Negative for abdominal pain, constipation, melena and vomiting.   Genitourinary: Positive for frequency and urgency. Negative for dysuria and hematuria.   Musculoskeletal: Positive for back pain, joint pain, myalgias and neck pain. Negative for falls.   Skin: Negative for itching and rash.   Neurological: Positive for tingling and sensory change. Negative for dizziness, tremors, speech change, focal weakness, seizures, loss of consciousness, weakness and headaches.   Endo/Heme/Allergies: Negative for environmental allergies. Does not bruise/bleed easily.   Psychiatric/Behavioral: Negative for hallucinations and memory loss. The patient  does not have insomnia.        Past Medical History:   Diagnosis Date    Abnormal Pap smear of cervix     Cervical spondylolysis     DDD (degenerative disc disease), lumbosacral     Fibromyalgia     Hyperthyroidism     Iatrogenic hypothyroidism     Spondylosis     Undifferentiated connective tissue disease        Past Surgical History:   Procedure Laterality Date    BREAST IMPLANTS      BREAST SURGERY Bilateral 2009    COLONOSCOPY N/A 10/13/2017    Procedure: COLONOSCOPY;  Surgeon: Hugh Luciano MD;  Location: [facility];  Service: Endoscopy;  Laterality: N/A;    EAR MASTOIDECTOMY W/ COCHLEAR IMPLANT W/ LANDMARK      STAPEDES SURGERY      sMart Stapedes Piston (Safe to 3T magnet)    UPPER GASTROINTESTINAL ENDOSCOPY          Social History   Substance Use Topics    Smoking status: Never Smoker    Smokeless tobacco: Never Used    Alcohol use 1.2 oz/week     2 Cans of beer per week      Comment: socially        Family History   Problem Relation Age of Onset    Hypertension Mother     Hypertension Father     Heart disease Father     Cancer Maternal Uncle         stomach cancer    Stomach cancer Maternal Uncle     Celiac disease Maternal Aunt     Breast cancer Paternal Aunt     Cirrhosis Neg Hx     Colon cancer Neg Hx     Colon polyps Neg Hx     Crohn's disease Neg Hx     Cystic fibrosis Neg Hx     Esophageal cancer Neg Hx     Hemochromatosis Neg Hx     Inflammatory bowel disease Neg Hx     Irritable bowel syndrome Neg Hx     Liver cancer Neg Hx     Liver disease Neg Hx     Rectal cancer Neg Hx     Ulcerative colitis Neg Hx     Marin's disease Neg Hx     Lymphoma Neg Hx     Tuberculosis Neg Hx     Scleroderma Neg Hx     Rheum arthritis Neg Hx     Multiple sclerosis Neg Hx     Melanoma Neg Hx     Lupus Neg Hx     Psoriasis Neg Hx     Skin cancer Neg Hx        Review of patient's allergies indicates:   Allergen Reactions    Adhesive Rash    Latex, natural rubber Rash  "          Physical examination:-    Vitals:    07/26/18 1059   BP: 116/80   Pulse: 87   Weight: 59.8 kg (131 lb 13.4 oz)   Height: 5' 3" (1.6 m)   PainSc:   4   PainLoc: Shoulder       Physical Exam   Constitutional: She is oriented to person, place, and time and well-developed, well-nourished, and in no distress. No distress.   HENT:   Head: Normocephalic.   Mouth/Throat: Oropharynx is clear and moist. No oropharyngeal exudate.   Eyes: Conjunctivae and EOM are normal. Pupils are equal, round, and reactive to light. Right eye exhibits no discharge. Left eye exhibits no discharge. No scleral icterus.   Neck: Normal range of motion. Neck supple.   Cardiovascular: Normal rate and intact distal pulses.    Pulmonary/Chest: Effort normal. No respiratory distress. She exhibits no tenderness.   Abdominal: Soft. There is no tenderness.   Musculoskeletal:   Multiple tender points. No synovitis in small joints. No effusion in large joints. No sclerodactyly or telangiectasias.    Lymphadenopathy:     She has no cervical adenopathy.   Neurological: She is alert and oriented to person, place, and time. No cranial nerve deficit.   Skin: Skin is warm. No rash noted. She is not diaphoretic. No erythema. No pallor.   Psychiatric: Affect and judgment normal.   Nursing note and vitals reviewed.    Medication List with Changes/Refills   New Medications    HYDROXYCHLOROQUINE (PLAQUENIL) 200 MG TABLET    Take 1 tablet (200 mg total) by mouth 2 (two) times daily.   Current Medications    DULOXETINE (CYMBALTA) 30 MG CAPSULE    Take 1 capsule (30 mg total) by mouth once daily.    ESOMEPRAZOLE (NEXIUM) 40 MG CAPSULE    Take 1 capsule (40 mg total) by mouth 2 (two) times daily before meals.    ESOMEPRAZOLE (NEXIUM) 40 MG CAPSULE    Take 40 mg by mouth 2 (two) times daily before meals.    MULTIVITAMIN (ONE DAILY MULTIVITAMIN) PER TABLET    Take 1 tablet by mouth once daily.    PENCICLOVIR (DENAVIR) 1 % CREAM    Apply topically every 2 (two) " hours.    SYNTHROID 100 MCG TABLET    Take 1 tablet (100 mcg total) by mouth once daily.       Assessment/Plans:-  Undifferentiated connective tissue disease  Positive MARYAM with high titer scleroderma antibody associated with fatigue, arthralgias, myalgias, severe gastroesophageal reflux disease, normal high-resolution lung CT including pulmonary function tests.  Persistent numbness and tingling over bilateral hands and feet even after surgery to fix cervical radiculopathy suggests possible small fiber neuropathy.  She denies any significant improvement from gabapentin and she has some at home to try again.  Since she has worsening dry mouth symptoms I will repeat her Sjogren's antibodies.  If negative I will consider referral to ENT for minor salivary gland biopsy.  After Sjogren's workup is done I will also refer her to Dr. Story for small fiber neuropathy skin biopsy.  Continue Plaquenil with regular eye exam.  - hydroxychloroquine (PLAQUENIL) 200 mg tablet; Take 1 tablet (200 mg total) by mouth 2 (two) times daily.  Dispense: 180 tablet; Refill: 3  - MARYAM; Standing  - CBC auto differential; Standing  - Comprehensive metabolic panel; Standing  - DRVVT; Standing  - Cardiolipin antibody; Standing  - Beta-2 glycoprotein antibodies; Standing  - Immunoglobulins (IgG, IgA, IgM) Quantitative; Standing  - Protein electrophoresis, serum; Standing  - Immunofixation electrophoresis; Standing     # Follow-up in about 6 months (around 1/26/2019).      Disclaimer: This note was prepared using voice recognition system and is likely to have sound alike errors and is not proof read.  Please call me with any questions.

## 2018-07-26 NOTE — ASSESSMENT & PLAN NOTE
Positive MARYAM with high titer scleroderma antibody associated with fatigue, arthralgias, myalgias, severe gastroesophageal reflux disease, normal high-resolution lung CT including pulmonary function tests.  Persistent numbness and tingling over bilateral hands and feet even after surgery to fix cervical radiculopathy suggests possible small fiber neuropathy.  She denies any significant improvement from gabapentin and she has some at home to try again.  Since she has worsening dry mouth symptoms I will repeat her Sjogren's antibodies.  If negative I will consider referral to ENT for minor salivary gland biopsy.  After Sjogren's workup is done I will also refer her to Dr. Story for small fiber neuropathy skin biopsy.  Continue Plaquenil with regular eye exam.

## 2018-07-27 LAB
ALBUMIN SERPL ELPH-MCNC: 4.06 G/DL
ALPHA1 GLOB SERPL ELPH-MCNC: 0.28 G/DL
ALPHA2 GLOB SERPL ELPH-MCNC: 0.62 G/DL
ANA SER QL IF: POSITIVE
ANA TITR SER IF: NORMAL {TITER}
B-GLOBULIN SERPL ELPH-MCNC: 0.77 G/DL
GAMMA GLOB SERPL ELPH-MCNC: 1.16 G/DL
INTERPRETATION SERPL IFE-IMP: NORMAL
LA PPP-IMP: NEGATIVE
PATHOLOGIST INTERPRETATION IFE: NORMAL
PATHOLOGIST INTERPRETATION SPE: NORMAL
PROT SERPL-MCNC: 6.9 G/DL

## 2018-07-28 LAB
B2 GLYCOPROT1 IGA SER QL: <9 SAU
B2 GLYCOPROT1 IGG SER QL: <9 SGU
B2 GLYCOPROT1 IGM SER QL: <9 SMU

## 2018-07-30 LAB
ANTI SM ANTIBODY: 2.64 EU
ANTI SM/RNP ANTIBODY: 2.25 EU
ANTI-SM INTERPRETATION: NEGATIVE
ANTI-SM/RNP INTERPRETATION: NEGATIVE
ANTI-SSA ANTIBODY: 13.08 EU
ANTI-SSA INTERPRETATION: NEGATIVE
ANTI-SSB ANTIBODY: 1.23 EU
ANTI-SSB INTERPRETATION: NEGATIVE
DSDNA AB SER-ACNC: NORMAL [IU]/ML

## 2018-07-31 ENCOUNTER — PATIENT MESSAGE (OUTPATIENT)
Dept: INTERNAL MEDICINE | Facility: CLINIC | Age: 48
End: 2018-07-31

## 2018-07-31 ENCOUNTER — PATIENT MESSAGE (OUTPATIENT)
Dept: ENDOCRINOLOGY | Facility: CLINIC | Age: 48
End: 2018-07-31

## 2018-07-31 ENCOUNTER — LAB VISIT (OUTPATIENT)
Dept: LAB | Facility: HOSPITAL | Age: 48
End: 2018-07-31
Attending: FAMILY MEDICINE
Payer: COMMERCIAL

## 2018-07-31 DIAGNOSIS — R31.9 HEMATURIA, UNSPECIFIED TYPE: ICD-10-CM

## 2018-07-31 DIAGNOSIS — R30.0 DYSURIA: Primary | ICD-10-CM

## 2018-07-31 DIAGNOSIS — R30.0 DYSURIA: ICD-10-CM

## 2018-07-31 DIAGNOSIS — N30.01 ACUTE CYSTITIS WITH HEMATURIA: Primary | ICD-10-CM

## 2018-07-31 PROBLEM — R41.3 MEMORY LOSS: Status: ACTIVE | Noted: 2018-06-01

## 2018-07-31 PROBLEM — R47.1 DYSARTHRIA: Status: ACTIVE | Noted: 2018-06-01

## 2018-07-31 LAB
BACTERIA #/AREA URNS HPF: ABNORMAL /HPF
BILIRUB UR QL STRIP: NEGATIVE
CARDIOLIPIN IGG SER IA-ACNC: <9.4 GPL
CARDIOLIPIN IGM SER IA-ACNC: <9.4 MPL
CLARITY UR: ABNORMAL
COLOR UR: YELLOW
GLUCOSE UR QL STRIP: NEGATIVE
HGB UR QL STRIP: ABNORMAL
KETONES UR QL STRIP: ABNORMAL
LEUKOCYTE ESTERASE UR QL STRIP: ABNORMAL
MICROSCOPIC COMMENT: ABNORMAL
NITRITE UR QL STRIP: POSITIVE
PH UR STRIP: 6 [PH] (ref 5–8)
PROT UR QL STRIP: ABNORMAL
RBC #/AREA URNS HPF: 40 /HPF (ref 0–4)
SP GR UR STRIP: 1.02 (ref 1–1.03)
URN SPEC COLLECT METH UR: ABNORMAL
WBC #/AREA URNS HPF: 30 /HPF (ref 0–5)

## 2018-07-31 PROCEDURE — 87086 URINE CULTURE/COLONY COUNT: CPT

## 2018-07-31 PROCEDURE — 87088 URINE BACTERIA CULTURE: CPT

## 2018-07-31 PROCEDURE — 81000 URINALYSIS NONAUTO W/SCOPE: CPT | Mod: PO

## 2018-07-31 PROCEDURE — 87186 SC STD MICRODIL/AGAR DIL: CPT

## 2018-07-31 PROCEDURE — 87077 CULTURE AEROBIC IDENTIFY: CPT

## 2018-07-31 RX ORDER — NITROFURANTOIN 25; 75 MG/1; MG/1
100 CAPSULE ORAL 2 TIMES DAILY
Qty: 20 CAPSULE | Refills: 0 | Status: SHIPPED | OUTPATIENT
Start: 2018-07-31 | End: 2018-08-10

## 2018-07-31 NOTE — TELEPHONE ENCOUNTER
Urinalysis ordered.    Please contact patient to see which lab she would like to leave a urine sample.

## 2018-08-03 LAB — BACTERIA UR CULT: NORMAL

## 2018-08-03 NOTE — PROGRESS NOTES
Bacteria is sensitive to all antibiotics.    Specifically it is sensitive to Macrobid as prescribed.    Take full 10 day course.    Follow up if symptoms not improved.

## 2018-09-14 ENCOUNTER — PATIENT MESSAGE (OUTPATIENT)
Dept: OBSTETRICS AND GYNECOLOGY | Facility: CLINIC | Age: 48
End: 2018-09-14

## 2018-09-14 DIAGNOSIS — Z12.39 BREAST CANCER SCREENING: Primary | ICD-10-CM

## 2018-09-17 ENCOUNTER — OFFICE VISIT (OUTPATIENT)
Dept: INTERNAL MEDICINE | Facility: CLINIC | Age: 48
End: 2018-09-17
Payer: COMMERCIAL

## 2018-09-17 VITALS
DIASTOLIC BLOOD PRESSURE: 76 MMHG | HEART RATE: 84 BPM | HEIGHT: 63 IN | WEIGHT: 129.44 LBS | SYSTOLIC BLOOD PRESSURE: 114 MMHG | BODY MASS INDEX: 22.93 KG/M2 | OXYGEN SATURATION: 98 % | TEMPERATURE: 99 F

## 2018-09-17 DIAGNOSIS — E03.2 IATROGENIC HYPOTHYROIDISM: Chronic | ICD-10-CM

## 2018-09-17 DIAGNOSIS — E78.2 MIXED HYPERLIPIDEMIA: Chronic | ICD-10-CM

## 2018-09-17 DIAGNOSIS — Z00.00 ANNUAL PHYSICAL EXAM: Primary | ICD-10-CM

## 2018-09-17 PROCEDURE — 99396 PREV VISIT EST AGE 40-64: CPT | Mod: S$GLB,,, | Performed by: FAMILY MEDICINE

## 2018-09-17 PROCEDURE — 99999 PR PBB SHADOW E&M-EST. PATIENT-LVL III: CPT | Mod: PBBFAC,,, | Performed by: FAMILY MEDICINE

## 2018-09-17 NOTE — PROGRESS NOTES
Subjective:   Patient ID: Sonia Meol is a 48 y.o. female.  Chief Complaint:  Follow-up      Presents for annual physical/ wellness exam.    Last treatment/ visit for UTI.  E coli.  Sensitive to all antibiotics.  Treated 10 days Macrobid. .  Symptoms improved.  No recurrence.    Past medical history significant for:  Hypothyroidism.  Last thyroid studies acceptable.  Followed by Endocrinology.  On 100 mcg supplement daily.    Multiple musculoskeletal/ autoimmune / rheumatoid complaints.  States doing significantly better since restarting Plaquenil.  Not taking Cymbalta.    Esophageal dysphagia.  Controlled on PPI once a day dosing presently.    Routine health maintenance shows need for mammogram parentheses which is scheduled), flu vaccine this season, and pneumonia vaccines since on immunosuppressive medication.  No new complaints or concerns today.          Current Outpatient Medications:     esomeprazole (NEXIUM) 40 MG capsule, Take 40 mg by mouth 2 (two) times daily before meals., Disp: , Rfl: 6    hydroxychloroquine (PLAQUENIL) 200 mg tablet, Take 1 tablet (200 mg total) by mouth 2 (two) times daily., Disp: 180 tablet, Rfl: 3    multivitamin (ONE DAILY MULTIVITAMIN) per tablet, Take 1 tablet by mouth once daily., Disp: , Rfl:     penciclovir (DENAVIR) 1 % cream, Apply topically every 2 (two) hours. (Patient taking differently: Apply 1 application topically as needed. ), Disp: 1.5 g, Rfl: 2    SYNTHROID 100 mcg tablet, Take 1 tablet (100 mcg total) by mouth once daily., Disp: 90 tablet, Rfl: 1     Review of Systems   Constitutional: Positive for appetite change. Negative for activity change, diaphoresis and fatigue.   Eyes: Negative for visual disturbance.   Respiratory: Negative for chest tightness and shortness of breath.    Cardiovascular: Negative for chest pain and palpitations.   Gastrointestinal: Negative for abdominal pain, constipation, diarrhea, nausea and vomiting.   Endocrine: Negative for  "cold intolerance and heat intolerance.   Genitourinary: Negative for menstrual problem and pelvic pain.   Musculoskeletal: Positive for arthralgias and myalgias. Negative for back pain and neck pain.   Skin: Negative for rash.   Neurological: Negative for dizziness, tremors, weakness, light-headedness, numbness and headaches.   Psychiatric/Behavioral: Positive for sleep disturbance. Negative for agitation, behavioral problems, confusion, decreased concentration, dysphoric mood, hallucinations, self-injury and suicidal ideas. The patient is not nervous/anxious and is not hyperactive.      Objective:   /76 (BP Location: Right arm, Patient Position: Sitting, BP Method: Medium (Manual))   Pulse 84   Temp 98.6 °F (37 °C) (Oral)   Ht 5' 3" (1.6 m)   Wt 58.7 kg (129 lb 6.6 oz)   LMP 09/06/2018 (Approximate)   SpO2 98%   BMI 22.92 kg/m²     Physical Exam   Constitutional: She is oriented to person, place, and time. Vital signs are normal. She appears well-developed and well-nourished.   Eyes: No scleral icterus.   Neck: No JVD present. Carotid bruit is not present. No thyroid mass and no thyromegaly present.   Cardiovascular: Normal rate, regular rhythm and normal heart sounds. Exam reveals no gallop and no friction rub.   No murmur heard.  Pulses:       Radial pulses are 2+ on the right side, and 2+ on the left side.   Pulmonary/Chest: Effort normal and breath sounds normal. She has no wheezes. She has no rhonchi. She has no rales.   Abdominal: Soft. She exhibits no distension. There is no hepatosplenomegaly. There is no tenderness. There is no rebound, no guarding and no CVA tenderness.   Musculoskeletal: Normal range of motion. She exhibits no edema.   Neurological: She is oriented to person, place, and time. She displays a negative Romberg sign. Coordination and gait normal.   Skin: Skin is warm and dry. No rash noted.   Psychiatric: She has a normal mood and affect. Her behavior is normal. Judgment and " thought content normal.   Nursing note and vitals reviewed.    Assessment:     1. Annual physical exam    2. Mixed hyperlipidemia    3. Iatrogenic hypothyroidism      Plan:   Annual physical exam  -     Comprehensive metabolic panel; Future; Expected date: 09/17/2018  -     Lipid panel; Future; Expected date: 09/17/2018  -     Vitamin D; Future; Expected date: 09/17/2018    Blood pressure normal.  BMI 23. Physical exam unremarkable.    Check labs.  Treat as needed.    Tetanus booster up-to-date.  Flu vaccine advised this season.    Gyn exam up-to-date.  Mammogram scheduled.    Declines pneumonia vaccines.      Mixed hyperlipidemia  Previous elevations but overall 10 year risk was low.    Treat as indicated.      Iatrogenic hypothyroidism  Continue Synthroid 100 mcg daily per Endocrinology.    Follow-up all specialist as scheduled.    Return to clinic 1 year  for annual physical exam.

## 2018-09-18 ENCOUNTER — LAB VISIT (OUTPATIENT)
Dept: LAB | Facility: HOSPITAL | Age: 48
End: 2018-09-18
Attending: FAMILY MEDICINE
Payer: COMMERCIAL

## 2018-09-18 DIAGNOSIS — Z00.00 ANNUAL PHYSICAL EXAM: ICD-10-CM

## 2018-09-18 LAB
25(OH)D3+25(OH)D2 SERPL-MCNC: 44 NG/ML
ALBUMIN SERPL BCP-MCNC: 4.3 G/DL
ALP SERPL-CCNC: 54 U/L
ALT SERPL W/O P-5'-P-CCNC: 14 U/L
ANION GAP SERPL CALC-SCNC: 12 MMOL/L
AST SERPL-CCNC: 18 U/L
BILIRUB SERPL-MCNC: 1.2 MG/DL
BUN SERPL-MCNC: 12 MG/DL
CALCIUM SERPL-MCNC: 10.6 MG/DL
CHLORIDE SERPL-SCNC: 104 MMOL/L
CHOLEST SERPL-MCNC: 252 MG/DL
CHOLEST/HDLC SERPL: 2.7 {RATIO}
CO2 SERPL-SCNC: 26 MMOL/L
CREAT SERPL-MCNC: 0.8 MG/DL
EST. GFR  (AFRICAN AMERICAN): >60 ML/MIN/1.73 M^2
EST. GFR  (NON AFRICAN AMERICAN): >60 ML/MIN/1.73 M^2
GLUCOSE SERPL-MCNC: 97 MG/DL
HDLC SERPL-MCNC: 92 MG/DL
HDLC SERPL: 36.5 %
LDLC SERPL CALC-MCNC: 145.2 MG/DL
NONHDLC SERPL-MCNC: 160 MG/DL
POTASSIUM SERPL-SCNC: 4.4 MMOL/L
PROT SERPL-MCNC: 7.5 G/DL
SODIUM SERPL-SCNC: 142 MMOL/L
TRIGL SERPL-MCNC: 74 MG/DL

## 2018-09-18 PROCEDURE — 36415 COLL VENOUS BLD VENIPUNCTURE: CPT | Mod: PO

## 2018-09-18 PROCEDURE — 80053 COMPREHEN METABOLIC PANEL: CPT

## 2018-09-18 PROCEDURE — 82306 VITAMIN D 25 HYDROXY: CPT

## 2018-09-18 PROCEDURE — 80061 LIPID PANEL: CPT

## 2018-09-20 ENCOUNTER — PATIENT MESSAGE (OUTPATIENT)
Dept: INTERNAL MEDICINE | Facility: CLINIC | Age: 48
End: 2018-09-20

## 2018-09-26 ENCOUNTER — TELEPHONE (OUTPATIENT)
Dept: GASTROENTEROLOGY | Facility: CLINIC | Age: 48
End: 2018-09-26

## 2018-09-26 ENCOUNTER — PATIENT MESSAGE (OUTPATIENT)
Dept: INTERNAL MEDICINE | Facility: CLINIC | Age: 48
End: 2018-09-26

## 2018-09-26 DIAGNOSIS — R13.19 ESOPHAGEAL DYSPHAGIA: Primary | ICD-10-CM

## 2018-09-26 RX ORDER — ESOMEPRAZOLE MAGNESIUM 40 MG/1
CAPSULE, DELAYED RELEASE ORAL
Qty: 60 CAPSULE | Refills: 3 | Status: SHIPPED | OUTPATIENT
Start: 2018-09-26 | End: 2018-09-27 | Stop reason: SDUPTHER

## 2018-09-27 RX ORDER — ESOMEPRAZOLE MAGNESIUM 40 MG/1
40 CAPSULE, DELAYED RELEASE ORAL
Qty: 90 CAPSULE | Refills: 3 | Status: SHIPPED | OUTPATIENT
Start: 2018-09-27 | End: 2019-12-31

## 2018-09-28 ENCOUNTER — HOSPITAL ENCOUNTER (OUTPATIENT)
Dept: RADIOLOGY | Facility: HOSPITAL | Age: 48
Discharge: HOME | End: 2018-09-28
Attending: NURSE PRACTITIONER
Payer: COMMERCIAL

## 2018-09-28 VITALS — HEIGHT: 63 IN | WEIGHT: 129 LBS | BODY MASS INDEX: 22.86 KG/M2

## 2018-09-28 DIAGNOSIS — Z12.39 BREAST CANCER SCREENING: ICD-10-CM

## 2018-09-28 PROCEDURE — 77063 BREAST TOMOSYNTHESIS BI: CPT | Mod: 26,,, | Performed by: RADIOLOGY

## 2018-09-28 PROCEDURE — 77063 BREAST TOMOSYNTHESIS BI: CPT | Mod: TC,PO

## 2018-09-28 PROCEDURE — 77067 SCR MAMMO BI INCL CAD: CPT | Mod: 26,,, | Performed by: RADIOLOGY

## 2018-10-03 ENCOUNTER — TELEPHONE (OUTPATIENT)
Dept: GASTROENTEROLOGY | Facility: CLINIC | Age: 48
End: 2018-10-03

## 2018-10-18 ENCOUNTER — OFFICE VISIT (OUTPATIENT)
Dept: OBSTETRICS AND GYNECOLOGY | Facility: CLINIC | Age: 48
End: 2018-10-18
Payer: COMMERCIAL

## 2018-10-18 VITALS — SYSTOLIC BLOOD PRESSURE: 108 MMHG | DIASTOLIC BLOOD PRESSURE: 74 MMHG

## 2018-10-18 DIAGNOSIS — Z01.419 ENCOUNTER FOR GYNECOLOGICAL EXAMINATION WITHOUT ABNORMAL FINDING: Primary | ICD-10-CM

## 2018-10-18 PROCEDURE — 99999 PR PBB SHADOW E&M-EST. PATIENT-LVL III: CPT | Mod: PBBFAC,,, | Performed by: NURSE PRACTITIONER

## 2018-10-18 PROCEDURE — 99396 PREV VISIT EST AGE 40-64: CPT | Mod: S$GLB,,, | Performed by: NURSE PRACTITIONER

## 2018-10-18 NOTE — PROGRESS NOTES
CC: Well woman exam    Sonia Melo is a 48 y.o. female  presents for well woman exam.  LMP: Patient's last menstrual period was 2018..  No issues, problems, or complaints.    MMG done in September and was normal.   No bleeding after episode last year.     Past Medical History:   Diagnosis Date    Abnormal Pap smear of cervix     Cervical spondylolysis     DDD (degenerative disc disease), lumbosacral     Fibromyalgia     Hyperthyroidism     Iatrogenic hypothyroidism     Spondylosis     Undifferentiated connective tissue disease      Past Surgical History:   Procedure Laterality Date    BREAST IMPLANTS      BREAST SURGERY Bilateral     silicone    COLONOSCOPY N/A 10/13/2017    Procedure: COLONOSCOPY;  Surgeon: Hugh Luciano MD;  Location[facility];  Service: Endoscopy;  Laterality: N/A;    COLONOSCOPY N/A 10/13/2017    Performed by [facility] at [facility] ENDO    EAR MASTOIDECTOMY W/ COCHLEAR IMPLANT W/ LANDMARK      ESOPHAGOGASTRODUODENOSCOPY (EGD) N/A 10/13/2017    [facility] by [facility] at [facility] ENDO    ESOPHAGOGASTRODUODENOSCOPY (EGD) N/A 2017    Performed by Roshan Chopra III, MD at [facility] ENDO    MANOMETRY-ESOPHAGEAL-HIGH RESOLUTION N/A 2017    Performed by Roshan [facility]opra III, MD at [facility] ENDO    STAPEDES SURGERY      sMart Stapedes Piston (Safe to  magnet)    UPPER GASTROINTESTINAL ENDOSCOPY       Social History     Socioeconomic History    Marital status:      Spouse name: Not on file    Number of children: 2    Years of education: Not on file    Highest education level: Not on file   Social Needs    Financial resource strain: Not on file    Food insecurity - worry: Not on file    Food insecurity - inability: Not on file    Transportation needs - medical: Not on file    Transportation needs - non-medical: Not on file   Occupational History     Employer: [employer]   Tobacco Use    Smoking status: Never  Smoker    Smokeless tobacco: Never Used   Substance and Sexual Activity    Alcohol use: Yes     Alcohol/week: 1.2 oz     Types: 2 Cans of beer per week     Comment: socially     Drug use: No    Sexual activity: Yes     Partners: Male     Birth control/protection: None   Other Topics Concern    Not on file   Social History Narrative    Wears a seatbelt.     Family History   Problem Relation Age of Onset    Hypertension Mother     Hypertension Father     Heart disease Father     Cancer Maternal Uncle         stomach cancer    Stomach cancer Maternal Uncle     Celiac disease Maternal Aunt     Breast cancer Paternal Aunt     Cirrhosis Neg Hx     Colon cancer Neg Hx     Colon polyps Neg Hx     Crohn's disease Neg Hx     Cystic fibrosis Neg Hx     Esophageal cancer Neg Hx     Hemochromatosis Neg Hx     Inflammatory bowel disease Neg Hx     Irritable bowel syndrome Neg Hx     Liver cancer Neg Hx     Liver disease Neg Hx     Rectal cancer Neg Hx     Ulcerative colitis Neg Hx     Marin's disease Neg Hx     Lymphoma Neg Hx     Tuberculosis Neg Hx     Scleroderma Neg Hx     Rheum arthritis Neg Hx     Multiple sclerosis Neg Hx     Melanoma Neg Hx     Lupus Neg Hx     Psoriasis Neg Hx     Skin cancer Neg Hx      OB History      Para Term  AB Living    4 2 2     2    SAB TAB Ectopic Multiple Live Births            2          /74   LMP 2018       ROS:  GENERAL: Denies weight gain or weight loss. Feeling well overall.   SKIN: Denies rash or lesions.   HEAD: Denies head injury or headache.   NODES: Denies enlarged lymph nodes.   CHEST: Denies chest pain or shortness of breath.   CARDIOVASCULAR: Denies palpitations or left sided chest pain.   ABDOMEN: No abdominal pain, constipation, diarrhea, nausea, vomiting or rectal bleeding.   URINARY: No frequency, dysuria, hematuria, or burning on urination.  REPRODUCTIVE: See HPI.   BREASTS: The patient performs breast  self-examination and denies pain, lumps, or nipple discharge.   HEMATOLOGIC: No easy bruisability or excessive bleeding.   MUSCULOSKELETAL: Denies joint pain or swelling.   NEUROLOGIC: Denies syncope or weakness.   PSYCHIATRIC: Denies depression, anxiety or mood swings.    PHYSICAL EXAM:  APPEARANCE: Well nourished, well developed, in no acute distress.  AFFECT: WNL, alert and oriented x 3  SKIN: No acne or hirsutism  NECK: Neck symmetric without masses or thyromegaly  NODES: No inguinal, cervical, axillary, or femoral lymph node enlargement  CHEST: Good respiratory effect  ABDOMEN: Soft.  No tenderness or masses.  No hepatosplenomegaly.  No hernias.  BREASTS: Symmetrical, no skin changes or visible lesions.  No palpable masses, nipple discharge bilaterally.  PELVIC: Normal external genitalia without lesions.  Normal hair distribution.  Adequate perineal body, normal urethral meatus.  Vagina moist and well rugated without lesions or discharge.  Cervix pink, without lesions, discharge or tenderness.  No significant cystocele or rectocele.  Bimanual exam shows uterus to be normal size, regular, mobile and nontender.  Adnexa without masses or tenderness.    EXTREMITIES: No edema.  Physical Exam    1. Encounter for gynecological examination without abnormal finding      AND PLAN:    Patient was counseled today on A.C.S. Pap guidelines and recommendations for yearly pelvic exams, mammograms and monthly self breast exams; to see her PCP for other health maintenance.

## 2018-11-20 ENCOUNTER — PATIENT MESSAGE (OUTPATIENT)
Dept: INTERNAL MEDICINE | Facility: CLINIC | Age: 48
End: 2018-11-20

## 2018-11-20 RX ORDER — LEVOTHYROXINE SODIUM 100 UG/1
100 TABLET ORAL DAILY
Qty: 90 TABLET | Refills: 3 | Status: SHIPPED | OUTPATIENT
Start: 2018-11-20 | End: 2020-01-03

## 2019-01-15 ENCOUNTER — OFFICE VISIT (OUTPATIENT)
Dept: OPHTHALMOLOGY | Facility: CLINIC | Age: 49
End: 2019-01-15
Payer: COMMERCIAL

## 2019-01-15 DIAGNOSIS — M35.9 UNDIFFERENTIATED CONNECTIVE TISSUE DISEASE: Primary | ICD-10-CM

## 2019-01-15 DIAGNOSIS — H52.4 PRESBYOPIA: ICD-10-CM

## 2019-01-15 DIAGNOSIS — Z79.899 LONG-TERM USE OF PLAQUENIL: ICD-10-CM

## 2019-01-15 PROCEDURE — 99999 PR PBB SHADOW E&M-EST. PATIENT-LVL II: ICD-10-PCS | Mod: PBBFAC,,, | Performed by: OPTOMETRIST

## 2019-01-15 PROCEDURE — 92015 DETERMINE REFRACTIVE STATE: CPT | Mod: S$GLB,,, | Performed by: OPTOMETRIST

## 2019-01-15 PROCEDURE — 92015 PR REFRACTION: ICD-10-PCS | Mod: S$GLB,,, | Performed by: OPTOMETRIST

## 2019-01-15 PROCEDURE — 92134 POSTERIOR SEGMENT OCT RETINA (OCULAR COHERENCE TOMOGRAPHY)-BOTH EYES: ICD-10-PCS | Mod: S$GLB,,, | Performed by: OPTOMETRIST

## 2019-01-15 PROCEDURE — 92004 PR EYE EXAM, NEW PATIENT,COMPREHESV: ICD-10-PCS | Mod: S$GLB,,, | Performed by: OPTOMETRIST

## 2019-01-15 PROCEDURE — 99999 PR PBB SHADOW E&M-EST. PATIENT-LVL II: CPT | Mod: PBBFAC,,, | Performed by: OPTOMETRIST

## 2019-01-15 PROCEDURE — 92134 CPTRZ OPH DX IMG PST SGM RTA: CPT | Mod: S$GLB,,, | Performed by: OPTOMETRIST

## 2019-01-15 PROCEDURE — 92004 COMPRE OPH EXAM NEW PT 1/>: CPT | Mod: S$GLB,,, | Performed by: OPTOMETRIST

## 2019-01-15 NOTE — PROGRESS NOTES
HPI     PTs last exam was 2 years ago. PT c/o blurred near va and wears otc   readers prn.   Plaquenil use: 4 years (unsure)  Last 10-2 VF: none  Last mOCT: 1/15/19  HPI    Any vision changes since last exam: decreased near va  Eye pain: no  Other ocular symptoms: occasional dryness and itching    Do you wear currently wear glasses or contacts? otc readers    Interested in contacts today? no    Do you plan on getting new glasses today? If needed      Last edited by Norah Bennett MA on 1/15/2019  1:57 PM. (History)            Assessment /Plan     For exam results, see Encounter Report.    Undifferentiated connective tissue disease  -     Posterior Segment OCT Retina-Both eyes    Long-term use of Plaquenil  -     Posterior Segment OCT Retina-Both eyes    Presbyopia  Eyeglass Final Rx     Eyeglass Final Rx       Sphere Cylinder Axis Add    Right -0.50 +0.75 002 +1.50    Left -0.25   +1.50    Expiration Date:  1/16/2020          Eyeglass Final Rx #2       Sphere Cylinder Axis Add    Right +1.00 +0.75 002     Left +1.25       Type:  SVL    Expiration Date:  1/16/2020    Comments:  Reading only              No maculopathy present today.   Stressed importance of follow up visits with pt.  RTC 12 months for dilation,10-2 VF, and mOCT.   PRN sooner if any va changes.

## 2019-02-27 ENCOUNTER — TELEPHONE (OUTPATIENT)
Dept: RHEUMATOLOGY | Facility: CLINIC | Age: 49
End: 2019-02-27

## 2019-03-18 ENCOUNTER — LAB VISIT (OUTPATIENT)
Dept: LAB | Facility: HOSPITAL | Age: 49
End: 2019-03-18
Payer: COMMERCIAL

## 2019-03-18 ENCOUNTER — OFFICE VISIT (OUTPATIENT)
Dept: RHEUMATOLOGY | Facility: CLINIC | Age: 49
End: 2019-03-18
Payer: COMMERCIAL

## 2019-03-18 ENCOUNTER — TELEPHONE (OUTPATIENT)
Dept: RHEUMATOLOGY | Facility: CLINIC | Age: 49
End: 2019-03-18

## 2019-03-18 VITALS
HEART RATE: 76 BPM | SYSTOLIC BLOOD PRESSURE: 114 MMHG | WEIGHT: 125.25 LBS | BODY MASS INDEX: 22.19 KG/M2 | HEIGHT: 63 IN | DIASTOLIC BLOOD PRESSURE: 80 MMHG

## 2019-03-18 DIAGNOSIS — M34.9 SCLERODERMA: Primary | ICD-10-CM

## 2019-03-18 DIAGNOSIS — M35.9 UNDIFFERENTIATED CONNECTIVE TISSUE DISEASE: Chronic | ICD-10-CM

## 2019-03-18 DIAGNOSIS — R13.19 ESOPHAGEAL DYSPHAGIA: ICD-10-CM

## 2019-03-18 DIAGNOSIS — K13.79 RECURRENT ORAL ULCERS: ICD-10-CM

## 2019-03-18 DIAGNOSIS — G62.9 SMALL FIBER NEUROPATHY: ICD-10-CM

## 2019-03-18 DIAGNOSIS — M34.9 SS (SYSTEMIC SCLEROSIS): ICD-10-CM

## 2019-03-18 LAB
ALBUMIN SERPL BCP-MCNC: 4.2 G/DL
ALP SERPL-CCNC: 63 U/L
ALT SERPL W/O P-5'-P-CCNC: 9 U/L
ANION GAP SERPL CALC-SCNC: 7 MMOL/L
AST SERPL-CCNC: 16 U/L
BASOPHILS # BLD AUTO: 0.04 K/UL
BASOPHILS NFR BLD: 0.8 %
BILIRUB SERPL-MCNC: 0.7 MG/DL
BUN SERPL-MCNC: 12 MG/DL
CALCIUM SERPL-MCNC: 10 MG/DL
CHLORIDE SERPL-SCNC: 106 MMOL/L
CO2 SERPL-SCNC: 28 MMOL/L
CREAT SERPL-MCNC: 0.7 MG/DL
DIFFERENTIAL METHOD: ABNORMAL
EOSINOPHIL # BLD AUTO: 0.2 K/UL
EOSINOPHIL NFR BLD: 3.8 %
ERYTHROCYTE [DISTWIDTH] IN BLOOD BY AUTOMATED COUNT: 12.8 %
EST. GFR  (AFRICAN AMERICAN): >60 ML/MIN/1.73 M^2
EST. GFR  (NON AFRICAN AMERICAN): >60 ML/MIN/1.73 M^2
GLUCOSE SERPL-MCNC: 99 MG/DL
HCT VFR BLD AUTO: 38.2 %
HGB BLD-MCNC: 12.2 G/DL
IMM GRANULOCYTES # BLD AUTO: 0.01 K/UL
IMM GRANULOCYTES NFR BLD AUTO: 0.2 %
LYMPHOCYTES # BLD AUTO: 2.2 K/UL
LYMPHOCYTES NFR BLD: 45.1 %
MCH RBC QN AUTO: 30 PG
MCHC RBC AUTO-ENTMCNC: 31.9 G/DL
MCV RBC AUTO: 94 FL
MONOCYTES # BLD AUTO: 0.5 K/UL
MONOCYTES NFR BLD: 9.4 %
NEUTROPHILS # BLD AUTO: 2 K/UL
NEUTROPHILS NFR BLD: 40.9 %
NRBC BLD-RTO: 0 /100 WBC
PLATELET # BLD AUTO: 193 K/UL
PMV BLD AUTO: 9.2 FL
POTASSIUM SERPL-SCNC: 5.3 MMOL/L
PROT SERPL-MCNC: 7.4 G/DL
RBC # BLD AUTO: 4.07 M/UL
SODIUM SERPL-SCNC: 141 MMOL/L
WBC # BLD AUTO: 4.79 K/UL

## 2019-03-18 PROCEDURE — 99999 PR PBB SHADOW E&M-EST. PATIENT-LVL III: CPT | Mod: PBBFAC,,, | Performed by: INTERNAL MEDICINE

## 2019-03-18 PROCEDURE — 85025 COMPLETE CBC W/AUTO DIFF WBC: CPT

## 2019-03-18 PROCEDURE — 99215 PR OFFICE/OUTPT VISIT, EST, LEVL V, 40-54 MIN: ICD-10-PCS | Mod: S$GLB,,, | Performed by: INTERNAL MEDICINE

## 2019-03-18 PROCEDURE — 99215 OFFICE O/P EST HI 40 MIN: CPT | Mod: S$GLB,,, | Performed by: INTERNAL MEDICINE

## 2019-03-18 PROCEDURE — 99999 PR PBB SHADOW E&M-EST. PATIENT-LVL III: ICD-10-PCS | Mod: PBBFAC,,, | Performed by: INTERNAL MEDICINE

## 2019-03-18 PROCEDURE — 36415 COLL VENOUS BLD VENIPUNCTURE: CPT

## 2019-03-18 PROCEDURE — 80053 COMPREHEN METABOLIC PANEL: CPT

## 2019-03-18 RX ORDER — HYDROXYCHLOROQUINE SULFATE 200 MG/1
200 TABLET, FILM COATED ORAL 2 TIMES DAILY
Qty: 180 TABLET | Refills: 3 | Status: SHIPPED | OUTPATIENT
Start: 2019-03-18 | End: 2020-01-22 | Stop reason: ALTCHOICE

## 2019-03-18 NOTE — ASSESSMENT & PLAN NOTE
Positive MARYAM with high titer scleroderma antibody associated with fatigue, arthralgias, myalgias, severe gastroesophageal reflux disease, normal high-resolution lung CT including pulmonary function tests. Continue Plaquenil with regular eye exam.

## 2019-03-18 NOTE — ASSESSMENT & PLAN NOTE
Worsening esophageal dysphagia NOT responding to Nexium . In consideration of her High titer Scleroderma antibody and Undifferentiated connective disease,REFER to GI for evaluation of esophageal involvement of her CTD.

## 2019-03-18 NOTE — TELEPHONE ENCOUNTER
----- Message from Reinier French MD sent at 3/18/2019  2:05 PM CDT -----  Labs show stable results. Continue plan as advised during the visit.

## 2019-03-18 NOTE — PROGRESS NOTES
RHEUMATOLOGY CLINIC FOLLOW UP VISIT  Chief complaints:-  Follow-up for autoimmune problem.    HPI:-  Sonia Muñoz a 49 y.o. pleasant female comes in for a follow up visit with above chief complaints.  She has undifferentiated connective tissue disease with positive MARYAM panel, positive scleroderma antibody, arthralgias, myalgias, possible small fiber neuropathy like pain in her hands and feet without any associated diabetes.  She is on hydroxychloroquine with no adverse effects.  She reports intermittent oral ulcers, worsening gastroesophageal reflux disease and fatigue.  No photosensitive malar rash, sicca syndrome or Raynaud's phenomenon today.  Review of Systems   Constitutional: Negative for chills, fever, malaise/fatigue and weight loss.   HENT: Positive for sore throat. Negative for ear discharge, ear pain, hearing loss and nosebleeds.    Eyes: Negative for blurred vision, double vision, photophobia, discharge and redness.   Respiratory: Negative for cough, hemoptysis, sputum production and shortness of breath.    Cardiovascular: Negative for chest pain, palpitations and claudication.   Gastrointestinal: Positive for diarrhea, heartburn and nausea. Negative for abdominal pain, constipation, melena and vomiting.   Genitourinary: Negative for dysuria, frequency, hematuria and urgency.   Musculoskeletal: Positive for back pain, joint pain, myalgias and neck pain. Negative for falls.   Skin: Negative for itching and rash.   Neurological: Positive for tingling and sensory change. Negative for dizziness, tremors, speech change, focal weakness, seizures, loss of consciousness, weakness and headaches.   Endo/Heme/Allergies: Negative for environmental allergies. Does not bruise/bleed easily.   Psychiatric/Behavioral: Negative for hallucinations and memory loss. The patient does not have insomnia.        Past Medical History:   Diagnosis Date    Abnormal Pap  smear of cervix     Cervical spondylolysis     DDD (degenerative disc disease), lumbosacral     Fibromyalgia     Hyperthyroidism     Iatrogenic hypothyroidism     Spondylosis     Undifferentiated connective tissue disease        Past Surgical History:   Procedure Laterality Date    BREAST IMPLANTS      BREAST SURGERY Bilateral 2009    silicone    COLONOSCOPY N/A 10/13/2017    [facility]    EAR MASTOIDECTOMY W/ COCHLEAR IMPLANT W/ LANDMARK      ESOPHAGOGASTRODUODENOSCOPY (EGD) N/A 10/13/2017    [facility]    ESOPHAGOGASTRODUODENOSCOPY (EGD) N/A 7/5/2017    Performed by Roshan Chopra III, MD at [facility]    MANOMETRY-ESOPHAGEAL-HIGH RESOLUTION N/A 7/5/2017    Performed by [facility], [facility] at [facility]    STAPEDES SURGERY      s[employer] Stapedes Piston (Safe to 3T magnet)    UPPER GASTROINTESTINAL ENDOSCOPY          Social History     Tobacco Use    Smoking status: Never Smoker    Smokeless tobacco: Never Used   Substance Use Topics    Alcohol use: Yes     Alcohol/week: 1.2 oz     Types: 2 Cans of beer per week     Comment: socially     Drug use: No       Family History   Problem Relation Age of Onset    Hypertension Mother     Hypertension Father     Heart disease Father     Cancer Maternal Uncle         stomach cancer    Stomach cancer Maternal Uncle     Celiac disease Maternal Aunt     Breast cancer Paternal Aunt     Cirrhosis Neg Hx     Colon cancer Neg Hx     Colon polyps Neg Hx     Crohn's disease Neg Hx     Cystic fibrosis Neg Hx     Esophageal cancer Neg Hx     Hemochromatosis Neg Hx     Inflammatory bowel disease Neg Hx     Irritable bowel syndrome Neg Hx     Liver cancer Neg Hx     Liver disease Neg Hx     Rectal cancer Neg Hx     Ulcerative colitis Neg Hx     Marin's disease Neg Hx     Lymphoma Neg Hx     Tuberculosis Neg Hx     Scleroderma Neg Hx     Rheum arthritis Neg Hx      "Multiple sclerosis Neg Hx     Melanoma Neg Hx     Lupus Neg Hx     Psoriasis Neg Hx     Skin cancer Neg Hx        Review of patient's allergies indicates:   Allergen Reactions    Adhesive Rash    Latex, natural rubber Rash           Physical examination:-    Vitals:    03/18/19 1038   BP: 114/80   Pulse: 76   Weight: 56.8 kg (125 lb 3.5 oz)   Height: 5' 3" (1.6 m)   PainSc:   3       Physical Exam   Constitutional: She is oriented to person, place, and time and well-developed, well-nourished, and in no distress. No distress.   HENT:   Head: Normocephalic.   Mouth/Throat: Oropharynx is clear and moist. No oropharyngeal exudate.   Eyes: Conjunctivae and EOM are normal. Pupils are equal, round, and reactive to light. Right eye exhibits no discharge. Left eye exhibits no discharge. No scleral icterus.   Neck: Normal range of motion. Neck supple.   Cardiovascular: Normal rate and intact distal pulses.   Pulmonary/Chest: Effort normal. No respiratory distress. She exhibits no tenderness.   Abdominal: Soft. There is no tenderness.   Musculoskeletal:   Few tender points. No synovitis in small joints. No effusion in large joints. No sclerodactyly or telangiectasias.    Lymphadenopathy:     She has no cervical adenopathy.   Neurological: She is alert and oriented to person, place, and time. No cranial nerve deficit.   Skin: Skin is warm. No rash noted. She is not diaphoretic. No erythema. No pallor.   Psychiatric: Affect and judgment normal.   Nursing note and vitals reviewed.    Medication List with Changes/Refills   New Medications    (MAGIC MOUTHWASH) 1:1:1 BENADRYL 12.5MG/5ML LIQ, ALUMINUM & MAGNESIUM HYDROXIDE-SIMEHTICONE (MAALOX), LIDOCAINE VISCOUS 2%    Swish and spit 5 mLs every 4 (four) hours as needed. for mouth sores   Current Medications    ESOMEPRAZOLE (NEXIUM) 40 MG CAPSULE    Take 1 capsule (40 mg total) by mouth before breakfast.    MULTIVITAMIN (ONE DAILY MULTIVITAMIN) PER TABLET    Take 1 tablet by " mouth once daily.    PENCICLOVIR (DENAVIR) 1 % CREAM    Apply topically every 2 (two) hours.    SYNTHROID 100 MCG TABLET    Take 1 tablet (100 mcg total) by mouth once daily.   Changed and/or Refilled Medications    Modified Medication Previous Medication    HYDROXYCHLOROQUINE (PLAQUENIL) 200 MG TABLET hydroxychloroquine (PLAQUENIL) 200 mg tablet       Take 1 tablet (200 mg total) by mouth 2 (two) times daily.    Take 1 tablet (200 mg total) by mouth 2 (two) times daily.       Assessment/Plans:-  Undifferentiated connective tissue disease  Positive MARYAM with high titer scleroderma antibody associated with fatigue, arthralgias, myalgias, severe gastroesophageal reflux disease, normal high-resolution lung CT including pulmonary function tests. Continue Plaquenil with regular eye exam.    Small fiber neuropathy  Continue plaquenil. Stable.     Scleroderma  SCL 70 antibody positivity with esophageal involvement. Repeat CT lungs.     Esophageal dysphagia  Worsening esophageal dysphagia NOT responding to Nexium . In consideration of her High titer Scleroderma antibody and Undifferentiated connective disease,REFER to GI for evaluation of esophageal involvement of her CTD.        # Follow-up in about 6 months (around 9/18/2019).      Disclaimer: This note was prepared using voice recognition system and is likely to have sound alike errors and is not proof read.  Please call me with any questions.

## 2019-03-21 ENCOUNTER — HOSPITAL ENCOUNTER (OUTPATIENT)
Dept: RADIOLOGY | Facility: HOSPITAL | Age: 49
Discharge: HOME | End: 2019-03-21
Attending: INTERNAL MEDICINE
Payer: COMMERCIAL

## 2019-03-21 DIAGNOSIS — M34.9 SCLERODERMA: ICD-10-CM

## 2019-03-21 DIAGNOSIS — M34.9 SS (SYSTEMIC SCLEROSIS): ICD-10-CM

## 2019-03-21 PROCEDURE — 71250 CT THORAX DX C-: CPT | Mod: TC

## 2019-03-21 PROCEDURE — 71250 CT CHEST WITHOUT CONTRAST: ICD-10-PCS | Mod: 26,,, | Performed by: RADIOLOGY

## 2019-03-21 PROCEDURE — 71250 CT THORAX DX C-: CPT | Mod: 26,,, | Performed by: RADIOLOGY

## 2019-03-25 ENCOUNTER — OFFICE VISIT (OUTPATIENT)
Dept: GASTROENTEROLOGY | Facility: CLINIC | Age: 49
End: 2019-03-25
Payer: COMMERCIAL

## 2019-03-25 VITALS
HEIGHT: 63 IN | HEART RATE: 84 BPM | BODY MASS INDEX: 22.27 KG/M2 | SYSTOLIC BLOOD PRESSURE: 110 MMHG | DIASTOLIC BLOOD PRESSURE: 64 MMHG | WEIGHT: 125.69 LBS

## 2019-03-25 DIAGNOSIS — K21.9 GASTROESOPHAGEAL REFLUX DISEASE, ESOPHAGITIS PRESENCE NOT SPECIFIED: ICD-10-CM

## 2019-03-25 DIAGNOSIS — R13.10 DYSPHAGIA, UNSPECIFIED TYPE: Primary | ICD-10-CM

## 2019-03-25 DIAGNOSIS — R13.19 ESOPHAGEAL DYSPHAGIA: ICD-10-CM

## 2019-03-25 PROCEDURE — 99999 PR PBB SHADOW E&M-EST. PATIENT-LVL IV: ICD-10-PCS | Mod: PBBFAC,,, | Performed by: INTERNAL MEDICINE

## 2019-03-25 PROCEDURE — 99213 OFFICE O/P EST LOW 20 MIN: CPT | Mod: S$GLB,,, | Performed by: INTERNAL MEDICINE

## 2019-03-25 PROCEDURE — 99213 PR OFFICE/OUTPT VISIT, EST, LEVL III, 20-29 MIN: ICD-10-PCS | Mod: S$GLB,,, | Performed by: INTERNAL MEDICINE

## 2019-03-25 PROCEDURE — 99999 PR PBB SHADOW E&M-EST. PATIENT-LVL IV: CPT | Mod: PBBFAC,,, | Performed by: INTERNAL MEDICINE

## 2019-03-25 NOTE — LETTER
March 25, 2019      Reinier French MD  [address]  [place] [address]           [facility]  [address]  [place] [address]  Phone: [phone]  Fax: [phone]          Patient: Sonia Melo   MR Number: [id number]   YOB: 1970   Date of Visit: 3/25/2019       Dear Dr. Reinier French:    Thank you for referring Sonia Melo to me for evaluation. Attached you will find relevant portions of my assessment and plan of care.    If you have questions, please do not hesitate to call me. I look forward to following Sonia Melo along with you.    Sincerely,    Kaela Gaytan MD    Enclosure  CC:  No Recipients    If you would like to receive this communication electronically, please contact [email or web address] or [phone] to request more information on [employer] Link access.    For providers and/or their staff who would like to refer a patient to [facility], please contact us through our one-stop-shop provider referral line, [facility], at [phone].    If you feel you have received this communication in error or would no longer like to receive these types of communications, please e-mail [email or web address]

## 2019-03-25 NOTE — PROGRESS NOTES
"Clinic Consult:  [facility] Consultation Note    Reason for Consult:  The primary encounter diagnosis was Dysphagia, unspecified type. Diagnoses of Esophageal dysphagia and Gastroesophageal reflux disease, esophagitis presence not specified were also pertinent to this visit.    PCP: Leonardo Frey   [address] [facility] / [place] [id number]    HPI:  This is a 49 y.o. female here for evaluation of GERD and dysphagia.  She has scleroderma antibodies and is followed by rheumatology who suggested she see GI again.  She has had an EGD in 2017 that was normal.  Manometry done in 2017 showed weak peristalsis.  She reports  difficulty with swallowing, feels like things get stuck (meat, bread, peanut butter).  Feels like it is stuck in midesophagus.  States this has worsened since her fusion surgery in 2018.  Reports heartburn, on nexium in the morning.  Symptoms are worse at night, several nights per week.  She does wake up in the night with symptoms.  Denies metallic or bad taste in the morning, denies brash.  Denies unintentional weight loss, vomiting.  Endorses occasional nausea and abdominal "discomfort"     GI history:  Endoscopy:  10/2017 normal  Manometry: 2017 Manometry results are diagnostic of weak peristalsis with large peristaltic defects. - The results of esophageal manometry are suspicious for scleroderma.  CRC Screenin normal  Family history: maternal uncle with gastric cancer; negative for colon cancer     ROS:  CONSTITUTIONAL: Denies weight change,  fatigue, fevers, chills, night sweats.  EYES: No changes in vision.   ENT: No oral lesions or sore throat.  HEMATOLOGICAL/Lymph: Denies bleeding tendency, bruising tendency. No swellings or enlarged lymph nodes.  CARDIOVASCULAR: Denies chest pain, shortness of breath, orthopnea and edema.  RESPIRATORY: Denies cough, hemoptysis, dyspnea, and wheezing.  GI: See HPI.  : Denies dysuria and hematuria  MUSCULOSKELETAL: Denies joint " pain or swelling, back pain and muscle pain.  SKIN: Denies rashes.  NEUROLOGIC: Denies headaches, seizures and numbness.  PSYCHIATRIC: Denies depression or anxiety.  ENDOCRINE: Denies heat or cold intolerance and excessive thirst or urination.    Medical History:   Past Medical History:   Diagnosis Date    Abnormal Pap smear of cervix     Cervical spondylolysis     DDD (degenerative disc disease), lumbosacral     Fibromyalgia     Hyperthyroidism     Iatrogenic hypothyroidism     Spondylosis     Undifferentiated connective tissue disease        Surgical History:  Past Surgical History:   Procedure Laterality Date    BREAST IMPLANTS      BREAST SURGERY Bilateral 2009    silicone    COLONOSCOPY N/A 10/13/2017    [facility]    EAR MASTOIDECTOMY W/ COCHLEAR IMPLANT W/ LANDMARK      ESOPHAGOGASTRODUODENOSCOPY (EGD) N/A 10/13/2017    [facility]    ESOPHAGOGASTRODUODENOSCOPY (EGD) N/A 7/5/2017    Per[facility] by Roshan Chopra III, [facility] at [facility]    MANOMETRY-ESOPHAGEAL-HIGH RESOLUTION N/A 7/5/2017    Per[facility], [facility] at [facility]    STAPEDES SURGERY      [employer] Stapedes Piston (Safe to 3T magnet)    UPPER GASTROINTESTINAL ENDOSCOPY         Family History:   Family History   Problem Relation Age of Onset    Hypertension Mother     Hypertension Father     Heart disease Father     Cancer Maternal Uncle         stomach cancer    Stomach cancer Maternal Uncle     Celiac disease Maternal Aunt     Breast cancer Paternal Aunt     Cirrhosis Neg Hx     Colon cancer Neg Hx     Colon polyps Neg Hx     Crohn's disease Neg Hx     Cystic fibrosis Neg Hx     Esophageal cancer Neg Hx     Hemochromatosis Neg Hx     Inflammatory bowel disease Neg Hx     Irritable bowel syndrome Neg Hx     Liver cancer Neg Hx     Liver disease Neg Hx     Rectal cancer Neg Hx     Ulcerative colitis Neg Hx     Marin's disease  "Neg Hx     Lymphoma Neg Hx     Tuberculosis Neg Hx     Scleroderma Neg Hx     Rheum arthritis Neg Hx     Multiple sclerosis Neg Hx     Melanoma Neg Hx     Lupus Neg Hx     Psoriasis Neg Hx     Skin cancer Neg Hx        Social History:   Social History     Tobacco Use    Smoking status: Never Smoker    Smokeless tobacco: Never Used   Substance Use Topics    Alcohol use: Yes     Alcohol/week: 1.2 oz     Types: 2 Cans of beer per week     Comment: socially     Drug use: No       Allergies: Reviewed    Home Medications:   Medication List with Changes/Refills   Current Medications    (MAGIC MOUTHWASH) 1:1:1 BENADRYL 12.5MG/5ML LIQ, ALUMINUM & MAGNESIUM HYDROXIDE-SIMEHTICONE (MAALOX), LIDOCAINE VISCOUS 2%    Swish and spit 5 mLs every 4 (four) hours as needed. for mouth sores    ESOMEPRAZOLE (NEXIUM) 40 MG CAPSULE    Take 1 capsule (40 mg total) by mouth before breakfast.    HYDROXYCHLOROQUINE (PLAQUENIL) 200 MG TABLET    Take 1 tablet (200 mg total) by mouth 2 (two) times daily.    MULTIVITAMIN (ONE DAILY MULTIVITAMIN) PER TABLET    Take 1 tablet by mouth once daily.    PENCICLOVIR (DENAVIR) 1 % CREAM    Apply topically every 2 (two) hours.    SYNTHROID 100 MCG TABLET    Take 1 tablet (100 mcg total) by mouth once daily.         Physical Exam:  Vital Signs:  /64   Pulse 84   Ht 5' 3" (1.6 m)   Wt 57 kg (125 lb 10.6 oz)   BMI 22.26 kg/m²   Body mass index is 22.26 kg/m².      GENERAL: No acute distress, A&Ox3  EYES: Anicteric, no pallor noted.  ENT: OP clear  NECK: Supple, no masses, no thyromegally.  CHEST: Equal breath sounds bilaterally, no wheezing.  CARDIOVASCULAR: Regular rate and rhythm. Murmurs, rubs and gallops absent.  ABDOMEN: soft, non-tender, non-distended, normal bowel sounds, no hepatosplenomegaly   EXTREMITIES: No clubbing, cyanosis or edema.  SKIN: Without lesion or erythema.  LYMPH: No cervical, axillary lymphadenopathy palpable.   NEUROLOGICAL: Grossly normal, no asterixis " present.    Labs: Pertinent labs reviewed.    Assessment and Plan:  Dysphagia, unspecified type  -    May be confounded by GERD.  EGD in 2017 was normal but manometry done shows weak peristalsis c/w scleroderma.  Will do esophagram to further evaluate and if needed repeat manometry (although I don't think this is needed at this time).  No need for EGD at this time.   -  FL Esophagram Complete; Future; Expected date: 03/25/2019        Gastroesophageal reflux disease, esophagitis presence not specified  - continue ppi and add ranitidine at night           Thank you so much for allowing me to participate in the care of Sonia Taverafanny Gaytan MD

## 2019-04-01 ENCOUNTER — HOSPITAL ENCOUNTER (OUTPATIENT)
Dept: RADIOLOGY | Facility: HOSPITAL | Age: 49
Discharge: HOME | End: 2019-04-01
Attending: INTERNAL MEDICINE
Payer: COMMERCIAL

## 2019-04-01 DIAGNOSIS — R13.10 DYSPHAGIA, UNSPECIFIED TYPE: ICD-10-CM

## 2019-04-01 PROCEDURE — 74220 X-RAY XM ESOPHAGUS 1CNTRST: CPT | Mod: TC

## 2019-04-01 PROCEDURE — A9698 NON-RAD CONTRAST MATERIALNOC: HCPCS | Performed by: INTERNAL MEDICINE

## 2019-04-01 PROCEDURE — 25500020 PHARM REV CODE 255: Performed by: INTERNAL MEDICINE

## 2019-04-01 RX ADMIN — Medication: at 10:04

## 2019-07-05 ENCOUNTER — PATIENT MESSAGE (OUTPATIENT)
Dept: INTERNAL MEDICINE | Facility: CLINIC | Age: 49
End: 2019-07-05

## 2019-07-05 NOTE — TELEPHONE ENCOUNTER
Patient requested to see PCP pertaining to the medical situation instead of another provider. Was able to schedule patient for 7/08/2019./chino

## 2019-07-09 ENCOUNTER — OFFICE VISIT (OUTPATIENT)
Dept: INTERNAL MEDICINE | Facility: CLINIC | Age: 49
End: 2019-07-09
Payer: COMMERCIAL

## 2019-07-09 VITALS
HEART RATE: 99 BPM | HEIGHT: 63 IN | OXYGEN SATURATION: 98 % | WEIGHT: 123.25 LBS | TEMPERATURE: 98 F | SYSTOLIC BLOOD PRESSURE: 126 MMHG | BODY MASS INDEX: 21.84 KG/M2 | DIASTOLIC BLOOD PRESSURE: 86 MMHG

## 2019-07-09 DIAGNOSIS — B83.9 WORMS IN STOOL: Primary | ICD-10-CM

## 2019-07-09 PROCEDURE — 99214 PR OFFICE/OUTPT VISIT, EST, LEVL IV, 30-39 MIN: ICD-10-PCS | Mod: S$GLB,,, | Performed by: FAMILY MEDICINE

## 2019-07-09 PROCEDURE — 99999 PR PBB SHADOW E&M-EST. PATIENT-LVL III: CPT | Mod: PBBFAC,,, | Performed by: FAMILY MEDICINE

## 2019-07-09 PROCEDURE — 99214 OFFICE O/P EST MOD 30 MIN: CPT | Mod: S$GLB,,, | Performed by: FAMILY MEDICINE

## 2019-07-09 PROCEDURE — 99999 PR PBB SHADOW E&M-EST. PATIENT-LVL III: ICD-10-PCS | Mod: PBBFAC,,, | Performed by: FAMILY MEDICINE

## 2019-07-09 RX ORDER — TRIAMCINOLONE ACETONIDE 1 MG/G
OINTMENT TOPICAL
Refills: 1 | COMMUNITY
Start: 2019-06-13 | End: 2019-10-09 | Stop reason: ALTCHOICE

## 2019-07-09 RX ORDER — ALBENDAZOLE 200 MG/1
400 TABLET, FILM COATED ORAL 2 TIMES DAILY
Qty: 40 TABLET | Refills: 0 | Status: SHIPPED | OUTPATIENT
Start: 2019-07-09 | End: 2019-07-19

## 2019-07-09 RX ORDER — VITAMIN B COMPLEX
1 CAPSULE ORAL DAILY
COMMUNITY
End: 2021-05-04

## 2019-07-09 RX ORDER — VALACYCLOVIR HYDROCHLORIDE 1 G/1
TABLET, FILM COATED ORAL
Refills: 0 | COMMUNITY
Start: 2019-06-13 | End: 2019-07-24

## 2019-07-09 NOTE — PROGRESS NOTES
"Subjective:   Patient ID: Sonia Melo is a 49 y.o. female.  Chief Complaint:  Possible parasites in stool (x 5days)      Patient presents for evaluation of more acute gastritis issues with reported warm in stool.    Recently ate wild boar.  No fever.    No blood in stool.  Other than GI issues, no additional constitutional symptoms.  No previous diagnosis treatment for parasitic infections.  Primary symptoms are abdominal cramping, frequent stools, decreased appetite.    Review of Systems   Constitutional: Positive for appetite change and fatigue. Negative for chills and fever.   HENT: Negative for congestion, ear pain, postnasal drip, rhinorrhea, sinus pressure, sore throat and trouble swallowing.    Respiratory: Negative for cough and shortness of breath.    Cardiovascular: Negative for chest pain and leg swelling.   Gastrointestinal: Positive for abdominal distention, abdominal pain and diarrhea. Negative for anal bleeding, blood in stool, constipation, nausea and vomiting.   Genitourinary: Negative for difficulty urinating, dysuria, flank pain, frequency and hematuria.   Musculoskeletal: Negative for arthralgias and myalgias.   Skin: Negative for rash.   Neurological: Negative for dizziness, syncope, weakness and light-headedness.     Objective:   /86 (BP Location: Left arm, Patient Position: Sitting, BP Method: Medium (Manual))   Pulse 99   Temp 97.6 °F (36.4 °C) (Tympanic)   Ht 5' 3" (1.6 m)   Wt 55.9 kg (123 lb 3.8 oz)   LMP 09/09/2018 (Approximate)   SpO2 98%   BMI 21.83 kg/m²     Physical Exam   Constitutional: Vital signs are normal. She appears well-developed and well-nourished.   Weight decreased 3 lb   HENT:   Mouth/Throat: Oropharynx is clear and moist.   Eyes: Conjunctivae are normal. No scleral icterus.   Cardiovascular: Normal rate and regular rhythm.   Pulmonary/Chest: Effort normal. No respiratory distress.   Abdominal: Soft. Bowel sounds are normal. She exhibits no distension " and no mass. There is no tenderness. There is no rebound and no guarding.   Musculoskeletal: She exhibits no edema.   Skin: Skin is warm and dry. No rash noted.   Psychiatric: She has a normal mood and affect. Her behavior is normal. Judgment and thought content normal.   Nursing note and vitals reviewed.    Assessment:       ICD-10-CM ICD-9-CM   1. Worms in stool B83.9 128.9     Plan:   Worms in stool  -     Stool Exam-Ova,Cysts,Parasites; Future; Expected date: 07/09/2019  -     Stool Exam-Ova,Cysts,Parasites; Future; Expected date: 07/09/2019  -     Stool Exam-Ova,Cysts,Parasites; Future; Expected date: 07/09/2019  -     albendazole (ALBENZA) 200 mg Tab; Take 2 tablets (400 mg total) by mouth 2 (two) times daily. for 10 days  Dispense: 40 tablet; Refill: 0    Possible Trichinella or other parasitic infection.    Treat empirically with albendazole.    Check stool for ova and parasites.    Return to clinic as needed.

## 2019-07-11 ENCOUNTER — LAB VISIT (OUTPATIENT)
Dept: LAB | Facility: HOSPITAL | Age: 49
End: 2019-07-11
Attending: FAMILY MEDICINE
Payer: COMMERCIAL

## 2019-07-11 DIAGNOSIS — B83.9 WORMS IN STOOL: ICD-10-CM

## 2019-07-11 PROCEDURE — 87209 SMEAR COMPLEX STAIN: CPT | Mod: 91

## 2019-07-12 LAB
O+P STL TRI STN: NORMAL

## 2019-07-14 ENCOUNTER — PATIENT MESSAGE (OUTPATIENT)
Dept: INTERNAL MEDICINE | Facility: CLINIC | Age: 49
End: 2019-07-14

## 2019-07-24 ENCOUNTER — OFFICE VISIT (OUTPATIENT)
Dept: INTERNAL MEDICINE | Facility: CLINIC | Age: 49
End: 2019-07-24
Payer: COMMERCIAL

## 2019-07-24 ENCOUNTER — HOSPITAL ENCOUNTER (EMERGENCY)
Facility: HOSPITAL | Age: 49
Discharge: HOME | End: 2019-07-24
Attending: EMERGENCY MEDICINE
Payer: COMMERCIAL

## 2019-07-24 VITALS
DIASTOLIC BLOOD PRESSURE: 85 MMHG | RESPIRATION RATE: 17 BRPM | OXYGEN SATURATION: 100 % | HEIGHT: 63 IN | BODY MASS INDEX: 21.95 KG/M2 | SYSTOLIC BLOOD PRESSURE: 127 MMHG | HEART RATE: 81 BPM | TEMPERATURE: 96 F | WEIGHT: 123.88 LBS

## 2019-07-24 VITALS
TEMPERATURE: 99 F | HEART RATE: 70 BPM | OXYGEN SATURATION: 98 % | BODY MASS INDEX: 21.82 KG/M2 | SYSTOLIC BLOOD PRESSURE: 114 MMHG | DIASTOLIC BLOOD PRESSURE: 72 MMHG | WEIGHT: 123.13 LBS | RESPIRATION RATE: 16 BRPM | HEIGHT: 63 IN

## 2019-07-24 DIAGNOSIS — R26.9 GAIT ABNORMALITY: ICD-10-CM

## 2019-07-24 DIAGNOSIS — E03.2 IATROGENIC HYPOTHYROIDISM: Primary | Chronic | ICD-10-CM

## 2019-07-24 DIAGNOSIS — R42 DIZZINESS: Primary | ICD-10-CM

## 2019-07-24 LAB
ALBUMIN SERPL BCP-MCNC: 4.3 G/DL (ref 3.5–5.2)
ALP SERPL-CCNC: 48 U/L (ref 55–135)
ALT SERPL W/O P-5'-P-CCNC: 31 U/L (ref 10–44)
ANION GAP SERPL CALC-SCNC: 9 MMOL/L (ref 8–16)
AST SERPL-CCNC: 23 U/L (ref 10–40)
B-HCG UR QL: NEGATIVE
BASOPHILS # BLD AUTO: 0.03 K/UL (ref 0–0.2)
BASOPHILS NFR BLD: 0.6 % (ref 0–1.9)
BILIRUB SERPL-MCNC: 0.9 MG/DL (ref 0.1–1)
BILIRUB UR QL STRIP: NEGATIVE
BUN SERPL-MCNC: 9 MG/DL (ref 6–20)
CALCIUM SERPL-MCNC: 9.8 MG/DL (ref 8.7–10.5)
CHLORIDE SERPL-SCNC: 104 MMOL/L (ref 95–110)
CLARITY UR REFRACT.AUTO: CLEAR
CO2 SERPL-SCNC: 29 MMOL/L (ref 23–29)
COLOR UR AUTO: YELLOW
CREAT SERPL-MCNC: 0.7 MG/DL (ref 0.5–1.4)
DIFFERENTIAL METHOD: ABNORMAL
EOSINOPHIL # BLD AUTO: 0.2 K/UL (ref 0–0.5)
EOSINOPHIL NFR BLD: 3.2 % (ref 0–8)
ERYTHROCYTE [DISTWIDTH] IN BLOOD BY AUTOMATED COUNT: 13 % (ref 11.5–14.5)
EST. GFR  (AFRICAN AMERICAN): >60 ML/MIN/1.73 M^2
EST. GFR  (NON AFRICAN AMERICAN): >60 ML/MIN/1.73 M^2
GLUCOSE SERPL-MCNC: 83 MG/DL (ref 70–110)
GLUCOSE UR QL STRIP: NEGATIVE
HCT VFR BLD AUTO: 37.3 % (ref 37–48.5)
HGB BLD-MCNC: 12.3 G/DL (ref 12–16)
HGB UR QL STRIP: NEGATIVE
HIV 1+2 AB+HIV1 P24 AG SERPL QL IA: NEGATIVE
INR PPP: 1 (ref 0.8–1.2)
KETONES UR QL STRIP: NEGATIVE
LEUKOCYTE ESTERASE UR QL STRIP: NEGATIVE
LYMPHOCYTES # BLD AUTO: 2.4 K/UL (ref 1–4.8)
LYMPHOCYTES NFR BLD: 51.3 % (ref 18–48)
MCH RBC QN AUTO: 29.9 PG (ref 27–31)
MCHC RBC AUTO-ENTMCNC: 33 G/DL (ref 32–36)
MCV RBC AUTO: 91 FL (ref 82–98)
MONOCYTES # BLD AUTO: 0.4 K/UL (ref 0.3–1)
MONOCYTES NFR BLD: 8.5 % (ref 4–15)
NEUTROPHILS # BLD AUTO: 1.7 K/UL (ref 1.8–7.7)
NEUTROPHILS NFR BLD: 36.4 % (ref 38–73)
NITRITE UR QL STRIP: NEGATIVE
PH UR STRIP: 7 [PH] (ref 5–8)
PLATELET # BLD AUTO: 218 K/UL (ref 150–350)
PMV BLD AUTO: 9.2 FL (ref 9.2–12.9)
POTASSIUM SERPL-SCNC: 3.8 MMOL/L (ref 3.5–5.1)
PROT SERPL-MCNC: 7.2 G/DL (ref 6–8.4)
PROT UR QL STRIP: NEGATIVE
PROTHROMBIN TIME: 10.4 SEC (ref 9–12.5)
RBC # BLD AUTO: 4.11 M/UL (ref 4–5.4)
SODIUM SERPL-SCNC: 142 MMOL/L (ref 136–145)
SP GR UR STRIP: 1.02 (ref 1–1.03)
TROPONIN I SERPL DL<=0.01 NG/ML-MCNC: <0.006 NG/ML (ref 0–0.03)
TSH SERPL DL<=0.005 MIU/L-ACNC: 3.87 UIU/ML (ref 0.4–4)
URN SPEC COLLECT METH UR: NORMAL
UROBILINOGEN UR STRIP-ACNC: NEGATIVE EU/DL
WBC # BLD AUTO: 4.72 K/UL (ref 3.9–12.7)

## 2019-07-24 PROCEDURE — 84484 ASSAY OF TROPONIN QUANT: CPT | Mod: ER

## 2019-07-24 PROCEDURE — 86703 HIV-1/HIV-2 1 RESULT ANTBDY: CPT

## 2019-07-24 PROCEDURE — 99999 PR PBB SHADOW E&M-EST. PATIENT-LVL III: CPT | Mod: PBBFAC,,, | Performed by: FAMILY MEDICINE

## 2019-07-24 PROCEDURE — 80053 COMPREHEN METABOLIC PANEL: CPT | Mod: ER

## 2019-07-24 PROCEDURE — 93010 ELECTROCARDIOGRAM REPORT: CPT | Mod: ,,, | Performed by: INTERNAL MEDICINE

## 2019-07-24 PROCEDURE — 84443 ASSAY THYROID STIM HORMONE: CPT | Mod: ER

## 2019-07-24 PROCEDURE — 81003 URINALYSIS AUTO W/O SCOPE: CPT | Mod: ER

## 2019-07-24 PROCEDURE — 81025 URINE PREGNANCY TEST: CPT | Mod: ER

## 2019-07-24 PROCEDURE — 85025 COMPLETE CBC W/AUTO DIFF WBC: CPT | Mod: ER

## 2019-07-24 PROCEDURE — 99900035 HC TECH TIME PER 15 MIN (STAT): Mod: ER

## 2019-07-24 PROCEDURE — 93005 ELECTROCARDIOGRAM TRACING: CPT | Mod: ER

## 2019-07-24 PROCEDURE — 93010 EKG 12-LEAD: ICD-10-PCS | Mod: ,,, | Performed by: INTERNAL MEDICINE

## 2019-07-24 PROCEDURE — 99999 PR PBB SHADOW E&M-EST. PATIENT-LVL III: ICD-10-PCS | Mod: PBBFAC,,, | Performed by: FAMILY MEDICINE

## 2019-07-24 PROCEDURE — 85610 PROTHROMBIN TIME: CPT | Mod: ER

## 2019-07-24 PROCEDURE — 25000003 PHARM REV CODE 250: Mod: ER | Performed by: EMERGENCY MEDICINE

## 2019-07-24 PROCEDURE — 99285 EMERGENCY DEPT VISIT HI MDM: CPT | Mod: 25,ER

## 2019-07-24 PROCEDURE — 99215 PR OFFICE/OUTPT VISIT, EST, LEVL V, 40-54 MIN: ICD-10-PCS | Mod: S$GLB,,, | Performed by: FAMILY MEDICINE

## 2019-07-24 PROCEDURE — 99215 OFFICE O/P EST HI 40 MIN: CPT | Mod: S$GLB,,, | Performed by: FAMILY MEDICINE

## 2019-07-24 RX ORDER — ASPIRIN 325 MG
325 TABLET, DELAYED RELEASE (ENTERIC COATED) ORAL
Status: COMPLETED | OUTPATIENT
Start: 2019-07-24 | End: 2019-07-24

## 2019-07-24 RX ADMIN — ASPIRIN 325 MG: 325 TABLET, COATED ORAL at 06:07

## 2019-07-24 NOTE — ED NOTES
md peck with meal and juice per pt request. Family at bedside. Pt without any complaints at present.

## 2019-07-24 NOTE — PROGRESS NOTES
"Subjective:       Patient ID: Sonia Melo is a 49 y.o. female.    Chief Complaint: Dizziness (balance off) and Numbness    Unsteady gait / numbness to both lower / upper ext / face:    O: acute on chronic  L: 0700 on 7/24/19  D: constant, improved  C: gait difficulty with shooting pains, exp as a "buzz"  Odell: nothing  Exac: nothing      Review of Systems   Constitutional: Negative for fever.   HENT: Negative for congestion.    Eyes: Negative for discharge.   Respiratory: Negative for shortness of breath.    Cardiovascular: Negative for chest pain.   Gastrointestinal: Negative for abdominal pain.   Genitourinary: Negative for difficulty urinating.   Musculoskeletal: Negative for joint swelling.   Neurological: Positive for dizziness, speech difficulty, light-headedness, numbness and headaches. Negative for tremors, seizures and weakness.   Psychiatric/Behavioral: Negative for agitation.       Objective:      Physical Exam   Constitutional: She is oriented to person, place, and time. She appears well-developed and well-nourished. No distress.   HENT:   Head: Normocephalic and atraumatic.   Eyes: Conjunctivae are normal. No scleral icterus.   Cardiovascular: Normal rate and regular rhythm.   Pulmonary/Chest: Effort normal and breath sounds normal. No respiratory distress. She has no wheezes.   Abdominal: Soft. Bowel sounds are normal. There is no tenderness. There is no guarding.   Musculoskeletal: She exhibits no edema.   Neurological: She is alert and oriented to person, place, and time. No cranial nerve deficit or sensory deficit. She exhibits normal muscle tone. Coordination normal.   Skin: Skin is warm and dry. No rash noted. She is not diaphoretic. No erythema. No pallor.   Good turgor   Nursing note and vitals reviewed.      Assessment:       1. Iatrogenic hypothyroidism    2. Gait abnormality        Plan:     Problem List Items Addressed This Visit        Endocrine    Iatrogenic hypothyroidism - Primary " (Chronic)    Current Assessment & Plan     On synthroid            Other    Gait abnormality    Current Assessment & Plan     Given sig history / co-morbidities with new onset gait abnormality, feel it prudent to have pt be seen in ED for possible TIA sx.    No permissive HTN at present, no deficit on exam, but given history and her inability to walk appropriately according to her and family, rec ED f/u.  Given history of cochlear issues this could also be related to inner ear dysfxn.    Ref to ED  Spoke with MIRIAM Beasley at 1506. Case was discussed who graciously acceptance of transfer of pt to ED.    MEDICAL DECISION MAKING: Moderate to high complexity.  Overall, the multiple problems listed are of moderate to high severity that may impact quality of life and activities of daily living. Side effects of medications, treatment plan as well as options and alternatives reviewed and discussed with patient. There was counseling of patient concerning these issues.

## 2019-07-24 NOTE — ED PROVIDER NOTES
Encounter Date: 7/24/2019       History     Chief Complaint   Patient presents with    Dizziness     was dizzy and weak this morning; resolved but Dr. Fletcher wanted patient seen in the ED     49-year-old female with past medical history fibromyalgia, hypothyroidism, undifferentiated connective tissue disease presents to the emergency department from her primary care provider's office due to dizziness.  The patient says she awoke this morning and was having difficulty keeping her balance, and says she felt like a drunk toddler .  Patient said she almost fell down due to this.  She did not injure herself.  Patient said the sensation lasted until noon.  Patient otherwise is having no new neurologic deficits aside from the coordination issues.  She saw her primary care provider today, and was sent here for TIA evaluation.  Patient says that she chronically has some tingling in her hands feet and face, but this is nothing new today.  She denies any vision changes, speech changes, nausea, vomiting, diarrhea, dysuria, abdominal pain, chest pain, shortness of breath.        Review of patient's allergies indicates:   Allergen Reactions    Adhesive Rash     Past Medical History:   Diagnosis Date    Abnormal Pap smear of cervix     Atrial flutter     Cervical spondylolysis     DDD (degenerative disc disease), lumbosacral     Fibromyalgia     Hyperthyroidism     Iatrogenic hypothyroidism     Photosensitivity 06/13/2019    Spondylosis     Undifferentiated connective tissue disease      Past Surgical History:   Procedure Laterality Date    BREAST IMPLANTS      BREAST SURGERY Bilateral 2009    silicone    CERVICAL FUSION  01/10/2018    ACDF    COLONOSCOPY N/A 10/13/2017    Performed by Hugh [facility]iano [facility] at [facility] ENDO    EAR MASTOIDECTOMY W/ COCHLEAR IMPLANT W/ LANDMARK      ESOPHAGOGASTRODUODENOSCOPY (EGD) N/A 10/13/2017    Performed by Hugh [facility] at [facility] ENDO    ESOPHAGOGASTRODUODENOSCOPY  (EGD) N/A 7/5/2017    Performed by Roshan Chopra III, MD at [facility] END[facility]    MANOMETRY-ESOPHAGEAL-HIGH RESOLUTION N/A 7/5/2017    Performed by Roshan Chopra III, MD at [facility] ENDO    STAPEDES SURGERY      [employer] Staped[employer] Piston (Safe to 3T magnet)    UPPER GASTROINTESTINAL ENDOSCOPY       Family History   Problem Relation Age of Onset    Hypertension Mother     Hypertension Father     Heart disease Father     Cancer Maternal Uncle         stomach cancer    Stomach cancer Maternal Uncle     Celiac disease Maternal Aunt     Breast cancer Paternal Aunt     Cirrhosis Neg Hx     Colon cancer Neg Hx     Colon polyps Neg Hx     Crohn's disease Neg Hx     Cystic fibrosis Neg Hx     Esophageal cancer Neg Hx     Hemochromatosis Neg Hx     Inflammatory bowel disease Neg Hx     Irritable bowel syndrome Neg Hx     Liver cancer Neg Hx     Liver disease Neg Hx     Rectal cancer Neg Hx     Ulcerative colitis Neg Hx     Marin's disease Neg Hx     Lymphoma Neg Hx     Tuberculosis Neg Hx     Scleroderma Neg Hx     Rheum arthritis Neg Hx     Multiple sclerosis Neg Hx     Melanoma Neg Hx     Lupus Neg Hx     Psoriasis Neg Hx     Skin cancer Neg Hx      Social History     Tobacco Use    Smoking status: Never Smoker    Smokeless tobacco: Never Used   Substance Use Topics    Alcohol use: Yes     Alcohol/week: 1.2 oz     Types: 2 Cans of beer per week     Comment: socially     Drug use: No     Review of Systems   Constitutional: Negative for diaphoresis and fever.   HENT: Negative for congestion, dental problem and sore throat.    Eyes: Negative for pain and visual disturbance.   Respiratory: Negative for cough and shortness of breath.    Cardiovascular: Negative for chest pain and palpitations.   Gastrointestinal: Negative for abdominal pain, diarrhea, nausea and vomiting.   Genitourinary: Negative for dysuria and flank pain.   Musculoskeletal: Negative for back pain and neck pain.   Skin: Negative for  "rash and wound.   Neurological: Positive for dizziness. Negative for weakness, numbness and headaches.   Psychiatric/Behavioral: Negative for agitation and confusion.     Physical Exam     Initial Vitals [07/24/19 1603]   BP Pulse Resp Temp SpO2   139/82 89 18 98.9 °F (37.2 °C) 98 %      MAP       --         Vitals:    07/24/19 1603 07/24/19 1620 07/24/19 1822 07/24/19 1824   BP: 139/82  123/78 128/77   Pulse: 89 69 65 74   Resp: 18      Temp: 98.9 °F (37.2 °C)      TempSrc: Oral      SpO2: 98%      Weight: 55.8 kg (123 lb 2 oz)      Height: 5' 3" (1.6 m)       07/24/19 1826 07/24/19 1901   BP: 120/79 114/72   Pulse: 78 70   Resp:  16   Temp:  98.6 °F (37 °C)   TempSrc:  Oral   SpO2:  98%   Weight:     Height:           Physical Exam    Nursing note and vitals reviewed.  Constitutional: She appears well-developed and well-nourished.   HENT:   Head: Normocephalic and atraumatic.   Eyes: EOM are normal. Pupils are equal, round, and reactive to light.   Neck: Normal range of motion. Neck supple.   Cardiovascular: Normal rate and regular rhythm.   Pulmonary/Chest: Breath sounds normal. No respiratory distress.   Abdominal: She exhibits no distension. There is no tenderness.   Neurological: She is alert and oriented to person, place, and time.   Skin: Skin is warm and dry.   Psychiatric: She has a normal mood and affect.       NIH Stroke Scale      Interval: Baseline  Time: 5:48 PM  Person Administering Scale: Prasanna Infante    Administer stroke scale items in the order listed. Record performance in each category after each subscale exam. Do not go back and change scores. Follow directions provided for each exam technique. Scores should reflect what the patient does, not what the clinician thinks the patient can do. The clinician should record answers while administering the exam and work quickly. Except where indicated, the patient should not be coached (i.e., repeated requests to patient to make a special " effort).      1a  Level of consciousness: 0=alert; keenly responsive   1b. LOC questions:  0=Answers both tasks correctly   1c. LOC commands: 0=Answers both tasks correctly   2.  Best Gaze: 0=normal   3.  Visual: 0=No visual loss   4. Facial Palsy: 0=Normal symmetric movement   5a.  Motor left arm: 0=No drift, limb holds 90 (or 45) degrees for full 10 seconds   5b.  Motor right arm: 0=No drift, limb holds 90 (or 45) degrees for full 10 seconds   6a. motor left le=No drift, limb holds 90 (or 45) degrees for full 10 seconds   6b  Motor right le=No drift, limb holds 90 (or 45) degrees for full 10 seconds   7. Limb Ataxia: 0=Absent   8.  Sensory: 0=Normal; no sensory loss   9. Best Language:  0=No aphasia, normal   10. Dysarthria: 0=Normal   11. Extinction and Inattention: 0=No abnormality   12. Distal motor function: 0=Normal    Total:   0       ED Course   Procedures  Labs Reviewed   CBC W/ AUTO DIFFERENTIAL - Abnormal; Notable for the following components:       Result Value    Gran # (ANC) 1.7 (*)     Gran% 36.4 (*)     Lymph% 51.3 (*)     All other components within normal limits   COMPREHENSIVE METABOLIC PANEL - Abnormal; Notable for the following components:    Alkaline Phosphatase 48 (*)     All other components within normal limits   URINALYSIS, REFLEX TO URINE CULTURE    Narrative:     Preferred Collection Type->Urine, Clean Catch   PREGNANCY TEST, URINE RAPID   HIV 1 / 2 ANTIBODY   PROTIME-INR   TSH   TROPONIN I     EKG Readings: (Independently Interpreted)   Rate of 69.  Normal axis.  NV, QRS and QTC within normal limits.  No STEMI.     ECG Results          EKG 12-lead (Final result)  Result time 19 17:46:28    Final result by Interface, Lab In [facility] (19 17:46:28)                 Narrative:    Test Reason : R26.89,    Vent. Rate : 069 BPM     Atrial Rate : 069 BPM     P-R Int : 162 ms          QRS Dur : 092 ms      QT Int : 440 ms       P-R-T Axes : 073 071 040 degrees     QTc Int :  471 ms    Normal sinus rhythm  Normal ECG  When compared with ECG of 20-DEC-2017 08:19,  QT has lengthened  Confirmed by LUIS A FUENTES MD ([id number]) on 7/24/2019 5:46:18 PM    Referred By: [email or web address]   SELF           Confirmed By:LUIS A FUENTES MD                            Imaging Results          CT Head Without Contrast (Final result)  Result time 07/24/19 17:14:14    Final result by Avi Salcedo Jr., MD (07/24/19 17:14:14)                 Impression:      No acute or significant CT abnormality.    All CT scans at this facility use dose modulation, iterative reconstruction, and/or weight base dosing when appropriate to reduce radiation dose to as low as reasonably achievable.      Electronically signed by: Avi Salcedo Jr., MD  Date:    07/24/2019  Time:    17:14             Narrative:    EXAMINATION:  CT HEAD WITHOUT CONTRAST    CLINICAL HISTORY:  Dizziness;    TECHNIQUE:  Contiguous axial images were obtained from the skull base through the vertex without intravenous contrast.    COMPARISON:  MRI of the brain from December 20, 2016.    FINDINGS:  No intracranial hemorrhage. No mass effect or midline shift. Normal parenchymal attenuation. The ventricles and sulci are normal in size and configuration. The paranasal sinuses and mastoid air cells are clear.  No concerning osseous findings.                               X-Ray Chest AP Portable (Final result)  Result time 07/24/19 17:15:17    Final result by Avi Salcedo Jr., MD (07/24/19 17:15:17)                 Impression:      No acute abnormality.      Electronically signed by: Avi Salcedo Jr., MD  Date:    07/24/2019  Time:    17:15             Narrative:    EXAMINATION:  XR CHEST AP PORTABLE    CLINICAL HISTORY:  Dizziness;    TECHNIQUE:  Single frontal view of the chest was performed.    COMPARISON:  None    FINDINGS:  The lungs are clear, with normal appearance of pulmonary vasculature and no pleural effusion or pneumothorax.    The cardiac silhouette is normal  in size. The hilar and mediastinal contours are unremarkable.    Bones are intact.                                 Medical Decision Making:   ED Management:  The patient was received from the off-going emergency room physician Dr Poole at 6:00PM.  All pertinent details presently available from the patient encounter were discussed along with the expected plan for disposition.      All findings were reviewed with the patient/family in detail along with the diagnosis of dizziness.  I see no indication of an emergent process beyond that addressed during our encounter but have duly counseled the patient/family regarding the need for prompt follow-up as well as the indications that should prompt immediate return to the emergency room should new or worrisome developments occur.  The patient/family communicates understanding of all this information and all remaining questions and concerns were addressed at this time.                         ED Course as of Jul 25 0307 Wed Jul 24, 2019   1733 ABCD2 score is 2, low risk of CVA in the near future.     [BA]   1741 Troponin I [BA]   1752 Left patient follow up with her neurologist for TIA workup.    [BA]      ED Course User Index  [BA] Levi Poole MD     Clinical Impression:       ICD-10-CM ICD-9-CM   1. Dizziness R42 780.4                                Prasanna Infante MD  07/25/19 [id number]

## 2019-07-24 NOTE — ASSESSMENT & PLAN NOTE
Given sig history / co-morbidities with new onset gait abnormality, feel it prudent to have pt be seen in ED for possible TIA sx.    No permissive HTN at present, no deficit on exam, but given history and her inability to walk appropriately according to her and family, rec ED f/u.  Given history of cochlear issues this could also be related to inner ear dysfxn.    Ref to ED  Spoke with MIRIAM Beasley at 1506. Case was discussed who graciously acceptance of transfer of pt to ED.    MEDICAL DECISION MAKING: Moderate to high complexity.  Overall, the multiple problems listed are of moderate to high severity that may impact quality of life and activities of daily living. Side effects of medications, treatment plan as well as options and alternatives reviewed and discussed with patient. There was counseling of patient concerning these issues.

## 2019-07-24 NOTE — ED NOTES
Attempt with 20g left ac - labs obtained but unable to thread. Cath tip intact when removed and pressure dsg applied.

## 2019-07-25 ENCOUNTER — PATIENT MESSAGE (OUTPATIENT)
Dept: INTERNAL MEDICINE | Facility: CLINIC | Age: 49
End: 2019-07-25

## 2019-07-26 ENCOUNTER — OFFICE VISIT (OUTPATIENT)
Dept: CARDIOLOGY | Facility: CLINIC | Age: 49
End: 2019-07-26
Payer: COMMERCIAL

## 2019-07-26 VITALS
HEART RATE: 96 BPM | BODY MASS INDEX: 21.99 KG/M2 | SYSTOLIC BLOOD PRESSURE: 100 MMHG | HEIGHT: 63 IN | DIASTOLIC BLOOD PRESSURE: 78 MMHG | WEIGHT: 124.13 LBS

## 2019-07-26 DIAGNOSIS — R06.09 OTHER FORM OF DYSPNEA: ICD-10-CM

## 2019-07-26 DIAGNOSIS — R07.9 CHEST PAIN, UNSPECIFIED TYPE: Primary | ICD-10-CM

## 2019-07-26 DIAGNOSIS — E78.2 MIXED HYPERLIPIDEMIA: Chronic | ICD-10-CM

## 2019-07-26 DIAGNOSIS — E03.2 IATROGENIC HYPOTHYROIDISM: Chronic | ICD-10-CM

## 2019-07-26 DIAGNOSIS — M34.9 SCLERODERMA: ICD-10-CM

## 2019-07-26 DIAGNOSIS — R00.2 PALPITATIONS: ICD-10-CM

## 2019-07-26 PROCEDURE — 99999 PR PBB SHADOW E&M-EST. PATIENT-LVL III: CPT | Mod: PBBFAC,,, | Performed by: INTERNAL MEDICINE

## 2019-07-26 PROCEDURE — 99204 OFFICE O/P NEW MOD 45 MIN: CPT | Mod: S$GLB,,, | Performed by: INTERNAL MEDICINE

## 2019-07-26 PROCEDURE — 99204 PR OFFICE/OUTPT VISIT, NEW, LEVL IV, 45-59 MIN: ICD-10-PCS | Mod: S$GLB,,, | Performed by: INTERNAL MEDICINE

## 2019-07-26 PROCEDURE — 99999 PR PBB SHADOW E&M-EST. PATIENT-LVL III: ICD-10-PCS | Mod: PBBFAC,,, | Performed by: INTERNAL MEDICINE

## 2019-07-26 NOTE — PROGRESS NOTES
Subjective:   Patient ID:  Sonia Melo is a 49 y.o. female who presents for cardiac consult of Irregular Heart Beat (consult) and Hyperlipidemia      HPI  The patient came in today for cardiac consult of Irregular Heart Beat (consult) and Hyperlipidemia    7/26/19  Sonia Melo is a 49 y.o. female pt with connective tissue disorder/scleroderma, HLD, hyperthyroidism presents for CV eval to establish care.     Pt had a flutter/palpitations in the past, has been seeing Dr. Williamson. She had a stress test > 3 years ago. NO prior echo.   She was in ER recently, had a flutter every 3rd or 4th rhythm per pt NOT AFluter. She does have connective tissue disease, sometimes has sharp chest pain in center of chest. She also gets numbness and tingling. SHe has been getting more tired and BRAXTON. Discussed will do cardiac testing and may need pulm eval as well.     Patient feels no leg swelling, no PND, no dizziness, no syncope, no CNS symptoms.    Patient has fairly good exercise tolerance.    Patient is compliant with medications.    FH - CAD     Past Medical History:   Diagnosis Date    Abnormal Pap smear of cervix     Cervical spondylolysis     DDD (degenerative disc disease), lumbosacral     Fibromyalgia     Hyperlipidemia     Hyperthyroidism     Iatrogenic hypothyroidism     Photosensitivity 06/13/2019    Spondylosis     Undifferentiated connective tissue disease        Past Surgical History:   Procedure Laterality Date    BREAST IMPLANTS      BREAST SURGERY Bilateral 2009    silicone    CERVICAL FUSION  01/10/2018    ACDF    COLONOSCOPY N/A 10/13/2017    Performed by [facility] at [facility] ENDO    EAR MASTOIDECTOMY W/ COCHLEAR IMPLANT W/ LANDMARK      ESOPHAGOGASTRODUODENOSCOPY (EGD) N/A 10/13/2017    Performed by Hugh Luciano MD at [facility] ENDO    ESOPHAGOGASTRODUODENOSCOPY (EGD) N/A 7/5/2017    Performed by Roshan Chopra III, MD at [facility] ENDO    MANOMETRY-ESOPHAGEAL-HIGH RESOLUTION N/A  7/5/2017    Performed by Roshan Chopra III, MD at [facility] ENDO    STAPEDES SURGERY      [employer] Staped[employer] Piston (Safe to 3T magnet)    UPPER GASTROINTESTINAL ENDOSCOPY         Social History     Tobacco Use    Smoking status: Never Smoker    Smokeless tobacco: Never Used   Substance Use Topics    Alcohol use: Yes     Alcohol/week: 1.2 oz     Types: 2 Cans of beer per week     Comment: socially     Drug use: No       Family History   Problem Relation Age of Onset    Hypertension Mother     Hypertension Father     Heart disease Father     Cancer Maternal Uncle         stomach cancer    Stomach cancer Maternal Uncle     Celiac disease Maternal Aunt     Breast cancer Paternal Aunt     Cirrhosis Neg Hx     Colon cancer Neg Hx     Colon polyps Neg Hx     Crohn's disease Neg Hx     Cystic fibrosis Neg Hx     Esophageal cancer Neg Hx     Hemochromatosis Neg Hx     Inflammatory bowel disease Neg Hx     Irritable bowel syndrome Neg Hx     Liver cancer Neg Hx     Liver disease Neg Hx     Rectal cancer Neg Hx     Ulcerative colitis Neg Hx     Marin's disease Neg Hx     Lymphoma Neg Hx     Tuberculosis Neg Hx     Scleroderma Neg Hx     Rheum arthritis Neg Hx     Multiple sclerosis Neg Hx     Melanoma Neg Hx     Lupus Neg Hx     Psoriasis Neg Hx     Skin cancer Neg Hx        Patient's Medications   New Prescriptions    No medications on file   Previous Medications    (MAGIC MOUTHWASH) 1:1:1 BENADRYL 12.5MG/5ML LIQ, ALUMINUM & MAGNESIUM HYDROXIDE-SIMEHTICONE (MAALOX), LIDOCAINE VISCOUS 2%    Swish and spit 5 mLs every 4 (four) hours as needed. for mouth sores    B COMPLEX VITAMINS CAPSULE    Take 1 capsule by mouth once daily.    ESOMEPRAZOLE (NEXIUM) 40 MG CAPSULE    Take 1 capsule (40 mg total) by mouth before breakfast.    HYDROXYCHLOROQUINE (PLAQUENIL) 200 MG TABLET    Take 1 tablet (200 mg total) by mouth 2 (two) times daily.    MULTIVITAMIN (ONE DAILY MULTIVITAMIN) PER TABLET    Take 1  "tablet by mouth once daily.    PENCICLOVIR (DENAVIR) 1 % CREAM    Apply topically every 2 (two) hours.    SYNTHROID 100 MCG TABLET    Take 1 tablet (100 mcg total) by mouth once daily.    TRIAMCINOLONE ACETONIDE 0.1% (KENALOG) 0.1 % OINTMENT    APPLY TO THE AFFECTED AREA(S) EVERY DAY AS DIRECTED   Modified Medications    No medications on file   Discontinued Medications    No medications on file       Review of Systems   Constitutional: Positive for malaise/fatigue.   HENT: Negative.    Eyes: Negative.    Respiratory: Positive for shortness of breath.    Cardiovascular: Positive for chest pain and palpitations.   Gastrointestinal: Negative.    Genitourinary: Negative.    Musculoskeletal: Negative.    Skin: Negative.    Neurological: Positive for dizziness.   Endo/Heme/Allergies: Negative.    Psychiatric/Behavioral: Negative.    All 12 systems otherwise negative.      Wt Readings from Last 3 Encounters:   07/26/19 56.3 kg (124 lb 1.9 oz)   07/24/19 55.8 kg (123 lb 2 oz)   07/24/19 56.2 kg (123 lb 14.4 oz)     Temp Readings from Last 3 Encounters:   07/24/19 98.6 °F (37 °C) (Oral)   07/24/19 96.4 °F (35.8 °C) (Tympanic)   07/09/19 97.6 °F (36.4 °C) (Tympanic)     BP Readings from Last 3 Encounters:   07/26/19 100/78   07/24/19 114/72   07/24/19 127/85     Pulse Readings from Last 3 Encounters:   07/26/19 96   07/24/19 70   07/24/19 81       /78 (BP Method: Small (Manual))   Pulse 96   Ht 5' 3" (1.6 m)   Wt 56.3 kg (124 lb 1.9 oz)   BMI 21.99 kg/m²     Objective:   Physical Exam   Constitutional: She is oriented to person, place, and time. She appears well-developed and well-nourished. No distress.   HENT:   Head: Normocephalic and atraumatic.   Nose: Nose normal.   Mouth/Throat: Oropharynx is clear and moist.   Eyes: Conjunctivae and EOM are normal. No scleral icterus.   Neck: Normal range of motion. Neck supple. No JVD present. No thyromegaly present.   Cardiovascular: Normal rate, regular rhythm, S1 normal " and S2 normal. Exam reveals no gallop, no S3, no S4 and no friction rub.   No murmur heard.  Pulmonary/Chest: Effort normal and breath sounds normal. No stridor. No respiratory distress. She has no wheezes. She has no rales. She exhibits no tenderness.   Abdominal: Soft. Bowel sounds are normal. She exhibits no distension and no mass. There is no tenderness. There is no rebound.   Genitourinary:   Genitourinary Comments: Deferred   Musculoskeletal: Normal range of motion. She exhibits no edema, tenderness or deformity.   Lymphadenopathy:     She has no cervical adenopathy.   Neurological: She is alert and oriented to person, place, and time. She exhibits normal muscle tone. Coordination normal.   Skin: Skin is warm and dry. No rash noted. She is not diaphoretic. No erythema. No pallor.   Psychiatric: She has a normal mood and affect. Her behavior is normal. Judgment and thought content normal.   Nursing note and vitals reviewed.      Lab Results   Component Value Date     07/24/2019    K 3.8 07/24/2019     07/24/2019    CO2 29 07/24/2019    BUN 9 07/24/2019    CREATININE 0.7 07/24/2019    GLU 83 07/24/2019    HGBA1C 5.4 05/05/2015    MG 2.0 11/30/2007    AST 23 07/24/2019    ALT 31 07/24/2019    ALBUMIN 4.3 07/24/2019    PROT 7.2 07/24/2019    BILITOT 0.9 07/24/2019    WBC 4.72 07/24/2019    HGB 12.3 07/24/2019    HCT 37.3 07/24/2019    MCV 91 07/24/2019     07/24/2019    INR 1.0 07/24/2019    TSH 3.867 07/24/2019    CHOL 252 (H) 09/18/2018    HDL 92 (H) 09/18/2018    LDLCALC 145.2 09/18/2018    TRIG 74 09/18/2018     Assessment:      1. Chest pain, unspecified type    2. Scleroderma    3. Iatrogenic hypothyroidism    4. Mixed hyperlipidemia    5. Palpitations    6. Other form of dyspnea        Plan:   1. Chest pain, BRAXTON  - ECG stress with oxygen sats  -2D ECHO  - may need pulm eval    2. HLD  - repeat labs  - will discuss statin    3. Palpitations  - Holter    4. Scleroderma/TERESA  - f/u with  rheum    5. Hypothyroidism  - cont meds per PCP    Thank you for allowing me to participate in this patient's care. Please do not hesitate to contact me with any questions or concerns. Consult note has been forwarded to the referral physician.

## 2019-07-29 ENCOUNTER — TELEPHONE (OUTPATIENT)
Dept: RHEUMATOLOGY | Facility: CLINIC | Age: 49
End: 2019-07-29

## 2019-07-29 NOTE — TELEPHONE ENCOUNTER
Spoke with patient . She does not want to reschedule at this time she will reschedule through the portal.

## 2019-08-05 ENCOUNTER — CLINICAL SUPPORT (OUTPATIENT)
Dept: CARDIOLOGY | Facility: CLINIC | Age: 49
End: 2019-08-05
Attending: INTERNAL MEDICINE
Payer: COMMERCIAL

## 2019-08-05 DIAGNOSIS — R07.9 CHEST PAIN, UNSPECIFIED TYPE: ICD-10-CM

## 2019-08-05 DIAGNOSIS — R00.2 PALPITATIONS: ICD-10-CM

## 2019-08-05 DIAGNOSIS — R06.09 OTHER FORM OF DYSPNEA: ICD-10-CM

## 2019-08-05 LAB — DIASTOLIC DYSFUNCTION: NO

## 2019-08-05 PROCEDURE — 93015 CARDIAC TREADMILL STRESS TEST: ICD-10-PCS | Mod: S$GLB,,, | Performed by: NUCLEAR MEDICINE

## 2019-08-05 PROCEDURE — 93015 CV STRESS TEST SUPVJ I&R: CPT | Mod: S$GLB,,, | Performed by: NUCLEAR MEDICINE

## 2019-08-05 PROCEDURE — 93224 XTRNL ECG REC UP TO 48 HRS: CPT | Mod: S$GLB,,, | Performed by: INTERNAL MEDICINE

## 2019-08-05 PROCEDURE — 93224 HOLTER MONITOR - 48 HOUR: ICD-10-PCS | Mod: S$GLB,,, | Performed by: INTERNAL MEDICINE

## 2019-08-16 ENCOUNTER — HOSPITAL ENCOUNTER (OUTPATIENT)
Dept: CARDIOLOGY | Facility: CLINIC | Age: 49
Discharge: HOME | End: 2019-08-16
Attending: INTERNAL MEDICINE
Payer: COMMERCIAL

## 2019-08-16 DIAGNOSIS — R00.2 PALPITATIONS: ICD-10-CM

## 2019-08-16 DIAGNOSIS — R06.09 OTHER FORM OF DYSPNEA: ICD-10-CM

## 2019-08-16 DIAGNOSIS — R07.9 CHEST PAIN, UNSPECIFIED TYPE: ICD-10-CM

## 2019-08-16 LAB
DIASTOLIC DYSFUNCTION: NO
ESTIMATED PA SYSTOLIC PRESSURE: 15.39
MITRAL VALVE MOBILITY: NORMAL
MITRAL VALVE REGURGITATION: NORMAL
RETIRED EF AND QEF - SEE NOTES: 60 (ref 55–65)
TRICUSPID VALVE REGURGITATION: NORMAL

## 2019-08-16 PROCEDURE — 93306 TTE W/DOPPLER COMPLETE: CPT | Mod: S$GLB,,, | Performed by: INTERNAL MEDICINE

## 2019-08-16 PROCEDURE — 93306 2D ECHO WITH COLOR FLOW DOPPLER: ICD-10-PCS | Mod: S$GLB,,, | Performed by: INTERNAL MEDICINE

## 2019-09-13 ENCOUNTER — PATIENT MESSAGE (OUTPATIENT)
Dept: OBSTETRICS AND GYNECOLOGY | Facility: CLINIC | Age: 49
End: 2019-09-13

## 2019-09-13 DIAGNOSIS — Z12.39 BREAST CANCER SCREENING: Primary | ICD-10-CM

## 2019-09-30 ENCOUNTER — HOSPITAL ENCOUNTER (OUTPATIENT)
Dept: RADIOLOGY | Facility: HOSPITAL | Age: 49
Discharge: HOME | End: 2019-09-30
Attending: OBSTETRICS & GYNECOLOGY
Payer: COMMERCIAL

## 2019-09-30 VITALS — BODY MASS INDEX: 21.99 KG/M2 | WEIGHT: 124.13 LBS | HEIGHT: 63 IN

## 2019-09-30 DIAGNOSIS — Z12.39 BREAST CANCER SCREENING: ICD-10-CM

## 2019-09-30 PROCEDURE — 77067 SCR MAMMO BI INCL CAD: CPT | Mod: 26,,, | Performed by: RADIOLOGY

## 2019-09-30 PROCEDURE — 77067 SCR MAMMO BI INCL CAD: CPT | Mod: TC

## 2019-09-30 PROCEDURE — 77063 BREAST TOMOSYNTHESIS BI: CPT | Mod: 26,,, | Performed by: RADIOLOGY

## 2019-09-30 PROCEDURE — 77063 MAMMO DIGITAL SCREENING BILAT WITH TOMOSYNTHESIS_CAD: ICD-10-PCS | Mod: 26,,, | Performed by: RADIOLOGY

## 2019-09-30 PROCEDURE — 77067 MAMMO DIGITAL SCREENING BILAT WITH TOMOSYNTHESIS_CAD: ICD-10-PCS | Mod: 26,,, | Performed by: RADIOLOGY

## 2019-10-03 ENCOUNTER — OFFICE VISIT (OUTPATIENT)
Dept: OBSTETRICS AND GYNECOLOGY | Facility: CLINIC | Age: 49
End: 2019-10-03
Payer: COMMERCIAL

## 2019-10-03 VITALS
WEIGHT: 125.44 LBS | DIASTOLIC BLOOD PRESSURE: 80 MMHG | SYSTOLIC BLOOD PRESSURE: 108 MMHG | BODY MASS INDEX: 22.23 KG/M2 | HEIGHT: 63 IN

## 2019-10-03 DIAGNOSIS — Z01.419 ENCOUNTER FOR GYNECOLOGICAL EXAMINATION WITHOUT ABNORMAL FINDING: Primary | ICD-10-CM

## 2019-10-03 PROCEDURE — 99396 PREV VISIT EST AGE 40-64: CPT | Mod: S$GLB,,, | Performed by: NURSE PRACTITIONER

## 2019-10-03 PROCEDURE — 88175 CYTOPATH C/V AUTO FLUID REDO: CPT

## 2019-10-03 PROCEDURE — 99396 PR PREVENTIVE VISIT,EST,40-64: ICD-10-PCS | Mod: S$GLB,,, | Performed by: NURSE PRACTITIONER

## 2019-10-03 PROCEDURE — 99999 PR PBB SHADOW E&M-EST. PATIENT-LVL III: ICD-10-PCS | Mod: PBBFAC,,, | Performed by: NURSE PRACTITIONER

## 2019-10-03 PROCEDURE — 99999 PR PBB SHADOW E&M-EST. PATIENT-LVL III: CPT | Mod: PBBFAC,,, | Performed by: NURSE PRACTITIONER

## 2019-10-03 NOTE — PROGRESS NOTES
CC: Well woman exam    Sonia Melo is a 49 y.o. female  presents for well woman exam.  LMP: Patient's last menstrual period was 2018..  No issues, problems, or complaints.   MMG on 19 - normal.        Past Medical History:   Diagnosis Date    Abnormal Pap [facility] of cervix     [facility] showing HPV changes    Cervical spondylolysis     DDD (degenerative disc disease), lumbosacral     Fibromyalgia     Hyperlipidemia     Hyperthyroidism     Iatrogenic hypothyroidism     Photosensitivity 2019    Spondylosis     Undifferentiated connective tissue disease      Past Surgical History:   Procedure Laterality Date    AUGMENTATION OF BREAST Bilateral 2009    silicone    BREAST IMPLANTS      BREAST SURGERY Bilateral 2009    silicone    CERVICAL FUSION  01/10/2018    ACDF    COLONOSCOPY N/A 10/13/2017    Procedure: COLONOSCOPY;  Surgeon: Hugh Luciano MD;  Location: [facility];  Service: Endoscopy;  Laterality: N/A;    EAR MASTOIDECTOMY W/ COCHLEAR IMPLANT W/ LANDMARK      STAPEDES SURGERY      sMart Stapedes Piston (Safe to 3T magnet)    UPPER GASTROINTESTINAL ENDOSCOPY       Social History     Socioeconomic History    Marital status:      Spouse name: Not on file    Number of children: 2    Years of education: Not on file    Highest education level: Not on file   Occupational History     Employer: [employer]   Social Needs    Financial resource strain: Not on file    Food insecurity:     Worry: Not on file     Inability: Not on file    Transportation needs:     Medical: Not on file     Non-medical: Not on file   Tobacco Use    Smoking status: Never Smoker    Smokeless tobacco: Never Used   Substance and Sexual Activity    Alcohol use: Yes     Alcohol/week: 2.0 standard drinks     Types: 2 Cans of beer per week     Comment: socially     Drug use: No    Sexual activity: Yes     Partners: Male     Birth control/protection:  "None   Lifestyle    Physical activity:     Days per week: Not on file     Minutes per session: Not on file    Stress: Not at all   Relationships    Social connections:     Talks on phone: Not on file     Gets together: Not on file     Attends [employer] service: Not on file     Active member of club or organization: Not on file     Attends meetings of clubs or organizations: Not on file     Relationship status: Not on file   Other Topics Concern    Not on file   Social History Narrative    Wears a seatbelt.     Family History   Problem Relation Age of Onset    Hypertension Mother     Hypertension Father     Heart disease Father     Cancer Maternal Uncle         stomach cancer    Stomach cancer Maternal Uncle     Celiac disease Maternal Aunt     Breast cancer Paternal Aunt     Cirrhosis Neg Hx     Colon cancer Neg Hx     Colon polyps Neg Hx     Crohn's disease Neg Hx     Cystic fibrosis Neg Hx     Esophageal cancer Neg Hx     Hemochromatosis Neg Hx     Inflammatory bowel disease Neg Hx     Irritable bowel syndrome Neg Hx     Liver cancer Neg Hx     Liver disease Neg Hx     Rectal cancer Neg Hx     Ulcerative colitis Neg Hx     Marin's disease Neg Hx     Lymphoma Neg Hx     Tuberculosis Neg Hx     Scleroderma Neg Hx     Rheum arthritis Neg Hx     Multiple sclerosis Neg Hx     Melanoma Neg Hx     Lupus Neg Hx     Psoriasis Neg Hx     Skin cancer Neg Hx      OB History        4    Para   2    Term   2            AB        Living   2       SAB        TAB        Ectopic        Multiple        Live Births   2                 /80   Ht 5' 3" (1.6 m)   Wt 56.9 kg (125 lb 7.1 oz)   LMP 2018   BMI 22.22 kg/m²       ROS:  GENERAL: Denies weight gain or weight loss. Feeling well overall.   SKIN: Denies rash or lesions.   HEAD: Denies head injury or headache.   NODES: Denies enlarged lymph nodes.   CHEST: Denies chest pain or shortness of breath.   CARDIOVASCULAR: " Denies palpitations or left sided chest pain.   ABDOMEN: No abdominal pain, constipation, diarrhea, nausea, vomiting or rectal bleeding.   URINARY: No frequency, dysuria, hematuria, or burning on urination.  REPRODUCTIVE: See HPI.   BREASTS: The patient performs breast self-examination and denies pain, lumps, or nipple discharge.   HEMATOLOGIC: No easy bruisability or excessive bleeding.   MUSCULOSKELETAL: Denies joint pain or swelling.   NEUROLOGIC: Denies syncope or weakness.   PSYCHIATRIC: Denies depression, anxiety or mood swings.    PHYSICAL EXAM:  APPEARANCE: Well nourished, well developed, in no acute distress.  AFFECT: WNL, alert and oriented x 3  SKIN: No acne or hirsutism  NECK: Neck symmetric without masses or thyromegaly  NODES: No inguinal, cervical, axillary, or femoral lymph node enlargement  CHEST: Good respiratory effect  ABDOMEN: Soft.  No tenderness or masses.  No hepatosplenomegaly.  No hernias.  BREASTS: Symmetrical, no skin changes or visible lesions.  No palpable masses, nipple discharge bilaterally. Augmented bilaterally, wants them removed.   PELVIC: Normal external genitalia without lesions.  Normal hair distribution.  Adequate perineal body, normal urethral meatus.  Vagina moist and well rugated without lesions or discharge.  Cervix pink, without lesions, discharge or tenderness.  No significant cystocele or rectocele.  Bimanual exam shows uterus to be normal size, regular, mobile and nontender.  Adnexa without masses or tenderness.    EXTREMITIES: No edema.  Physical Exam    1. Encounter for gynecological examination without abnormal finding  Liquid-based pap smear, screening    AND PLAN:    Patient was counseled today on A.C.S. Pap guidelines and recommendations for yearly pelvic exams, mammograms and monthly self breast exams; to see her PCP for other health maintenance.     Pt wants her implants removed - recommend recheck with plastic surgeon that placed them

## 2019-10-03 NOTE — PATIENT INSTRUCTIONS
Breast Health: Breast Self-Awareness  What is breast self-awareness?  Breast self-awareness is knowing how your breasts normally look and feel. Your breasts change as you go through different stages of your life. So its important to learn what is normal for your breasts. Breast self-awareness helps you notice any changes in your breasts right away. Report any changes to your healthcare provider.  Why is breast self-awareness important?  Many experts now say that women should focus on breast self-awareness instead of doing a breast self-examination (BSE). These experts include the American Cancer Society, the U.S. Preventive Services Task Force, and the American Congress of Obstetricians and Gynecologists. Some experts even advise not teaching women to do a BSE. Thats because research hasnt shown a clear benefit to doing BSEs.  Breast self-awareness is different than a BSE. Breast self-awareness isnt about following a certain method and schedule. Its about knowing what's normal for your breasts. That way you can notice even small changes right away. If you see any changes, report them to your healthcare provider.  Changes to look for  Call your healthcare provider if you find any changes in your breasts that concern you. These changes may include:  · A lump  · Nipple discharge other than breast milk, especially a bloody discharge  · Swelling  · A change in size or shape  · Skin irritation, such as redness, thickening, or dimpling of the skin  · Swollen lymph nodes in the armpit  · Nipple problems, such as pain or redness  If you find a lump  Contact your provider if you find lumpiness in one breast, feel something different in the tissue, or feel a definite lump. Sometimes lumpiness may be due to menstrual changes. But there may be reason for concern.  Your provider may want to see you right away if you have:  · Nipple discharge that is bloody  · Skin changes on your breast, such as dimpling or puckering  Its  normal to be upset if you find a lump. But its important to contact your provider right away. Remember that most breast lumps are benign. This means they are not cancer.  Date Last Reviewed: 8/10/2015  © 3939-7779 [employer]. [address] [place] [address]. All rights reserved. This information is not intended as a substitute for professional medical care. Always follow your healthcare professional's instructions.        The Range of Pap Test Results  When your Pap test is sent to the lab, the lab studies your cell samples and reports any abnormal cell changes. Your health care provider can discuss these changes with you. In some cases, an abnormal Pap test is due to an infection. More serious cell changes range from dysplasia to cancer. Talk to your health care provider about your Pap test.    Normal results  Cervical cells, even normal ones, are always changing. As they mature, normal squamous cells move from deeper layers within the cervix. Over time, these cells flatten and cover the surface of the cervix. Within the cervical canal, the cells are different. These glandular cells are taller and not as flat as the cells on the surface of the cervix. When a Pap test sample shows healthy cells of both types, the results are negative. Keep having Pap tests as often as directed.  Abnormal results  A positive Pap test result means some cells in the sample showed abnormal changes. These results are grouped by the type of cell change and the location, or extent, of the changes. Depending on the results, you may need further testing.  · Inflammation: Noncancerous changes are present. They may be due to normal cell repair. Or, they may be caused by an infection, such as HPV or yeast. Further testing may be needed. (Also called reactive cellular changes.)  · Atypical squamous cells: Test results are unclear. Cells on the surface of the cervix show changes, but their significance is not yet known.  Testing for HPV and other sexually transmitted infections (STIs) may be needed. Treatment may be required. (Reported as ASC-US or ASC-H.)  · Atypical glandular cells: Cells lining the cervical canal show abnormal changes. Further testing is likely. You may also have treatment to destroy or remove problem cells. (Reported as AGC.)  · Mild dysplasia: Cells show distinct changes. More testing or HPV typing may be done. You may also have treatment to destroy or remove problem cells. (Reported as low-grade ZAN or DOC 1.)  · Moderate to severe dysplasia: Cells show precancerous changes. Or, noninvasive cancer (carcinoma in situ) may be present. Treatment to destroy or remove problem cells is likely. (Reported as high-grade ZAN or DOC 2 or DOC 3.)  · Cancer: Different types of cancer may be detected by your Pap test. More tests to assess the cancer's extent are likely. The type of treatment will depend on the test results and other factors, such as age and health history. (Reported as squamous cell carcinoma, endocervical adenocarcinoma in situ, or adenocarcinoma.)  Date Last Reviewed: 5/12/2015  © 0690-1439 [employer]. [address] [place] [id number]. All rights reserved. This information is not intended as a substitute for professional medical care. Always follow your healthcare professional's instructions.

## 2019-10-09 ENCOUNTER — OFFICE VISIT (OUTPATIENT)
Dept: INTERNAL MEDICINE | Facility: CLINIC | Age: 49
End: 2019-10-09
Payer: COMMERCIAL

## 2019-10-09 VITALS
TEMPERATURE: 98 F | SYSTOLIC BLOOD PRESSURE: 113 MMHG | DIASTOLIC BLOOD PRESSURE: 74 MMHG | OXYGEN SATURATION: 100 % | BODY MASS INDEX: 21.84 KG/M2 | WEIGHT: 123.25 LBS | HEART RATE: 89 BPM | HEIGHT: 63 IN

## 2019-10-09 DIAGNOSIS — L25.9 CONTACT DERMATITIS, UNSPECIFIED CONTACT DERMATITIS TYPE, UNSPECIFIED TRIGGER: ICD-10-CM
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
## 2019-10-09 PROCEDURE — 99999 PR PBB SHADOW E&M-EST. PATIENT-LVL III: CPT | Mod: PBBFAC,,, | Performed by: FAMILY MEDICINE

## 2019-10-09 PROCEDURE — 99214 PR OFFICE/OUTPT VISIT, EST, LEVL IV, 30-39 MIN: ICD-10-PCS | Mod: S$GLB,,, | Performed by: FAMILY MEDICINE

## 2019-10-09 PROCEDURE — 99999 PR PBB SHADOW E&M-EST. PATIENT-LVL III: ICD-10-PCS | Mod: PBBFAC,,, | Performed by: FAMILY MEDICINE

## 2019-10-09 PROCEDURE — 99214 OFFICE O/P EST MOD 30 MIN: CPT | Mod: S$GLB,,, | Performed by: FAMILY MEDICINE

## 2019-10-09 RX ORDER — HYDROCORTISONE 25 MG/G
CREAM TOPICAL 2 TIMES DAILY
Qty: 1 TUBE | Refills: 0 | Status: SHIPPED | OUTPATIENT
Start: 2019-10-09 | End: 2021-03-01

## 2019-10-09 NOTE — PROGRESS NOTES
Patient ID: Sonia Melo is a 49 y.o. female.    Chief Complaint: leg swelling and itching ([place] spot on left leg)    HPI Patient is 59 here with complaint of leg (area below knee) itching and swelling that began 4 days when she attended a wedding in [place]. Wedding was indoors. She denies trauma and does not recall insect bite. Itching has decreased. Has put nothing on area. No systemic symptoms such as SOB or chest tightness.     Family History   Problem Relation Age of Onset    Hypertension Mother     Hypertension Father     Heart disease Father     Cancer Maternal Uncle         stomach cancer    Stomach cancer Maternal Uncle     Celiac disease Maternal Aunt     Breast cancer Paternal Aunt     Cirrhosis Neg Hx     Colon cancer Neg Hx     Colon polyps Neg Hx     Crohn's disease Neg Hx     Cystic fibrosis Neg Hx     Esophageal cancer Neg Hx     Hemochromatosis Neg Hx     Inflammatory bowel disease Neg Hx     Irritable bowel syndrome Neg Hx     Liver cancer Neg Hx     Liver disease Neg Hx     Rectal cancer Neg Hx     Ulcerative colitis Neg Hx     Marin's disease Neg Hx     Lymphoma Neg Hx     Tuberculosis Neg Hx     Scleroderma Neg Hx     Rheum arthritis Neg Hx     Multiple sclerosis Neg Hx     Melanoma Neg Hx     Lupus Neg Hx     Psoriasis Neg Hx     Skin cancer Neg Hx        Current Outpatient Medications:     (Magic mouthwash) 1:1:1 Benadryl 12.5mg/5ml liq, aluminum & magnesium hydroxide-simehticone (Maalox), lidocaine viscous 2%, Swish and spit 5 mLs every 4 (four) hours as needed. for mouth sores, Disp: 90 mL, Rfl: 0    b complex vitamins capsule, Take 1 capsule by mouth once daily., Disp: , Rfl:     hydroxychloroquine (PLAQUENIL) 200 mg tablet, Take 1 tablet (200 mg total) by mouth 2 (two) times daily., Disp: 180 tablet, Rfl: 3    multivitamin (ONE DAILY MULTIVITAMIN) per tablet, Take 1 tablet by mouth once daily., Disp: , Rfl:     penciclovir (DENAVIR) 1 % cream, Apply  "topically every 2 (two) hours., Disp: 1.5 g, Rfl: 2    SYNTHROID 100 mcg tablet, Take 1 tablet (100 mcg total) by mouth once daily., Disp: 90 tablet, Rfl: 3    esomeprazole (NEXIUM) 40 MG capsule, Take 1 capsule (40 mg total) by mouth before breakfast., Disp: 90 capsule, Rfl: 3    hydrocortisone 2.5 % cream, Apply topically 2 (two) times daily., Disp: 1 Tube, Rfl: 0    Review of Systems   Constitutional: Negative for chills and fever.   HENT: Negative for congestion and sore throat.    Eyes: Negative for visual disturbance.   Respiratory: Negative for cough, shortness of breath and wheezing.    Cardiovascular: Negative for chest pain.   Gastrointestinal: Negative for abdominal pain, diarrhea and nausea.   Endocrine: Negative for polydipsia and polyuria.   Musculoskeletal: Negative for arthralgias.   Skin: Positive for rash.   Neurological: Negative for dizziness and headaches.   Psychiatric/Behavioral: Negative for sleep disturbance. The patient is not nervous/anxious.        Objective:   /74 (BP Location: Left arm, Patient Position: Sitting, BP Method: Medium (Automatic))   Pulse 89   Temp 97.9 °F (36.6 °C) (Tympanic)   Ht 5' 3" (1.6 m)   Wt 55.9 kg (123 lb 3.8 oz)   LMP 09/09/2018   SpO2 100%   BMI 21.83 kg/m²      Physical Exam   Constitutional: She is oriented to person, place, and time. She appears well-developed and well-nourished. No distress.   HENT:   Head: Normocephalic and atraumatic.   Eyes: Conjunctivae are normal.   Neck: Normal range of motion.   Cardiovascular: Normal rate, regular rhythm and normal heart sounds.   No murmur heard.  Pulmonary/Chest: Effort normal and breath sounds normal. No respiratory distress. She has no wheezes.   Abdominal: Soft.   Musculoskeletal: Normal range of motion.   Neurological: She is alert and oriented to person, place, and time.   Skin: Skin is warm. She is not diaphoretic. There is erythema.   Region of left lateral below knee with small circular " area of erythema and mild edema. Excoriation present (fropm patient scratching area). Small central bite ephraim present   Psychiatric: She has a normal mood and affect. Her behavior is normal.       Assessment & Plan     Problem List Items Addressed This Visit        Derm    Contact dermatitis    Relevant Medications    hydrocortisone 2.5 % cream       Orthopedic    Bite, insect - Primary    Current Assessment & Plan     -suspect insect bite with local reaction. She reports symptoms are improving. Supportive care ie ice as needed and steroid cream prescribed         Relevant Medications    hydrocortisone 2.5 % cream            Follow up if symptoms worsen or fail to improve.

## 2019-10-09 NOTE — ASSESSMENT & PLAN NOTE
-suspect insect bite with local reaction. She reports symptoms are improving. Supportive care ie ice as needed and steroid cream prescribed

## 2019-10-14 ENCOUNTER — PATIENT MESSAGE (OUTPATIENT)
Dept: OBSTETRICS AND GYNECOLOGY | Facility: CLINIC | Age: 49
End: 2019-10-14

## 2019-11-06 ENCOUNTER — OFFICE VISIT (OUTPATIENT)
Dept: CARDIOLOGY | Facility: CLINIC | Age: 49
End: 2019-11-06
Payer: COMMERCIAL

## 2019-11-06 VITALS
SYSTOLIC BLOOD PRESSURE: 110 MMHG | DIASTOLIC BLOOD PRESSURE: 70 MMHG | WEIGHT: 124.31 LBS | HEART RATE: 75 BPM | BODY MASS INDEX: 22.03 KG/M2

## 2019-11-06 DIAGNOSIS — E78.2 MIXED HYPERLIPIDEMIA: Chronic | ICD-10-CM

## 2019-11-06 DIAGNOSIS — M34.9 SCLERODERMA: ICD-10-CM

## 2019-11-06 DIAGNOSIS — E03.2 IATROGENIC HYPOTHYROIDISM: Chronic | ICD-10-CM

## 2019-11-06 DIAGNOSIS — R07.9 CHEST PAIN, UNSPECIFIED TYPE: Primary | ICD-10-CM

## 2019-11-06 DIAGNOSIS — R06.09 OTHER FORM OF DYSPNEA: ICD-10-CM

## 2019-11-06 DIAGNOSIS — M35.9 UNDIFFERENTIATED CONNECTIVE TISSUE DISEASE: Chronic | ICD-10-CM

## 2019-11-06 DIAGNOSIS — K21.9 GASTROESOPHAGEAL REFLUX DISEASE, ESOPHAGITIS PRESENCE NOT SPECIFIED: ICD-10-CM

## 2019-11-06 PROCEDURE — 99999 PR PBB SHADOW E&M-EST. PATIENT-LVL III: CPT | Mod: PBBFAC,,, | Performed by: INTERNAL MEDICINE

## 2019-11-06 PROCEDURE — 99214 OFFICE O/P EST MOD 30 MIN: CPT | Mod: S$GLB,,, | Performed by: INTERNAL MEDICINE

## 2019-11-06 PROCEDURE — 99999 PR PBB SHADOW E&M-EST. PATIENT-LVL III: ICD-10-PCS | Mod: PBBFAC,,, | Performed by: INTERNAL MEDICINE

## 2019-11-06 PROCEDURE — 99214 PR OFFICE/OUTPT VISIT, EST, LEVL IV, 30-39 MIN: ICD-10-PCS | Mod: S$GLB,,, | Performed by: INTERNAL MEDICINE

## 2019-11-06 NOTE — PROGRESS NOTES
Subjective:   Patient ID:  Sonia Melo is a 49 y.o. female who presents for cardiac consult of Hyperlipidemia (3 mon f/u )      Hyperlipidemia   Associated symptoms include chest pain and shortness of breath.     The patient came in today for cardiac consult of Hyperlipidemia (3 mon f/u )      Sonia Melo is a 49 y.o. female pt with connective tissue disorder/scleroderma, HLD, hyperthyroidism presents for follow up CV eval.     7/26/19  Pt had a flutter/palpitations in the past, has been seeing Dr. Williamson. She had a stress test > 3 years ago. NO prior echo.   She was in ER recently, had a flutter every 3rd or 4th rhythm per pt NOT AFluter. She does have connective tissue disease, sometimes has sharp chest pain in center of chest. She also gets numbness and tingling. SHe has been getting more tired and BRAXTON. Discussed will do cardiac testing and may need pulm eval as well.     11/6/19  ECG stress negative, ECHO negative, Holter with rare PVCs. Overall feels well lately, No significant CP but occ gets it, pulse can fluctuate. Woke up once with tachycardia/palpitations.     Patient feels no leg swelling, no PND, no dizziness, no syncope, no CNS symptoms.    Patient has fairly good exercise tolerance.    Patient is compliant with medications.    FH - CAD       HOLTER    TEST DESCRIPTION   PREDOMINANT RHYTHM  1. Sinus rhythm with heart rates varying between 51 and 145 bpm with an average of 86 bpm.     VENTRICULAR ARRHYTHMIAS  1. There were very rare PVCs recorded totalling 2 and averaging less than 1 per hour.   2. There were no episodes of ventricular tachycardia.    SUPRA VENTRICULAR ARRHYTHMIAS  1. There were very rare PACs recorded totalling 4 and averaging less than 1 per hour.   2. There were no episodes of sustained supraventricular tachycardia.    CONCLUSIONS     1 - Normal left ventricular systolic function (EF 60-65%).     2 - Normal left ventricular diastolic function.     3 - Normal right  ventricular systolic function .     4 - The estimated PA systolic pressure is 15 mmHg.     5 - No wall motion abnormalities.     6 - Trivial mitral regurgitation.     7 - Trivial to mild tricuspid regurgitation.     8 - Anterior mitral valve leaflet prolapse - mild.       This document has been electronically    SIGNED BY: Stanley Mary MD On: 08/16/2019 13:24    ECG stress  EKG Conclusions:    1. The EKG portion of this study is negative for ischemia at a moderate workload, and peak heart rate of 169 bpm (103% of predicted).   2. Blood pressure response to exercise was normal (Presenting BP: 131/95 Peak BP: 173/91).   3. No significant arrhythmias were present.   4. There were no symptoms of chest discomfort or significant dyspnea throughout the protocol.   5. The Taylor treadmill score was 6 suggesting a low probability for future cardiovascular events.        This document has been electronically    SIGNED BY: Jeremias Huber MD On: 08/05/2019 16:08    Past Medical History:   Diagnosis Date    [facility]    Cervical spondylolysis     DDD (degenerative disc disease), lumbosacral     Fibromyalgia     Hyperlipidemia     Hyperthyroidism     Iatrogenic hypothyroidism     Photosensitivity 06/13/2019    Spondylosis     Undifferentiated connective tissue disease        Past Surgical History:   Procedure Laterality Date    AUGMENTATION OF BREAST Bilateral 2009    silicone    BREAST IMPLANTS      BREAST SURGERY Bilateral 2009    silicone    CERVICAL FUSION  01/10/2018    ACDF    COLONOSCOPY N/A 10/13/2017    Procedure: COLONOSCOPY;  Surgeon: Hugh Luciano MD;  Location: [facility];  Service: Endoscopy;  Laterality: N/A;    EAR MASTOIDECTOMY W/ COCHLEAR IMPLANT W/ LANDMARK      STAPEDES SURGERY      sMart Stapedes Piston (Safe to 3T magnet)    UPPER GASTROINTESTINAL ENDOSCOPY         Social History     Tobacco Use    Smoking  status: Never Smoker    Smokeless tobacco: Never Used   Substance Use Topics    Alcohol use: Yes     Alcohol/week: 2.0 standard drinks     Types: 2 Cans of beer per week     Comment: socially     Drug use: No       Family History   Problem Relation Age of Onset    Hypertension Mother     Hypertension Father     Heart disease Father     Cancer Maternal Uncle         stomach cancer    Stomach cancer Maternal Uncle     Celiac disease Maternal Aunt     Breast cancer Paternal Aunt     Cirrhosis Neg Hx     Colon cancer Neg Hx     Colon polyps Neg Hx     Crohn's disease Neg Hx     Cystic fibrosis Neg Hx     Esophageal cancer Neg Hx     Hemochromatosis Neg Hx     Inflammatory bowel disease Neg Hx     Irritable bowel syndrome Neg Hx     Liver cancer Neg Hx     Liver disease Neg Hx     Rectal cancer Neg Hx     Ulcerative colitis Neg Hx     Marin's disease Neg Hx     Lymphoma Neg Hx     Tuberculosis Neg Hx     Scleroderma Neg Hx     Rheum arthritis Neg Hx     Multiple sclerosis Neg Hx     Melanoma Neg Hx     Lupus Neg Hx     Psoriasis Neg Hx     Skin cancer Neg Hx        Patient's Medications   New Prescriptions    No medications on file   Previous Medications    (MAGIC MOUTHWASH) 1:1:1 BENADRYL 12.5MG/5ML LIQ, ALUMINUM & MAGNESIUM HYDROXIDE-SIMEHTICONE (MAALOX), LIDOCAINE VISCOUS 2%    Swish and spit 5 mLs every 4 (four) hours as needed. for mouth sores    B COMPLEX VITAMINS CAPSULE    Take 1 capsule by mouth once daily.    ESOMEPRAZOLE (NEXIUM) 40 MG CAPSULE    Take 1 capsule (40 mg total) by mouth before breakfast.    HYDROCORTISONE 2.5 % CREAM    Apply topically 2 (two) times daily.    HYDROXYCHLOROQUINE (PLAQUENIL) 200 MG TABLET    Take 1 tablet (200 mg total) by mouth 2 (two) times daily.    MULTIVITAMIN (ONE DAILY MULTIVITAMIN) PER TABLET    Take 1 tablet by mouth once daily.    PENCICLOVIR (DENAVIR) 1 % CREAM    Apply topically every 2 (two) hours.    SYNTHROID 100 MCG TABLET    Take 1  tablet (100 mcg total) by mouth once daily.   Modified Medications    No medications on file   Discontinued Medications    No medications on file       Review of Systems   Constitutional: Positive for malaise/fatigue.   HENT: Negative.    Eyes: Negative.    Respiratory: Positive for shortness of breath.    Cardiovascular: Positive for chest pain and palpitations.   Gastrointestinal: Negative.    Genitourinary: Negative.    Musculoskeletal: Negative.    Skin: Negative.    Neurological: Positive for dizziness.   Endo/Heme/Allergies: Negative.    Psychiatric/Behavioral: Negative.    All 12 systems otherwise negative.      Wt Readings from Last 3 Encounters:   11/06/19 56.4 kg (124 lb 5.4 oz)   10/09/19 55.9 kg (123 lb 3.8 oz)   10/03/19 56.9 kg (125 lb 7.1 oz)     Temp Readings from Last 3 Encounters:   10/09/19 97.9 °F (36.6 °C) (Tympanic)   07/24/19 98.6 °F (37 °C) (Oral)   07/24/19 96.4 °F (35.8 °C) (Tympanic)     BP Readings from Last 3 Encounters:   11/06/19 110/70   10/09/19 113/74   10/03/19 108/80     Pulse Readings from Last 3 Encounters:   11/06/19 75   10/09/19 89   07/26/19 96       /70 (BP Location: Right arm, Patient Position: Sitting, BP Method: Small (Manual))   Pulse 75   Wt 56.4 kg (124 lb 5.4 oz)   LMP 09/28/2018   BMI 22.03 kg/m²     Objective:   Physical Exam   Constitutional: She is oriented to person, place, and time. She appears well-developed and well-nourished. No distress.   HENT:   Head: Normocephalic and atraumatic.   Nose: Nose normal.   Mouth/Throat: Oropharynx is clear and moist.   Eyes: Conjunctivae and EOM are normal. No scleral icterus.   Neck: Normal range of motion. Neck supple. No JVD present. No thyromegaly present.   Cardiovascular: Normal rate, regular rhythm, S1 normal and S2 normal. Exam reveals no gallop, no S3, no S4 and no friction rub.   No murmur heard.  Pulmonary/Chest: Effort normal and breath sounds normal. No stridor. No respiratory distress. She has no  wheezes. She has no rales. She exhibits no tenderness.   Abdominal: Soft. Bowel sounds are normal. She exhibits no distension and no mass. There is no tenderness. There is no rebound.   Genitourinary:   Genitourinary Comments: Deferred   Musculoskeletal: Normal range of motion. She exhibits no edema, tenderness or deformity.   Lymphadenopathy:     She has no cervical adenopathy.   Neurological: She is alert and oriented to person, place, and time. She exhibits normal muscle tone. Coordination normal.   Skin: Skin is warm and dry. No rash noted. She is not diaphoretic. No erythema. No pallor.   Psychiatric: She has a normal mood and affect. Her behavior is normal. Judgment and thought content normal.   Nursing note and vitals reviewed.      Lab Results   Component Value Date     07/24/2019    K 3.8 07/24/2019     07/24/2019    CO2 29 07/24/2019    BUN 9 07/24/2019    CREATININE 0.7 07/24/2019    GLU 83 07/24/2019    HGBA1C 5.4 05/05/2015    MG 2.0 11/30/2007    AST 23 07/24/2019    ALT 31 07/24/2019    ALBUMIN 4.3 07/24/2019    PROT 7.2 07/24/2019    BILITOT 0.9 07/24/2019    WBC 4.72 07/24/2019    HGB 12.3 07/24/2019    HCT 37.3 07/24/2019    MCV 91 07/24/2019     07/24/2019    INR 1.0 07/24/2019    TSH 3.867 07/24/2019    CHOL 227 (H) 08/16/2019    HDL 90 (H) 08/16/2019    LDLCALC 127.4 08/16/2019    TRIG 48 08/16/2019     Assessment:      1. Chest pain, unspecified type    2. Other form of dyspnea    3. Mixed hyperlipidemia    4. Undifferentiated connective tissue disease    5. Scleroderma    6. Iatrogenic hypothyroidism    7. Gastroesophageal reflux disease, esophagitis presence not specified        Plan:   1. Chest pain, BRAXTON  - ECG stress with oxygen sats - negative  - 2D ECHO - negative  - may need pulm eval    2. HLD  - repeat labs  - will discuss statin    3. Palpitations  - Holter - neg    4. Scleroderma/TERESA  - f/u with rheum    5. Hypothyroidism  - cont meds per PCP    Thank you for  allowing me to participate in this patient's care. Please do not hesitate to contact me with any questions or concerns. Consult note has been forwarded to the referral physician.

## 2019-12-31 ENCOUNTER — OFFICE VISIT (OUTPATIENT)
Dept: INTERNAL MEDICINE | Facility: CLINIC | Age: 49
End: 2019-12-31
Payer: COMMERCIAL

## 2019-12-31 VITALS
BODY MASS INDEX: 22.43 KG/M2 | HEIGHT: 63 IN | OXYGEN SATURATION: 99 % | DIASTOLIC BLOOD PRESSURE: 76 MMHG | WEIGHT: 126.56 LBS | HEART RATE: 79 BPM | SYSTOLIC BLOOD PRESSURE: 110 MMHG | RESPIRATION RATE: 17 BRPM | TEMPERATURE: 97 F

## 2019-12-31 DIAGNOSIS — Z28.39 IMMUNIZATION DEFICIENCY: ICD-10-CM

## 2019-12-31 DIAGNOSIS — R10.13 EPIGASTRIC PAIN: Primary | ICD-10-CM

## 2019-12-31 PROCEDURE — 99214 OFFICE O/P EST MOD 30 MIN: CPT | Mod: 25,S$GLB,, | Performed by: FAMILY MEDICINE

## 2019-12-31 PROCEDURE — 90670 PNEUMOCOCCAL CONJUGATE VACCINE 13-VALENT LESS THAN 5YO & GREATER THAN: ICD-10-PCS | Mod: S$GLB,,, | Performed by: FAMILY MEDICINE

## 2019-12-31 PROCEDURE — 99999 PR PBB SHADOW E&M-EST. PATIENT-LVL IV: CPT | Mod: PBBFAC,,, | Performed by: FAMILY MEDICINE

## 2019-12-31 PROCEDURE — 99214 PR OFFICE/OUTPT VISIT, EST, LEVL IV, 30-39 MIN: ICD-10-PCS | Mod: 25,S$GLB,, | Performed by: FAMILY MEDICINE

## 2019-12-31 PROCEDURE — 99999 PR PBB SHADOW E&M-EST. PATIENT-LVL IV: ICD-10-PCS | Mod: PBBFAC,,, | Performed by: FAMILY MEDICINE

## 2019-12-31 PROCEDURE — 90471 PNEUMOCOCCAL CONJUGATE VACCINE 13-VALENT LESS THAN 5YO & GREATER THAN: ICD-10-PCS | Mod: S$GLB,,, | Performed by: FAMILY MEDICINE

## 2019-12-31 PROCEDURE — 90471 IMMUNIZATION ADMIN: CPT | Mod: S$GLB,,, | Performed by: FAMILY MEDICINE

## 2019-12-31 PROCEDURE — 90670 PCV13 VACCINE IM: CPT | Mod: S$GLB,,, | Performed by: FAMILY MEDICINE

## 2019-12-31 RX ORDER — SUCRALFATE 1 G/1
1 TABLET ORAL
Qty: 20 TABLET | Refills: 0 | Status: SHIPPED | OUTPATIENT
Start: 2019-12-31 | End: 2020-03-26

## 2019-12-31 RX ORDER — PANTOPRAZOLE SODIUM 40 MG/1
40 TABLET, DELAYED RELEASE ORAL DAILY
Qty: 30 TABLET | Refills: 0 | Status: SHIPPED | OUTPATIENT
Start: 2019-12-31 | End: 2020-01-22 | Stop reason: ALTCHOICE

## 2019-12-31 NOTE — PROGRESS NOTES
Subjective:       Patient ID: Sonia Melo is a 49 y.o. female.    Chief Complaint: Abdominal Pain    Abdominal Pain   This is a new problem. The current episode started in the past 7 days. The onset quality is sudden. The problem occurs constantly. The problem has been gradually worsening. The pain is located in the epigastric region. The pain is severe. The quality of the pain is aching. Pertinent negatives include no diarrhea or nausea.     Review of Systems   Respiratory: Negative for shortness of breath.    Cardiovascular: Negative for chest pain.   Gastrointestinal: Positive for abdominal pain. Negative for diarrhea and nausea.       Objective:      Physical Exam   Constitutional: She appears well-developed and well-nourished. No distress.   HENT:   Head: Normocephalic and atraumatic.   Pulmonary/Chest: Effort normal and breath sounds normal. No respiratory distress. She has no wheezes.   Abdominal: Soft. She exhibits no distension. There is tenderness in the epigastric area. There is no guarding.   Skin: Skin is warm and dry. No rash noted. She is not diaphoretic. No erythema.   Nursing note and vitals reviewed.      Assessment:       1. Epigastric pain    2. Immunization deficiency        Plan:     Problem List Items Addressed This Visit        ID    Immunization deficiency    Relevant Orders    Pneumococcal Conjugate Vaccine (13 Valent) (IM) (Completed)       GI    Epigastric pain - Primary    Relevant Medications    pantoprazole (PROTONIX) 40 MG tablet    sucralfate (CARAFATE) 1 gram tablet    Other Relevant Orders    Lipase    Stool culture    Occult blood x 1, stool    Stool Exam-Ova,Cysts,Parasites    WBC, Stool    H. pylori antigen, stool

## 2020-01-03 ENCOUNTER — LAB VISIT (OUTPATIENT)
Dept: LAB | Facility: HOSPITAL | Age: 50
End: 2020-01-03
Attending: FAMILY MEDICINE
Payer: COMMERCIAL

## 2020-01-03 DIAGNOSIS — R10.13 EPIGASTRIC PAIN: ICD-10-CM

## 2020-01-03 LAB — LIPASE SERPL-CCNC: 45 U/L (ref 4–60)

## 2020-01-03 PROCEDURE — 83690 ASSAY OF LIPASE: CPT

## 2020-01-03 PROCEDURE — 36415 COLL VENOUS BLD VENIPUNCTURE: CPT

## 2020-01-03 RX ORDER — LEVOTHYROXINE SODIUM 100 UG/1
100 TABLET ORAL
Qty: 90 TABLET | Refills: 3 | Status: SHIPPED | OUTPATIENT
Start: 2020-01-03 | End: 2020-10-18 | Stop reason: SDUPTHER

## 2020-01-21 ENCOUNTER — OFFICE VISIT (OUTPATIENT)
Dept: OPHTHALMOLOGY | Facility: CLINIC | Age: 50
End: 2020-01-21
Payer: COMMERCIAL

## 2020-01-21 DIAGNOSIS — M35.9 UNDIFFERENTIATED CONNECTIVE TISSUE DISEASE: Primary | ICD-10-CM

## 2020-01-21 DIAGNOSIS — Z79.899 LONG-TERM USE OF PLAQUENIL: ICD-10-CM

## 2020-01-21 DIAGNOSIS — H52.4 PRESBYOPIA: ICD-10-CM

## 2020-01-21 PROCEDURE — 92134 POSTERIOR SEGMENT OCT RETINA (OCULAR COHERENCE TOMOGRAPHY)-BOTH EYES: ICD-10-PCS | Mod: S$GLB,,, | Performed by: OPTOMETRIST

## 2020-01-21 PROCEDURE — 92083 EXTENDED VISUAL FIELD XM: CPT | Mod: S$GLB,,, | Performed by: OPTOMETRIST

## 2020-01-21 PROCEDURE — 92014 COMPRE OPH EXAM EST PT 1/>: CPT | Mod: S$GLB,,, | Performed by: OPTOMETRIST

## 2020-01-21 PROCEDURE — 92134 CPTRZ OPH DX IMG PST SGM RTA: CPT | Mod: S$GLB,,, | Performed by: OPTOMETRIST

## 2020-01-21 PROCEDURE — 99999 PR PBB SHADOW E&M-EST. PATIENT-LVL I: CPT | Mod: PBBFAC,,, | Performed by: OPTOMETRIST

## 2020-01-21 PROCEDURE — 92014 PR EYE EXAM, EST PATIENT,COMPREHESV: ICD-10-PCS | Mod: S$GLB,,, | Performed by: OPTOMETRIST

## 2020-01-21 PROCEDURE — 92083 HUMPHREY VISUAL FIELD - OU - BOTH EYES: ICD-10-PCS | Mod: S$GLB,,, | Performed by: OPTOMETRIST

## 2020-01-21 PROCEDURE — 99999 PR PBB SHADOW E&M-EST. PATIENT-LVL I: ICD-10-PCS | Mod: PBBFAC,,, | Performed by: OPTOMETRIST

## 2020-01-21 NOTE — PROGRESS NOTES
HPI     PTs last exam was 1/15/19 with DNL. PT c/o blurred near va and wears otc   readers prn.   Plaquenil use: 5 years  Last 10-2 VF: 1/21/20  Last mOCT: 1/21/20  HPI    Any vision changes since last exam: decreased near va  Eye pain: no  Other ocular symptoms: fb sensation OD since last night     Do you wear currently wear glasses or contacts? otc readers     Interested in contacts today? no     Do you plan on getting new glasses today? If needed    Last edited by Norah Bennett MA on 1/21/2020 10:21 AM. (History)            Assessment /Plan     For exam results, see Encounter Report.    Undifferentiated connective tissue disease  -     Zamudio Visual Field - OU - Extended - Both Eyes  -     Posterior Segment OCT Retina-Both eyes    Long-term use of Plaquenil  -     Zamudio Visual Field - OU - Extended - Both Eyes  -     Posterior Segment OCT Retina-Both eyes    Presbyopia  Discussed bifocals, pt declined  Ok to continue with otc readers    No maculopathy present today.   Stressed importance of follow up visits with pt.  RTC 12 months for dilation,10-2 VF, and mOCT.   PRN sooner if any va changes.

## 2020-01-22 ENCOUNTER — OFFICE VISIT (OUTPATIENT)
Dept: RHEUMATOLOGY | Facility: CLINIC | Age: 50
End: 2020-01-22
Payer: COMMERCIAL

## 2020-01-22 VITALS
HEIGHT: 63 IN | SYSTOLIC BLOOD PRESSURE: 110 MMHG | WEIGHT: 124.31 LBS | HEART RATE: 82 BPM | BODY MASS INDEX: 22.03 KG/M2 | DIASTOLIC BLOOD PRESSURE: 72 MMHG

## 2020-01-22 DIAGNOSIS — M35.9 UNDIFFERENTIATED CONNECTIVE TISSUE DISEASE: ICD-10-CM

## 2020-01-22 DIAGNOSIS — M34.9 SCLERODERMA: Primary | ICD-10-CM

## 2020-01-22 DIAGNOSIS — R91.1 LUNG NODULE: ICD-10-CM

## 2020-01-22 PROCEDURE — 99214 OFFICE O/P EST MOD 30 MIN: CPT | Mod: S$GLB,,, | Performed by: INTERNAL MEDICINE

## 2020-01-22 PROCEDURE — 99999 PR PBB SHADOW E&M-EST. PATIENT-LVL III: CPT | Mod: PBBFAC,,, | Performed by: INTERNAL MEDICINE

## 2020-01-22 PROCEDURE — 99214 PR OFFICE/OUTPT VISIT, EST, LEVL IV, 30-39 MIN: ICD-10-PCS | Mod: S$GLB,,, | Performed by: INTERNAL MEDICINE

## 2020-01-22 PROCEDURE — 99999 PR PBB SHADOW E&M-EST. PATIENT-LVL III: ICD-10-PCS | Mod: PBBFAC,,, | Performed by: INTERNAL MEDICINE

## 2020-01-22 NOTE — ASSESSMENT & PLAN NOTE
Significant titer Scl 70 antibody positivity with esophageal involvement.  No significant interstitial lung disease.  Repeat CT scan.  Scheduled see gastroenterologist tomorrow for possible upper endoscopy in the near future.  Upper endoscopy in 2017 was normal.

## 2020-01-22 NOTE — PROGRESS NOTES
RHEUMATOLOGY CLINIC FOLLOW UP VISIT  Chief complaints:-  Follow-up for autoimmune problem.    HPI:-  Sonia Muñoz a 49 y.o. pleasant female comes in for a follow up visit with above chief complaints.  She has undifferentiated connective tissue disease with positive MARYAM panel, positive scleroderma antibody, arthralgias, myalgias, possible small fiber neuropathy like pain in her hands and feet without any associated diabetes.  She is on hydroxychloroquine with no adverse effects.  She reports intermittent oral ulcers, worsening gastroesophageal reflux disease and fatigue.  No photosensitive malar rash, sicca syndrome or Raynaud's phenomenon today.  Review of Systems   Constitutional: Negative for chills, fever, malaise/fatigue and weight loss.   HENT: Positive for sore throat. Negative for ear discharge, ear pain, hearing loss and nosebleeds.    Eyes: Negative for blurred vision, double vision, photophobia, discharge and redness.   Respiratory: Negative for cough, hemoptysis, sputum production and shortness of breath.    Cardiovascular: Negative for chest pain, palpitations and claudication.   Gastrointestinal: Positive for diarrhea, heartburn and nausea. Negative for abdominal pain, constipation, melena and vomiting.   Genitourinary: Negative for dysuria, frequency, hematuria and urgency.   Musculoskeletal: Positive for back pain, joint pain, myalgias and neck pain. Negative for falls.   Skin: Negative for itching and rash.   Neurological: Positive for tingling and sensory change. Negative for dizziness, tremors, speech change, focal weakness, seizures, loss of consciousness, weakness and headaches.   Endo/Heme/Allergies: Negative for environmental allergies. Does not bruise/bleed easily.   Psychiatric/Behavioral: Negative for hallucinations and memory loss. The patient does not have insomnia.        Past Medical History:   Diagnosis Date    [facility]    Cervical spondylolysis     DDD (degenerative disc disease), lumbosacral     Fibromyalgia     Hyperlipidemia     Hyperthyroidism     Iatrogenic hypothyroidism     Photosensitivity 06/13/2019    Spondylosis     Undifferentiated connective tissue disease        Past Surgical History:   Procedure Laterality Date    AUGMENTATION OF BREAST Bilateral 2009    silicone    BREAST IMPLANTS      BREAST SURGERY Bilateral 2009    silicone    CERVICAL FUSION  01/10/2018    ACDF    COLONOSCOPY N/A 10/13/2017    Procedure: COLONOSCOPY;  Surgeon: Hugh Luciano MD;  Location: [facility];  Service: Endoscopy;  Laterality: N/A;    EAR MASTOIDECTOMY W/ COCHLEAR IMPLANT W/ LANDMARK      STAPEDES SURGERY      sMart Stapedes Piston (Safe to 3T magnet)    UPPER GASTROINTESTINAL ENDOSCOPY          Social History     Tobacco Use    Smoking status: Never Smoker    Smokeless tobacco: Never Used   Substance Use Topics    Alcohol use: Yes     Alcohol/week: 2.0 standard drinks     Types: 2 Cans of beer per week     Comment: socially     Drug use: No       Family History   Problem Relation Age of Onset    Hypertension Mother     Hypertension Father     Heart disease Father     Cancer Maternal Uncle         stomach cancer    Stomach cancer Maternal Uncle     Celiac disease Maternal Aunt     Breast cancer Paternal Aunt     Cirrhosis Neg Hx     Colon cancer Neg Hx     Colon polyps Neg Hx     Crohn's disease Neg Hx     Cystic fibrosis Neg Hx     Esophageal cancer Neg Hx     Hemochromatosis Neg Hx     Inflammatory bowel disease Neg Hx     Irritable bowel syndrome Neg Hx     Liver cancer Neg Hx     Liver disease Neg Hx     Rectal cancer Neg Hx     Ulcerative colitis Neg Hx     Marin's disease Neg Hx     Lymphoma Neg Hx     Tuberculosis Neg Hx     Scleroderma Neg Hx     Rheum arthritis Neg Hx     Multiple sclerosis Neg Hx      "Melanoma Neg Hx     Lupus Neg Hx     Psoriasis Neg Hx     Skin cancer Neg Hx        Review of patient's allergies indicates:   Allergen Reactions    Adhesive Rash    Latex, natural rubber Rash           Physical examination:-    Vitals:    01/22/20 1023   BP: 110/72   Pulse: 82   Weight: 56.4 kg (124 lb 5.4 oz)   Height: 5' 3" (1.6 m)   PainSc:   3   PainLoc: Abdomen       Physical Exam   Constitutional: She is oriented to person, place, and time and well-developed, well-nourished, and in no distress. No distress.   HENT:   Head: Normocephalic.   Mouth/Throat: Oropharynx is clear and moist. No oropharyngeal exudate.   Eyes: Pupils are equal, round, and reactive to light. Conjunctivae and EOM are normal. Right eye exhibits no discharge. Left eye exhibits no discharge. No scleral icterus.   Neck: Normal range of motion. Neck supple.   Cardiovascular: Normal rate and intact distal pulses.   Pulmonary/Chest: Effort normal. No respiratory distress. She exhibits no tenderness.   Abdominal: Soft. There is no tenderness.   Musculoskeletal:   Few tender points. No synovitis in small joints. No effusion in large joints. No sclerodactyly or telangiectasias.    Lymphadenopathy:     She has no cervical adenopathy.   Neurological: She is alert and oriented to person, place, and time. No cranial nerve deficit.   Skin: Skin is warm. No rash noted. She is not diaphoretic. No erythema. No pallor.   Psychiatric: Affect and judgment normal.   Nursing note and vitals reviewed.    Medication List with Changes/Refills   Current Medications    (MAGIC MOUTHWASH) 1:1:1 BENADRYL 12.5MG/5ML LIQ, ALUMINUM & MAGNESIUM HYDROXIDE-SIMEHTICONE (MAALOX), LIDOCAINE VISCOUS 2%    Swish and spit 5 mLs every 4 (four) hours as needed. for mouth sores    B COMPLEX VITAMINS CAPSULE    Take 1 capsule by mouth once daily.    HYDROCORTISONE 2.5 % CREAM    Apply topically 2 (two) times daily.    LACTOBACILLUS ACIDOPHILUS (PROBIOTIC ORAL)    Take by mouth. "    MULTIVITAMIN (ONE DAILY MULTIVITAMIN) PER TABLET    Take 1 tablet by mouth once daily.    PENCICLOVIR (DENAVIR) 1 % CREAM    Apply topically every 2 (two) hours.    SUCRALFATE (CARAFATE) 1 GRAM TABLET    Take 1 tablet (1 g total) by mouth 4 (four) times daily before meals and nightly.    SYNTHROID 100 MCG TABLET    Take 1 tablet (100 mcg total) by mouth before breakfast. Administer on an empty stomach at least 30 minutes before food.   Discontinued Medications    HYDROXYCHLOROQUINE (PLAQUENIL) 200 MG TABLET    Take 1 tablet (200 mg total) by mouth 2 (two) times daily.    PANTOPRAZOLE (PROTONIX) 40 MG TABLET    Take 1 tablet (40 mg total) by mouth once daily.       Assessment/Plans:-  Scleroderma  Significant titer Scl 70 antibody positivity with esophageal involvement.  No significant interstitial lung disease.  Repeat CT scan.  Scheduled see gastroenterologist tomorrow for possible upper endoscopy in the near future.  Upper endoscopy in 2017 was normal.    Undifferentiated connective tissue disease  Stable without any changes.  Discontinue Plaquenil.       # Follow up in about 4 months (around 5/22/2020).      Disclaimer: This note was prepared using voice recognition system and is likely to have sound alike errors and is not proof read.  Please call me with any questions.

## 2020-01-23 ENCOUNTER — TELEPHONE (OUTPATIENT)
Dept: GASTROENTEROLOGY | Facility: CLINIC | Age: 50
End: 2020-01-23

## 2020-01-23 ENCOUNTER — OFFICE VISIT (OUTPATIENT)
Dept: GASTROENTEROLOGY | Facility: CLINIC | Age: 50
End: 2020-01-23
Payer: COMMERCIAL

## 2020-01-23 ENCOUNTER — HOSPITAL ENCOUNTER (OUTPATIENT)
Dept: RADIOLOGY | Facility: HOSPITAL | Age: 50
Discharge: HOME | End: 2020-01-23
Attending: INTERNAL MEDICINE
Payer: COMMERCIAL

## 2020-01-23 VITALS
HEIGHT: 63 IN | DIASTOLIC BLOOD PRESSURE: 76 MMHG | WEIGHT: 125.44 LBS | SYSTOLIC BLOOD PRESSURE: 112 MMHG | BODY MASS INDEX: 22.23 KG/M2 | HEART RATE: 78 BPM

## 2020-01-23 DIAGNOSIS — K21.9 GASTROESOPHAGEAL REFLUX DISEASE WITHOUT ESOPHAGITIS: ICD-10-CM

## 2020-01-23 DIAGNOSIS — M34.9 SCLERODERMA: ICD-10-CM

## 2020-01-23 DIAGNOSIS — R14.0 ABDOMINAL BLOATING: ICD-10-CM

## 2020-01-23 DIAGNOSIS — R07.89 ATYPICAL CHEST PAIN: ICD-10-CM

## 2020-01-23 DIAGNOSIS — K59.00 CONSTIPATION, UNSPECIFIED CONSTIPATION TYPE: ICD-10-CM

## 2020-01-23 DIAGNOSIS — K59.00 CONSTIPATION, UNSPECIFIED CONSTIPATION TYPE: Primary | ICD-10-CM

## 2020-01-23 PROCEDURE — 99214 OFFICE O/P EST MOD 30 MIN: CPT | Mod: S$GLB,,, | Performed by: INTERNAL MEDICINE

## 2020-01-23 PROCEDURE — 99999 PR PBB SHADOW E&M-EST. PATIENT-LVL III: CPT | Mod: PBBFAC,,, | Performed by: INTERNAL MEDICINE

## 2020-01-23 PROCEDURE — 74019 XR ABDOMEN FLAT AND ERECT: ICD-10-PCS | Mod: 26,,, | Performed by: RADIOLOGY

## 2020-01-23 PROCEDURE — 74019 RADEX ABDOMEN 2 VIEWS: CPT | Mod: 26,,, | Performed by: RADIOLOGY

## 2020-01-23 PROCEDURE — 99214 PR OFFICE/OUTPT VISIT, EST, LEVL IV, 30-39 MIN: ICD-10-PCS | Mod: S$GLB,,, | Performed by: INTERNAL MEDICINE

## 2020-01-23 PROCEDURE — 99999 PR PBB SHADOW E&M-EST. PATIENT-LVL III: ICD-10-PCS | Mod: PBBFAC,,, | Performed by: INTERNAL MEDICINE

## 2020-01-23 PROCEDURE — 74019 RADEX ABDOMEN 2 VIEWS: CPT | Mod: TC

## 2020-01-23 NOTE — TELEPHONE ENCOUNTER
Please call and schedule EGD (ESOPHAGOGASTRODUODENOSCOPY), PH MONITORING, ESOPHAGUS, WIRELESS, (OFF REFLUX MEDS

## 2020-01-23 NOTE — PROGRESS NOTES
Subjective:       Patient ID: Sonia Melo is a 49 y.o. female.    Chief Complaint: Bloated; Abdominal Pain (burning); and Nausea    Patient with history of mixed connective tissue disease and felt to have motility findings of scleroderma involving the esophagus is here now because burning substernal chest pain for the past 2 weeks. This has been a longstanding process but over the last  2 weeks it has been worse. She had been on Nexium until last year when she had seen Dr Gaytan in our office and an esophogram was done without evidence of previous findings suggested by motility study. Her PPI was then discontinued so she has not been on Rx since that time. She had been doing well until her recent bouts of burning substernal pain with exception of a period near the end of last month when she was suspected of having an ulcer. She was seen in urgent care with abdominal bloating and discomfort and shown to be constipated. She'd been place on PPI and Carafate for suspicion of ulcer and it is likely the latter that caused the constipation. despite that both drugs were stopped.     She is having nausea but no vomiting. There has been no BRBPR or melena. There has been no anorexia or weight loss. There has been early satiety. Her bowel activity now seems to be okay. There is some mild bloating in her epigastrium.         Review of Systems   Constitutional: Negative for activity change, appetite change, chills, diaphoresis, fatigue, fever and unexpected weight change.   HENT: Negative for congestion, ear discharge, ear pain, hearing loss, nosebleeds, postnasal drip and tinnitus.    Eyes: Negative for photophobia and visual disturbance.   Respiratory: Negative for apnea, cough, choking, chest tightness, shortness of breath and wheezing.    Cardiovascular: Negative for chest pain, palpitations and leg swelling.   Gastrointestinal: Positive for nausea. Negative for abdominal distention, abdominal pain, anal bleeding, blood  in stool, diarrhea, rectal pain and vomiting.   Genitourinary: Negative for difficulty urinating, dyspareunia, dysuria, flank pain, frequency, hematuria, menstrual problem, pelvic pain, urgency, vaginal bleeding and vaginal discharge.   Musculoskeletal: Negative for arthralgias, back pain, gait problem, joint swelling, myalgias and neck stiffness.   Skin: Negative for pallor and rash.   Neurological: Negative for dizziness, tremors, seizures, syncope, speech difficulty, weakness, numbness and headaches.   Hematological: Negative for adenopathy.   Psychiatric/Behavioral: Negative for agitation, confusion, hallucinations, sleep disturbance and suicidal ideas.       Objective:      Physical Exam   Constitutional: She is oriented to person, place, and time. She appears well-developed and well-nourished.   HENT:   Head: Normocephalic and atraumatic.   Bilateral turbinate congestion   Eyes: Pupils are equal, round, and reactive to light. Conjunctivae and EOM are normal. Right eye exhibits no discharge. Left eye exhibits no discharge. No scleral icterus.   Neck: Normal range of motion. Neck supple. No JVD present. No thyromegaly present.   Cardiovascular: Normal rate, regular rhythm, normal heart sounds and intact distal pulses. Exam reveals no gallop and no friction rub.   No murmur heard.  Pulmonary/Chest: Effort normal and breath sounds normal. No respiratory distress. She has no wheezes. She has no rales. She exhibits no tenderness.   Abdominal: Soft. Bowel sounds are normal. She exhibits no distension and no mass. There is tenderness. There is no rebound and no guarding.   Mild diffuse tenderness   Musculoskeletal: Normal range of motion. She exhibits no edema.   Lymphadenopathy:     She has no cervical adenopathy.   Neurological: She is alert and oriented to person, place, and time. She has normal reflexes. She exhibits normal muscle tone. Coordination normal.   Skin: Skin is warm and dry. No rash noted. No erythema.  No pallor.   Psychiatric: She has a normal mood and affect. Her behavior is normal. Judgment and thought content normal.   Vitals reviewed.      Assessment:   No diagnosis found.   Constipation, unspecified constipation type  -     X-Ray Abdomen Flat And Erect; Future; Expected date: 01/23/2020    Abdominal bloating  -     X-Ray Abdomen Flat And Erect; Future; Expected date: 01/23/2020    Atypical chest pain  -     X-Ray Abdomen Flat And Erect; Future; Expected date: 01/23/2020  -     Case request GI: EGD (ESOPHAGOGASTRODUODENOSCOPY), PH MONITORING, ESOPHAGUS, WIRELESS, (OFF REFLUX MEDS)    Scleroderma  -     X-Ray Abdomen Flat And Erect; Future; Expected date: 01/23/2020  -     Case request GI: EGD (ESOPHAGOGASTRODUODENOSCOPY), PH MONITORING, ESOPHAGUS, WIRELESS, (OFF REFLUX MEDS)    Gastroesophageal reflux disease without esophagitis  -     X-Ray Abdomen Flat And Erect; Future; Expected date: 01/23/2020  -     Case request GI: EGD (ESOPHAGOGASTRODUODENOSCOPY), PH MONITORING, ESOPHAGUS, WIRELESS, (OFF REFLUX MEDS)        Plan:   As above

## 2020-01-28 ENCOUNTER — HOSPITAL ENCOUNTER (OUTPATIENT)
Dept: RADIOLOGY | Facility: HOSPITAL | Age: 50
Discharge: HOME | End: 2020-01-28
Attending: INTERNAL MEDICINE
Payer: COMMERCIAL

## 2020-01-28 DIAGNOSIS — R91.1 LUNG NODULE: ICD-10-CM

## 2020-01-28 DIAGNOSIS — M34.9 SCLERODERMA: ICD-10-CM

## 2020-01-28 PROCEDURE — 71250 CT THORAX DX C-: CPT | Mod: 26,,, | Performed by: RADIOLOGY

## 2020-01-28 PROCEDURE — 71250 CT CHEST WITHOUT CONTRAST: ICD-10-PCS | Mod: 26,,, | Performed by: RADIOLOGY

## 2020-01-28 PROCEDURE — 71250 CT THORAX DX C-: CPT | Mod: TC

## 2020-01-29 ENCOUNTER — HOSPITAL ENCOUNTER (OUTPATIENT)
Facility: HOSPITAL | Age: 50
Discharge: HOME | End: 2020-01-29
Attending: INTERNAL MEDICINE | Admitting: INTERNAL MEDICINE
Payer: COMMERCIAL

## 2020-01-29 ENCOUNTER — ANESTHESIA EVENT (OUTPATIENT)
Dept: ENDOSCOPY | Facility: HOSPITAL | Age: 50
End: 2020-01-29
Payer: COMMERCIAL

## 2020-01-29 ENCOUNTER — ANESTHESIA (OUTPATIENT)
Dept: ENDOSCOPY | Facility: HOSPITAL | Age: 50
End: 2020-01-29
Payer: COMMERCIAL

## 2020-01-29 DIAGNOSIS — R13.19 ESOPHAGEAL DYSPHAGIA: Primary | ICD-10-CM

## 2020-01-29 DIAGNOSIS — K21.9 GERD (GASTROESOPHAGEAL REFLUX DISEASE): ICD-10-CM

## 2020-01-29 DIAGNOSIS — K21.9 GASTROESOPHAGEAL REFLUX DISEASE, ESOPHAGITIS PRESENCE NOT SPECIFIED: ICD-10-CM

## 2020-01-29 DIAGNOSIS — M34.9 SCLERODERMA: ICD-10-CM

## 2020-01-29 PROCEDURE — 63600175 PHARM REV CODE 636 W HCPCS: Performed by: INTERNAL MEDICINE

## 2020-01-29 PROCEDURE — 88305 TISSUE EXAM BY PATHOLOGIST: CPT | Mod: 26,,, | Performed by: PATHOLOGY

## 2020-01-29 PROCEDURE — 91035 PR GERD TST W/ MUCOS PH ELECTROD: ICD-10-PCS | Mod: 26,,, | Performed by: INTERNAL MEDICINE

## 2020-01-29 PROCEDURE — 63600175 PHARM REV CODE 636 W HCPCS: Performed by: NURSE ANESTHETIST, CERTIFIED REGISTERED

## 2020-01-29 PROCEDURE — 43239 EGD BIOPSY SINGLE/MULTIPLE: CPT | Mod: ,,, | Performed by: INTERNAL MEDICINE

## 2020-01-29 PROCEDURE — 88305 TISSUE EXAM BY PATHOLOGIST: CPT | Performed by: PATHOLOGY

## 2020-01-29 PROCEDURE — 27201012 HC FORCEPS, HOT/COLD, DISP: Performed by: INTERNAL MEDICINE

## 2020-01-29 PROCEDURE — 25000003 PHARM REV CODE 250: Performed by: NURSE ANESTHETIST, CERTIFIED REGISTERED

## 2020-01-29 PROCEDURE — 37000008 HC ANESTHESIA 1ST 15 MINUTES: Performed by: INTERNAL MEDICINE

## 2020-01-29 PROCEDURE — 43239 PR EGD, FLEX, W/BIOPSY, SGL/MULTI: ICD-10-PCS | Mod: ,,, | Performed by: INTERNAL MEDICINE

## 2020-01-29 PROCEDURE — 91035 G-ESOPH REFLX TST W/ELECTROD: CPT | Performed by: INTERNAL MEDICINE

## 2020-01-29 PROCEDURE — 43239 EGD BIOPSY SINGLE/MULTIPLE: CPT | Performed by: INTERNAL MEDICINE

## 2020-01-29 PROCEDURE — 91035 G-ESOPH REFLX TST W/ELECTROD: CPT | Mod: 26,,, | Performed by: INTERNAL MEDICINE

## 2020-01-29 PROCEDURE — 88305 TISSUE EXAM BY PATHOLOGIST: ICD-10-PCS | Mod: 26,,, | Performed by: PATHOLOGY

## 2020-01-29 RX ORDER — PROPOFOL 10 MG/ML
VIAL (ML) INTRAVENOUS
Status: DISCONTINUED | OUTPATIENT
Start: 2020-01-29 | End: 2020-01-29

## 2020-01-29 RX ORDER — SODIUM CHLORIDE, SODIUM LACTATE, POTASSIUM CHLORIDE, CALCIUM CHLORIDE 600; 310; 30; 20 MG/100ML; MG/100ML; MG/100ML; MG/100ML
INJECTION, SOLUTION INTRAVENOUS CONTINUOUS
Status: DISCONTINUED | OUTPATIENT
Start: 2020-01-29 | End: 2020-01-29 | Stop reason: HOSPADM

## 2020-01-29 RX ORDER — SODIUM CHLORIDE 0.9 % (FLUSH) 0.9 %
10 SYRINGE (ML) INJECTION
Status: DISCONTINUED | OUTPATIENT
Start: 2020-01-29 | End: 2020-01-29 | Stop reason: HOSPADM

## 2020-01-29 RX ORDER — LIDOCAINE HYDROCHLORIDE 10 MG/ML
INJECTION, SOLUTION EPIDURAL; INFILTRATION; INTRACAUDAL; PERINEURAL
Status: DISCONTINUED | OUTPATIENT
Start: 2020-01-29 | End: 2020-01-29

## 2020-01-29 RX ADMIN — PROPOFOL 30 MG: 10 INJECTION, EMULSION INTRAVENOUS at 07:01

## 2020-01-29 RX ADMIN — SODIUM CHLORIDE, SODIUM LACTATE, POTASSIUM CHLORIDE, AND CALCIUM CHLORIDE: 600; 310; 30; 20 INJECTION, SOLUTION INTRAVENOUS at 06:01

## 2020-01-29 RX ADMIN — PROPOFOL 50 MG: 10 INJECTION, EMULSION INTRAVENOUS at 07:01

## 2020-01-29 RX ADMIN — LIDOCAINE HYDROCHLORIDE 50 MG: 10 INJECTION, SOLUTION EPIDURAL; INFILTRATION; INTRACAUDAL; PERINEURAL at 07:01

## 2020-01-29 NOTE — H&P
Short Stay Endoscopy History and Physical    PCP - Leonardo Frey MD    Procedure - EGD  ASA - 2  Mallampati - per anesthesia  History of Anesthesia problems - no  Family history Anesthesia problems -  no     HPI:  This is a 49 y.o. female here for evaluation of :   Active Hospital Problems    Diagnosis  POA    *GERD (gastroesophageal reflux disease) [K21.9]  Yes    Esophageal dysphagia [R13.10]  Yes      Resolved Hospital Problems   No resolved problems to display.         Reflux - yes  Dysphagia - yes, at times  Abdominal pain - no  Diarrhea - no  Anemia - no  GI bleeding - no    ROS:  CONSTITUTIONAL: Denies weight change,  fatigue, fevers, chills, night sweats.  CARDIOVASCULAR: Denies chest pain, shortness of breath, orthopnea and edema.  RESPIRATORY: Denies cough, hemoptysis, dyspnea, and wheezing.  GI: See HPI.    Medical History:   Past Medical History:   Diagnosis Date    [facility]    Cervical spondylolysis     DDD (degenerative disc disease), lumbosacral     Fibromyalgia     Hyperlipidemia     Hyperthyroidism     Iatrogenic hypothyroidism     Photosensitivity 06/13/2019    Spondylosis     Undifferentiated connective tissue disease        Surgical History:   Past Surgical History:   Procedure Laterality Date    AUGMENTATION OF BREAST Bilateral 2009    silicone    BREAST IMPLANTS      BREAST SURGERY Bilateral 2009    silicone    CERVICAL FUSION  01/10/2018    ACDF    COLONOSCOPY N/A 10/13/2017    Procedure: COLONOSCOPY;  Surgeon: Hugh Luciano MD;  Location: [facility];  Service: Endoscopy;  Laterality: N/A;    EAR MASTOIDECTOMY W/ COCHLEAR IMPLANT W/ LANDMARK      STAPEDES SURGERY      sMart Stapedes Piston (Safe to 3T magnet)    UPPER GASTROINTESTINAL ENDOSCOPY         Family History:  Family History   Problem Relation Age of Onset    Hypertension Mother     Hypertension Father     Heart disease Father      Cancer Maternal Uncle         stomach cancer    Stomach cancer Maternal Uncle     Celiac disease Maternal Aunt     Breast cancer Paternal Aunt     Cirrhosis Neg Hx     Colon cancer Neg Hx     Colon polyps Neg Hx     Crohn's disease Neg Hx     Cystic fibrosis Neg Hx     Esophageal cancer Neg Hx     Hemochromatosis Neg Hx     Inflammatory bowel disease Neg Hx     Irritable bowel syndrome Neg Hx     Liver cancer Neg Hx     Liver disease Neg Hx     Rectal cancer Neg Hx     Ulcerative colitis Neg Hx     Marin's disease Neg Hx     Lymphoma Neg Hx     Tuberculosis Neg Hx     Scleroderma Neg Hx     Rheum arthritis Neg Hx     Multiple sclerosis Neg Hx     Melanoma Neg Hx     Lupus Neg Hx     Psoriasis Neg Hx     Skin cancer Neg Hx        Social History:   Social History     Tobacco Use    Smoking status: Never Smoker    Smokeless tobacco: Never Used   Substance Use Topics    Alcohol use: Yes     Alcohol/week: 2.0 standard drinks     Types: 2 Cans of beer per week     Comment: socially     Drug use: No       Allergy  Review of patient's allergies indicates:   Allergen Reactions    Adhesive Rash       Medications:   No current facility-administered medications on file prior to encounter.      Current Outpatient Medications on File Prior to Encounter   Medication Sig Dispense Refill    b complex vitamins capsule Take 1 capsule by mouth once daily.      Lactobacillus acidophilus (PROBIOTIC ORAL) Take by mouth.      multivitamin (ONE DAILY MULTIVITAMIN) per tablet Take 1 tablet by mouth once daily.      sucralfate (CARAFATE) 1 gram tablet Take 1 tablet (1 g total) by mouth 4 (four) times daily before meals and nightly. 20 tablet 0    SYNTHROID 100 mcg tablet Take 1 tablet (100 mcg total) by mouth before breakfast. Administer on an empty stomach at least 30 minutes before food. 90 tablet 3    (Magic mouthwash) 1:1:1 Benadryl 12.5mg/5ml liq, aluminum & magnesium hydroxide-simehticone  (Maalox), lidocaine viscous 2% Swish and spit 5 mLs every 4 (four) hours as needed. for mouth sores (Patient not taking: Reported on 1/23/2020) 90 mL 0    hydrocortisone 2.5 % cream Apply topically 2 (two) times daily. 1 Tube 0    penciclovir (DENAVIR) 1 % cream Apply topically every 2 (two) hours. 1.5 g 2       Physical Exam:  Vital Signs:   Vitals:    01/29/20 0621   BP: 106/71   Pulse: 87   Resp: 20   Temp: 97.7 °F (36.5 °C)     General Appearance: Well appearing in no acute distress  ENT: OP clear  Chest: CTA B  CV: RRR, no m/r/g  Abd: s/nt/nd/nabs  Ext: no edema    Labs:  Reviewed    IMP:  Active Hospital Problems    Diagnosis  POA    *GERD (gastroesophageal reflux disease) [K21.9]  Yes    Esophageal dysphagia [R13.10]  Yes      Resolved Hospital Problems   No resolved problems to display.         Plan:  I have explained the risks and benefits of endoscopy procedures to the patient including but not limited to bleeding, perforation, infection, and death. The patient wishes to proceed.

## 2020-01-29 NOTE — TRANSFER OF CARE
"Anesthesia Transfer of Care Note    Patient: Sonia Melo    Procedure(s) Performed: Procedure(s) (LRB):  EGD (ESOPHAGOGASTRODUODENOSCOPY) (N/A)  PH MONITORING, ESOPHAGUS, WIRELESS, (OFF REFLUX MEDS) (N/A)    Patient location: GI    Anesthesia Type: MAC    Transport from OR: Transported from OR on room air with adequate spontaneous ventilation    Post pain: adequate analgesia    Post assessment: no apparent anesthetic complications    Post vital signs: stable    Level of consciousness: awake    Nausea/Vomiting: no nausea/vomiting    Complications: none    Transfer of care protocol was followed      Last vitals:   Visit Vitals  /71   Pulse 87   Temp 36.5 °C (97.7 °F)   Resp 20   Ht 5' 3" (1.6 m)   Wt 57.5 kg (126 lb 12.2 oz)   LMP 09/28/2018   SpO2 99%   Breastfeeding? No   BMI 22.46 kg/m²     "

## 2020-01-29 NOTE — PLAN OF CARE
Instructions and consent discussed in full with pt and prior to sedation. Patient verbalized understanding. Handouts given and pt educated to return to unit in 48 hrs to return diary and monitor.

## 2020-01-29 NOTE — DISCHARGE SUMMARY
Endoscopy Discharge Summary      Admit Date: 1/29/2020    Discharge Date and Time:  1/29/2020 7:15 AM    Attending Physician: Tisha Mendosa MD     Discharge Physician: Tisha Mendosa MD     Principal Admitting Diagnoses: GERD (gastroesophageal reflux disease)         Discharge Diagnosis: The primary encounter diagnosis was Esophageal dysphagia. Diagnoses of Scleroderma, Gastroesophageal reflux disease, esophagitis presence not specified, and GERD (gastroesophageal reflux disease) were also pertinent to this visit.     Discharged Condition: Good    Indication for Admission: GERD (gastroesophageal reflux disease)     Hospital Course: Patient was admitted for an inpatient procedure and tolerated the procedure well with no complications.    Significant Diagnostic Studies: EGD with biopsy and Bravo capsule placement    Pathology (if any):  Specimen (12h ago, onward)    None          Estimated Blood Loss: 1 ml.    Discussed with: patient.    Disposition: Home.    Follow Up/Patient Instructions:   Current Discharge Medication List      CONTINUE these medications which have NOT CHANGED    Details   b complex vitamins capsule Take 1 capsule by mouth once daily.      Lactobacillus acidophilus (PROBIOTIC ORAL) Take by mouth.      multivitamin (ONE DAILY MULTIVITAMIN) per tablet Take 1 tablet by mouth once daily.      sucralfate (CARAFATE) 1 gram tablet Take 1 tablet (1 g total) by mouth 4 (four) times daily before meals and nightly.  Qty: 20 tablet, Refills: 0    Associated Diagnoses: Epigastric pain      SYNTHROID 100 mcg tablet Take 1 tablet (100 mcg total) by mouth before breakfast. Administer on an empty stomach at least 30 minutes before food.  Qty: 90 tablet, Refills: 3      (Magic mouthwash) 1:1:1 Benadryl 12.5mg/5ml liq, aluminum & magnesium hydroxide-simehticone (Maalox), lidocaine viscous 2% Swish and spit 5 mLs every 4 (four) hours as needed. for mouth sores  Qty: 90 mL, Refills: 0    Associated  Diagnoses: Recurrent oral ulcers      hydrocortisone 2.5 % cream Apply topically 2 (two) times daily.  Qty: 1 Tube, Refills: 0    Associated Diagnoses: Insect bite, initial encounter; Contact dermatitis, unspecified contact dermatitis type, unspecified trigger      penciclovir (DENAVIR) 1 % cream Apply topically every 2 (two) hours.  Qty: 1.5 g, Refills: 2    Associated Diagnoses: Routine general medical examination at a health care facility; Hypothyroidism due to acquired atrophy of thyroid; Mixed hyperlipidemia             No discharge procedures on file.        Tisha Mendosa MD  Gastroenterology and Hepatology

## 2020-01-29 NOTE — ANESTHESIA RELEASE NOTE
"Anesthesia Release from PACU Note    Patient: Sonia Melo    Procedure(s) Performed: Procedure(s) (LRB):  EGD (ESOPHAGOGASTRODUODENOSCOPY) (N/A)  PH MONITORING, ESOPHAGUS, WIRELESS, (OFF REFLUX MEDS) (N/A)    Anesthesia type: MAC    Post pain: Adequate analgesia    Post assessment: no apparent anesthetic complications    Last Vitals:   Visit Vitals  /71   Pulse 87   Temp 36.5 °C (97.7 °F)   Resp 20   Ht 5' 3" (1.6 m)   Wt 57.5 kg (126 lb 12.2 oz)   LMP 09/28/2018   SpO2 99%   Breastfeeding? No   BMI 22.46 kg/m²       Post vital signs: stable    Level of consciousness: awake    Nausea/Vomiting: no nausea/no vomiting    Complications: none    Airway Patency: patent    Respiratory: unassisted    Cardiovascular: stable and blood pressure at baseline    Hydration: euvolemic  "

## 2020-01-29 NOTE — DISCHARGE INSTRUCTIONS
Gastritis (Adult)    Gastritis is inflammation and irritation of the stomach lining. It can be present for a short time (acute) or be long lasting (chronic). Gastritis is often caused by infection with bacteria called H pylori. More than a third of people in the [place] have this bacteria in their bodies. In many cases, H pylori causes no problems or symptoms. In some people, though, the infection irritates the stomach lining and causes gastritis. Other causes of stomach irritation include drinking alcohol or taking pain-relieving medicines called NSAIDs (such as aspirin or ibuprofen).   Symptoms of gastritis can include:  · Abdominal pain or bloating  · Loss of appetite  · Nausea or vomiting  · Vomiting blood or having black stools  · Feeling more tired than usual  An inflamed and irritated stomach lining is more likely to develop a sore called an ulcer. To help prevent this, gastritis should be treated.  Home care  If needed, medicines may be prescribed. If you have H pylori infection, treating it will likely relieve your symptoms. Other changes can help reduce stomach irritation and help it heal.  · If you have been prescribed medicines for H pylori infection, take them as directed. Take all of the medicine until it is finished or your healthcare provider tells you to stop, even if you feel better.  · Your healthcare provider may recommend avoiding NSAIDs. If you take daily aspirin for your heart or other medical reasons, do not stop without talking to your healthcare provider first.  · Avoid drinking alcohol.  · Stop smoking. Smoking can irritate the stomach and delay healing. As much as possible, stay away from second hand smoke.  Follow-up care  Follow up with your healthcare provider, or as advised by our staff. Testing may be needed to check for inflammation or an ulcer.  When to seek medical advice  Call your healthcare provider for any of the following:  · Stomach pain that gets worse or moves to the lower  right abdomen (appendix area)  · Chest pain that appears or gets worse, or spreads to the back, neck, shoulder, or arm  · Frequent vomiting (cant keep down liquids)  · Blood in the stool or vomit (red or black in color)  · Feeling weak or dizzy  · Fever of 100.4ºF (38ºC) or higher, or as directed by your healthcare provider  Date Last Reviewed: 6/22/2015 © 2000-2017 [employer]. [address] [place] [address], [place] [address]. All rights reserved. This information is not intended as a substitute for professional medical care. Always follow your healthcare professional's instructions.        Duodenitis    The duodenum is the first part of the small intestine, just past the stomach. Duodenitis is inflammation of the lining of the duodenum. This sheet tells you more about the condition.  Causes of duodenitis  The most common cause of duodenitis is infection by Helicobacter pylori (H. pylori) bacteria. Another common cause is long-term use of NSAIDs (such as aspirin and ibuprofen). Celiac disease, an allergy to gluten, causes a particular type of inflammation in the duodenum along with other changes. Less commonly, duodenitis happens along with another health problem, such as Crohn's disease. Drinking alcohol, smoking, or taking certain medicines also may make duodenitis more likely to happen.   Symptoms of duodenitis  The condition may cause no symptoms. If symptoms do happen, they can include:  · Burning, cramping, or hunger-like pain in your stomach  · Gas or a bloated feeling  · Nausea and vomiting  · Feeling full soon after starting a meal  Diagnosing duodenitis  Here is what will be done to diagnose duodenitis:  · If duodenitis is suspected, an upper endoscopy with biopsy will be done to confirm it. During this test, a thin, flexible tube with a light and camera on the end (endoscope) is used. The scope is moved down your throat to the stomach and into the duodenum. The scope sends images of the duodenum  to a video screen. Small samples (biopsy) of the lining of the duodenum may be taken. These samples may be sent to a lab for testing for H. pylori.  · To check for H. pylori or other pathogens, a blood, stool, gastric biopsy, or breath test may be done. Samples of blood or stool are taken and tested in a lab. For a breath test, you swallow a harmless compound. If H. pylori bacteria are present, extra carbon dioxide gas can then be detected in your breath.  · To check for celiac disease, blood tests may be done. If positive, an upper endoscopy with biopsy is usually done to confirm the diagnosis.   · In rare cases, an upper gastrointestinal (GI) series is done. This gives more information about the digestive tract. This procedure takes X-rays of the upper GI tract from the mouth to the small intestine.  Treating duodenitis  Duodenitis is treated using one or more of the following:  · Antibiotic medicines to kill H. pylori  · Medicines to reduce the amount of acid the stomach makes  · Stopping NSAIDs such as aspirin and ibuprofen. However, if you take aspirin for a medical condition, such as heart disease or stroke, do not stop until you check with your prescribing healthcare provider. If you take NSAIDs for arthritis or pain, check with your healthcare provider about alternatives.  · Adopting a gluten-free diet if celiac disease is the cause.  · Avoiding alcohol  · Stopping smoking  Your healthcare provider can tell you more about what treatments are needed.  Recovery and follow-up  With treatment, most cases of duodenitis clear up completely. In rare cases, duodenitis can be an ongoing (chronic) problem or can develop into a duodenal ulcer. If your symptoms do not improve or if they go away and come back, let your healthcare provider know. In such cases, regular healthcare provider visits and treatments are needed to manage your condition.   When to call the healthcare provider  Call your healthcare provider right  away if you have any of the following:  · Fever of 100.4°F (38.0°C) or higher, or as advised by your healthcare provider  · Nausea or vomiting (vomit may be bloody or look like coffee grounds)  · Dark, tarry, or bloody stools  · Sudden or severe stomach pain  · Pain that does not improve with treatment  · Rapid weight loss   Date Last Reviewed: 7/1/2016  © 3317-9013 [employer]. [address] [place] [address], [place] [address]. All rights reserved. This information is not intended as a substitute for professional medical care. Always follow your healthcare professional's instructions.

## 2020-01-29 NOTE — [employer] PATIENT INSTRUCTIONS
Discharge Summary/Instructions after an Endoscopic Procedure  Patient Name: Sonia Melo  Patient MRN: [id number]  Patient YOB: 1970  Wednesday, January 29, 2020 Tisha Mendosa MD  RESTRICTIONS:  During your procedure today, you received medications for sedation.  These   medications may affect your judgment, balance and coordination.  Therefore,   for 24 hours, you have the following restrictions:   - DO NOT drive a car, operate machinery, make legal/financial decisions,   sign important papers or drink alcohol.    ACTIVITY:  Today: no heavy lifting, straining or running due to procedural   sedation/anesthesia.  The following day: return to full activity including work.  DIET:  Eat and drink normally unless instructed otherwise.     TREATMENT FOR COMMON SIDE EFFECTS:  - Mild abdominal pain, nausea, belching, bloating or excessive gas:  rest,   eat lightly and use a heating pad.  - Sore Throat: treat with throat lozenges and/or gargle with warm salt   water.  - Because air was used during the procedure, expelling large amounts of air   from your rectum or belching is normal.  - If a bowel prep was taken, you may not have a bowel movement for 1-3 days.    This is normal.  SYMPTOMS TO WATCH FOR AND REPORT TO YOUR PHYSICIAN:  1. Abdominal pain or bloating, other than gas cramps.  2. Chest pain.  3. Back pain.  4. Signs of infection such as: chills or fever occurring within 24 hours   after the procedure.  5. Rectal bleeding, which would show as bright red, maroon, or black stools.   (A tablespoon of blood from the rectum is not serious, especially if   hemorrhoids are present.)  6. Vomiting.  7. Weakness or dizziness.  GO DIRECTLY TO THE NEAREST EMERGENCY ROOM IF YOU HAVE ANY OF THE FOLLOWING:      Difficulty breathing              Chills and/or fever over 101 F   Persistent vomiting and/or vomiting blood   Severe abdominal pain   Severe chest pain   Black, tarry stools   Bleeding- more than one  tablespoon   Any other symptom or condition that you feel may need urgent attention  Your doctor recommends these additional instructions:  If any biopsies were taken, your doctors clinic will contact you in 1 to 2   weeks with any results.  - Discharge patient to home (via wheelchair).   - Resume previous diet.   - Continue present medications.   - Await pathology results.   - Telephone GI clinic for pathology results in 2 weeks.   - Patient has a contact number available for emergencies.  The signs and   symptoms of potential delayed complications were discussed with the   patient.  Return to normal activities tomorrow.  Written discharge   instructions were provided to the patient.  For questions, problems or results please call your physician Tisha Mendosa MD at Work:  [phone]  If you have any questions about the above instructions, call the GI   department at [phone] or call the endoscopy unit at [phone]   from 7am until 3 pm.  [facility] [place], EMERGENCY ROOM PHONE NUMBER:   [phone]  IF A COMPLICATION OR EMERGENCY SITUATION ARISES AND YOU ARE UNABLE TO REACH   YOUR PHYSICIAN - GO DIRECTLY TO THE EMERGENCY ROOM.  I have read or have had read to me these discharge instructions for my   procedure and have received a written copy.  I understand these   instructions and will follow-up with my physician if I have any questions.     __________________________________       _____________________________________  Nurse Signature                                          Patient/Designated   Responsible Party Signature  MD Tisha De La Torre MD  1/29/2020 7:14:38 AM  [employer]

## 2020-01-29 NOTE — ANESTHESIA PREPROCEDURE EVALUATION
01/29/2020  Sonia Melo is a 49 y.o., female.    Anesthesia Evaluation    I have reviewed the Patient Summary Reports.     I have reviewed the Medications.     Review of Systems  Anesthesia Hx:  No problems with previous Anesthesia  History of prior surgery of interest to airway management or planning: Denies Family Hx of Anesthesia complications.   Denies Personal Hx of Anesthesia complications.   Social:  Non-Smoker    Hematology/Oncology:  Hematology Normal   Oncology Normal     Cardiovascular:   hyperlipidemia    Pulmonary:  Pulmonary Normal    Hepatic/GI:   GERD    Musculoskeletal:   Arthritis   Spine Disorders: cervical and lumbar Degenerative disease and Disc disease    Neurological:   Neuromuscular Disease, Fibromyalgia   Endocrine:   Hypothyroidism    Psych:  Psychiatric Normal           Physical Exam  General:  Well nourished    Airway/Jaw/Neck:  Airway Findings: Mouth Opening: Normal Tongue: Normal  General Airway Assessment: Adult  Mallampati: II  TM Distance: Normal, at least 6 cm          Chest/Lungs:  Chest/Lungs Findings: Normal Respiratory Rate     Heart/Vascular:  Heart Findings: Rate: Normal             Anesthesia Plan  Type of Anesthesia, risks & benefits discussed:  Anesthesia Type:  MAC  Patient's Preference:   Intra-op Monitoring Plan: standard ASA monitors  Intra-op Monitoring Plan Comments:   Post Op Pain Control Plan:   Post Op Pain Control Plan Comments:   Induction:   IV  Beta Blocker:  Patient is not currently on a Beta-Blocker (No further documentation required).       Informed Consent: Patient understands risks and agrees with Anesthesia plan.  Questions answered. Anesthesia consent signed with patient.  ASA Score: 2     Day of Surgery Review of History & Physical:    H&P update referred to the surgeon.         Ready For Surgery From Anesthesia Perspective.

## 2020-01-29 NOTE — OR NURSING
Pt denies anything loose or removable in mouth. Bite block placed gently into mouth while pt awake, no complaints from pt.

## 2020-01-30 VITALS
TEMPERATURE: 98 F | HEART RATE: 80 BPM | BODY MASS INDEX: 22.46 KG/M2 | WEIGHT: 126.75 LBS | RESPIRATION RATE: 18 BRPM | DIASTOLIC BLOOD PRESSURE: 66 MMHG | HEIGHT: 63 IN | SYSTOLIC BLOOD PRESSURE: 91 MMHG | OXYGEN SATURATION: 98 %

## 2020-02-03 ENCOUNTER — PATIENT MESSAGE (OUTPATIENT)
Dept: GASTROENTEROLOGY | Facility: CLINIC | Age: 50
End: 2020-02-03

## 2020-02-03 LAB
FINAL PATHOLOGIC DIAGNOSIS: NORMAL
GROSS: NORMAL

## 2020-02-04 ENCOUNTER — PATIENT MESSAGE (OUTPATIENT)
Dept: GASTROENTEROLOGY | Facility: CLINIC | Age: 50
End: 2020-02-04

## 2020-02-12 NOTE — [employer] PATIENT INSTRUCTIONS
Discharge Summary/Instructions after an Endoscopic Procedure  Patient Name: Sonia Melo  Patient MRN: [id number]  Patient YOB: 1970  Wednesday, January 29, 2020 Kaela Gaytan MD  RESTRICTIONS:  During your procedure today, you received medications for sedation.  These   medications may affect your judgment, balance and coordination.  Therefore,   for 24 hours, you have the following restrictions:   - DO NOT drive a car, operate machinery, make legal/financial decisions,   sign important papers or drink alcohol.    ACTIVITY:  Today: no heavy lifting, straining or running due to procedural   sedation/anesthesia.  The following day: return to full activity including work.  DIET:  Eat and drink normally unless instructed otherwise.     TREATMENT FOR COMMON SIDE EFFECTS:  - Mild abdominal pain, nausea, belching, bloating or excessive gas:  rest,   eat lightly and use a heating pad.  - Sore Throat: treat with throat lozenges and/or gargle with warm salt   water.  - Because air was used during the procedure, expelling large amounts of air   from your rectum or belching is normal.  - If a bowel prep was taken, you may not have a bowel movement for 1-3 days.    This is normal.  SYMPTOMS TO WATCH FOR AND REPORT TO YOUR PHYSICIAN:  1. Abdominal pain or bloating, other than gas cramps.  2. Chest pain.  3. Back pain.  4. Signs of infection such as: chills or fever occurring within 24 hours   after the procedure.  5. Rectal bleeding, which would show as bright red, maroon, or black stools.   (A tablespoon of blood from the rectum is not serious, especially if   hemorrhoids are present.)  6. Vomiting.  7. Weakness or dizziness.  GO DIRECTLY TO THE NEAREST EMERGENCY ROOM IF YOU HAVE ANY OF THE FOLLOWING:      Difficulty breathing              Chills and/or fever over 101 F   Persistent vomiting and/or vomiting blood   Severe abdominal pain   Severe chest pain   Black, tarry stools   Bleeding- more than one  tablespoon   Any other symptom or condition that you feel may need urgent attention  Your doctor recommends these additional instructions:  If any biopsies were taken, your doctors clinic will contact you in 1 to 2   weeks with any results.  - Acid suppressing medication.   - Per referring provider.  For questions, problems or results please call your physician Kaela Gaytan MD at Work:  [phone]  If you have any questions about the above instructions, call the GI   department at [phone] or call the endoscopy unit at [phone]   from 7am until 3 pm.  [facility] [place], EMERGENCY ROOM PHONE NUMBER:   [phone]  IF A COMPLICATION OR EMERGENCY SITUATION ARISES AND YOU ARE UNABLE TO REACH   YOUR PHYSICIAN - GO DIRECTLY TO THE EMERGENCY ROOM.  I have read or have had read to me these discharge instructions for my   procedure and have received a written copy.  I understand these   instructions and will follow-up with my physician if I have any questions.     __________________________________       _____________________________________  Nurse Signature                                          Patient/Designated   Responsible Party Signature  MD Kaela Elkins MD  2/12/2020 3:03:23 PM  This report has been verified and signed electronically.  [employer]

## 2020-02-20 ENCOUNTER — PATIENT MESSAGE (OUTPATIENT)
Dept: GASTROENTEROLOGY | Facility: CLINIC | Age: 50
End: 2020-02-20

## 2020-03-26 ENCOUNTER — OFFICE VISIT (OUTPATIENT)
Dept: INTERNAL MEDICINE | Facility: CLINIC | Age: 50
End: 2020-03-26
Payer: COMMERCIAL

## 2020-03-26 DIAGNOSIS — R10.32 LEFT LOWER QUADRANT ABDOMINAL PAIN: Primary | ICD-10-CM

## 2020-03-26 PROCEDURE — 99214 OFFICE O/P EST MOD 30 MIN: CPT | Mod: 95,,, | Performed by: FAMILY MEDICINE

## 2020-03-26 PROCEDURE — 99214 PR OFFICE/OUTPT VISIT, EST, LEVL IV, 30-39 MIN: ICD-10-PCS | Mod: 95,,, | Performed by: FAMILY MEDICINE

## 2020-03-26 NOTE — PROGRESS NOTES
Subjective:   Patient ID: Sonia Melo is a 50 y.o. female.  Chief Complaint:  No chief complaint on file.    The patient location is: Home  The chief complaint leading to consultation is: LLQ Pain  Visit type: Virtual visit with synchronous audio and video  Total time spent with patient: 30 minutes  Each patient to whom he or she provides medical services by telemedicine is:  (1) informed of the relationship between the physician and patient and the respective role of any other health care provider with respect to management of the patient; and (2) notified that he or she may decline to receive medical services by telemedicine and may withdraw from such care at any time.    Patient presents evaluation of left lower quadrant abdominal pain.    24-36 hour duration.    Different than recent epigastric pain for which she saw GI.  EGD was overall unremarkable.  Symptoms improved with PPI and Carafate.  Does have colonoscopy and 2017 which was normal.  Specifically no mention of diverticulosis.  No fever, chills, nausea, vomiting, diarrhea.    No  symptoms.    No recent medication changes.    Pain is increased with coughing, palpation, bending and twisting.    No rash.  No visible bulge or defect.    Under going menopause, no recent cycle, but denies any other vaginal discharge.       Abdominal Pain   This is a recurrent problem. The current episode started yesterday. The onset quality is sudden. The problem occurs daily. The most recent episode lasted 1 day. The problem has been unchanged. The pain is located in the LLQ. The pain is at a severity of 3/10. The pain is moderate. The quality of the pain is cramping, sharp and tearing. The abdominal pain does not radiate. Pertinent negatives include no anorexia, arthralgias, belching, constipation, diarrhea, dysuria, fever, flatus, frequency, headaches, hematochezia, hematuria, melena, myalgias, nausea, vomiting or weight loss. The pain is aggravated by coughing and  palpation (Bending and Twisting). The pain is relieved by activity, certain positions and standing. She has tried nothing for the symptoms. Prior diagnostic workup includes CT scan, GI consult, upper endoscopy and lower endoscopy (Abdominal Xray). Her past medical history is significant for GERD. There is no history of colon cancer, Crohn's disease, gallstones, irritable bowel syndrome, pancreatitis, PUD or ulcerative colitis. Patient's medical history includes UTI. Patient's medical history does not include kidney stones.     Review of Systems   Constitutional: Negative for activity change, appetite change, chills, diaphoresis, fatigue, fever, unexpected weight change and weight loss.   HENT: Negative for congestion, ear pain, postnasal drip, rhinorrhea, sinus pressure, sore throat and trouble swallowing.    Respiratory: Negative for cough and shortness of breath.    Cardiovascular: Negative for chest pain and leg swelling.   Gastrointestinal: Positive for abdominal pain (LLQ). Negative for abdominal distention, anorexia, blood in stool, constipation, diarrhea, flatus, hematochezia, melena, nausea and vomiting.   Genitourinary: Negative for difficulty urinating, dysuria, flank pain, frequency and hematuria.   Musculoskeletal: Negative for arthralgias and myalgias.   Skin: Negative for rash.   Neurological: Negative for dizziness, syncope, weakness, light-headedness and headaches.     Objective:   LMP 09/28/2018     Physical Exam   Constitutional: She is oriented to person, place, and time. She appears well-developed and well-nourished.  Non-toxic appearance. She does not have a sickly appearance. She does not appear ill. No distress.   HENT:   Mouth/Throat: Oropharynx is clear and moist.   Eyes: Conjunctivae are normal. Right eye exhibits no discharge. Left eye exhibits no discharge. Right conjunctiva is not injected. Left conjunctiva is not injected. No scleral icterus.   Neck: Normal range of motion and full  passive range of motion without pain. Neck supple.   Pulmonary/Chest: Effort normal. No accessory muscle usage. No tachypnea. No respiratory distress.   Abdominal: Soft. There is tenderness in the left lower quadrant. There is no rebound, no guarding and no CVA tenderness. No hernia.   Musculoskeletal: She exhibits no edema.   Lymphadenopathy:        Right: No inguinal adenopathy present.        Left: No inguinal adenopathy present.   Neurological: She is alert and oriented to person, place, and time.   Skin: No rash noted.   Psychiatric: She has a normal mood and affect. Judgment normal.     Assessment:       ICD-10-CM ICD-9-CM   1. Left lower quadrant abdominal pain R10.32 789.04     Plan:   Discussed in detail with patient possible differential diagnosis.    Soft tissue/musculoskeletal /abdominal wall related vs underlying diverticular to liver disease vs kidney stone vs constipation vs zoster vs gyn related.  Based on history, review of systems, and limited physical exam most like musculoskeletal in nature.   Reviewed on line left lower abdominal wall muscle disrtibution  Recommend Tylenol, muscle relaxant, warm compresses for the next 24 - 48 hours    If no significant improvement, trial of Linzess.    No other additional evaluation or treatment needed if above effective as long as does not develop fever or other constitutional type symptoms with pain.    Patient expresses understanding and agreement to the above plan.

## 2020-05-25 ENCOUNTER — TELEPHONE (OUTPATIENT)
Dept: RHEUMATOLOGY | Facility: CLINIC | Age: 50
End: 2020-05-25

## 2020-05-27 ENCOUNTER — TELEPHONE (OUTPATIENT)
Dept: RHEUMATOLOGY | Facility: CLINIC | Age: 50
End: 2020-05-27

## 2020-05-27 ENCOUNTER — PATIENT OUTREACH (OUTPATIENT)
Dept: ADMINISTRATIVE | Facility: OTHER | Age: 50
End: 2020-05-27

## 2020-05-27 NOTE — TELEPHONE ENCOUNTER
Left message to confirm apt for 5.28.20 at 11.45 am with Dr. French at the [facility].

## 2020-10-19 RX ORDER — LEVOTHYROXINE SODIUM 100 UG/1
100 TABLET ORAL
Qty: 90 TABLET | Refills: 0 | Status: SHIPPED | OUTPATIENT
Start: 2020-10-19 | End: 2021-01-19

## 2020-11-16 ENCOUNTER — PATIENT OUTREACH (OUTPATIENT)
Dept: ADMINISTRATIVE | Facility: OTHER | Age: 50
End: 2020-11-16

## 2020-11-16 DIAGNOSIS — Z12.31 ENCOUNTER FOR SCREENING MAMMOGRAM FOR MALIGNANT NEOPLASM OF BREAST: Primary | ICD-10-CM

## 2020-11-30 ENCOUNTER — OFFICE VISIT (OUTPATIENT)
Dept: INTERNAL MEDICINE | Facility: CLINIC | Age: 50
End: 2020-11-30
Payer: COMMERCIAL

## 2020-11-30 DIAGNOSIS — G44.82 COITAL HEADACHE: Primary | ICD-10-CM

## 2020-11-30 PROCEDURE — 99214 OFFICE O/P EST MOD 30 MIN: CPT | Mod: 95,,, | Performed by: FAMILY MEDICINE

## 2020-11-30 PROCEDURE — 99214 PR OFFICE/OUTPT VISIT, EST, LEVL IV, 30-39 MIN: ICD-10-PCS | Mod: 95,,, | Performed by: FAMILY MEDICINE

## 2020-11-30 RX ORDER — RIZATRIPTAN BENZOATE 5 MG/1
5 TABLET, ORALLY DISINTEGRATING ORAL
Qty: 30 TABLET | Refills: 0 | Status: SHIPPED | OUTPATIENT
Start: 2020-11-30 | End: 2021-05-04 | Stop reason: SDUPTHER

## 2020-11-30 NOTE — PROGRESS NOTES
Subjective:   Patient ID: Sonia Melo is a 50 y.o. female.  Chief Complaint:  No chief complaint on file.    The patient location is: Home  The chief complaint leading to consultation is: Hadache    Visit type: audiovisual    Face to Face time with patient: 20 minutes  30 minutes of total time spent on the encounter, which includes face to face time and non-face to face time preparing to see the patient (eg, review of tests), Obtaining and/or reviewing separately obtained history, Documenting clinical information in the electronic or other health record, Independently interpreting results (not separately reported) and communicating results to the patient/family/caregiver, or Care coordination (not separately reported).     Each patient to whom he or she provides medical services by telemedicine is:  (1) informed of the relationship between the physician and patient and the respective role of any other health care provider with respect to management of the patient; and (2) notified that he or she may decline to receive medical services by telemedicine and may withdraw from such care at any time.     presents for evaluation of headache.    Occipital in bilateral, radiating to forehead.    Occurs post coital with sex and/or orgasm.    Last 1 1 week ago which lasted / persisted for 3 days.    Tylenol was minimally effective for pain.  Patient discussed with gyn who recommended she see her PCP.    She does have previous neck and myofascial pain issues but they were improved post surgery.    These headaches feel different.    No previous history of migraine headaches.    Denies any previous treatment with triptans.  Does have history of GERD with overall sensitive stomach to multiple medications.  No neurologic sequelae with headaches   No other associated symptoms with headaches      Headache   This is a new problem. The current episode started 1 to 4 weeks ago. The problem occurs intermittently. The problem has  been waxing and waning. The pain is located in the occipital and bilateral region. The pain quality is not similar to prior headaches. The quality of the pain is described as sharp, stabbing and shooting. The pain is at a severity of 10/10. The pain is severe. Pertinent negatives include no abdominal pain, abnormal behavior, anorexia, back pain, blurred vision, coughing, dizziness, drainage, ear pain, eye pain, eye redness, eye watering, facial sweating, fever, hearing loss, insomnia, loss of balance, muscle aches, nausea, neck pain, numbness, phonophobia, photophobia, rhinorrhea, scalp tenderness, seizures, sinus pressure, sore throat, swollen glands, tingling, tinnitus, visual change, vomiting, weakness or weight loss. The symptoms are aggravated by intercourse. She has tried acetaminophen for the symptoms. The treatment provided mild relief. There is no history of cancer, cluster headaches, hypertension, migraine headaches, obesity, pseudotumor cerebri, recent head traumas or sinus disease.       Current Outpatient Medications:     b complex vitamins capsule, Take 1 capsule by mouth once daily., Disp: , Rfl:     hydrocortisone 2.5 % cream, Apply topically 2 (two) times daily., Disp: 1 Tube, Rfl: 0    Lactobacillus acidophilus (PROBIOTIC ORAL), Take by mouth., Disp: , Rfl:     multivitamin (ONE DAILY MULTIVITAMIN) per tablet, Take 1 tablet by mouth once daily., Disp: , Rfl:     penciclovir (DENAVIR) 1 % cream, Apply topically every 2 (two) hours., Disp: 1.5 g, Rfl: 2    rizatriptan (MAXALT-MLT) 5 MG disintegrating tablet, Take 1 tablet (5 mg total) by mouth as needed (for Headache). May repeat in 2 hours if needed, Disp: 30 tablet, Rfl: 0    SYNTHROID 100 mcg tablet, Take 1 tablet (100 mcg total) by mouth before breakfast. Administer on an empty stomach at least 30 minutes before food., Disp: 90 tablet, Rfl: 0    Review of Systems   Constitutional: Negative for activity change, chills, fatigue, fever,  unexpected weight change and weight loss.   HENT: Negative for ear pain, hearing loss, rhinorrhea, sinus pressure, sore throat, tinnitus and trouble swallowing.    Eyes: Negative for blurred vision, photophobia, pain, discharge, redness and visual disturbance.   Respiratory: Negative for cough, chest tightness, shortness of breath and wheezing.    Cardiovascular: Negative for chest pain, palpitations and leg swelling.   Gastrointestinal: Negative for abdominal pain, anorexia, blood in stool, constipation, diarrhea, nausea and vomiting.   Endocrine: Negative for polydipsia and polyuria.   Genitourinary: Negative for difficulty urinating, dysuria, hematuria and menstrual problem.   Musculoskeletal: Negative for arthralgias, back pain, gait problem, joint swelling, myalgias, neck pain and neck stiffness.   Skin: Negative for rash.   Neurological: Positive for headaches. Negative for dizziness, tingling, tremors, seizures, syncope, facial asymmetry, speech difficulty, weakness, light-headedness, numbness and loss of balance.   Hematological: Negative for adenopathy.   Psychiatric/Behavioral: Negative for confusion and dysphoric mood. The patient is not nervous/anxious and does not have insomnia.      Objective:   LMP 09/28/2018     Physical Exam  Constitutional:       General: She is not in acute distress.     Appearance: Normal appearance. She is well-developed and normal weight. She is not ill-appearing or toxic-appearing.   Eyes:      General: No scleral icterus.        Right eye: No discharge.         Left eye: No discharge.      Conjunctiva/sclera: Conjunctivae normal.      Right eye: Right conjunctiva is not injected.      Left eye: Left conjunctiva is not injected.   Pulmonary:      Effort: Pulmonary effort is normal. No tachypnea, accessory muscle usage or respiratory distress.   Skin:     Findings: No rash.   Neurological:      Mental Status: She is alert and oriented to person, place, and time.   Psychiatric:          Attention and Perception: Attention and perception normal.         Mood and Affect: Mood and affect normal.         Speech: Speech normal.         Behavior: Behavior normal. Behavior is cooperative.         Thought Content: Thought content normal.         Cognition and Memory: Cognition and memory normal.         Judgment: Judgment normal.       Assessment:       ICD-10-CM ICD-9-CM   1. Coital headache  G44.82 339.82     Plan:   Coital headache  -     rizatriptan (MAXALT-MLT) 5 MG disintegrating tablet; Take 1 tablet (5 mg total) by mouth as needed (for Headache). May repeat in 2 hours if needed  Dispense: 30 tablet; Refill: 0    Patient will probably be intolerant of indomethacin as both preventative or acute treatment agent.    Also concerned about potential side effects of beta-blockers.    Recommend trial of Maxalt.  If effective, and based on frequency of need/ use, could consider Aimovig or equivalent monthly injection in the future.    Patient expresses understanding agreement to the above plan.    Will follow-up in 2-4 weeks.

## 2020-12-07 DIAGNOSIS — R00.2 PALPITATIONS: ICD-10-CM

## 2020-12-07 DIAGNOSIS — R06.09 OTHER FORM OF DYSPNEA: ICD-10-CM

## 2020-12-07 DIAGNOSIS — R07.9 CHEST PAIN, UNSPECIFIED TYPE: Primary | ICD-10-CM

## 2020-12-09 ENCOUNTER — OFFICE VISIT (OUTPATIENT)
Dept: CARDIOLOGY | Facility: CLINIC | Age: 50
End: 2020-12-09
Payer: COMMERCIAL

## 2020-12-09 ENCOUNTER — HOSPITAL ENCOUNTER (OUTPATIENT)
Dept: CARDIOLOGY | Facility: HOSPITAL | Age: 50
Discharge: HOME | End: 2020-12-09
Attending: INTERNAL MEDICINE
Payer: COMMERCIAL

## 2020-12-09 VITALS
HEART RATE: 87 BPM | DIASTOLIC BLOOD PRESSURE: 74 MMHG | BODY MASS INDEX: 21.83 KG/M2 | WEIGHT: 123.25 LBS | SYSTOLIC BLOOD PRESSURE: 120 MMHG | OXYGEN SATURATION: 98 %

## 2020-12-09 DIAGNOSIS — E03.2 IATROGENIC HYPOTHYROIDISM: Chronic | ICD-10-CM

## 2020-12-09 DIAGNOSIS — E78.2 MIXED HYPERLIPIDEMIA: Primary | Chronic | ICD-10-CM

## 2020-12-09 DIAGNOSIS — R00.2 PALPITATIONS: ICD-10-CM

## 2020-12-09 DIAGNOSIS — K21.9 GASTROESOPHAGEAL REFLUX DISEASE, UNSPECIFIED WHETHER ESOPHAGITIS PRESENT: ICD-10-CM

## 2020-12-09 DIAGNOSIS — M34.9 SCLERODERMA: ICD-10-CM

## 2020-12-09 DIAGNOSIS — R13.19 ESOPHAGEAL DYSPHAGIA: ICD-10-CM

## 2020-12-09 DIAGNOSIS — R06.09 OTHER FORM OF DYSPNEA: ICD-10-CM

## 2020-12-09 DIAGNOSIS — M35.9 UNDIFFERENTIATED CONNECTIVE TISSUE DISEASE: Chronic | ICD-10-CM

## 2020-12-09 DIAGNOSIS — R07.9 CHEST PAIN, UNSPECIFIED TYPE: ICD-10-CM

## 2020-12-09 PROCEDURE — 99214 PR OFFICE/OUTPT VISIT, EST, LEVL IV, 30-39 MIN: ICD-10-PCS | Mod: S$GLB,,, | Performed by: INTERNAL MEDICINE

## 2020-12-09 PROCEDURE — 93005 ELECTROCARDIOGRAM TRACING: CPT

## 2020-12-09 PROCEDURE — 93010 ELECTROCARDIOGRAM REPORT: CPT | Mod: ,,, | Performed by: INTERNAL MEDICINE

## 2020-12-09 PROCEDURE — 99214 OFFICE O/P EST MOD 30 MIN: CPT | Mod: S$GLB,,, | Performed by: INTERNAL MEDICINE

## 2020-12-09 PROCEDURE — 99999 PR PBB SHADOW E&M-EST. PATIENT-LVL III: ICD-10-PCS | Mod: PBBFAC,,, | Performed by: INTERNAL MEDICINE

## 2020-12-09 PROCEDURE — 99999 PR PBB SHADOW E&M-EST. PATIENT-LVL III: CPT | Mod: PBBFAC,,, | Performed by: INTERNAL MEDICINE

## 2020-12-09 PROCEDURE — 93010 EKG 12-LEAD: ICD-10-PCS | Mod: ,,, | Performed by: INTERNAL MEDICINE

## 2020-12-09 NOTE — PROGRESS NOTES
Subjective:   Patient ID:  Sonia Melo is a 50 y.o. female who presents for cardiac consult of Follow-up      Hyperlipidemia  Associated symptoms include chest pain and shortness of breath.     The patient came in today for cardiac consult of Follow-up      Sonia Melo is a 50 y.o. female pt with connective tissue disorder/scleroderma, HLD, hyperthyroidism presents for follow up CV eval.     7/26/19  Pt had a flutter/palpitations in the past, has been seeing Dr. Williamson. She had a stress test > 3 years ago. NO prior echo.   She was in ER recently, had a flutter every 3rd or 4th rhythm per pt NOT AFluter. She does have connective tissue disease, sometimes has sharp chest pain in center of chest. She also gets numbness and tingling. SHe has been getting more tired and BRAXTON. Discussed will do cardiac testing and may need pulm eval as well.     11/6/19  ECG stress negative, ECHO negative, Holter with rare PVCs. Overall feels well lately, No significant CP but occ gets it, pulse can fluctuate. Woke up once with tachycardia/palpitations.     12/9/20  Follow up since 11/2019. Overall stable now, had trouble swallowing at time but stable. Occ chest tightness/ maybe muscle pain. Occ has palpitations- rare. Drinks one cup coffee. Started taking Maxalt for migraines.   ECG - NSR    Patient feels no leg swelling, no PND, no dizziness, no syncope, no CNS symptoms.    Patient has fairly good exercise tolerance.    Patient is compliant with medications.    FH - CAD       HOLTER    TEST DESCRIPTION   PREDOMINANT RHYTHM  1. Sinus rhythm with heart rates varying between 51 and 145 bpm with an average of 86 bpm.     VENTRICULAR ARRHYTHMIAS  1. There were very rare PVCs recorded totalling 2 and averaging less than 1 per hour.   2. There were no episodes of ventricular tachycardia.    SUPRA VENTRICULAR ARRHYTHMIAS  1. There were very rare PACs recorded totalling 4 and averaging less than 1 per hour.   2. There were no episodes  of sustained supraventricular tachycardia.    CONCLUSIONS     1 - Normal left ventricular systolic function (EF 60-65%).     2 - Normal left ventricular diastolic function.     3 - Normal right ventricular systolic function .     4 - The estimated PA systolic pressure is 15 mmHg.     5 - No wall motion abnormalities.     6 - Trivial mitral regurgitation.     7 - Trivial to mild tricuspid regurgitation.     8 - Anterior mitral valve leaflet prolapse - mild.       This document has been electronically    SIGNED BY: Stanley Mary MD On: 08/16/2019 13:24    ECG stress  EKG Conclusions:    1. The EKG portion of this study is negative for ischemia at a moderate workload, and peak heart rate of 169 bpm (103% of predicted).   2. Blood pressure response to exercise was normal (Presenting BP: 131/95 Peak BP: 173/91).   3. No significant arrhythmias were present.   4. There were no symptoms of chest discomfort or significant dyspnea throughout the protocol.   5. The Taylor treadmill score was 6 suggesting a low probability for future cardiovascular events.        This document has been electronically    SIGNED BY: Jeremias Huber MD On: 08/05/2019 16:08    Past Medical History:   Diagnosis Date    [facility] HPV changes    Cervical spondylolysis     DDD (degenerative disc disease), lumbosacral     Fibromyalgia     Hyperlipidemia     Hyperthyroidism     Iatrogenic hypothyroidism     Photosensitivity 06/13/2019    Spondylosis     Undifferentiated connective tissue disease        Past Surgical History:   Procedure Laterality Date    AUGMENTATION OF BREAST Bilateral 2009    silicone    BREAST IMPLANTS      BREAST SURGERY Bilateral 2009    silicone    CERVICAL FUSION  01/10/2018    ACDF    COLONOSCOPY N/A 10/13/2017    Procedure: COLONOSCOPY;  Surgeon: Hugh Luciano MD;  Location: [facility];  Service: Endoscopy;  Laterality: N/A;    EAR  MASTOIDECTOMY W/ COCHLEAR IMPLANT W/ LANDMARK      ESOPHAGOGASTRODUODENOSCOPY N/A 1/29/2020    Procedure: EGD (ESOPHAGOGASTRODUODENOSCOPY);  Surgeon: Tisha Mendosa MD;  Location: [facility];  Service: Endoscopy;  Laterality: N/A;    STAPEDES SURGERY      sMart Stapedes Piston (Safe to 3T magnet)    UPPER GASTROINTESTINAL ENDOSCOPY         Social History     Tobacco Use    Smoking status: Never Smoker    Smokeless tobacco: Never Used   Substance Use Topics    Alcohol use: Yes     Alcohol/week: 2.0 standard drinks     Types: 2 Cans of beer per week     Comment: socially     Drug use: No       Family History   Problem Relation Age of Onset    Hypertension Mother     Hypertension Father     Heart disease Father     Cancer Maternal Uncle         stomach cancer    Stomach cancer Maternal Uncle     Celiac disease Maternal Aunt     Breast cancer Paternal Aunt     Cirrhosis Neg Hx     Colon cancer Neg Hx     Colon polyps Neg Hx     Crohn's disease Neg Hx     Cystic fibrosis Neg Hx     Esophageal cancer Neg Hx     Hemochromatosis Neg Hx     Inflammatory bowel disease Neg Hx     Irritable bowel syndrome Neg Hx     Liver cancer Neg Hx     Liver disease Neg Hx     Rectal cancer Neg Hx     Ulcerative colitis Neg Hx     Marin's disease Neg Hx     Lymphoma Neg Hx     Tuberculosis Neg Hx     Scleroderma Neg Hx     Rheum arthritis Neg Hx     Multiple sclerosis Neg Hx     Melanoma Neg Hx     Lupus Neg Hx     Psoriasis Neg Hx     Skin cancer Neg Hx        Patient's Medications   New Prescriptions    No medications on file   Previous Medications    B COMPLEX VITAMINS CAPSULE    Take 1 capsule by mouth once daily.    HYDROCORTISONE 2.5 % CREAM    Apply topically 2 (two) times daily.    LACTOBACILLUS ACIDOPHILUS (PROBIOTIC ORAL)    Take by mouth.    MULTIVITAMIN (ONE DAILY MULTIVITAMIN) PER TABLET    Take 1 tablet by mouth once daily.    PENCICLOVIR (DENAVIR) 1 % CREAM    Apply topically every 2  (two) hours.    RIZATRIPTAN (MAXALT-MLT) 5 MG DISINTEGRATING TABLET    Take 1 tablet (5 mg total) by mouth as needed (for Headache). May repeat in 2 hours if needed    SYNTHROID 100 MCG TABLET    Take 1 tablet (100 mcg total) by mouth before breakfast. Administer on an empty stomach at least 30 minutes before food.   Modified Medications    No medications on file   Discontinued Medications    No medications on file       Review of Systems   Constitutional: Positive for malaise/fatigue.   HENT: Negative.    Eyes: Negative.    Respiratory: Positive for shortness of breath.    Cardiovascular: Positive for chest pain and palpitations.   Gastrointestinal: Negative.    Genitourinary: Negative.    Musculoskeletal: Negative.    Skin: Negative.    Neurological: Positive for dizziness.   Endo/Heme/Allergies: Negative.    Psychiatric/Behavioral: Negative.    All 12 systems otherwise negative.      Wt Readings from Last 3 Encounters:   12/09/20 55.9 kg (123 lb 3.8 oz)   01/29/20 57.5 kg (126 lb 12.2 oz)   01/23/20 56.9 kg (125 lb 7.1 oz)     Temp Readings from Last 3 Encounters:   01/29/20 97.6 °F (36.4 °C) (Temporal)   12/31/19 96.5 °F (35.8 °C) (Tympanic)   10/09/19 97.9 °F (36.6 °C) (Tympanic)     BP Readings from Last 3 Encounters:   12/09/20 120/74   01/29/20 91/66   01/23/20 112/76     Pulse Readings from Last 3 Encounters:   12/09/20 87   01/29/20 80   01/23/20 78       /74 (BP Location: Right arm, Patient Position: Sitting, BP Method: Medium (Manual))   Pulse 87   Wt 55.9 kg (123 lb 3.8 oz)   LMP 09/28/2018   SpO2 98%   BMI 21.83 kg/m²     Objective:   Physical Exam   Constitutional: She is oriented to person, place, and time. She appears well-developed and well-nourished. No distress.   HENT:   Head: Normocephalic and atraumatic.   Nose: Nose normal.   Mouth/Throat: Oropharynx is clear and moist.   Eyes: Conjunctivae and EOM are normal. No scleral icterus.   Neck: Normal range of motion. Neck supple. No JVD  present. No thyromegaly present.   Cardiovascular: Normal rate, regular rhythm, S1 normal and S2 normal. Exam reveals no gallop, no S3, no S4 and no friction rub.   No murmur heard.  Pulmonary/Chest: Effort normal and breath sounds normal. No stridor. No respiratory distress. She has no wheezes. She has no rales. She exhibits no tenderness.   Abdominal: Soft. Bowel sounds are normal. She exhibits no distension and no mass. There is no abdominal tenderness. There is no rebound.   Genitourinary:    Genitourinary Comments: Deferred     Musculoskeletal: Normal range of motion.         General: No tenderness, deformity or edema.   Lymphadenopathy:     She has no cervical adenopathy.   Neurological: She is alert and oriented to person, place, and time. She exhibits normal muscle tone. Coordination normal.   Skin: Skin is warm and dry. No rash noted. She is not diaphoretic. No erythema. No pallor.   Psychiatric: She has a normal mood and affect. Her behavior is normal. Judgment and thought content normal.   Nursing note and vitals reviewed.      Lab Results   Component Value Date     07/24/2019    K 3.8 07/24/2019     07/24/2019    CO2 29 07/24/2019    BUN 9 07/24/2019    CREATININE 0.7 07/24/2019    GLU 83 07/24/2019    HGBA1C 5.4 05/05/2015    MG 2.0 11/30/2007    AST 23 07/24/2019    ALT 31 07/24/2019    ALBUMIN 4.3 07/24/2019    PROT 7.2 07/24/2019    BILITOT 0.9 07/24/2019    WBC 4.72 07/24/2019    HGB 12.3 07/24/2019    HCT 37.3 07/24/2019    MCV 91 07/24/2019     07/24/2019    INR 1.0 07/24/2019    TSH 3.867 07/24/2019    CHOL 227 (H) 08/16/2019    HDL 90 (H) 08/16/2019    LDLCALC 127.4 08/16/2019    TRIG 48 08/16/2019     Assessment:      1. Mixed hyperlipidemia    2. Undifferentiated connective tissue disease    3. Scleroderma    4. Gastroesophageal reflux disease, unspecified whether esophagitis present    5. Esophageal dysphagia    6. Iatrogenic hypothyroidism        Plan:   1. Chest pain,  BRAXTON  - ECG stress with oxygen sats - negative  - 2D ECHO - negative    2. HLD  - repeat labs  - will discuss statin if needed     3. Palpitations  - Holter - neg    4. Scleroderma/TERESA  - f/u with rheum    5. Hypothyroidism  - cont meds per PCP    6. GERD/esoph dysphagia  - f/u GI    Thank you for allowing me to participate in this patient's care. Please do not hesitate to contact me with any questions or concerns. Consult note has been forwarded to the referral physician.

## 2020-12-15 ENCOUNTER — TELEPHONE (OUTPATIENT)
Dept: RHEUMATOLOGY | Facility: CLINIC | Age: 50
End: 2020-12-15

## 2020-12-15 NOTE — TELEPHONE ENCOUNTER
Called pt regarding 2.45 pm appointment with Dr. GONZALES tomorrow, 12.16.20, no answer, left message for pt to call clinic back.

## 2020-12-16 ENCOUNTER — OFFICE VISIT (OUTPATIENT)
Dept: RHEUMATOLOGY | Facility: CLINIC | Age: 50
End: 2020-12-16
Payer: COMMERCIAL

## 2020-12-16 DIAGNOSIS — R76.8 SCL-70 ANTIBODY POSITIVE: Primary | ICD-10-CM

## 2020-12-16 PROCEDURE — 99214 OFFICE O/P EST MOD 30 MIN: CPT | Mod: 95,,, | Performed by: INTERNAL MEDICINE

## 2020-12-16 PROCEDURE — 99214 PR OFFICE/OUTPT VISIT, EST, LEVL IV, 30-39 MIN: ICD-10-PCS | Mod: 95,,, | Performed by: INTERNAL MEDICINE

## 2020-12-16 NOTE — PROGRESS NOTES
RHEUMATOLOGY FOLLOW UP - TELE VISIT     The patient location is: [place]  The chief complaint leading to consultation is: Follow up for UCTD  Visit type: Virtual visit with synchronous audio and video  Total time spent with patient: 10 MINUTES  Each patient to whom he or she provides medical services by telemedicine is:  (1) informed of the relationship between the physician and patient and the respective role of any other health care provider with respect to management of the patient; and (2) notified that he or she may decline to receive medical services by telemedicine and may withdraw from such care at any time.    HPI:-  Sonia Muñoz a 50 y.o. pleasant female seen today through My chart video visit for follow up.  She has LOW GRADE undifferentiated connective tissue disease with positive MARYAM panel, positive scleroderma antibody, arthralgias, myalgias, possible small fiber neuropathy like pain in her hands and feet without any associated diabetes.  She was on hydroxychloroquine with no adverse effects which was discontinued earlier this year due to disease inactivity. Other than mild GERD, she has been doing well.  No photosensitive malar rash, sicca syndrome or Raynaud's phenomenon today.      Review of Systems   Constitutional: Negative for chills and fever.   HENT: Negative for congestion and sore throat.    Eyes: Negative for blurred vision and redness.   Respiratory: Negative for cough and shortness of breath.    Cardiovascular: Negative for chest pain and leg swelling.   Gastrointestinal: Positive for heartburn. Negative for abdominal pain.   Genitourinary: Negative for dysuria.   Musculoskeletal: Positive for back pain and joint pain. Negative for falls, myalgias and neck pain.   Skin: Negative for rash.   Neurological: Negative for headaches.   Endo/Heme/Allergies: Does not bruise/bleed easily.   Psychiatric/Behavioral: Negative for memory loss. The patient does not have insomnia.        Past Medical  History:   Diagnosis Date    [facility] changes    Cervical spondylolysis     DDD (degenerative disc disease), lumbosacral     Fibromyalgia     Hyperlipidemia     Hyperthyroidism     Iatrogenic hypothyroidism     Photosensitivity 06/13/2019    Spondylosis     Undifferentiated connective tissue disease        Past Surgical History:   Procedure Laterality Date    AUGMENTATION OF BREAST Bilateral 2009    silicone    BREAST IMPLANTS      BREAST SURGERY Bilateral 2009    silicone    CERVICAL FUSION  01/10/2018    ACDF    COLONOSCOPY N/A 10/13/2017    Procedure: COLONOSCOPY;  Surgeon: Hugh Luciano MD;  Location: [facility];  Service: Endoscopy;  Laterality: N/A;    EAR MASTOIDECTOMY W/ COCHLEAR IMPLANT W/ LANDMARK      ESOPHAGOGASTRODUODENOSCOPY N/A 1/29/2020    Procedure: EGD (ESOPHAGOGASTRODUODENOSCOPY);  Surgeon: Tisha Mendosa MD;  Location: [facility];  Service: Endoscopy;  Laterality: N/A;    STAPEDES SURGERY      s[employer] Staped[employer] Piston (Safe to 3T magnet)    UPPER GASTROINTESTINAL ENDOSCOPY          Social History     Tobacco Use    Smoking status: Never Smoker    Smokeless tobacco: Never Used   Substance Use Topics    Alcohol use: Yes     Alcohol/week: 2.0 standard drinks     Types: 2 Cans of beer per week     Comment: socially     Drug use: No       Family History   Problem Relation Age of Onset    Hypertension Mother     Hypertension Father     Heart disease Father     Cancer Maternal Uncle         stomach cancer    Stomach cancer Maternal Uncle     Celiac disease Maternal Aunt     Breast cancer Paternal Aunt     Cirrhosis Neg Hx     Colon cancer Neg Hx     Colon polyps Neg Hx     Crohn's disease Neg Hx     Cystic fibrosis Neg Hx     Esophageal cancer Neg Hx     Hemochromatosis Neg Hx     Inflammatory bowel disease Neg Hx     Irritable bowel syndrome Neg Hx     Liver cancer Neg Hx     Liver disease  Neg Hx     Rectal cancer Neg Hx     Ulcerative colitis Neg Hx     Marin's disease Neg Hx     Lymphoma Neg Hx     Tuberculosis Neg Hx     Scleroderma Neg Hx     Rheum arthritis Neg Hx     Multiple sclerosis Neg Hx     Melanoma Neg Hx     Lupus Neg Hx     Psoriasis Neg Hx     Skin cancer Neg Hx        Review of patient's allergies indicates:   Allergen Reactions    Adhesive Rash       Physical exam:-    GEN: awake, alert, non-diaphoretic, no psychomotor agitation, no acute distress    HEENT :Head: atraumatic, normocephalic, no rashes noted, no lesions noted;    Eyes: NO redness, discharge, swelling, or lesions    Nose: NO redness, swelling, discharge, deformity, or impetigo/crusting    Skin: no lesions, wounds, erythema, or cyanosis noted on face or hands    Cardiopulmonary: no increased respiratory effort, speaking in clear sentences    MSK: normal ROM in the neck, Upper extremities, Lower extremities  Good ROM of hands, fist formation 100%  and , no obvious synovitis    Good ambulation in front of the camera    Neuro: cranial nerves grossly normal, speech normal rate and rhythm, orientation arrived to appointment on time with no prompting, moved both upper extremities equally    Pysch:  appearance, behavior, and attitude- well groomed, pleasant, cooperative    Medication List with Changes/Refills   Current Medications    B COMPLEX VITAMINS CAPSULE    Take 1 capsule by mouth once daily.    HYDROCORTISONE 2.5 % CREAM    Apply topically 2 (two) times daily.    LACTOBACILLUS ACIDOPHILUS (PROBIOTIC ORAL)    Take by mouth.    MULTIVITAMIN (ONE DAILY MULTIVITAMIN) PER TABLET    Take 1 tablet by mouth once daily.    PENCICLOVIR (DENAVIR) 1 % CREAM    Apply topically every 2 (two) hours.    RIZATRIPTAN (MAXALT-MLT) 5 MG DISINTEGRATING TABLET    Take 1 tablet (5 mg total) by mouth as needed (for Headache). May repeat in 2 hours if needed    SYNTHROID 100 MCG TABLET    Take 1 tablet (100 mcg total) by mouth  before breakfast. Administer on an empty stomach at least 30 minutes before food.       Assessment/Plans:-  1. 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  No Raynaud's.  No decrease in exercise tolerance.  CT scan done in the past showed no evidence of interstitial lung disease.  Discussed in detail about all symptoms and signs to report.  Follow-up in a year.  No labs.  Continue following up with gastroenterologist for gastroesophageal reflux disease.  Problem List Items Addressed This Visit     Scleroderma - Primary          Follow up in about 1 year (around 12/16/2021).    Disclaimer: This note was prepared using voice recognition system and is likely to have sound alike errors and is not proof read.  Please call me with any questions.

## 2021-01-19 RX ORDER — LEVOTHYROXINE SODIUM 100 UG/1
100 TABLET ORAL
Qty: 30 TABLET | Refills: 0 | Status: SHIPPED | OUTPATIENT
Start: 2021-01-19 | End: 2021-03-01

## 2021-01-21 ENCOUNTER — OFFICE VISIT (OUTPATIENT)
Dept: OPHTHALMOLOGY | Facility: CLINIC | Age: 51
End: 2021-01-21
Payer: COMMERCIAL

## 2021-01-21 DIAGNOSIS — Z79.899 LONG-TERM USE OF PLAQUENIL: ICD-10-CM

## 2021-01-21 DIAGNOSIS — M35.9 UNDIFFERENTIATED CONNECTIVE TISSUE DISEASE: Primary | ICD-10-CM

## 2021-01-21 PROCEDURE — 92014 COMPRE OPH EXAM EST PT 1/>: CPT | Mod: S$GLB,,, | Performed by: OPTOMETRIST

## 2021-01-21 PROCEDURE — 99999 PR PBB SHADOW E&M-EST. PATIENT-LVL II: CPT | Mod: PBBFAC,,, | Performed by: OPTOMETRIST

## 2021-01-21 PROCEDURE — 92014 PR EYE EXAM, EST PATIENT,COMPREHESV: ICD-10-PCS | Mod: S$GLB,,, | Performed by: OPTOMETRIST

## 2021-01-21 PROCEDURE — 99999 PR PBB SHADOW E&M-EST. PATIENT-LVL II: ICD-10-PCS | Mod: PBBFAC,,, | Performed by: OPTOMETRIST

## 2021-02-11 ENCOUNTER — PATIENT MESSAGE (OUTPATIENT)
Dept: INTERNAL MEDICINE | Facility: CLINIC | Age: 51
End: 2021-02-11

## 2021-02-27 ENCOUNTER — PATIENT MESSAGE (OUTPATIENT)
Dept: INTERNAL MEDICINE | Facility: CLINIC | Age: 51
End: 2021-02-27

## 2021-03-30 ENCOUNTER — PATIENT OUTREACH (OUTPATIENT)
Dept: ADMINISTRATIVE | Facility: OTHER | Age: 51
End: 2021-03-30

## 2021-04-01 ENCOUNTER — OFFICE VISIT (OUTPATIENT)
Dept: GASTROENTEROLOGY | Facility: CLINIC | Age: 51
End: 2021-04-01
Payer: COMMERCIAL

## 2021-04-01 DIAGNOSIS — R10.33 PERIUMBILICAL ABDOMINAL PAIN: ICD-10-CM

## 2021-04-01 DIAGNOSIS — Z01.818 PRE-OP TESTING: ICD-10-CM

## 2021-04-01 DIAGNOSIS — R14.0 ABDOMINAL BLOATING: Primary | ICD-10-CM

## 2021-04-01 PROCEDURE — 99214 OFFICE O/P EST MOD 30 MIN: CPT | Mod: 95,,, | Performed by: INTERNAL MEDICINE

## 2021-04-01 PROCEDURE — 99214 PR OFFICE/OUTPT VISIT, EST, LEVL IV, 30-39 MIN: ICD-10-PCS | Mod: 95,,, | Performed by: INTERNAL MEDICINE

## 2021-04-05 ENCOUNTER — TELEPHONE (OUTPATIENT)
Dept: ENDOSCOPY | Facility: HOSPITAL | Age: 51
End: 2021-04-05

## 2021-05-04 ENCOUNTER — OFFICE VISIT (OUTPATIENT)
Dept: INTERNAL MEDICINE | Facility: CLINIC | Age: 51
End: 2021-05-04
Payer: COMMERCIAL

## 2021-05-04 VITALS
HEART RATE: 96 BPM | DIASTOLIC BLOOD PRESSURE: 70 MMHG | WEIGHT: 123.25 LBS | BODY MASS INDEX: 21.84 KG/M2 | SYSTOLIC BLOOD PRESSURE: 110 MMHG | TEMPERATURE: 98 F | OXYGEN SATURATION: 99 % | HEIGHT: 63 IN

## 2021-05-04 DIAGNOSIS — G44.82 COITAL HEADACHE: ICD-10-CM

## 2021-05-04 DIAGNOSIS — Z00.00 ANNUAL PHYSICAL EXAM: Primary | ICD-10-CM

## 2021-05-04 DIAGNOSIS — E89.0 POSTABLATIVE HYPOTHYROIDISM: ICD-10-CM

## 2021-05-04 DIAGNOSIS — B00.1 RECURRENT COLD SORES: ICD-10-CM

## 2021-05-04 PROCEDURE — 99396 PREV VISIT EST AGE 40-64: CPT | Mod: S$GLB,,, | Performed by: FAMILY MEDICINE

## 2021-05-04 PROCEDURE — 99396 PR PREVENTIVE VISIT,EST,40-64: ICD-10-PCS | Mod: S$GLB,,, | Performed by: FAMILY MEDICINE

## 2021-05-04 PROCEDURE — 99999 PR PBB SHADOW E&M-EST. PATIENT-LVL III: ICD-10-PCS | Mod: PBBFAC,,, | Performed by: FAMILY MEDICINE

## 2021-05-04 PROCEDURE — 99999 PR PBB SHADOW E&M-EST. PATIENT-LVL III: CPT | Mod: PBBFAC,,, | Performed by: FAMILY MEDICINE

## 2021-05-04 RX ORDER — RIZATRIPTAN BENZOATE 5 MG/1
5 TABLET, ORALLY DISINTEGRATING ORAL
Qty: 30 TABLET | Refills: 0 | Status: SHIPPED | OUTPATIENT
Start: 2021-05-04 | End: 2022-10-18 | Stop reason: SDUPTHER

## 2021-05-04 RX ORDER — PENCICLOVIR 10 MG/G
CREAM TOPICAL
Qty: 5 G | Refills: 3 | Status: SHIPPED | OUTPATIENT
Start: 2021-05-04 | End: 2021-05-18 | Stop reason: SDUPTHER

## 2021-05-05 PROBLEM — R26.9 GAIT ABNORMALITY: Status: RESOLVED | Noted: 2019-07-24 | Resolved: 2021-05-05
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
## 2021-05-05 PROBLEM — R10.13 EPIGASTRIC PAIN: Status: RESOLVED | Noted: 2019-12-31 | Resolved: 2021-05-05

## 2021-05-05 PROBLEM — R47.1 DYSARTHRIA: Status: RESOLVED | Noted: 2018-06-01 | Resolved: 2021-05-05

## 2021-05-17 ENCOUNTER — LAB VISIT (OUTPATIENT)
Dept: LAB | Facility: HOSPITAL | Age: 51
End: 2021-05-17
Attending: INTERNAL MEDICINE
Payer: COMMERCIAL

## 2021-05-17 ENCOUNTER — PATIENT MESSAGE (OUTPATIENT)
Dept: ENDOSCOPY | Facility: HOSPITAL | Age: 51
End: 2021-05-17

## 2021-05-17 DIAGNOSIS — R14.0 ABDOMINAL BLOATING: ICD-10-CM

## 2021-05-17 DIAGNOSIS — Z00.00 ANNUAL PHYSICAL EXAM: ICD-10-CM

## 2021-05-17 DIAGNOSIS — R10.33 PERIUMBILICAL ABDOMINAL PAIN: ICD-10-CM

## 2021-05-17 LAB
ALBUMIN SERPL BCP-MCNC: 3.9 G/DL (ref 3.5–5.2)
ALP SERPL-CCNC: 65 U/L (ref 55–135)
ALT SERPL W/O P-5'-P-CCNC: 14 U/L (ref 10–44)
ANION GAP SERPL CALC-SCNC: 8 MMOL/L (ref 8–16)
AST SERPL-CCNC: 21 U/L (ref 10–40)
BILIRUB SERPL-MCNC: 0.7 MG/DL (ref 0.1–1)
BUN SERPL-MCNC: 15 MG/DL (ref 6–20)
CALCIUM SERPL-MCNC: 9.8 MG/DL (ref 8.7–10.5)
CHLORIDE SERPL-SCNC: 107 MMOL/L (ref 95–110)
CHOLEST SERPL-MCNC: 245 MG/DL (ref 120–199)
CHOLEST/HDLC SERPL: 3.2 {RATIO} (ref 2–5)
CO2 SERPL-SCNC: 27 MMOL/L (ref 23–29)
CREAT SERPL-MCNC: 0.7 MG/DL (ref 0.5–1.4)
ERYTHROCYTE [DISTWIDTH] IN BLOOD BY AUTOMATED COUNT: 13.2 % (ref 11.5–14.5)
EST. GFR  (AFRICAN AMERICAN): >60 ML/MIN/1.73 M^2
EST. GFR  (NON AFRICAN AMERICAN): >60 ML/MIN/1.73 M^2
ESTIMATED AVG GLUCOSE: 105 MG/DL (ref 68–131)
GLUCOSE SERPL-MCNC: 92 MG/DL (ref 70–110)
HBA1C MFR BLD: 5.3 % (ref 4–5.6)
HCT VFR BLD AUTO: 37.3 % (ref 37–48.5)
HDLC SERPL-MCNC: 76 MG/DL (ref 40–75)
HDLC SERPL: 31 % (ref 20–50)
HGB BLD-MCNC: 12 G/DL (ref 12–16)
LDLC SERPL CALC-MCNC: 147 MG/DL (ref 63–159)
MCH RBC QN AUTO: 30.5 PG (ref 27–31)
MCHC RBC AUTO-ENTMCNC: 32.2 G/DL (ref 32–36)
MCV RBC AUTO: 95 FL (ref 82–98)
NONHDLC SERPL-MCNC: 169 MG/DL
PLATELET # BLD AUTO: 229 K/UL (ref 150–450)
PMV BLD AUTO: 9.9 FL (ref 9.2–12.9)
POTASSIUM SERPL-SCNC: 4.7 MMOL/L (ref 3.5–5.1)
PROT SERPL-MCNC: 7.3 G/DL (ref 6–8.4)
RBC # BLD AUTO: 3.93 M/UL (ref 4–5.4)
SODIUM SERPL-SCNC: 142 MMOL/L (ref 136–145)
TRIGL SERPL-MCNC: 110 MG/DL (ref 30–150)
TSH SERPL DL<=0.005 MIU/L-ACNC: 1.65 UIU/ML (ref 0.4–4)
WBC # BLD AUTO: 5.28 K/UL (ref 3.9–12.7)

## 2021-05-17 PROCEDURE — 85027 COMPLETE CBC AUTOMATED: CPT | Performed by: FAMILY MEDICINE

## 2021-05-17 PROCEDURE — 80053 COMPREHEN METABOLIC PANEL: CPT | Performed by: FAMILY MEDICINE

## 2021-05-17 PROCEDURE — 83036 HEMOGLOBIN GLYCOSYLATED A1C: CPT | Performed by: FAMILY MEDICINE

## 2021-05-17 PROCEDURE — 83516 IMMUNOASSAY NONANTIBODY: CPT | Mod: 59 | Performed by: INTERNAL MEDICINE

## 2021-05-17 PROCEDURE — 80061 LIPID PANEL: CPT | Performed by: FAMILY MEDICINE

## 2021-05-17 PROCEDURE — 84443 ASSAY THYROID STIM HORMONE: CPT | Performed by: FAMILY MEDICINE

## 2021-05-17 PROCEDURE — 36415 COLL VENOUS BLD VENIPUNCTURE: CPT | Performed by: FAMILY MEDICINE

## 2021-05-18 ENCOUNTER — PATIENT MESSAGE (OUTPATIENT)
Dept: INTERNAL MEDICINE | Facility: CLINIC | Age: 51
End: 2021-05-18

## 2021-05-18 DIAGNOSIS — B00.1 RECURRENT COLD SORES: ICD-10-CM

## 2021-05-18 RX ORDER — PENCICLOVIR 10 MG/G
CREAM TOPICAL
Qty: 1.5 G | Refills: 3 | Status: SHIPPED | OUTPATIENT
Start: 2021-05-18

## 2021-05-20 ENCOUNTER — PATIENT MESSAGE (OUTPATIENT)
Dept: GASTROENTEROLOGY | Facility: CLINIC | Age: 51
End: 2021-05-20

## 2021-05-20 LAB
GLIADIN PEPTIDE IGA SER-ACNC: 4 UNITS
GLIADIN PEPTIDE IGG SER-ACNC: 2 UNITS
IGA SERPL-MCNC: 185 MG/DL (ref 70–400)
TTG IGA SER-ACNC: 8 UNITS
TTG IGG SER-ACNC: 4 UNITS

## 2021-05-27 ENCOUNTER — PATIENT MESSAGE (OUTPATIENT)
Dept: GASTROENTEROLOGY | Facility: CLINIC | Age: 51
End: 2021-05-27

## 2021-05-27 DIAGNOSIS — R14.0 ABDOMINAL BLOATING: Primary | ICD-10-CM

## 2021-05-28 DIAGNOSIS — R10.33 PERIUMBILICAL ABDOMINAL PAIN: Primary | ICD-10-CM

## 2021-06-04 ENCOUNTER — LAB VISIT (OUTPATIENT)
Dept: LAB | Facility: HOSPITAL | Age: 51
End: 2021-06-04
Attending: INTERNAL MEDICINE
Payer: COMMERCIAL

## 2021-06-04 DIAGNOSIS — R10.33 PERIUMBILICAL ABDOMINAL PAIN: ICD-10-CM

## 2021-06-04 PROCEDURE — 81375 HLA II TYPING AG EQUIV LR: CPT

## 2021-06-17 ENCOUNTER — PATIENT MESSAGE (OUTPATIENT)
Dept: GASTROENTEROLOGY | Facility: CLINIC | Age: 51
End: 2021-06-17

## 2021-06-17 LAB
CELIA INTERPRETATION: NORMAL
CELIA TESTING DATE: NORMAL
HLA DQA1 1: NORMAL
HLA DQA1 2: NORMAL
HLA DRB4 1: NORMAL
HLA-DP 1 SERO. EQUIV: NORMAL
HLA-DP 2 SERO. EQUIV: NORMAL
HLA-DPA1 1: NORMAL
HLA-DPA1 2: NORMAL
HLA-DPB1 1: NORMAL
HLA-DPB1 2: NORMAL
HLA-DQ 1 SERO. EQUIV: 2
HLA-DQ 2 SERO. EQUIV: 7
HLA-DQB1 1: NORMAL
HLA-DQB1 2: NORMAL
HLA-DRB1 1 SERO. EQUIV: NORMAL
HLA-DRB1 1: NORMAL
HLA-DRB1 2 SERO. EQUIV: NORMAL
HLA-DRB1 2: NORMAL
HLA-DRB3 1: NORMAL
HLA-DRB3 2: NORMAL
HLA-DRB345 1 SERO. EQUIV: NORMAL
HLA-DRB345 2 SERO. EQUIV: NORMAL
HLA-DRB4 2: NORMAL
HLA-DRB5 1: NORMAL
HLA-DRB5 2: NORMAL

## 2021-07-15 ENCOUNTER — PATIENT MESSAGE (OUTPATIENT)
Dept: GASTROENTEROLOGY | Facility: CLINIC | Age: 51
End: 2021-07-15

## 2021-08-26 ENCOUNTER — PATIENT MESSAGE (OUTPATIENT)
Dept: GASTROENTEROLOGY | Facility: CLINIC | Age: 51
End: 2021-08-26

## 2021-10-05 ENCOUNTER — PATIENT MESSAGE (OUTPATIENT)
Dept: INTERNAL MEDICINE | Facility: CLINIC | Age: 51
End: 2021-10-05

## 2021-11-09 ENCOUNTER — PATIENT OUTREACH (OUTPATIENT)
Dept: ADMINISTRATIVE | Facility: OTHER | Age: 51
End: 2021-11-09
Payer: COMMERCIAL

## 2021-12-15 ENCOUNTER — PATIENT OUTREACH (OUTPATIENT)
Dept: ADMINISTRATIVE | Facility: OTHER | Age: 51
End: 2021-12-15
Payer: COMMERCIAL

## 2021-12-15 ENCOUNTER — TELEPHONE (OUTPATIENT)
Dept: RHEUMATOLOGY | Facility: CLINIC | Age: 51
End: 2021-12-15
Payer: COMMERCIAL

## 2021-12-15 ENCOUNTER — HOSPITAL ENCOUNTER (OUTPATIENT)
Dept: RADIOLOGY | Facility: HOSPITAL | Age: 51
Discharge: HOME | End: 2021-12-15
Attending: OBSTETRICS & GYNECOLOGY
Payer: COMMERCIAL

## 2021-12-15 ENCOUNTER — OFFICE VISIT (OUTPATIENT)
Dept: OBSTETRICS AND GYNECOLOGY | Facility: CLINIC | Age: 51
End: 2021-12-15
Payer: COMMERCIAL

## 2021-12-15 VITALS
DIASTOLIC BLOOD PRESSURE: 82 MMHG | SYSTOLIC BLOOD PRESSURE: 134 MMHG | BODY MASS INDEX: 21.56 KG/M2 | HEIGHT: 63 IN | WEIGHT: 121.69 LBS

## 2021-12-15 DIAGNOSIS — Z12.31 SCREENING MAMMOGRAM, ENCOUNTER FOR: ICD-10-CM

## 2021-12-15 DIAGNOSIS — Z01.419 ENCOUNTER FOR GYNECOLOGICAL EXAMINATION WITHOUT ABNORMAL FINDING: Primary | ICD-10-CM

## 2021-12-15 DIAGNOSIS — N94.10 DYSPAREUNIA IN FEMALE: ICD-10-CM

## 2021-12-15 PROCEDURE — 99396 PR PREVENTIVE VISIT,EST,40-64: ICD-10-PCS | Mod: S$GLB,,, | Performed by: NURSE PRACTITIONER

## 2021-12-15 PROCEDURE — 99999 PR PBB SHADOW E&M-EST. PATIENT-LVL III: CPT | Mod: PBBFAC,,, | Performed by: NURSE PRACTITIONER

## 2021-12-15 PROCEDURE — 77067 SCR MAMMO BI INCL CAD: CPT | Mod: 26,,, | Performed by: RADIOLOGY

## 2021-12-15 PROCEDURE — 77063 BREAST TOMOSYNTHESIS BI: CPT | Mod: 26,,, | Performed by: RADIOLOGY

## 2021-12-15 PROCEDURE — 88175 CYTOPATH C/V AUTO FLUID REDO: CPT | Performed by: NURSE PRACTITIONER

## 2021-12-15 PROCEDURE — 99396 PREV VISIT EST AGE 40-64: CPT | Mod: S$GLB,,, | Performed by: NURSE PRACTITIONER

## 2021-12-15 PROCEDURE — 87624 HPV HI-RISK TYP POOLED RSLT: CPT | Performed by: NURSE PRACTITIONER

## 2021-12-15 PROCEDURE — 77067 MAMMO DIGITAL SCREENING BILAT WITH TOMO: ICD-10-PCS | Mod: 26,,, | Performed by: RADIOLOGY

## 2021-12-15 PROCEDURE — 99999 PR PBB SHADOW E&M-EST. PATIENT-LVL III: ICD-10-PCS | Mod: PBBFAC,,, | Performed by: NURSE PRACTITIONER

## 2021-12-15 PROCEDURE — 77063 MAMMO DIGITAL SCREENING BILAT WITH TOMO: ICD-10-PCS | Mod: 26,,, | Performed by: RADIOLOGY

## 2021-12-15 PROCEDURE — 77067 SCR MAMMO BI INCL CAD: CPT | Mod: TC,PO

## 2021-12-15 RX ORDER — FERROUS SULFATE 220 (44)/5
220 SOLUTION, ORAL ORAL DAILY
COMMUNITY

## 2021-12-15 RX ORDER — IBUPROFEN 800 MG/1
TABLET ORAL
COMMUNITY
End: 2022-08-01 | Stop reason: ALTCHOICE

## 2021-12-16 ENCOUNTER — OFFICE VISIT (OUTPATIENT)
Dept: RHEUMATOLOGY | Facility: CLINIC | Age: 51
End: 2021-12-16
Payer: COMMERCIAL

## 2021-12-16 VITALS
WEIGHT: 119.94 LBS | HEIGHT: 63 IN | DIASTOLIC BLOOD PRESSURE: 88 MMHG | HEART RATE: 86 BPM | BODY MASS INDEX: 21.25 KG/M2 | SYSTOLIC BLOOD PRESSURE: 129 MMHG

## 2021-12-16 DIAGNOSIS — R76.8 SCL-70 ANTIBODY POSITIVE: Primary | ICD-10-CM

## 2021-12-16 DIAGNOSIS — K90.41 NCGS (NON-CELIAC GLUTEN SENSITIVITY): ICD-10-CM

## 2021-12-16 PROCEDURE — 99214 OFFICE O/P EST MOD 30 MIN: CPT | Mod: S$GLB,,, | Performed by: INTERNAL MEDICINE

## 2021-12-16 PROCEDURE — 99999 PR PBB SHADOW E&M-EST. PATIENT-LVL III: ICD-10-PCS | Mod: PBBFAC,,, | Performed by: INTERNAL MEDICINE

## 2021-12-16 PROCEDURE — 99999 PR PBB SHADOW E&M-EST. PATIENT-LVL III: CPT | Mod: PBBFAC,,, | Performed by: INTERNAL MEDICINE

## 2021-12-16 PROCEDURE — 99214 PR OFFICE/OUTPT VISIT, EST, LEVL IV, 30-39 MIN: ICD-10-PCS | Mod: S$GLB,,, | Performed by: INTERNAL MEDICINE

## 2022-01-13 ENCOUNTER — OFFICE VISIT (OUTPATIENT)
Dept: GASTROENTEROLOGY | Facility: CLINIC | Age: 52
End: 2022-01-13
Payer: COMMERCIAL

## 2022-01-13 VITALS
BODY MASS INDEX: 22.11 KG/M2 | HEIGHT: 63 IN | WEIGHT: 124.75 LBS | DIASTOLIC BLOOD PRESSURE: 78 MMHG | HEART RATE: 72 BPM | SYSTOLIC BLOOD PRESSURE: 120 MMHG

## 2022-01-13 DIAGNOSIS — K90.41 GLUTEN-SENSITIVE ENTEROPATHY: Primary | ICD-10-CM

## 2022-01-13 PROCEDURE — 99213 PR OFFICE/OUTPT VISIT, EST, LEVL III, 20-29 MIN: ICD-10-PCS | Mod: S$GLB,,, | Performed by: INTERNAL MEDICINE

## 2022-01-13 PROCEDURE — 99999 PR PBB SHADOW E&M-EST. PATIENT-LVL IV: CPT | Mod: PBBFAC,,, | Performed by: INTERNAL MEDICINE

## 2022-01-13 PROCEDURE — 99999 PR PBB SHADOW E&M-EST. PATIENT-LVL IV: ICD-10-PCS | Mod: PBBFAC,,, | Performed by: INTERNAL MEDICINE

## 2022-01-13 PROCEDURE — 99213 OFFICE O/P EST LOW 20 MIN: CPT | Mod: S$GLB,,, | Performed by: INTERNAL MEDICINE

## 2022-01-13 NOTE — PROGRESS NOTES
Clinic Consult:  [facility] Consultation Note    Reason for Consult:  The encounter diagnosis was Gluten-sensitive enteropathy.    PCP: Leonardo Frey       HPI:  This is a 51 y.o. female here for follow up.  Son has celiac disease and her daughter has latent celiac disease. Ms. Melo underwent celiac serologies that was normal and EGD was normal. She was not able to tolerate a gluten challenge followed by EGD or serological testing. 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     When she is exposed to gluten she reports having diarrhea, abdominal cramping, nausea, bloating. She occasionally has hematochezia. Symptoms are relieved when on a gluten free diet.    Last colonoscopy was in 10/13/2017 and no polyps were removed. Repeat recommended in 10 years for screening.    ROS:  CONSTITUTIONAL: Denies weight change,  fatigue, fevers, chills, night sweats.  EYES: No changes in vision.   ENT: No oral lesions or sore throat.  HEMATOLOGICAL/Lymph: Denies bleeding tendency, bruising tendency. No swellings or enlarged lymph nodes.  CARDIOVASCULAR: Denies chest pain, shortness of breath, orthopnea and edema.  RESPIRATORY: Denies cough, hemoptysis, dyspnea, and wheezing.  GI: See HPI.  : Denies dysuria and hematuria  MUSCULOSKELETAL: Denies joint pain or swelling, back pain and muscle pain.  SKIN: Denies rashes.  NEUROLOGIC: Denies headaches, seizures and numbness.  PSYCHIATRIC: Denies depression or anxiety.  ENDOCRINE: Denies heat or cold intolerance and excessive thirst or urination.    Medical History:   Past Medical History:   Diagnosis Date    [facility] changes    Cervical spondylolysis     DDD (degenerative disc disease), lumbosacral     Fibromyalgia     Hyperlipidemia     Hyperthyroidism     Iatrogenic hypothyroidism     Photosensitivity 06/13/2019    Spondylosis     Undifferentiated connective tissue disease        Surgical  History:  Past Surgical History:   Procedure Laterality Date    AUGMENTATION OF BREAST Bilateral 2009    silicone    BREAST IMPLANTS      BREAST SURGERY Bilateral 2009    silicone    CERVICAL FUSION  01/10/2018    ACDF    COLONOSCOPY N/A 10/13/2017    Procedure: COLONOSCOPY;  Surgeon: Hugh Luciano MD;  Location: [facility];  Service: Endoscopy;  Laterality: N/A;    EAR MASTOIDECTOMY W/ COCHLEAR IMPLANT W/ LANDMARK      ESOPHAGOGASTRODUODENOSCOPY N/A 1/29/2020    Procedure: EGD (ESOPHAGOGASTRODUODENOSCOPY);  Surgeon: Tisha Mendosa MD;  Location: [facility];  Service: Endoscopy;  Laterality: N/A;    STAPEDES SURGERY      sMart Stapedes Piston (Safe to 3T magnet)    UPPER GASTROINTESTINAL ENDOSCOPY         Family History:   Family History   Problem Relation Age of Onset    Hypertension Mother     Hypertension Father     Heart disease Father     Cancer Maternal Uncle         stomach cancer    Stomach cancer Maternal Uncle     Celiac disease Maternal Aunt     Breast cancer Paternal Aunt     Cirrhosis Neg Hx     Colon cancer Neg Hx     Colon polyps Neg Hx     Crohn's disease Neg Hx     Cystic fibrosis Neg Hx     Esophageal cancer Neg Hx     Hemochromatosis Neg Hx     Inflammatory bowel disease Neg Hx     Irritable bowel syndrome Neg Hx     Liver cancer Neg Hx     Liver disease Neg Hx     Rectal cancer Neg Hx     Ulcerative colitis Neg Hx     Marin's disease Neg Hx     Lymphoma Neg Hx     Tuberculosis Neg Hx     Scleroderma Neg Hx     Rheum arthritis Neg Hx     Multiple sclerosis Neg Hx     Melanoma Neg Hx     Lupus Neg Hx     Psoriasis Neg Hx     Skin cancer Neg Hx        Social History:   Social History     Tobacco Use    Smoking status: Never Smoker    Smokeless tobacco: Never Used   Substance Use Topics    Alcohol use: Yes     Alcohol/week: 2.0 standard drinks     Types: 2 Cans of beer per week     Comment: socially     Drug use: No       Allergies:  "Reviewed    Home Medications:   Medication List with Changes/Refills   Current Medications    COLLAGEN MISC    by Misc.(Non-Drug; Combo Route) route.    FERROUS SULFATE 220 MG (44 MG IRON)/5 ML SOLUTION    Take 220 mg by mouth once daily.    IBUPROFEN (ADVIL,MOTRIN) 800 MG TABLET    ibuprofen Take No date recorded No form recorded No frequency recorded No route recorded No set duration recorded No set duration amount recorded active No dosage strength recorded No dosage strength units of measure recorded    MULTIVIT WITH MINERALS/LUTEIN (MULTIVITAMIN 50 PLUS ORAL)    multivitamin Take No date recorded No form recorded No frequency recorded No route recorded No set duration recorded No set duration amount recorded active No dosage strength recorded No dosage strength units of measure recorded    PENCICLOVIR (DENAVIR) 1 % CREAM    Apply topically every 2 (two) hours.    RIZATRIPTAN (MAXALT-MLT) 5 MG DISINTEGRATING TABLET    Take 1 tablet (5 mg total) by mouth as needed (for Headache). May repeat in 2 hours if needed    SYNTHROID 100 MCG TABLET    TAKE 1 TABLET BEFORE BREAKFAST         Physical Exam:  Vital Signs:  /78   Pulse 72   Ht 5' 3" (1.6 m)   Wt 56.6 kg (124 lb 12.5 oz)   LMP 09/28/2018   BMI 22.10 kg/m²   Body mass index is 22.1 kg/m².      GENERAL: No acute distress, A&Ox3  EYES: Anicteric, no pallor noted.  ENT: OP clear  NECK: Supple, no masses, no thyromegally.  CHEST: Equal breath sounds bilaterally, no wheezing.  CARDIOVASCULAR: Regular rate and rhythm. Murmurs, rubs and gallops absent.  ABDOMEN: soft, non-tender, non-distended, normal bowel sounds, no hepatosplenomegaly   EXTREMITIES: No clubbing, cyanosis or edema.  SKIN: Without lesion or erythema.  LYMPH: No cervical, axillary lymphadenopathy palpable.   NEUROLOGICAL: Grossly normal, no asterixis present.    Labs: Pertinent labs reviewed.    Assessment and Plan:  Gluten-sensitive enteropathy    Ms. Melo symptoms improved and resolved on a " gluten free diet. She had negative serologies for celiac disease, cannot tolerate a gluten challenge and has +ve HLA testing with an allele for DQ2. She can have celiac disease but unable to confirm the diagnosis. At the time her symptoms improve off of gluten. She will continue that. In regards to her occasional hematochezia if it recurs will plan for repeat colonoscopy.      No follow-ups on file.        Thank you so much for allowing me to participate in the care of Sonia Marlon Garcia MD  Gastroenterology

## 2022-01-13 NOTE — PATIENT INSTRUCTIONS
IF you notice blood in your stool please notify us and you will need a colonoscopy.   Otherwise continue a gluten free diet, at this point I will say you may have celiac disease but cannot say you have it definitely.     You will need a repeat colonoscopy in 2027 if all remains normal.

## 2022-02-01 ENCOUNTER — OFFICE VISIT (OUTPATIENT)
Dept: CARDIOLOGY | Facility: CLINIC | Age: 52
End: 2022-02-01
Payer: COMMERCIAL

## 2022-02-01 ENCOUNTER — PATIENT OUTREACH (OUTPATIENT)
Dept: ADMINISTRATIVE | Facility: OTHER | Age: 52
End: 2022-02-01
Payer: COMMERCIAL

## 2022-02-01 ENCOUNTER — HOSPITAL ENCOUNTER (OUTPATIENT)
Dept: CARDIOLOGY | Facility: HOSPITAL | Age: 52
Discharge: HOME | End: 2022-02-01
Attending: INTERNAL MEDICINE
Payer: COMMERCIAL

## 2022-02-01 ENCOUNTER — OFFICE VISIT (OUTPATIENT)
Dept: OPHTHALMOLOGY | Facility: CLINIC | Age: 52
End: 2022-02-01
Payer: COMMERCIAL

## 2022-02-01 VITALS
SYSTOLIC BLOOD PRESSURE: 122 MMHG | WEIGHT: 123 LBS | RESPIRATION RATE: 16 BRPM | OXYGEN SATURATION: 100 % | DIASTOLIC BLOOD PRESSURE: 84 MMHG | HEART RATE: 66 BPM | BODY MASS INDEX: 21.79 KG/M2 | HEIGHT: 63 IN

## 2022-02-01 DIAGNOSIS — R60.0 LOCALIZED EDEMA: ICD-10-CM

## 2022-02-01 DIAGNOSIS — E78.2 MIXED HYPERLIPIDEMIA: ICD-10-CM

## 2022-02-01 DIAGNOSIS — R13.19 ESOPHAGEAL DYSPHAGIA: ICD-10-CM

## 2022-02-01 DIAGNOSIS — R00.2 PALPITATIONS: Primary | ICD-10-CM

## 2022-02-01 DIAGNOSIS — M79.606 PAIN OF LOWER EXTREMITY, UNSPECIFIED LATERALITY: ICD-10-CM

## 2022-02-01 DIAGNOSIS — M34.9 SCLERODERMA: ICD-10-CM

## 2022-02-01 DIAGNOSIS — E03.2 IATROGENIC HYPOTHYROIDISM: ICD-10-CM

## 2022-02-01 DIAGNOSIS — Z00.00 ANNUAL PHYSICAL EXAM: ICD-10-CM

## 2022-02-01 DIAGNOSIS — R07.9 CHEST PAIN, UNSPECIFIED TYPE: ICD-10-CM

## 2022-02-01 DIAGNOSIS — M35.9 UNDIFFERENTIATED CONNECTIVE TISSUE DISEASE: ICD-10-CM

## 2022-02-01 DIAGNOSIS — R07.9 CHEST PAIN, UNSPECIFIED TYPE: Primary | ICD-10-CM

## 2022-02-01 DIAGNOSIS — R06.09 OTHER FORM OF DYSPNEA: ICD-10-CM

## 2022-02-01 DIAGNOSIS — T15.11XA FOREIGN BODY OF RIGHT CONJUNCTIVAL SAC, INITIAL ENCOUNTER: Primary | ICD-10-CM

## 2022-02-01 DIAGNOSIS — S05.01XA ABRASION OF RIGHT CORNEA, INITIAL ENCOUNTER: ICD-10-CM

## 2022-02-01 DIAGNOSIS — R00.2 PALPITATIONS: ICD-10-CM

## 2022-02-01 DIAGNOSIS — K21.9 GASTROESOPHAGEAL REFLUX DISEASE, UNSPECIFIED WHETHER ESOPHAGITIS PRESENT: ICD-10-CM

## 2022-02-01 PROCEDURE — 99214 PR OFFICE/OUTPT VISIT, EST, LEVL IV, 30-39 MIN: ICD-10-PCS | Mod: S$GLB,,, | Performed by: INTERNAL MEDICINE

## 2022-02-01 PROCEDURE — 99999 PR PBB SHADOW E&M-EST. PATIENT-LVL III: ICD-10-PCS | Mod: PBBFAC,,, | Performed by: OPTOMETRIST

## 2022-02-01 PROCEDURE — 99999 PR PBB SHADOW E&M-EST. PATIENT-LVL IV: ICD-10-PCS | Mod: PBBFAC,,, | Performed by: INTERNAL MEDICINE

## 2022-02-01 PROCEDURE — 65205 PR REMV F.B.,EYE,SUPERF CONJUNC: ICD-10-PCS | Mod: RT,S$GLB,, | Performed by: OPTOMETRIST

## 2022-02-01 PROCEDURE — 92012 INTRM OPH EXAM EST PATIENT: CPT | Mod: 25,S$GLB,, | Performed by: OPTOMETRIST

## 2022-02-01 PROCEDURE — 93010 ELECTROCARDIOGRAM REPORT: CPT | Mod: ,,, | Performed by: INTERNAL MEDICINE

## 2022-02-01 PROCEDURE — 99999 PR PBB SHADOW E&M-EST. PATIENT-LVL III: CPT | Mod: PBBFAC,,, | Performed by: OPTOMETRIST

## 2022-02-01 PROCEDURE — 99214 OFFICE O/P EST MOD 30 MIN: CPT | Mod: S$GLB,,, | Performed by: INTERNAL MEDICINE

## 2022-02-01 PROCEDURE — 65205 REMOVE FOREIGN BODY FROM EYE: CPT | Mod: RT,S$GLB,, | Performed by: OPTOMETRIST

## 2022-02-01 PROCEDURE — 93005 ELECTROCARDIOGRAM TRACING: CPT

## 2022-02-01 PROCEDURE — 92012 PR EYE EXAM, EST PATIENT,INTERMED: ICD-10-PCS | Mod: 25,S$GLB,, | Performed by: OPTOMETRIST

## 2022-02-01 PROCEDURE — 93010 EKG 12-LEAD: ICD-10-PCS | Mod: ,,, | Performed by: INTERNAL MEDICINE

## 2022-02-01 PROCEDURE — 99999 PR PBB SHADOW E&M-EST. PATIENT-LVL IV: CPT | Mod: PBBFAC,,, | Performed by: INTERNAL MEDICINE

## 2022-02-01 RX ORDER — AMLODIPINE BESYLATE 2.5 MG/1
2.5 TABLET ORAL DAILY
Qty: 30 TABLET | Refills: 3 | Status: SHIPPED | OUTPATIENT
Start: 2022-02-01 | End: 2022-09-12

## 2022-02-01 RX ORDER — MOXIFLOXACIN 5 MG/ML
1 SOLUTION/ DROPS OPHTHALMIC 3 TIMES DAILY
Qty: 1.4 ML | Refills: 0 | Status: SHIPPED | OUTPATIENT
Start: 2022-02-01 | End: 2022-02-08

## 2022-02-01 NOTE — PROGRESS NOTES
HPI     Eye Problem     Comments: OD              Comments     Patient states feels like something got into the right eye last night   while at work, very irritated, itchy, red, and watery.           Last edited by Laila Haddad on 2/1/2022 10:55 AM. (History)            Assessment /Plan     For exam results, see Encounter Report.    Foreign body of right conjunctival sac, initial encounter  Instilled one drop of tetracaine ophthalmic solution into the patient's eye. A sterile cotton swab was used to remove foreign body from the patient's conjunctiva with the patient seated behind the slit lamp.  The patient tolerated the procedure well and without complication. Rx for antibiotic drops was given.  Pt to RTC PRN if symptoms worsen or persist x 2 days.   Discussed above and answered questions.    Abrasion of right cornea, initial encounter  Small, healing abrasion just below inferior pupillary border   No infiltrate  vigamox as directed  Recommended Refresh gel q1hr -prn w/a, pm nayely qhs    Other orders  -     moxifloxacin (VIGAMOX) 0.5 % ophthalmic solution; Place 1 drop into the right eye 3 (three) times daily. for 7 days  Dispense: 1.4 mL; Refill: 0      RTC PRN with any worsening or if symptoms persist x 2 days  Discussed above and all questions were answered.

## 2022-02-01 NOTE — PROGRESS NOTES
Subjective:   Patient ID:  Sonia Melo is a 51 y.o. female who presents for cardiac consult of No chief complaint on file.      Hyperlipidemia  Associated symptoms include chest pain and shortness of breath.     The patient came in today for cardiac consult of No chief complaint on file.      Sonia Melo is a 51 y.o. female pt with connective tissue disorder/scleroderma, HLD, hypothyroidism presents for follow up CV eval.     7/26/19  Pt had a flutter/palpitations in the past, has been seeing Dr. Williamson. She had a stress test > 3 years ago. NO prior echo.   She was in ER recently, had a flutter every 3rd or 4th rhythm per pt NOT AFluter. She does have connective tissue disease, sometimes has sharp chest pain in center of chest. She also gets numbness and tingling. SHe has been getting more tired and BRAXTON. Discussed will do cardiac testing and may need pulm eval as well.     11/6/19  ECG stress negative, ECHO negative, Holter with rare PVCs. Overall feels well lately, No significant CP but occ gets it, pulse can fluctuate. Woke up once with tachycardia/palpitations.     12/9/20  Follow up since 11/2019. Overall stable now, had trouble swallowing at time but stable. Occ chest tightness/ maybe muscle pain. Occ has palpitations- rare. Drinks one cup coffee. Started taking Maxalt for migraines.   ECG - NSR    2/1/22  Follow up since 12/2020. BP and Hr stable. She has been gluten free since 6 months had more esoph issues. Her kids were diagnosed with celiac. She has issues with blood vessels - feels burning in hands with purple spots.   ECG - NSR     Patient feels no leg swelling, no PND, no dizziness, no syncope, no CNS symptoms.    Patient has fairly good exercise tolerance.    Patient is compliant with medications.    FH - CAD       HOLTER    TEST DESCRIPTION   PREDOMINANT RHYTHM  1. Sinus rhythm with heart rates varying between 51 and 145 bpm with an average of 86 bpm.     VENTRICULAR ARRHYTHMIAS  1. There  were very rare PVCs recorded totalling 2 and averaging less than 1 per hour.   2. There were no episodes of ventricular tachycardia.    SUPRA VENTRICULAR ARRHYTHMIAS  1. There were very rare PACs recorded totalling 4 and averaging less than 1 per hour.   2. There were no episodes of sustained supraventricular tachycardia.    CONCLUSIONS     1 - Normal left ventricular systolic function (EF 60-65%).     2 - Normal left ventricular diastolic function.     3 - Normal right ventricular systolic function .     4 - The estimated PA systolic pressure is 15 mmHg.     5 - No wall motion abnormalities.     6 - Trivial mitral regurgitation.     7 - Trivial to mild tricuspid regurgitation.     8 - Anterior mitral valve leaflet prolapse - mild.       This document has been electronically    SIGNED BY: Stanley Mary MD On: 08/16/2019 13:24    ECG stress  EKG Conclusions:    1. The EKG portion of this study is negative for ischemia at a moderate workload, and peak heart rate of 169 bpm (103% of predicted).   2. Blood pressure response to exercise was normal (Presenting BP: 131/95 Peak BP: 173/91).   3. No significant arrhythmias were present.   4. There were no symptoms of chest discomfort or significant dyspnea throughout the protocol.   5. The Taylor treadmill score was 6 suggesting a low probability for future cardiovascular events.        This document has been electronically    SIGNED BY: Jeremias Huber MD On: 08/05/2019 16:08    Past Medical History:   Diagnosis Date    [facility] changes    Cervical spondylolysis     DDD (degenerative disc disease), lumbosacral     Fibromyalgia     Hyperlipidemia     Hyperthyroidism     Iatrogenic hypothyroidism     Photosensitivity 06/13/2019    Spondylosis     Undifferentiated connective tissue disease        Past Surgical History:   Procedure Laterality Date    AUGMENTATION OF BREAST Bilateral 2009    silicone     BREAST IMPLANTS      BREAST SURGERY Bilateral 2009    silicone    CERVICAL FUSION  01/10/2018    ACDF    COLONOSCOPY N/A 10/13/2017    Procedure: COLONOSCOPY;  Surgeon: Hugh Luciano MD;  Location: [facility];  Service: Endoscopy;  Laterality: N/A;    EAR MASTOIDECTOMY W/ COCHLEAR IMPLANT W/ LANDMARK      ESOPHAGOGASTRODUODENOSCOPY N/A 1/29/2020    Procedure: EGD (ESOPHAGOGASTRODUODENOSCOPY);  Surgeon: Tisha Mendosa MD;  Location: [facility];  Service: Endoscopy;  Laterality: N/A;    STAPEDES SURGERY      sMart Stapedes Piston (Safe to 3T magnet)    UPPER GASTROINTESTINAL ENDOSCOPY         Social History     Tobacco Use    Smoking status: Never Smoker    Smokeless tobacco: Never Used   Substance Use Topics    Alcohol use: Yes     Alcohol/week: 2.0 standard drinks     Types: 2 Cans of beer per week     Comment: socially     Drug use: No       Family History   Problem Relation Age of Onset    Hypertension Mother     Hypertension Father     Heart disease Father     Cancer Maternal Uncle         stomach cancer    Stomach cancer Maternal Uncle     Celiac disease Maternal Aunt     Breast cancer Paternal Aunt     Cirrhosis Neg Hx     Colon cancer Neg Hx     Colon polyps Neg Hx     Crohn's disease Neg Hx     Cystic fibrosis Neg Hx     Esophageal cancer Neg Hx     Hemochromatosis Neg Hx     Inflammatory bowel disease Neg Hx     Irritable bowel syndrome Neg Hx     Liver cancer Neg Hx     Liver disease Neg Hx     Rectal cancer Neg Hx     Ulcerative colitis Neg Hx     Marin's disease Neg Hx     Lymphoma Neg Hx     Tuberculosis Neg Hx     Scleroderma Neg Hx     Rheum arthritis Neg Hx     Multiple sclerosis Neg Hx     Melanoma Neg Hx     Lupus Neg Hx     Psoriasis Neg Hx     Skin cancer Neg Hx        Patient's Medications   New Prescriptions    No medications on file   Previous Medications    COLLAGEN MISC    by Misc.(Non-Drug; Combo Route) route.    FERROUS SULFATE 220 MG  (44 MG IRON)/5 ML SOLUTION    Take 220 mg by mouth once daily.    IBUPROFEN (ADVIL,MOTRIN) 800 MG TABLET    ibuprofen Take No date recorded No form recorded No frequency recorded No route recorded No set duration recorded No set duration amount recorded active No dosage strength recorded No dosage strength units of measure recorded    MULTIVIT WITH MINERALS/LUTEIN (MULTIVITAMIN 50 PLUS ORAL)    multivitamin Take No date recorded No form recorded No frequency recorded No route recorded No set duration recorded No set duration amount recorded active No dosage strength recorded No dosage strength units of measure recorded    PENCICLOVIR (DENAVIR) 1 % CREAM    Apply topically every 2 (two) hours.    RIZATRIPTAN (MAXALT-MLT) 5 MG DISINTEGRATING TABLET    Take 1 tablet (5 mg total) by mouth as needed (for Headache). May repeat in 2 hours if needed    SYNTHROID 100 MCG TABLET    TAKE 1 TABLET BEFORE BREAKFAST   Modified Medications    No medications on file   Discontinued Medications    No medications on file       Review of Systems   Constitutional: Positive for malaise/fatigue.   HENT: Negative.    Eyes: Negative.    Respiratory: Positive for shortness of breath.    Cardiovascular: Positive for chest pain and palpitations.   Gastrointestinal: Negative.    Genitourinary: Negative.    Musculoskeletal: Negative.    Skin: Negative.    Neurological: Positive for dizziness.   Endo/Heme/Allergies: Negative.    Psychiatric/Behavioral: Negative.    All 12 systems otherwise negative.      Wt Readings from Last 3 Encounters:   02/01/22 55.8 kg (123 lb)   01/13/22 56.6 kg (124 lb 12.5 oz)   12/16/21 54.4 kg (119 lb 14.9 oz)     Temp Readings from Last 3 Encounters:   05/04/21 98.4 °F (36.9 °C) (Tympanic)   01/29/20 97.6 °F (36.4 °C) (Temporal)   12/31/19 96.5 °F (35.8 °C) (Tympanic)     BP Readings from Last 3 Encounters:   02/01/22 122/84   01/13/22 120/78   12/16/21 129/88     Pulse Readings from Last 3 Encounters:   02/01/22 66  "  01/13/22 72   12/16/21 86       /84 (BP Location: Right arm, Patient Position: Sitting, BP Method: Medium (Manual))   Pulse 66   Resp 16   Ht 5' 3" (1.6 m)   Wt 55.8 kg (123 lb)   LMP 09/28/2018   SpO2 100%   BMI 21.79 kg/m²     Objective:   Physical Exam  Vitals and nursing note reviewed.   Constitutional:       General: She is not in acute distress.     Appearance: She is well-developed. She is not diaphoretic.   HENT:      Head: Normocephalic and atraumatic.      Nose: Nose normal.   Eyes:      General: No scleral icterus.     Conjunctiva/sclera: Conjunctivae normal.   Neck:      Thyroid: No thyromegaly.      Vascular: No JVD.   Cardiovascular:      Rate and Rhythm: Normal rate and regular rhythm.      Heart sounds: S1 normal and S2 normal. No murmur heard.  No friction rub. No gallop. No S3 or S4 sounds.    Pulmonary:      Effort: Pulmonary effort is normal. No respiratory distress.      Breath sounds: Normal breath sounds. No stridor. No wheezing or rales.   Chest:      Chest wall: No tenderness.   Abdominal:      General: Bowel sounds are normal. There is no distension.      Palpations: Abdomen is soft. There is no mass.      Tenderness: There is no abdominal tenderness. There is no rebound.   Genitourinary:     Comments: Deferred  Musculoskeletal:         General: No tenderness or deformity. Normal range of motion.      Cervical back: Normal range of motion and neck supple.   Lymphadenopathy:      Cervical: No cervical adenopathy.   Skin:     General: Skin is warm and dry.      Coloration: Skin is not pale.      Findings: No erythema or rash.   Neurological:      Mental Status: She is alert and oriented to person, place, and time.      Motor: No abnormal muscle tone.      Coordination: Coordination normal.   Psychiatric:         Behavior: Behavior normal.         Thought Content: Thought content normal.         Judgment: Judgment normal.         Lab Results   Component Value Date     " 05/17/2021    K 4.7 05/17/2021     05/17/2021    CO2 27 05/17/2021    BUN 15 05/17/2021    CREATININE 0.7 05/17/2021    GLU 92 05/17/2021    HGBA1C 5.3 05/17/2021    MG 2.0 11/30/2007    AST 21 05/17/2021    ALT 14 05/17/2021    ALBUMIN 3.9 05/17/2021    PROT 7.3 05/17/2021    BILITOT 0.7 05/17/2021    WBC 5.28 05/17/2021    HGB 12.0 05/17/2021    HCT 37.3 05/17/2021    MCV 95 05/17/2021     05/17/2021    INR 1.0 07/24/2019    TSH 1.654 05/17/2021    CHOL 245 (H) 05/17/2021    HDL 76 (H) 05/17/2021    LDLCALC 147.0 05/17/2021    TRIG 110 05/17/2021     Assessment:      1. Chest pain, unspecified type    2. Other form of dyspnea    3. Undifferentiated connective tissue disease    4. Scleroderma    5. Gastroesophageal reflux disease, unspecified whether esophagitis present    6. Esophageal dysphagia    7. Mixed hyperlipidemia    8. Iatrogenic hypothyroidism        Plan:   1. Chest pain, BRAXTON  - ECG stress with oxygen sats - negative 8/2019  - 2D ECHO - negative    2. HLD  - elevated HDL  - cont to monitor  - low jeffrey diet     3. Palpitations  - Holter - neg    4. Scleroderma/TERESA  - f/u with rheum    5. Hypothyroidism, TSH 1.65  - cont Synthroid    6. GERD/esoph dysphagia  - f/u GI  - is gluten sensitive     7. Leg pain/numbness, possible Raynauds  - order leg u/s venous and arterial us  - start low dose Norvasc 2.5 mg    Thank you for allowing me to participate in this patient's care. Please do not hesitate to contact me with any questions or concerns. Consult note has been forwarded to the referral physician.

## 2022-02-07 ENCOUNTER — TELEPHONE (OUTPATIENT)
Dept: INTERNAL MEDICINE | Facility: CLINIC | Age: 52
End: 2022-02-07
Payer: COMMERCIAL

## 2022-02-07 NOTE — TELEPHONE ENCOUNTER
----- Message from Brenden Darling sent at 2/7/2022  1:55 PM CST -----  Contact: Janeth/Carey ashby  Type:  Pharmacy Calling to Clarify an RX    Name of Caller: Janeth  Pharmacy Name:[facility]   Prescription Name:SYNTHROID 100 mcg tablet  What do they need to clarify?: patient insurance doesn't cover medication, and would like to know if the generic is ok.  Best Call Back Number:[phone] ref# [id number]  Additional Information: pharmacy needs a response by today or tomorrow morning

## 2022-02-08 RX ORDER — LEVOTHYROXINE SODIUM 100 UG/1
100 TABLET ORAL
Qty: 90 TABLET | Refills: 3 | Status: SHIPPED | OUTPATIENT
Start: 2022-02-08 | End: 2022-02-16

## 2022-02-08 NOTE — TELEPHONE ENCOUNTER
It is okay by me for the generic as long as it is okay with Ms. Melo.    New prescription sent to pharmacy

## 2022-02-15 ENCOUNTER — PATIENT MESSAGE (OUTPATIENT)
Dept: INTERNAL MEDICINE | Facility: CLINIC | Age: 52
End: 2022-02-15
Payer: COMMERCIAL

## 2022-02-16 ENCOUNTER — OFFICE VISIT (OUTPATIENT)
Dept: INTERNAL MEDICINE | Facility: CLINIC | Age: 52
End: 2022-02-16
Payer: COMMERCIAL

## 2022-02-16 ENCOUNTER — HOSPITAL ENCOUNTER (OUTPATIENT)
Dept: CARDIOLOGY | Facility: HOSPITAL | Age: 52
Discharge: HOME | End: 2022-02-16
Attending: INTERNAL MEDICINE
Payer: COMMERCIAL

## 2022-02-16 VITALS
WEIGHT: 123.88 LBS | BODY MASS INDEX: 21.79 KG/M2 | OXYGEN SATURATION: 100 % | SYSTOLIC BLOOD PRESSURE: 122 MMHG | BODY MASS INDEX: 21.79 KG/M2 | DIASTOLIC BLOOD PRESSURE: 84 MMHG | HEIGHT: 63 IN | DIASTOLIC BLOOD PRESSURE: 88 MMHG | WEIGHT: 123 LBS | HEART RATE: 76 BPM | TEMPERATURE: 99 F | SYSTOLIC BLOOD PRESSURE: 124 MMHG | HEIGHT: 63 IN | RESPIRATION RATE: 17 BRPM | BODY MASS INDEX: 21.95 KG/M2 | HEIGHT: 63 IN | WEIGHT: 123 LBS | SYSTOLIC BLOOD PRESSURE: 122 MMHG | DIASTOLIC BLOOD PRESSURE: 84 MMHG

## 2022-02-16 DIAGNOSIS — R60.0 LOCALIZED EDEMA: ICD-10-CM

## 2022-02-16 DIAGNOSIS — R10.33 PERIUMBILICAL ABDOMINAL PAIN: ICD-10-CM

## 2022-02-16 DIAGNOSIS — R61 NIGHT SWEATS: Primary | ICD-10-CM

## 2022-02-16 DIAGNOSIS — M79.606 PAIN OF LOWER EXTREMITY, UNSPECIFIED LATERALITY: ICD-10-CM

## 2022-02-16 PROBLEM — M34.9 SCLERODERMA: Status: RESOLVED | Noted: 2019-03-18 | Resolved: 2022-02-16

## 2022-02-16 LAB
LEFT ANT TIBIAL SYS PSV: 47 CM/S
LEFT CFA PSV: 92 CM/S
LEFT PERONEAL SYS PSV: 57 CM/S
LEFT POPLITEAL PSV: 40 CM/S
LEFT POST TIBIAL SYS PSV: 42 CM/S
LEFT PROFUNDA SYS PSV: 68 CM/S
LEFT SUPER FEMORAL DIST SYS PSV: 75 CM/S
LEFT SUPER FEMORAL MID SYS PSV: 81 CM/S
LEFT SUPER FEMORAL OSTIAL SYS PSV: 67 CM/S
LEFT SUPER FEMORAL PROX SYS PSV: 83 CM/S
LEFT TIB/PER TRUNK SYS PSV: 62 CM/S
OHS CV LEFT LOWER EXTREMITY ABI (NO CALC): 1.09
OHS CV RIGHT ABI LOWER EXTREMITY (NO CALC): 1.1
RIGHT ANT TIBIAL SYS PSV: 46 CM/S
RIGHT CFA PSV: 112 CM/S
RIGHT PERONEAL SYS PSV: 52 CM/S
RIGHT POPLITEAL PSV: 53 CM/S
RIGHT POST TIBIAL SYS PSV: 35 CM/S
RIGHT PROFUNDA SYS PSV: 72 CM/S
RIGHT SUPER FEMORAL DIST SYS PSV: 75 CM/S
RIGHT SUPER FEMORAL MID SYS PSV: 89 CM/S
RIGHT SUPER FEMORAL OSTIAL SYS PSV: 102 CM/S
RIGHT SUPER FEMORAL PROX SYS PSV: 93 CM/S
RIGHT TIB/PER TRUNK SYS PSV: 60 CM/S

## 2022-02-16 PROCEDURE — 93925 LOWER EXTREMITY STUDY: CPT | Mod: PO

## 2022-02-16 PROCEDURE — 93925 CV US DOPPLER ARTERIAL LEGS BILATERAL (CUPID ONLY): ICD-10-PCS | Mod: 26,,, | Performed by: INTERNAL MEDICINE

## 2022-02-16 PROCEDURE — 99214 PR OFFICE/OUTPT VISIT, EST, LEVL IV, 30-39 MIN: ICD-10-PCS | Mod: S$GLB,,, | Performed by: FAMILY MEDICINE

## 2022-02-16 PROCEDURE — 99214 OFFICE O/P EST MOD 30 MIN: CPT | Mod: S$GLB,,, | Performed by: FAMILY MEDICINE

## 2022-02-16 PROCEDURE — 99999 PR PBB SHADOW E&M-EST. PATIENT-LVL IV: CPT | Mod: PBBFAC,,, | Performed by: FAMILY MEDICINE

## 2022-02-16 PROCEDURE — 99999 PR PBB SHADOW E&M-EST. PATIENT-LVL IV: ICD-10-PCS | Mod: PBBFAC,,, | Performed by: FAMILY MEDICINE

## 2022-02-16 PROCEDURE — 93970 CV US DOPPLER VENOUS LEGS BILATERAL (CUPID ONLY): ICD-10-PCS | Mod: 26,,, | Performed by: INTERNAL MEDICINE

## 2022-02-16 PROCEDURE — 93925 LOWER EXTREMITY STUDY: CPT | Mod: 26,,, | Performed by: INTERNAL MEDICINE

## 2022-02-16 PROCEDURE — 93970 EXTREMITY STUDY: CPT | Mod: 26,,, | Performed by: INTERNAL MEDICINE

## 2022-02-16 PROCEDURE — 93970 EXTREMITY STUDY: CPT | Mod: 50,PO

## 2022-02-16 RX ORDER — LEVOTHYROXINE SODIUM 100 UG/1
100 TABLET ORAL
Qty: 90 TABLET | Refills: 3 | Status: SHIPPED | OUTPATIENT
Start: 2022-02-16 | End: 2023-01-27

## 2022-02-16 NOTE — PROGRESS NOTES
Subjective:       Patient ID: Sonia Melo is a 51 y.o. female.    Chief Complaint: Abdominal Pain and Night Sweats    HPI  Night sweats in the last month soaking through clothes. No hot flashes during day. Having some temper flare ups.    H/o gluten sensitivity.     Stomach pain that is uncomfortable to touch. Present for a month. Comes and goes. More at night when laying down. Feels like she may have some lymph node enlargment in groin/neck area.   She is on iron supplement.  Says that she has a couple bowel movements per day but reports in the past she had imaging that showed significant amount of stool on x-ray.    Not having menstrual cycles for the last year or so.     Decreased appetite.     recently was diagnosed with CLL which has been emotional and stressful understandably.      Family History   Problem Relation Age of Onset    Hypertension Mother     Hypertension Father     Heart disease Father     Cancer Maternal Uncle         stomach cancer    Stomach cancer Maternal Uncle     Celiac disease Maternal Aunt     Breast cancer Paternal Aunt     Cirrhosis Neg Hx     Colon cancer Neg Hx     Colon polyps Neg Hx     Crohn's disease Neg Hx     Cystic fibrosis Neg Hx     Esophageal cancer Neg Hx     Hemochromatosis Neg Hx     Inflammatory bowel disease Neg Hx     Irritable bowel syndrome Neg Hx     Liver cancer Neg Hx     Liver disease Neg Hx     Rectal cancer Neg Hx     Ulcerative colitis Neg Hx     Marin's disease Neg Hx     Lymphoma Neg Hx     Tuberculosis Neg Hx     Scleroderma Neg Hx     Rheum arthritis Neg Hx     Multiple sclerosis Neg Hx     Melanoma Neg Hx     Lupus Neg Hx     Psoriasis Neg Hx     Skin cancer Neg Hx        Current Outpatient Medications:     amLODIPine (NORVASC) 2.5 MG tablet, Take 1 tablet (2.5 mg total) by mouth once daily., Disp: 30 tablet, Rfl: 3    COLLAGEN MISC, by Misc.(Non-Drug; Combo Route) route., Disp: , Rfl:     ferrous sulfate 220  "mg (44 mg iron)/5 mL solution, Take 220 mg by mouth once daily., Disp: , Rfl:     ibuprofen (ADVIL,MOTRIN) 800 MG tablet, ibuprofen Take No date recorded No form recorded No frequency recorded No route recorded No set duration recorded No set duration amount recorded active No dosage strength recorded No dosage strength units of measure recorded, Disp: , Rfl:     multivit with minerals/lutein (MULTIVITAMIN 50 PLUS ORAL), multivitamin Take No date recorded No form recorded No frequency recorded No route recorded No set duration recorded No set duration amount recorded active No dosage strength recorded No dosage strength units of measure recorded, Disp: , Rfl:     penciclovir (DENAVIR) 1 % cream, Apply topically every 2 (two) hours., Disp: 1.5 g, Rfl: 3    rizatriptan (MAXALT-MLT) 5 MG disintegrating tablet, Take 1 tablet (5 mg total) by mouth as needed (for Headache). May repeat in 2 hours if needed, Disp: 30 tablet, Rfl: 0    levothyroxine (SYNTHROID) 100 MCG tablet, Take 1 tablet (100 mcg total) by mouth before breakfast., Disp: 90 tablet, Rfl: 3    Review of Systems   Constitutional: Negative for chills and fever.   Respiratory: Negative for cough and shortness of breath.    Cardiovascular: Negative for chest pain.   Gastrointestinal: Positive for abdominal pain (in umbilical/LLQ). Negative for constipation, diarrhea and nausea.   Skin: Negative for rash.   Neurological: Negative for dizziness.       Objective:   /88 (BP Location: Left arm, Patient Position: Sitting, BP Method: Medium (Manual))   Pulse 76   Temp 99.3 °F (37.4 °C) (Temporal)   Resp 17   Ht 5' 3" (1.6 m)   Wt 56.2 kg (123 lb 14.4 oz)   LMP 09/28/2018   SpO2 100%   BMI 21.95 kg/m²      Physical Exam  Constitutional:       General: She is not in acute distress.     Appearance: She is well-developed and well-nourished. She is not diaphoretic.   HENT:      Head: Normocephalic and atraumatic.      Nose: Nose normal.   Eyes:      " General:         Right eye: No discharge.         Left eye: No discharge.      Extraocular Movements: EOM normal.      Conjunctiva/sclera: Conjunctivae normal.      Pupils: Pupils are equal, round, and reactive to light.   Neck:      Thyroid: No thyromegaly.   Cardiovascular:      Rate and Rhythm: Normal rate and regular rhythm.      Heart sounds: No murmur heard.      Pulmonary:      Effort: Pulmonary effort is normal. No respiratory distress.      Breath sounds: Normal breath sounds.   Abdominal:      General: There is no distension.      Palpations: Abdomen is soft.      Tenderness: There is abdominal tenderness.       Musculoskeletal:         General: No edema.   Lymphadenopathy:      Cervical: No cervical adenopathy.   Skin:     Findings: No rash.   Neurological:      Mental Status: She is alert and oriented to person, place, and time.   Psychiatric:         Mood and Affect: Mood and affect normal.         Behavior: Behavior normal.         Assessment & Plan     Problem List Items Addressed This Visit        GI    Periumbilical abdominal pain    Current Assessment & Plan     Recommended increased fiber and stool softeners.  If not improving will consider some imaging.            Other    Night sweats - Primary    Current Assessment & Plan     Differential includes thyroid hormone alteration although she has been consistently on the same dose.  Other concerns would be postmenopausal flashes verses infectious or other etiology.  She is not having any fever.  With the stomach this comfort would consider imaging at if not improving after taking stool softeners and all labs are normal.         Relevant Orders    TSH    CBC Auto Differential    Comprehensive Metabolic Panel            Immunizations Administered on Date of Encounter - 2/16/2022     No immunizations on file.           No follow-ups on file.    Disclaimer:  This note may have been prepared using voice recognition software, it may have not been  extensively proofed, as such there could be errors within the text such as sound alike errors.

## 2022-02-16 NOTE — ASSESSMENT & PLAN NOTE
Differential includes thyroid hormone alteration although she has been consistently on the same dose.  Other concerns would be postmenopausal flashes verses infectious or other etiology.  She is not having any fever.  With the stomach this comfort would consider imaging at if not improving after taking stool softeners and all labs are normal.

## 2022-02-23 ENCOUNTER — TELEPHONE (OUTPATIENT)
Dept: INTERNAL MEDICINE | Facility: CLINIC | Age: 52
End: 2022-02-23
Payer: COMMERCIAL

## 2022-02-23 DIAGNOSIS — D84.9 IMMUNOSUPPRESSED STATUS: ICD-10-CM

## 2022-02-23 NOTE — TELEPHONE ENCOUNTER
----- Message from Radha Garay LPN sent at 2/23/2022  4:37 PM CST -----  Contact: [phone]    ----- Message -----  From: Aliyah Larose  Sent: 2/23/2022   4:23 PM CST  To: Rocco VELAZQUEZ Staff    Type:  Pharmacy Calling to Clarify an RX    Name of Caller:[facility]  Pharmacy Name:  [facility] - [place] [address]  Phone: [phone] Fax: [phone]    Prescription Name:levothyroxine (SYNTHROID) 100 MCG tablet  What do they need to clarify?:please call its a dru ashby  Best Call Back Number: [phone]  Additional Information: reference  [id number]

## 2022-02-24 ENCOUNTER — TELEPHONE (OUTPATIENT)
Dept: INTERNAL MEDICINE | Facility: CLINIC | Age: 52
End: 2022-02-24
Payer: COMMERCIAL

## 2022-02-24 NOTE — TELEPHONE ENCOUNTER
----- Message from Levi Valiente DO sent at 2/24/2022  2:15 PM CST -----  Contact: [facility]  Ok for generic if patient ok with that  ----- Message -----  From: Radha Garay LPN  Sent: 2/24/2022   9:51 AM CST  To: Levi Valiente DO    advise  ----- Message -----  From: Brenden Darling  Sent: 2/24/2022   9:38 AM CST  To: Rocco VELAZQUEZ Staff    Type:  Pharmacy Calling to Clarify an RX    Name of Caller: [facility]   Pharmacy Name: [facility]   Prescription Name: levothyroxine (SYNTHROID) 100 MCG tablet   What do they need to clarify?: refill to soon note, patient plan doesn't cover name brand, and if it's ok for Generic brand   Best Call Back Number: [phone] ( ref# [id number])  Additional Information: [facility] states that the prescription will be closed out at the end of the day.

## 2022-02-24 NOTE — TELEPHONE ENCOUNTER
Pharmacy confirmed pt has medication filled by Dr. Frey 2/9/22 and  on 2/16/22 too soon for refills.

## 2022-03-15 ENCOUNTER — PATIENT MESSAGE (OUTPATIENT)
Dept: CARDIOLOGY | Facility: CLINIC | Age: 52
End: 2022-03-15
Payer: COMMERCIAL

## 2022-03-21 ENCOUNTER — PATIENT OUTREACH (OUTPATIENT)
Dept: ADMINISTRATIVE | Facility: OTHER | Age: 52
End: 2022-03-21
Payer: COMMERCIAL

## 2022-04-29 ENCOUNTER — TELEPHONE (OUTPATIENT)
Dept: CARDIOLOGY | Facility: CLINIC | Age: 52
End: 2022-04-29
Payer: COMMERCIAL

## 2022-04-29 ENCOUNTER — OFFICE VISIT (OUTPATIENT)
Dept: CARDIOLOGY | Facility: CLINIC | Age: 52
End: 2022-04-29
Payer: COMMERCIAL

## 2022-04-29 DIAGNOSIS — R13.19 ESOPHAGEAL DYSPHAGIA: ICD-10-CM

## 2022-04-29 DIAGNOSIS — R60.0 LOCALIZED EDEMA: ICD-10-CM

## 2022-04-29 DIAGNOSIS — K21.9 GASTROESOPHAGEAL REFLUX DISEASE, UNSPECIFIED WHETHER ESOPHAGITIS PRESENT: ICD-10-CM

## 2022-04-29 DIAGNOSIS — M34.9 SCLERODERMA: ICD-10-CM

## 2022-04-29 DIAGNOSIS — R00.2 PALPITATIONS: ICD-10-CM

## 2022-04-29 DIAGNOSIS — R07.9 CHEST PAIN, UNSPECIFIED TYPE: Primary | ICD-10-CM

## 2022-04-29 DIAGNOSIS — M35.9 UNDIFFERENTIATED CONNECTIVE TISSUE DISEASE: ICD-10-CM

## 2022-04-29 PROCEDURE — 99214 OFFICE O/P EST MOD 30 MIN: CPT | Mod: 95,,, | Performed by: INTERNAL MEDICINE

## 2022-04-29 PROCEDURE — 99214 PR OFFICE/OUTPT VISIT, EST, LEVL IV, 30-39 MIN: ICD-10-PCS | Mod: 95,,, | Performed by: INTERNAL MEDICINE

## 2022-04-29 NOTE — TELEPHONE ENCOUNTER
Spoke with the patient and scheduled appt    ----- Message from Stanley Mary MD sent at 4/29/2022  2:31 PM CDT -----  9 month follow up with and ECG, or sooner if chest pain symptoms are recurrent.

## 2022-04-29 NOTE — PROGRESS NOTES
Subjective:   Patient ID:  Sonia Melo is a 52 y.o. female who presents for cardiac consult of No chief complaint on file.    The patient location is: home  The chief complaint leading to consultation is: CV follow up    Visit type: audiovisual    Face to Face time with patient: 12 min  43 minutes of total time spent on the encounter, which includes face to face time and non-face to face time preparing to see the patient (eg, review of tests), Obtaining and/or reviewing separately obtained history, Documenting clinical information in the electronic or other health record, Independently interpreting results (not separately reported) and communicating results to the patient/family/caregiver, or Care coordination (not separately reported).         Each patient to whom he or she provides medical services by telemedicine is:  (1) informed of the relationship between the physician and patient and the respective role of any other health care provider with respect to management of the patient; and (2) notified that he or she may decline to receive medical services by telemedicine and may withdraw from such care at any time.    Notes:       Hyperlipidemia  Associated symptoms include chest pain and shortness of breath.     The patient came in today for cardiac consult of No chief complaint on file.      Sonia Melo is a 52 y.o. female pt with connective tissue disorder/scleroderma, HLD, hypothyroidism presents for follow up CV eval.     7/26/19  Pt had a flutter/palpitations in the past, has been seeing Dr. Williamson. She had a stress test > 3 years ago. NO prior echo.   She was in ER recently, had a flutter every 3rd or 4th rhythm per pt NOT AFluter. She does have connective tissue disease, sometimes has sharp chest pain in center of chest. She also gets numbness and tingling. SHe has been getting more tired and BRAXTON. Discussed will do cardiac testing and may need pulm eval as well.     11/6/19  ECG stress negative,  ECHO negative, Holter with rare PVCs. Overall feels well lately, No significant CP but occ gets it, pulse can fluctuate. Woke up once with tachycardia/palpitations.     12/9/20  Follow up since 11/2019. Overall stable now, had trouble swallowing at time but stable. Occ chest tightness/ maybe muscle pain. Occ has palpitations- rare. Drinks one cup coffee. Started taking Maxalt for migraines.   ECG - NSR    2/1/22  Follow up since 12/2020. BP and Hr stable. She has been gluten free since 6 months had more esoph issues. Her kids were diagnosed with celiac. She has issues with blood vessels - feels burning in hands with purple spots.   ECG - NSR     4/29/22  LE u/s neg for DVT and PAD in Feb 2022. She has some purple spots still in legs, unsure etiology ? Vasculitis? Needs to f/u with rheum. Has not tried any compression stockings yet. Had atypical CP from sleep a few times which resolved, thinks due to anxiety.     Patient feels no leg swelling, no PND, no dizziness, no syncope, no CNS symptoms.    Patient has fairly good exercise tolerance.    Patient is compliant with medications.    FH - CAD       Conclusion    · Triphasic waveforms B LE.  · Normal COOKIE B LE.  · Normal study.    Conclusion    · There is no evidence of a right lower extremity DVT.  · There is no evidence of a left lower extremity DVT.           HOLTER    TEST DESCRIPTION   PREDOMINANT RHYTHM  1. Sinus rhythm with heart rates varying between 51 and 145 bpm with an average of 86 bpm.     VENTRICULAR ARRHYTHMIAS  1. There were very rare PVCs recorded totalling 2 and averaging less than 1 per hour.   2. There were no episodes of ventricular tachycardia.    SUPRA VENTRICULAR ARRHYTHMIAS  1. There were very rare PACs recorded totalling 4 and averaging less than 1 per hour.   2. There were no episodes of sustained supraventricular tachycardia.    CONCLUSIONS     1 - Normal left ventricular systolic function (EF 60-65%).     2 - Normal left ventricular  diastolic function.     3 - Normal right ventricular systolic function .     4 - The estimated PA systolic pressure is 15 mmHg.     5 - No wall motion abnormalities.     6 - Trivial mitral regurgitation.     7 - Trivial to mild tricuspid regurgitation.     8 - Anterior mitral valve leaflet prolapse - mild.       This document has been electronically    SIGNED BY: Stanley Mary MD On: 08/16/2019 13:24    ECG stress  EKG Conclusions:    1. The EKG portion of this study is negative for ischemia at a moderate workload, and peak heart rate of 169 bpm (103% of predicted).   2. Blood pressure response to exercise was normal (Presenting BP: 131/95 Peak BP: 173/91).   3. No significant arrhythmias were present.   4. There were no symptoms of chest discomfort or significant dyspnea throughout the protocol.   5. The Taylor treadmill score was 6 suggesting a low probability for future cardiovascular events.        This document has been electronically    SIGNED BY: Jeremias Huber MD On: 08/05/2019 16:08    Past Medical History:   Diagnosis Date    [facility]    Cervical spondylolysis     DDD (degenerative disc disease), lumbosacral     Fibromyalgia     Hyperlipidemia     Hyperthyroidism     Iatrogenic hypothyroidism     Photosensitivity 06/13/2019    Spondylosis     Undifferentiated connective tissue disease        Past Surgical History:   Procedure Laterality Date    AUGMENTATION OF BREAST Bilateral 2009    silicone    BREAST IMPLANTS      BREAST SURGERY Bilateral 2009    silicone    CERVICAL FUSION  01/10/2018    ACDF    COLONOSCOPY N/A 10/13/2017    Procedure: COLONOSCOPY;  Surgeon: Hugh Luciano MD;  Location: [facility];  Service: Endoscopy;  Laterality: N/A;    EAR MASTOIDECTOMY W/ COCHLEAR IMPLANT W/ LANDMARK      ESOPHAGOGASTRODUODENOSCOPY N/A 1/29/2020    Procedure: EGD (ESOPHAGOGASTRODUODENOSCOPY);  Surgeon: Tisha STEVENS  MD Deondre;  Location: [facility] ENDO;  Service: Endoscopy;  Laterality: N/A;    STAPEDES SURGERY      sMart Stapedes Piston (Safe to 3T magnet)    UPPER GASTROINTESTINAL ENDOSCOPY         Social History     Tobacco Use    Smoking status: Never Smoker    Smokeless tobacco: Never Used   Substance Use Topics    Alcohol use: Yes     Alcohol/week: 2.0 standard drinks     Types: 2 Cans of beer per week     Comment: socially     Drug use: No       Family History   Problem Relation Age of Onset    Hypertension Mother     Hypertension Father     Heart disease Father     Cancer Maternal Uncle         stomach cancer    Stomach cancer Maternal Uncle     Celiac disease Maternal Aunt     Breast cancer Paternal Aunt     Cirrhosis Neg Hx     Colon cancer Neg Hx     Colon polyps Neg Hx     Crohn's disease Neg Hx     Cystic fibrosis Neg Hx     Esophageal cancer Neg Hx     Hemochromatosis Neg Hx     Inflammatory bowel disease Neg Hx     Irritable bowel syndrome Neg Hx     Liver cancer Neg Hx     Liver disease Neg Hx     Rectal cancer Neg Hx     Ulcerative colitis Neg Hx     Marin's disease Neg Hx     Lymphoma Neg Hx     Tuberculosis Neg Hx     Scleroderma Neg Hx     Rheum arthritis Neg Hx     Multiple sclerosis Neg Hx     Melanoma Neg Hx     Lupus Neg Hx     Psoriasis Neg Hx     Skin cancer Neg Hx        Patient's Medications   New Prescriptions    No medications on file   Previous Medications    AMLODIPINE (NORVASC) 2.5 MG TABLET    Take 1 tablet (2.5 mg total) by mouth once daily.    COLLAGEN MISC    by Misc.(Non-Drug; Combo Route) route.    FERROUS SULFATE 220 MG (44 MG IRON)/5 ML SOLUTION    Take 220 mg by mouth once daily.    IBUPROFEN (ADVIL,MOTRIN) 800 MG TABLET    ibuprofen Take No date recorded No form recorded No frequency recorded No route recorded No set duration recorded No set duration amount recorded active No dosage strength recorded No dosage strength units of measure recorded     LEVOTHYROXINE (SYNTHROID) 100 MCG TABLET    Take 1 tablet (100 mcg total) by mouth before breakfast.    MULTIVIT WITH MINERALS/LUTEIN (MULTIVITAMIN 50 PLUS ORAL)    multivitamin Take No date recorded No form recorded No frequency recorded No route recorded No set duration recorded No set duration amount recorded active No dosage strength recorded No dosage strength units of measure recorded    PENCICLOVIR (DENAVIR) 1 % CREAM    Apply topically every 2 (two) hours.    RIZATRIPTAN (MAXALT-MLT) 5 MG DISINTEGRATING TABLET    Take 1 tablet (5 mg total) by mouth as needed (for Headache). May repeat in 2 hours if needed   Modified Medications    No medications on file   Discontinued Medications    No medications on file       Review of Systems   Constitutional: Positive for malaise/fatigue.   HENT: Negative.    Eyes: Negative.    Respiratory: Positive for shortness of breath.    Cardiovascular: Positive for chest pain and palpitations.   Gastrointestinal: Negative.    Genitourinary: Negative.    Musculoskeletal: Negative.    Skin: Negative.    Neurological: Positive for dizziness.   Endo/Heme/Allergies: Negative.    Psychiatric/Behavioral: Negative.    All 12 systems otherwise negative.      Wt Readings from Last 3 Encounters:   02/16/22 55.8 kg (123 lb)   02/16/22 56.2 kg (123 lb 14.4 oz)   02/16/22 55.8 kg (123 lb)     Temp Readings from Last 3 Encounters:   02/16/22 99.3 °F (37.4 °C) (Temporal)   05/04/21 98.4 °F (36.9 °C) (Tympanic)   01/29/20 97.6 °F (36.4 °C) (Temporal)     BP Readings from Last 3 Encounters:   02/16/22 122/84   02/16/22 124/88   02/16/22 122/84     Pulse Readings from Last 3 Encounters:   02/16/22 76   02/01/22 66   01/13/22 72       LMP 09/28/2018     Objective:   Physical Exam  Constitutional:       General: She is not in acute distress.     Appearance: She is well-developed. She is not diaphoretic.   HENT:      Head: Normocephalic and atraumatic.      Mouth/Throat:      Pharynx: No oropharyngeal  exudate.   Eyes:      General: No scleral icterus.        Right eye: No discharge.         Left eye: No discharge.   Pulmonary:      Effort: Pulmonary effort is normal. No respiratory distress.   Skin:     Coloration: Skin is not pale.      Findings: No erythema or rash.   Neurological:      Mental Status: She is alert and oriented to person, place, and time.   Psychiatric:         Behavior: Behavior normal.         Thought Content: Thought content normal.         Judgment: Judgment normal.         Lab Results   Component Value Date     02/16/2022    K 5.0 02/16/2022     02/16/2022    CO2 29 02/16/2022    BUN 14 02/16/2022    CREATININE 0.7 02/16/2022    GLU 95 02/16/2022    HGBA1C 5.3 05/17/2021    MG 2.0 11/30/2007    AST 23 02/16/2022    ALT 19 02/16/2022    ALBUMIN 4.4 02/16/2022    PROT 7.9 02/16/2022    BILITOT 0.9 02/16/2022    WBC 5.26 02/16/2022    HGB 13.1 02/16/2022    HCT 40.2 02/16/2022    MCV 93 02/16/2022     02/16/2022    INR 1.0 07/24/2019    TSH 3.321 02/16/2022    CHOL 245 (H) 05/17/2021    HDL 76 (H) 05/17/2021    LDLCALC 147.0 05/17/2021    TRIG 110 05/17/2021     Assessment:      1. Chest pain, unspecified type    2. Localized edema    3. Palpitations    4. Undifferentiated connective tissue disease    5. Scleroderma    6. Gastroesophageal reflux disease, unspecified whether esophagitis present    7. Esophageal dysphagia        Plan:   1. Chest pain, BRAXTON - atypical  - ECG stress with oxygen sats - negative 8/2019  - 2D ECHO - negative  - if severe CP/SOB needs ER eval; r/o non cardiac etiologies as well    2. HLD  - elevated HDL  - cont to monitor  - low jeffrey diet     3. Palpitations  - Holter - neg    4. Scleroderma/TERESA  - f/u with rheum    5. Hypothyroidism, TSH 1.65  - cont Synthroid    6. GERD/esoph dysphagia  - f/u GI  - is gluten sensitive     7. Leg pain/numbness, possible Raynauds  - leg u/s venous and arterial us - negative  - started low dose Norvasc 2.5 mg - has  not taken recently     Thank you for allowing me to participate in this patient's care. Please do not hesitate to contact me with any questions or concerns. Consult note has been forwarded to the referral physician.

## 2022-06-01 ENCOUNTER — PATIENT MESSAGE (OUTPATIENT)
Dept: INTERNAL MEDICINE | Facility: CLINIC | Age: 52
End: 2022-06-01
Payer: COMMERCIAL

## 2022-06-02 PROBLEM — G62.9 SMALL FIBER NEUROPATHY: Status: RESOLVED | Noted: 2018-07-26 | Resolved: 2022-06-02

## 2022-06-02 PROBLEM — R61 NIGHT SWEATS: Status: RESOLVED | Noted: 2022-02-16 | Resolved: 2022-06-02

## 2022-06-02 PROBLEM — R41.3 MEMORY LOSS: Status: RESOLVED | Noted: 2018-06-01 | Resolved: 2022-06-02

## 2022-06-02 PROBLEM — K21.9 GERD (GASTROESOPHAGEAL REFLUX DISEASE): Status: RESOLVED | Noted: 2019-03-25 | Resolved: 2022-06-02

## 2022-06-02 PROBLEM — R10.33 PERIUMBILICAL ABDOMINAL PAIN: Status: RESOLVED | Noted: 2022-02-16 | Resolved: 2022-06-02

## 2022-08-01 ENCOUNTER — HOSPITAL ENCOUNTER (OUTPATIENT)
Dept: RADIOLOGY | Facility: HOSPITAL | Age: 52
Discharge: HOME | End: 2022-08-01
Attending: FAMILY MEDICINE
Payer: COMMERCIAL

## 2022-08-01 ENCOUNTER — OFFICE VISIT (OUTPATIENT)
Dept: INTERNAL MEDICINE | Facility: CLINIC | Age: 52
End: 2022-08-01
Payer: COMMERCIAL

## 2022-08-01 VITALS
RESPIRATION RATE: 18 BRPM | DIASTOLIC BLOOD PRESSURE: 82 MMHG | HEART RATE: 95 BPM | WEIGHT: 130.94 LBS | BODY MASS INDEX: 23.2 KG/M2 | SYSTOLIC BLOOD PRESSURE: 120 MMHG | TEMPERATURE: 98 F

## 2022-08-01 DIAGNOSIS — M54.2 NECK PAIN: ICD-10-CM

## 2022-08-01 DIAGNOSIS — M54.9 BACK PAIN, UNSPECIFIED BACK LOCATION, UNSPECIFIED BACK PAIN LATERALITY, UNSPECIFIED CHRONICITY: ICD-10-CM

## 2022-08-01 DIAGNOSIS — F41.9 ANXIETY: ICD-10-CM
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
## 2022-08-01 PROCEDURE — 99999 PR PBB SHADOW E&M-EST. PATIENT-LVL V: ICD-10-PCS | Mod: PBBFAC,,, | Performed by: FAMILY MEDICINE

## 2022-08-01 PROCEDURE — 72050 X-RAY EXAM NECK SPINE 4/5VWS: CPT | Mod: 26,,, | Performed by: RADIOLOGY

## 2022-08-01 PROCEDURE — 72070 XR THORACIC SPINE AP LATERAL: ICD-10-PCS | Mod: 26,,, | Performed by: RADIOLOGY

## 2022-08-01 PROCEDURE — 72070 X-RAY EXAM THORAC SPINE 2VWS: CPT | Mod: 26,,, | Performed by: RADIOLOGY

## 2022-08-01 PROCEDURE — 72110 X-RAY EXAM L-2 SPINE 4/>VWS: CPT | Mod: 26,,, | Performed by: RADIOLOGY

## 2022-08-01 PROCEDURE — 72050 XR CERVICAL SPINE COMPLETE 5 VIEW: ICD-10-PCS | Mod: 26,,, | Performed by: RADIOLOGY

## 2022-08-01 PROCEDURE — 72110 X-RAY EXAM L-2 SPINE 4/>VWS: CPT | Mod: TC

## 2022-08-01 PROCEDURE — 72070 X-RAY EXAM THORAC SPINE 2VWS: CPT | Mod: TC

## 2022-08-01 PROCEDURE — 72050 X-RAY EXAM NECK SPINE 4/5VWS: CPT | Mod: TC

## 2022-08-01 PROCEDURE — 99214 PR OFFICE/OUTPT VISIT, EST, LEVL IV, 30-39 MIN: ICD-10-PCS | Mod: S$GLB,,, | Performed by: FAMILY MEDICINE

## 2022-08-01 PROCEDURE — 72110 XR LUMBAR SPINE COMPLETE 5 VIEW: ICD-10-PCS | Mod: 26,,, | Performed by: RADIOLOGY

## 2022-08-01 PROCEDURE — 99214 OFFICE O/P EST MOD 30 MIN: CPT | Mod: S$GLB,,, | Performed by: FAMILY MEDICINE

## 2022-08-01 PROCEDURE — 99999 PR PBB SHADOW E&M-EST. PATIENT-LVL V: CPT | Mod: PBBFAC,,, | Performed by: FAMILY MEDICINE

## 2022-08-01 RX ORDER — DULOXETIN HYDROCHLORIDE 20 MG/1
20 CAPSULE, DELAYED RELEASE ORAL DAILY
Qty: 30 CAPSULE | Refills: 0 | Status: SHIPPED | OUTPATIENT
Start: 2022-08-01 | End: 2023-05-02

## 2022-08-01 RX ORDER — MELOXICAM 15 MG/1
15 TABLET ORAL DAILY
Qty: 30 TABLET | Refills: 0 | Status: SHIPPED | OUTPATIENT
Start: 2022-08-01 | End: 2022-10-18
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
## 2022-09-08 ENCOUNTER — LAB VISIT (OUTPATIENT)
Dept: LAB | Facility: HOSPITAL | Age: 52
End: 2022-09-08
Attending: ORTHOPAEDIC SURGERY
Payer: COMMERCIAL

## 2022-09-08 DIAGNOSIS — Z01.812 PRE-OPERATIVE LABORATORY EXAMINATION: ICD-10-CM

## 2022-09-08 DIAGNOSIS — M54.16 LUMBAR RADICULOPATHY: ICD-10-CM

## 2022-09-08 DIAGNOSIS — Z79.01 LONG TERM (CURRENT) USE OF ANTICOAGULANTS: ICD-10-CM

## 2022-09-08 LAB
APTT BLDCRRT: 25.6 SEC (ref 21–32)
BASOPHILS # BLD AUTO: 0.05 K/UL (ref 0–0.2)
BASOPHILS NFR BLD: 0.9 % (ref 0–1.9)
DIFFERENTIAL METHOD: NORMAL
EOSINOPHIL # BLD AUTO: 0.4 K/UL (ref 0–0.5)
EOSINOPHIL NFR BLD: 6.4 % (ref 0–8)
ERYTHROCYTE [DISTWIDTH] IN BLOOD BY AUTOMATED COUNT: 12.8 % (ref 11.5–14.5)
HCT VFR BLD AUTO: 39.2 % (ref 37–48.5)
HGB BLD-MCNC: 12.6 G/DL (ref 12–16)
IMM GRANULOCYTES # BLD AUTO: 0.01 K/UL (ref 0–0.04)
IMM GRANULOCYTES NFR BLD AUTO: 0.2 % (ref 0–0.5)
LYMPHOCYTES # BLD AUTO: 2.2 K/UL (ref 1–4.8)
LYMPHOCYTES NFR BLD: 38.5 % (ref 18–48)
MCH RBC QN AUTO: 30.3 PG (ref 27–31)
MCHC RBC AUTO-ENTMCNC: 32.1 G/DL (ref 32–36)
MCV RBC AUTO: 94 FL (ref 82–98)
MONOCYTES # BLD AUTO: 0.4 K/UL (ref 0.3–1)
MONOCYTES NFR BLD: 7.5 % (ref 4–15)
NEUTROPHILS # BLD AUTO: 2.6 K/UL (ref 1.8–7.7)
NEUTROPHILS NFR BLD: 46.5 % (ref 38–73)
NRBC BLD-RTO: 0 /100 WBC
PLATELET # BLD AUTO: 221 K/UL (ref 150–450)
PMV BLD AUTO: 9.2 FL (ref 9.2–12.9)
RBC # BLD AUTO: 4.16 M/UL (ref 4–5.4)
WBC # BLD AUTO: 5.61 K/UL (ref 3.9–12.7)

## 2022-09-08 PROCEDURE — 85025 COMPLETE CBC W/AUTO DIFF WBC: CPT | Mod: PO | Performed by: ORTHOPAEDIC SURGERY

## 2022-09-08 PROCEDURE — 36415 COLL VENOUS BLD VENIPUNCTURE: CPT | Mod: PO | Performed by: ORTHOPAEDIC SURGERY

## 2022-09-08 PROCEDURE — 85730 THROMBOPLASTIN TIME PARTIAL: CPT | Mod: PO | Performed by: ORTHOPAEDIC SURGERY

## 2022-09-09 ENCOUNTER — APPOINTMENT (OUTPATIENT)
Dept: LAB | Facility: HOSPITAL | Age: 52
End: 2022-09-09
Attending: ORTHOPAEDIC SURGERY
Payer: COMMERCIAL

## 2022-09-09 LAB
INR PPP: 0.9 (ref 0.8–1.2)
PROTHROMBIN TIME: 9.8 SEC (ref 9–12.5)

## 2022-09-09 PROCEDURE — 85610 PROTHROMBIN TIME: CPT | Mod: PO | Performed by: ORTHOPAEDIC SURGERY

## 2022-09-09 PROCEDURE — 36415 COLL VENOUS BLD VENIPUNCTURE: CPT | Mod: PO | Performed by: ORTHOPAEDIC SURGERY

## 2022-09-12 ENCOUNTER — OFFICE VISIT (OUTPATIENT)
Dept: INTERNAL MEDICINE | Facility: CLINIC | Age: 52
End: 2022-09-12
Payer: COMMERCIAL

## 2022-09-12 DIAGNOSIS — G44.329 CHRONIC POST-TRAUMATIC HEADACHE, NOT INTRACTABLE: Primary | ICD-10-CM
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
## 2022-09-12 DIAGNOSIS — G47.9 SLEEPING DIFFICULTY: ICD-10-CM

## 2022-09-12 PROCEDURE — 99214 OFFICE O/P EST MOD 30 MIN: CPT | Mod: 95,,, | Performed by: FAMILY MEDICINE

## 2022-09-12 PROCEDURE — 99214 PR OFFICE/OUTPT VISIT, EST, LEVL IV, 30-39 MIN: ICD-10-PCS | Mod: 95,,, | Performed by: FAMILY MEDICINE

## 2022-09-12 RX ORDER — METHOCARBAMOL 750 MG/1
TABLET, FILM COATED ORAL
COMMUNITY
Start: 2022-08-23

## 2022-09-12 RX ORDER — NORTRIPTYLINE HYDROCHLORIDE 50 MG/1
50 CAPSULE ORAL NIGHTLY
Qty: 30 CAPSULE | Refills: 0 | Status: SHIPPED | OUTPATIENT
Start: 2022-09-12 | End: 2022-10-18
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
## 2022-09-18 ENCOUNTER — PATIENT MESSAGE (OUTPATIENT)
Dept: INTERNAL MEDICINE | Facility: CLINIC | Age: 52
End: 2022-09-18
Payer: COMMERCIAL

## 2022-09-23 ENCOUNTER — OFFICE VISIT (OUTPATIENT)
Dept: OPHTHALMOLOGY | Facility: CLINIC | Age: 52
End: 2022-09-23
Payer: COMMERCIAL

## 2022-09-23 DIAGNOSIS — H04.129 DRY EYE: Primary | ICD-10-CM

## 2022-09-23 DIAGNOSIS — H52.4 PRESBYOPIA: ICD-10-CM

## 2022-09-23 PROCEDURE — 92014 COMPRE OPH EXAM EST PT 1/>: CPT | Mod: S$GLB,,, | Performed by: OPTOMETRIST

## 2022-09-23 PROCEDURE — 92014 PR EYE EXAM, EST PATIENT,COMPREHESV: ICD-10-PCS | Mod: S$GLB,,, | Performed by: OPTOMETRIST

## 2022-09-23 PROCEDURE — 99999 PR PBB SHADOW E&M-EST. PATIENT-LVL III: CPT | Mod: PBBFAC,,, | Performed by: OPTOMETRIST

## 2022-09-23 PROCEDURE — 99999 PR PBB SHADOW E&M-EST. PATIENT-LVL III: ICD-10-PCS | Mod: PBBFAC,,, | Performed by: OPTOMETRIST

## 2022-09-23 RX ORDER — CYCLOSPORINE 0.5 MG/ML
1 EMULSION OPHTHALMIC 2 TIMES DAILY
Qty: 60 EACH | Refills: 11 | Status: SHIPPED | OUTPATIENT
Start: 2022-09-23

## 2022-09-23 NOTE — PROGRESS NOTES
HPI    Pt co eyelids sticking to eyes in the middle of the night  Pt co crustiness  Pt co grittiness ou  Visine qam ou   No pain, just irritation  Pt co itching ou  Here for annual   No longer taking plaquenil   Last edited by Jose Robert on 9/23/2022  9:29 AM.            Assessment /Plan     For exam results, see Encounter Report.    Dry eye  -     cycloSPORINE (RESTASIS) 0.05 % ophthalmic emulsion; Place 1 drop into both eyes 2 (two) times daily.  Dispense: 60 each; Refill: 11  Start Restasis bid OU  Start systane bid OU and genteal gel qhs OU  Discussed steroid drop in the future if needed  Pt to let us know if symptoms persist or worsen    Presbyopia  Spec Rx is optional, ok to continue with OTC readers      RTC 1 yr for undilated eye exam or PRN if any problems.   Discussed above and answered questions.

## 2022-10-18 ENCOUNTER — LAB VISIT (OUTPATIENT)
Dept: LAB | Facility: HOSPITAL | Age: 52
End: 2022-10-18
Attending: FAMILY MEDICINE
Payer: COMMERCIAL

## 2022-10-18 ENCOUNTER — OFFICE VISIT (OUTPATIENT)
Dept: INTERNAL MEDICINE | Facility: CLINIC | Age: 52
End: 2022-10-18
Payer: COMMERCIAL

## 2022-10-18 VITALS
BODY MASS INDEX: 23.43 KG/M2 | HEIGHT: 63 IN | OXYGEN SATURATION: 98 % | DIASTOLIC BLOOD PRESSURE: 82 MMHG | SYSTOLIC BLOOD PRESSURE: 122 MMHG | WEIGHT: 132.25 LBS | HEART RATE: 83 BPM

## 2022-10-18 DIAGNOSIS — Z82.49 FAMILY HISTORY OF BRAIN ANEURYSM: ICD-10-CM

## 2022-10-18 DIAGNOSIS — Z11.59 NEED FOR HEPATITIS C SCREENING TEST: ICD-10-CM

## 2022-10-18 DIAGNOSIS — G44.329 CHRONIC POST-TRAUMATIC HEADACHE, NOT INTRACTABLE: ICD-10-CM

## 2022-10-18 DIAGNOSIS — G44.82 COITAL HEADACHE: ICD-10-CM

## 2022-10-18 DIAGNOSIS — Z00.00 ANNUAL PHYSICAL EXAM: ICD-10-CM

## 2022-10-18 DIAGNOSIS — E03.2 IATROGENIC HYPOTHYROIDISM: Chronic | ICD-10-CM

## 2022-10-18 DIAGNOSIS — E78.2 MIXED HYPERLIPIDEMIA: Chronic | ICD-10-CM

## 2022-10-18 DIAGNOSIS — Z00.00 ANNUAL PHYSICAL EXAM: Primary | ICD-10-CM

## 2022-10-18 LAB
ALBUMIN SERPL BCP-MCNC: 4.1 G/DL (ref 3.5–5.2)
ALP SERPL-CCNC: 86 U/L (ref 55–135)
ALT SERPL W/O P-5'-P-CCNC: 17 U/L (ref 10–44)
ANION GAP SERPL CALC-SCNC: 10 MMOL/L (ref 8–16)
AST SERPL-CCNC: 19 U/L (ref 10–40)
BILIRUB SERPL-MCNC: 0.5 MG/DL (ref 0.1–1)
BUN SERPL-MCNC: 13 MG/DL (ref 6–20)
CALCIUM SERPL-MCNC: 9.6 MG/DL (ref 8.7–10.5)
CHLORIDE SERPL-SCNC: 106 MMOL/L (ref 95–110)
CHOLEST SERPL-MCNC: 302 MG/DL (ref 120–199)
CHOLEST/HDLC SERPL: 2.9 {RATIO} (ref 2–5)
CO2 SERPL-SCNC: 26 MMOL/L (ref 23–29)
CREAT SERPL-MCNC: 0.7 MG/DL (ref 0.5–1.4)
ERYTHROCYTE [DISTWIDTH] IN BLOOD BY AUTOMATED COUNT: 13.4 % (ref 11.5–14.5)
EST. GFR  (NO RACE VARIABLE): >60 ML/MIN/1.73 M^2
GLUCOSE SERPL-MCNC: 85 MG/DL (ref 70–110)
HCT VFR BLD AUTO: 39.5 % (ref 37–48.5)
HCV AB SERPL QL IA: NORMAL
HDLC SERPL-MCNC: 105 MG/DL (ref 40–75)
HDLC SERPL: 34.8 % (ref 20–50)
HGB BLD-MCNC: 12.1 G/DL (ref 12–16)
LDLC SERPL CALC-MCNC: 181.8 MG/DL (ref 63–159)
MCH RBC QN AUTO: 30.6 PG (ref 27–31)
MCHC RBC AUTO-ENTMCNC: 30.6 G/DL (ref 32–36)
MCV RBC AUTO: 100 FL (ref 82–98)
NONHDLC SERPL-MCNC: 197 MG/DL
PLATELET # BLD AUTO: 241 K/UL (ref 150–450)
PMV BLD AUTO: 9.5 FL (ref 9.2–12.9)
POTASSIUM SERPL-SCNC: 4.9 MMOL/L (ref 3.5–5.1)
PROT SERPL-MCNC: 7.1 G/DL (ref 6–8.4)
RBC # BLD AUTO: 3.95 M/UL (ref 4–5.4)
SODIUM SERPL-SCNC: 142 MMOL/L (ref 136–145)
TRIGL SERPL-MCNC: 76 MG/DL (ref 30–150)
TSH SERPL DL<=0.005 MIU/L-ACNC: 1.6 UIU/ML (ref 0.4–4)
WBC # BLD AUTO: 5.85 K/UL (ref 3.9–12.7)

## 2022-10-18 PROCEDURE — 83036 HEMOGLOBIN GLYCOSYLATED A1C: CPT | Performed by: FAMILY MEDICINE

## 2022-10-18 PROCEDURE — 85027 COMPLETE CBC AUTOMATED: CPT | Performed by: FAMILY MEDICINE

## 2022-10-18 PROCEDURE — 36415 COLL VENOUS BLD VENIPUNCTURE: CPT | Performed by: FAMILY MEDICINE

## 2022-10-18 PROCEDURE — 86803 HEPATITIS C AB TEST: CPT | Performed by: FAMILY MEDICINE

## 2022-10-18 PROCEDURE — 84443 ASSAY THYROID STIM HORMONE: CPT | Performed by: FAMILY MEDICINE

## 2022-10-18 PROCEDURE — 99396 PR PREVENTIVE VISIT,EST,40-64: ICD-10-PCS | Mod: S$GLB,,, | Performed by: FAMILY MEDICINE

## 2022-10-18 PROCEDURE — 80061 LIPID PANEL: CPT | Performed by: FAMILY MEDICINE

## 2022-10-18 PROCEDURE — 99999 PR PBB SHADOW E&M-EST. PATIENT-LVL III: CPT | Mod: PBBFAC,,, | Performed by: FAMILY MEDICINE

## 2022-10-18 PROCEDURE — 80053 COMPREHEN METABOLIC PANEL: CPT | Performed by: FAMILY MEDICINE

## 2022-10-18 PROCEDURE — 99999 PR PBB SHADOW E&M-EST. PATIENT-LVL III: ICD-10-PCS | Mod: PBBFAC,,, | Performed by: FAMILY MEDICINE

## 2022-10-18 PROCEDURE — 99396 PREV VISIT EST AGE 40-64: CPT | Mod: S$GLB,,, | Performed by: FAMILY MEDICINE

## 2022-10-18 RX ORDER — TOPIRAMATE 50 MG/1
50 TABLET, FILM COATED ORAL NIGHTLY
Qty: 30 TABLET | Refills: 0 | Status: SHIPPED | OUTPATIENT
Start: 2022-10-18 | End: 2023-05-02

## 2022-10-18 RX ORDER — RIZATRIPTAN BENZOATE 5 MG/1
5 TABLET, ORALLY DISINTEGRATING ORAL
Qty: 30 TABLET | Refills: 0 | Status: SHIPPED | OUTPATIENT
Start: 2022-10-18 | End: 2024-03-12

## 2022-10-18 NOTE — PROGRESS NOTES
Subjective:   Patient ID: Sonia Melo is a 52 y.o. female.  Chief Complaint:  Annual Exam    Presents for annual physical exam.    Most recent visit September 2022 for chronic post traumatic headache  Treated with Pamelor 50 mg at night.    Reports that the medication causes fatigue and heaviness in the legs.   Would like to just use rizatriptan, Excedrin, and aleve     Last physical exam May 2021  Blood Counts are normal. No significant anemia.  Sugar, Kidney, Liver, and Electrolyte tests are all normal.  Cholesterol tests are normal. 10-year risk of a heart disease or stroke is <1%. Aspirin daily is not recommended. A statin cholesterol medication is not recommended.  A1c is in a normal range. No Prediabtes or Diabetes  Thyroid testing is normal.     Medical history for:  - Hypothyroidism.  On Synthroid 100 mcg daily.  Reports compliance.  Denies side effects.  No symptoms of hypo or hyperthyroidism.  - Recurrent fever blister/cold sores.  Use Denavir topically for outbreaks.  - Postcoital headache  - Mixed hyperlipidemia.  Not requiring medical treatment.    - undifferentiated autoimmune disease.  - Non celiac gluten sensitivity  - Osteoarthritis cervical and lumbar spine     Health maintenance needs include:  - Mammogram will be due in December 2022   - COVID, shingles, pneumonia, and flu vaccines  - Hepatitis-C screening     Patient reports increased urinary frequency over the last few days.   No associated dysuria, suprapubic pain, flank pain.     Also concerned based on her chronic headaches with potential for rate aneurysm  Positive family history of brain  No first-degree relative but 3 to 4+ second-degree relatives    Review of Systems   Constitutional:  Negative for chills, fatigue and fever.   HENT:  Negative for congestion, dental problem, ear pain, postnasal drip, rhinorrhea, sinus pressure and sore throat.    Eyes:  Negative for visual disturbance.   Respiratory:  Negative for cough, chest  "tightness, shortness of breath and wheezing.    Cardiovascular:  Negative for chest pain, palpitations and leg swelling.   Gastrointestinal:  Negative for abdominal pain, blood in stool, constipation, diarrhea, nausea and vomiting.   Endocrine: Positive for polyuria. Negative for polydipsia and polyphagia.   Genitourinary:  Positive for frequency. Negative for difficulty urinating, dysuria, flank pain, hematuria, pelvic pain, urgency, vaginal bleeding, vaginal discharge and vaginal pain.   Musculoskeletal:  Negative for myalgias.   Skin:  Negative for rash.   Neurological:  Positive for headaches. Negative for dizziness, syncope, weakness and light-headedness.   Hematological:  Negative for adenopathy.   Psychiatric/Behavioral:  Negative for decreased concentration, dysphoric mood and sleep disturbance. The patient is not nervous/anxious.      Objective:   /82 (BP Location: Left arm, Patient Position: Sitting, BP Method: Small (Manual))   Pulse 83   Ht 5' 3" (1.6 m)   Wt 60 kg (132 lb 4.4 oz)   LMP 09/28/2018   SpO2 98%   BMI 23.43 kg/m²     Physical Exam  Vitals and nursing note reviewed.   Constitutional:       Appearance: Normal appearance. She is well-developed and normal weight.   Neck:      Thyroid: No thyroid mass, thyromegaly or thyroid tenderness.   Cardiovascular:      Rate and Rhythm: Normal rate and regular rhythm.      Heart sounds: Normal heart sounds. No murmur heard.    No friction rub. No gallop.   Pulmonary:      Effort: Pulmonary effort is normal.      Breath sounds: Normal breath sounds. No decreased breath sounds, wheezing, rhonchi or rales.   Abdominal:      General: There is no distension.      Palpations: Abdomen is soft.      Tenderness: There is no abdominal tenderness.   Musculoskeletal:      Right lower leg: No edema.      Left lower leg: No edema.   Lymphadenopathy:      Cervical: No cervical adenopathy.   Skin:     Findings: No rash.   Neurological:      General: No focal " deficit present.      Mental Status: She is alert and oriented to person, place, and time.   Psychiatric:         Mood and Affect: Mood and affect normal.       Assessment:       ICD-10-CM ICD-9-CM   1. Annual physical exam  Z00.00 V70.0   2. Need for hepatitis C screening test  Z11.59 V73.89   3. Iatrogenic hypothyroidism  E03.2 244.3   4. Mixed hyperlipidemia  E78.2 272.2   5. Coital headache  G44.82 339.82   6. Chronic post-traumatic headache, not intractable  G44.329 339.22   7. Family history of brain aneurysm  Z82.49 V17.1     Plan:   Annual physical exam  Need for hepatitis C screening test  -     CBC Without Differential; Future; Expected date: 10/18/2022  -     Comprehensive Metabolic Panel; Future; Expected date: 10/18/2022  -     Lipid Panel; Future; Expected date: 10/18/2022  -     TSH; Future; Expected date: 10/18/2022  -     Hemoglobin A1C; Future; Expected date: 10/18/2022  -     Hepatitis C Antibody; Future; Expected date: 10/18/2022  -     Urinalysis, Reflex to Urine Culture Urine, Clean Catch; Future; Expected date: 10/18/2022  Blood pressure normal.  BMI 23.  Overall exam stable.    Check labs.  Treat as indicated.    Colon cancer screening up-to-date   Gyn exam up-to-date  Breast cancer screening due December 2022   Tetanus booster up-to-date  Declines/defers all other recommended vaccines    Iatrogenic hypothyroidism  Stable.  Asymptomatic.  Last TSH normal.    Recheck TSH today   Adjust current thyroid supplement if indicated     Mixed hyperlipidemia  Stable.  Asymptomatic.    Last 10 year cardiovascular risk less than 10%   Start statin if indicated     Coital headache  -     rizatriptan (MAXALT-MLT) 5 MG disintegrating tablet; Take 1 tablet (5 mg total) by mouth as needed (for Headache). May repeat in 2 hours if needed  Dispense: 30 tablet; Refill: 0    Chronic post-traumatic headache, not intractable  Family history of brain aneurysm  -     CTA Head and Neck (xpd); Future; Expected date:  10/18/2022  -     topiramate (TOPAMAX) 50 MG tablet; Take 1 tablet (50 mg total) by mouth nightly.  Dispense: 30 tablet; Refill: 0  Check CTA with history of Family aneurysm  Trial of Topamax 50 mg nightly for prevention   Continue Maxalt as needed     Follow-up with any/all specialists as scheduled     Return to clinic 1 year for annual physical exam or sooner as needed

## 2022-10-19 LAB
ESTIMATED AVG GLUCOSE: 105 MG/DL (ref 68–131)
HBA1C MFR BLD: 5.3 % (ref 4–5.6)

## 2022-10-20 PROBLEM — M79.18 MYOFASCIAL PAIN: Chronic | Status: RESOLVED | Noted: 2017-08-14 | Resolved: 2022-10-20

## 2022-10-20 PROBLEM — K90.41 NCGS (NON-CELIAC GLUTEN SENSITIVITY): Chronic | Status: ACTIVE | Noted: 2021-12-16

## 2022-10-27 ENCOUNTER — HOSPITAL ENCOUNTER (OUTPATIENT)
Dept: RADIOLOGY | Facility: HOSPITAL | Age: 52
Discharge: HOME | End: 2022-10-27
Attending: FAMILY MEDICINE
Payer: COMMERCIAL

## 2022-10-27 DIAGNOSIS — G44.329 CHRONIC POST-TRAUMATIC HEADACHE, NOT INTRACTABLE: ICD-10-CM

## 2022-10-27 DIAGNOSIS — Z82.49 FAMILY HISTORY OF BRAIN ANEURYSM: ICD-10-CM

## 2022-10-27 PROCEDURE — 70496 CT ANGIOGRAPHY HEAD: CPT | Mod: TC

## 2022-10-27 PROCEDURE — 25500020 PHARM REV CODE 255: Performed by: FAMILY MEDICINE

## 2022-10-27 PROCEDURE — 70498 CTA HEAD AND NECK (XPD): ICD-10-PCS | Mod: 26,,, | Performed by: RADIOLOGY

## 2022-10-27 PROCEDURE — 70496 CTA HEAD AND NECK (XPD): ICD-10-PCS | Mod: 26,,, | Performed by: RADIOLOGY

## 2022-10-27 PROCEDURE — 70496 CT ANGIOGRAPHY HEAD: CPT | Mod: 26,,, | Performed by: RADIOLOGY

## 2022-10-27 PROCEDURE — 70498 CT ANGIOGRAPHY NECK: CPT | Mod: 26,,, | Performed by: RADIOLOGY

## 2022-10-27 RX ADMIN — IOHEXOL 100 ML: 350 INJECTION, SOLUTION INTRAVENOUS at 10:10

## 2022-10-31 DIAGNOSIS — R93.89 ABNORMAL COMPUTED TOMOGRAPHY ANGIOGRAPHY (CTA) OF NECK: Primary | ICD-10-CM

## 2022-11-17 ENCOUNTER — OFFICE VISIT (OUTPATIENT)
Dept: CARDIOTHORACIC SURGERY | Facility: CLINIC | Age: 52
End: 2022-11-17
Payer: COMMERCIAL

## 2022-11-17 ENCOUNTER — PATIENT MESSAGE (OUTPATIENT)
Dept: INTERNAL MEDICINE | Facility: CLINIC | Age: 52
End: 2022-11-17
Payer: COMMERCIAL

## 2022-11-17 VITALS
WEIGHT: 132.06 LBS | HEIGHT: 62 IN | DIASTOLIC BLOOD PRESSURE: 88 MMHG | HEART RATE: 73 BPM | SYSTOLIC BLOOD PRESSURE: 127 MMHG | OXYGEN SATURATION: 99 % | BODY MASS INDEX: 24.3 KG/M2

## 2022-11-17 DIAGNOSIS — R93.89 ABNORMAL COMPUTED TOMOGRAPHY ANGIOGRAPHY (CTA) OF NECK: ICD-10-CM

## 2022-11-17 DIAGNOSIS — R93.89 ABNORMAL COMPUTED TOMOGRAPHY ANGIOGRAPHY (CTA) OF NECK: Primary | ICD-10-CM

## 2022-11-17 PROCEDURE — 99999 PR PBB SHADOW E&M-EST. PATIENT-LVL IV: CPT | Mod: PBBFAC,,, | Performed by: THORACIC SURGERY (CARDIOTHORACIC VASCULAR SURGERY)

## 2022-11-17 PROCEDURE — 99999 PR PBB SHADOW E&M-EST. PATIENT-LVL IV: ICD-10-PCS | Mod: PBBFAC,,, | Performed by: THORACIC SURGERY (CARDIOTHORACIC VASCULAR SURGERY)

## 2022-11-17 PROCEDURE — 99202 PR OFFICE/OUTPT VISIT, NEW, LEVL II, 15-29 MIN: ICD-10-PCS | Mod: S$GLB,,, | Performed by: THORACIC SURGERY (CARDIOTHORACIC VASCULAR SURGERY)

## 2022-11-17 PROCEDURE — 99202 OFFICE O/P NEW SF 15 MIN: CPT | Mod: S$GLB,,, | Performed by: THORACIC SURGERY (CARDIOTHORACIC VASCULAR SURGERY)

## 2022-11-17 RX ORDER — ATORVASTATIN CALCIUM 20 MG/1
20 TABLET, FILM COATED ORAL DAILY
Qty: 90 TABLET | Refills: 3 | Status: SHIPPED | OUTPATIENT
Start: 2022-11-17 | End: 2023-02-03

## 2022-11-17 NOTE — PROGRESS NOTES
Subjective:      Patient ID: Sonia Melo is a 52 y.o. female.    Chief Complaint: No chief complaint on file.    HPI:  52-year-old female with the asymptomatic less than 50% carotid stenosis on the left side internal carotid artery diagnosed with a CT angiogram of the head and neck is here for the consultation.  No history of TIA or no history of weakness.    Review of patient's allergies indicates:   Allergen Reactions    Adhesive Rash       Past Medical History:   Diagnosis Date    [facility]ix [facility] changes    Cervical spondylolysis     DDD (degenerative disc disease), lumbosacral     Fibromyalgia     Hyperlipidemia     Hyperthyroidism     Iatrogenic hypothyroidism     Photosensitivity 06/13/2019    Spondylosis     Undifferentiated connective tissue disease        Family History   Problem Relation Age of Onset    Hypertension Mother     Hypertension Father     Heart disease Father     Cancer Maternal Uncle         stomach cancer    Stomach cancer Maternal Uncle     Celiac disease Maternal Aunt     Breast cancer Paternal Aunt     Cirrhosis Neg Hx     Colon cancer Neg Hx     Colon polyps Neg Hx     Crohn's disease Neg Hx     Cystic fibrosis Neg Hx     Esophageal cancer Neg Hx     Hemochromatosis Neg Hx     Inflammatory bowel disease Neg Hx     Irritable bowel syndrome Neg Hx     Liver cancer Neg Hx     Liver disease Neg Hx     Rectal cancer Neg Hx     Ulcerative colitis Neg Hx     Marin's disease Neg Hx     Lymphoma Neg Hx     Tuberculosis Neg Hx     Scleroderma Neg Hx     Rheum arthritis Neg Hx     Multiple sclerosis Neg Hx     Melanoma Neg Hx     Lupus Neg Hx     Psoriasis Neg Hx     Skin cancer Neg Hx        Social History     Socioeconomic History    Marital status:     Number of children: 2   Occupational History     Employer: [employer]   Tobacco Use    Smoking status: Never    Smokeless tobacco: Never   Substance and Sexual  "Activity    Alcohol use: Yes     Alcohol/week: 2.0 standard drinks     Types: 2 Cans of beer per week     Comment: socially     Drug use: No    Sexual activity: Yes     Partners: Male     Birth control/protection: None   Social History Narrative    Wears a seatbelt.       Past Surgical History:   Procedure Laterality Date    AUGMENTATION OF BREAST Bilateral 2009    silicone    BREAST IMPLANTS      BREAST SURGERY Bilateral 2009    silicone    CERVICAL FUSION  01/10/2018    ACDF    COLONOSCOPY N/A 10/13/2017    Procedure: COLONOSCOPY;  Surgeon: Hugh Luciano MD;  Location: [facility];  Service: Endoscopy;  Laterality: N/A;    EAR MASTOIDECTOMY W/ COCHLEAR IMPLANT W/ LANDMARK      ESOPHAGOGASTRODUODENOSCOPY N/A 1/29/2020    Procedure: EGD (ESOPHAGOGASTRODUODENOSCOPY);  Surgeon: Tisha Mendosa MD;  Location: [facility];  Service: Endoscopy;  Laterality: N/A;    STAPEDES SURGERY      sMart Stapedes Piston (Safe to 3T magnet)    UPPER GASTROINTESTINAL ENDOSCOPY         Review of Systems   Constitutional:  Negative for activity change and appetite change.   HENT:  Negative for dental problem, nosebleeds and sore throat.    Eyes:  Negative for discharge and visual disturbance.   Respiratory:  Negative for cough, chest tightness and stridor.    Cardiovascular:  Negative for leg swelling.   Gastrointestinal:  Negative for abdominal distention and abdominal pain.   Genitourinary:  Negative for difficulty urinating and dysuria.   Musculoskeletal:  Negative for arthralgias, back pain and joint swelling.   Allergic/Immunologic: Negative for environmental allergies.   Neurological:  Negative for dizziness, syncope and headaches.   Hematological:  Does not bruise/bleed easily.   Psychiatric/Behavioral:  Negative for behavioral problems.         Objective:   /88   Pulse 73   Ht 5' 2" (1.575 m)   Wt 59.9 kg (132 lb 0.9 oz)   LMP 09/28/2018   SpO2 99%   BMI 24.15 kg/m²     CTA Head and Neck (xpd)  Narrative: " EXAMINATION:  CTA HEAD AND NECK (XPD)    CLINICAL HISTORY:  Post Trauma Headache and Family History Brain Aneurysm;Chronic post-traumatic headache, not intractable    TECHNIQUE:  CT angiogram was performed from the level of the max to the top of the head following the IV administration of 100mL of Omnipaque 350.   Sagittal and coronal reconstructions and maximum intensity projection reconstructions were performed. Arterial stenosis percentages are based on NASCET measurement criteria.    COMPARISON:  CT head 07/24/2019    FINDINGS:  Thoracic aorta and great vessel origins are unremarkable.  Bilateral common carotid arteries unremarkable.    Right ICA unremarkable.  Left a ICA demonstrates short-segment both calcified and noncalcified atherosclerotic plaquing with less than 50% stenosis.  More distal left ICA is unremarkable.    Anterior, middle and posterior cerebral arteries unremarkable without stenosis or aneurysm.  Hypoplastic posterior communicating arteries noted.  Basilar artery unremarkable.  Vertebral arteries unremarkable.    Normal cerebral venous opacification.    Lung apices demonstrate stable scarring.  Neck soft tissues unremarkable.  Orbits unremarkable.  Paranasal sinuses and mastoid air cells clear.  No acute intracranial abnormality.  No acute osseous abnormality.  Impression: Short-segment stenosis of the left ICA origin with stenosis felt to be less than 50%.  Correlate with carotid ultrasound as clinically indicated.  Otherwise unremarkable CTA head/neck.    Electronically signed by: Cosme Toth MD  Date:    10/27/2022  Time:    11:42         Physical Exam  Vitals and nursing note reviewed.   Constitutional:       Appearance: Normal appearance.   HENT:      Head: Normocephalic and atraumatic.      Mouth/Throat:      Mouth: Mucous membranes are moist.   Eyes:      Extraocular Movements: Extraocular movements intact.      Pupils: Pupils are equal, round, and reactive to light.    Cardiovascular:      Rate and Rhythm: Normal rate and regular rhythm.      Pulses: Normal pulses.      Heart sounds: Normal heart sounds.   Pulmonary:      Effort: Pulmonary effort is normal.      Breath sounds: Normal breath sounds.   Abdominal:      Palpations: Abdomen is soft.   Musculoskeletal:         General: Normal range of motion.      Cervical back: Normal range of motion and neck supple.   Skin:     General: Skin is warm.      Capillary Refill: Capillary refill takes less than 2 seconds.   Neurological:      General: No focal deficit present.      Mental Status: She is alert and oriented to person, place, and time.   Psychiatric:         Mood and Affect: Mood normal.       Assessment:     1. Abnormal computed tomography angiography (CTA) of neck    CT angiogram shows less than 50% stenosis involving the left internal carotid artery  Hyperlipidemia    Plan   Aspirin 81 mg once a day  Enteric-coated after meals  Less than 50% asymptomatic carotid stenosis does not meet criteria for carotid intervention at this time.  We will be happy to participate in her care if the carotid stenosis become symptomatic or more than 80% without symptoms  I recommend yearly carotid Doppler with primary care Dr. Frey.  I will discharge this patient from my care today          Blanca Self MD,   [facility]  [place]

## 2022-11-29 ENCOUNTER — LAB VISIT (OUTPATIENT)
Dept: LAB | Facility: HOSPITAL | Age: 52
End: 2022-11-29
Attending: INTERNAL MEDICINE
Payer: COMMERCIAL

## 2022-11-29 ENCOUNTER — OFFICE VISIT (OUTPATIENT)
Dept: RHEUMATOLOGY | Facility: CLINIC | Age: 52
End: 2022-11-29
Payer: COMMERCIAL

## 2022-11-29 VITALS
DIASTOLIC BLOOD PRESSURE: 85 MMHG | BODY MASS INDEX: 23.98 KG/M2 | WEIGHT: 130.31 LBS | HEART RATE: 79 BPM | SYSTOLIC BLOOD PRESSURE: 126 MMHG | HEIGHT: 62 IN

## 2022-11-29 DIAGNOSIS — M06.4 UNDIFFERENTIATED INFLAMMATORY ARTHRITIS: Primary | ICD-10-CM

## 2022-11-29 DIAGNOSIS — M06.4 UNDIFFERENTIATED INFLAMMATORY ARTHRITIS: ICD-10-CM

## 2022-11-29 DIAGNOSIS — R76.8 SCL-70 ANTIBODY POSITIVE: ICD-10-CM

## 2022-11-29 PROBLEM — M19.90 UNDIFFERENTIATED INFLAMMATORY ARTHRITIS: Status: ACTIVE | Noted: 2022-11-29

## 2022-11-29 LAB
CK SERPL-CCNC: 106 U/L (ref 20–180)
CRP SERPL-MCNC: 1.2 MG/L (ref 0–8.2)
ERYTHROCYTE [SEDIMENTATION RATE] IN BLOOD BY PHOTOMETRIC METHOD: 24 MM/HR (ref 0–36)

## 2022-11-29 PROCEDURE — 82746 ASSAY OF FOLIC ACID SERUM: CPT | Performed by: INTERNAL MEDICINE

## 2022-11-29 PROCEDURE — 82085 ASSAY OF ALDOLASE: CPT | Performed by: INTERNAL MEDICINE

## 2022-11-29 PROCEDURE — 86235 NUCLEAR ANTIGEN ANTIBODY: CPT | Mod: 59 | Performed by: INTERNAL MEDICINE

## 2022-11-29 PROCEDURE — 99999 PR PBB SHADOW E&M-EST. PATIENT-LVL IV: CPT | Mod: PBBFAC,,, | Performed by: INTERNAL MEDICINE

## 2022-11-29 PROCEDURE — 85652 RBC SED RATE AUTOMATED: CPT | Performed by: INTERNAL MEDICINE

## 2022-11-29 PROCEDURE — 86039 ANTINUCLEAR ANTIBODIES (ANA): CPT | Performed by: INTERNAL MEDICINE

## 2022-11-29 PROCEDURE — 82550 ASSAY OF CK (CPK): CPT | Performed by: INTERNAL MEDICINE

## 2022-11-29 PROCEDURE — 86038 ANTINUCLEAR ANTIBODIES: CPT | Performed by: INTERNAL MEDICINE

## 2022-11-29 PROCEDURE — 99215 PR OFFICE/OUTPT VISIT, EST, LEVL V, 40-54 MIN: ICD-10-PCS | Mod: S$GLB,,, | Performed by: INTERNAL MEDICINE

## 2022-11-29 PROCEDURE — 86140 C-REACTIVE PROTEIN: CPT | Performed by: INTERNAL MEDICINE

## 2022-11-29 PROCEDURE — 99215 OFFICE O/P EST HI 40 MIN: CPT | Mod: S$GLB,,, | Performed by: INTERNAL MEDICINE

## 2022-11-29 PROCEDURE — 82607 VITAMIN B-12: CPT | Performed by: INTERNAL MEDICINE

## 2022-11-29 PROCEDURE — 99999 PR PBB SHADOW E&M-EST. PATIENT-LVL IV: ICD-10-PCS | Mod: PBBFAC,,, | Performed by: INTERNAL MEDICINE

## 2022-11-29 RX ORDER — PREDNISONE 5 MG/1
TABLET ORAL
Qty: 46 TABLET | Refills: 0 | Status: SHIPPED | OUTPATIENT
Start: 2022-11-29 | End: 2022-12-27
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
## 2022-11-30 LAB
FOLATE SERPL-MCNC: 9.8 NG/ML (ref 4–24)
VIT B12 SERPL-MCNC: >2000 PG/ML (ref 210–950)

## 2022-12-01 LAB
ANA PATTERN 1: NORMAL
ANA SER QL IF: POSITIVE
ANA TITR SER IF: NORMAL {TITER}

## 2022-12-02 LAB
ALDOLASE SERPL-CCNC: 2.7 U/L (ref 1.2–7.6)
ANTI SM ANTIBODY: 0.07 RATIO (ref 0–0.99)
ANTI SM/RNP ANTIBODY: 0.08 RATIO (ref 0–0.99)
ANTI-SM INTERPRETATION: NEGATIVE
ANTI-SM/RNP INTERPRETATION: NEGATIVE
ANTI-SSA ANTIBODY: 0.09 RATIO (ref 0–0.99)
ANTI-SSA INTERPRETATION: NEGATIVE
ANTI-SSB ANTIBODY: 0.07 RATIO (ref 0–0.99)
ANTI-SSB INTERPRETATION: NEGATIVE
DSDNA AB SER-ACNC: NORMAL [IU]/ML

## 2023-01-04 DIAGNOSIS — Z12.31 OTHER SCREENING MAMMOGRAM: ICD-10-CM

## 2023-01-27 RX ORDER — LEVOTHYROXINE SODIUM 100 UG/1
TABLET ORAL
Qty: 90 TABLET | Refills: 2 | Status: SHIPPED | OUTPATIENT
Start: 2023-01-27 | End: 2023-10-25

## 2023-01-27 NOTE — TELEPHONE ENCOUNTER
No new care gaps identified.  [facility] Embedded Care Gaps. Reference number: [id number]. 1/27/2023   12:16:50 AM CST

## 2023-01-27 NOTE — TELEPHONE ENCOUNTER
Refill Decision Note   Sonia Melo  is requesting a refill authorization.  Brief Assessment and Rationale for Refill:  Approve     Medication Therapy Plan:  TSH WNL 10/18/22    Medication Reconciliation Completed: No   Comments:     No Care Gaps recommended.     Note composed:4:51 AM 01/27/2023

## 2023-02-03 ENCOUNTER — HOSPITAL ENCOUNTER (OUTPATIENT)
Dept: CARDIOLOGY | Facility: HOSPITAL | Age: 53
Discharge: HOME | End: 2023-02-03
Attending: INTERNAL MEDICINE
Payer: COMMERCIAL

## 2023-02-03 ENCOUNTER — OFFICE VISIT (OUTPATIENT)
Dept: CARDIOLOGY | Facility: CLINIC | Age: 53
End: 2023-02-03
Payer: COMMERCIAL

## 2023-02-03 VITALS
BODY MASS INDEX: 24.26 KG/M2 | HEIGHT: 62 IN | DIASTOLIC BLOOD PRESSURE: 80 MMHG | SYSTOLIC BLOOD PRESSURE: 114 MMHG | WEIGHT: 131.81 LBS | HEART RATE: 73 BPM | OXYGEN SATURATION: 97 %

## 2023-02-03 DIAGNOSIS — E03.2 IATROGENIC HYPOTHYROIDISM: ICD-10-CM

## 2023-02-03 DIAGNOSIS — R00.2 PALPITATIONS: ICD-10-CM

## 2023-02-03 DIAGNOSIS — E78.2 MIXED HYPERLIPIDEMIA: ICD-10-CM

## 2023-02-03 DIAGNOSIS — K21.9 GASTROESOPHAGEAL REFLUX DISEASE, UNSPECIFIED WHETHER ESOPHAGITIS PRESENT: ICD-10-CM

## 2023-02-03 DIAGNOSIS — R13.19 ESOPHAGEAL DYSPHAGIA: ICD-10-CM

## 2023-02-03 DIAGNOSIS — R60.0 LOCALIZED EDEMA: Primary | ICD-10-CM

## 2023-02-03 DIAGNOSIS — R06.09 OTHER FORM OF DYSPNEA: ICD-10-CM

## 2023-02-03 DIAGNOSIS — R07.9 CHEST PAIN, UNSPECIFIED TYPE: ICD-10-CM

## 2023-02-03 DIAGNOSIS — I65.23 ATHEROSCLEROSIS OF BOTH CAROTID ARTERIES: ICD-10-CM

## 2023-02-03 DIAGNOSIS — M35.9 UNDIFFERENTIATED CONNECTIVE TISSUE DISEASE: ICD-10-CM

## 2023-02-03 DIAGNOSIS — M34.9 SCLERODERMA: ICD-10-CM

## 2023-02-03 DIAGNOSIS — M79.606 PAIN OF LOWER EXTREMITY, UNSPECIFIED LATERALITY: ICD-10-CM

## 2023-02-03 PROCEDURE — 99214 OFFICE O/P EST MOD 30 MIN: CPT | Mod: S$GLB,,, | Performed by: INTERNAL MEDICINE

## 2023-02-03 PROCEDURE — 93010 EKG 12-LEAD: ICD-10-PCS | Mod: ,,, | Performed by: STUDENT IN AN ORGANIZED HEALTH CARE EDUCATION/TRAINING PROGRAM

## 2023-02-03 PROCEDURE — 99999 PR PBB SHADOW E&M-EST. PATIENT-LVL IV: ICD-10-PCS | Mod: PBBFAC,,, | Performed by: INTERNAL MEDICINE

## 2023-02-03 PROCEDURE — 99214 PR OFFICE/OUTPT VISIT, EST, LEVL IV, 30-39 MIN: ICD-10-PCS | Mod: S$GLB,,, | Performed by: INTERNAL MEDICINE

## 2023-02-03 PROCEDURE — 93010 ELECTROCARDIOGRAM REPORT: CPT | Mod: ,,, | Performed by: STUDENT IN AN ORGANIZED HEALTH CARE EDUCATION/TRAINING PROGRAM

## 2023-02-03 PROCEDURE — 93005 ELECTROCARDIOGRAM TRACING: CPT

## 2023-02-03 PROCEDURE — 99999 PR PBB SHADOW E&M-EST. PATIENT-LVL IV: CPT | Mod: PBBFAC,,, | Performed by: INTERNAL MEDICINE

## 2023-02-03 RX ORDER — ASPIRIN 81 MG/1
81 TABLET ORAL DAILY
Qty: 90 TABLET | Refills: 3 | Status: SHIPPED | OUTPATIENT
Start: 2023-02-03 | End: 2024-03-12

## 2023-02-03 RX ORDER — PRAVASTATIN SODIUM 20 MG/1
20 TABLET ORAL NIGHTLY
Qty: 90 TABLET | Refills: 1 | Status: SHIPPED | OUTPATIENT
Start: 2023-02-03 | End: 2023-10-26

## 2023-02-03 NOTE — PROGRESS NOTES
Subjective:   Patient ID:  Sonia Melo is a 52 y.o. female who presents for cardiac consult of No chief complaint on file.    The patient came in today for cardiac consult of No chief complaint on file.      Sonia Melo is a 52 y.o. female pt with carotid artery disease, connective tissue disorder/scleroderma, HLD, hypothyroidism presents for follow up CV eval.     7/26/19  Pt had a flutter/palpitations in the past, has been seeing Dr. Williamson. She had a stress test > 3 years ago. NO prior echo.   She was in ER recently, had a flutter every 3rd or 4th rhythm per pt NOT AFluter. She does have connective tissue disease, sometimes has sharp chest pain in center of chest. She also gets numbness and tingling. SHe has been getting more tired and BRAXTON. Discussed will do cardiac testing and may need pulm eval as well.     11/6/19  ECG stress negative, ECHO negative, Holter with rare PVCs. Overall feels well lately, No significant CP but occ gets it, pulse can fluctuate. Woke up once with tachycardia/palpitations.     12/9/20  Follow up since 11/2019. Overall stable now, had trouble swallowing at time but stable. Occ chest tightness/ maybe muscle pain. Occ has palpitations- rare. Drinks one cup coffee. Started taking Maxalt for migraines.   ECG - NSR    2/1/22  Follow up since 12/2020. BP and Hr stable. She has been gluten free since 6 months had more esoph issues. Her kids were diagnosed with celiac. She has issues with blood vessels - feels burning in hands with purple spots.   ECG - NSR     4/29/22  LE u/s neg for DVT and PAD in Feb 2022. She has some purple spots still in legs, unsure etiology ? Vasculitis? Needs to f/u with rheum. Has not tried any compression stockings yet. Had atypical CP from sleep a few times which resolved, thinks due to anxiety.     2/3/23  Pt had CTA H&N with < 50% carotid artery disease last Oct. LDL is worse Oct 2022. BP and Hr well controlled. She was on Lipitor and could not tolerate  it.     Patient feels no leg swelling, no PND, no dizziness, no syncope, no CNS symptoms.    Patient has fairly good exercise tolerance.    Patient is compliant with medications.    FH - CAD        Short-segment stenosis of the left ICA origin with stenosis felt to be less than 50%.  Correlate with carotid ultrasound as clinically indicated.  Otherwise unremarkable CTA head/neck.     Electronically signed by: Cosme Toth MD  Date:                                            10/27/2022  Time:                                           11:42    Conclusion    Triphasic waveforms B LE.  Normal COOKIE B LE.  Normal study.    Conclusion    There is no evidence of a right lower extremity DVT.  There is no evidence of a left lower extremity DVT.       HOLTER    TEST DESCRIPTION   PREDOMINANT RHYTHM  1. Sinus rhythm with heart rates varying between 51 and 145 bpm with an average of 86 bpm.     VENTRICULAR ARRHYTHMIAS  1. There were very rare PVCs recorded totalling 2 and averaging less than 1 per hour.   2. There were no episodes of ventricular tachycardia.    SUPRA VENTRICULAR ARRHYTHMIAS  1. There were very rare PACs recorded totalling 4 and averaging less than 1 per hour.   2. There were no episodes of sustained supraventricular tachycardia.    CONCLUSIONS     1 - Normal left ventricular systolic function (EF 60-65%).     2 - Normal left ventricular diastolic function.     3 - Normal right ventricular systolic function .     4 - The estimated PA systolic pressure is 15 mmHg.     5 - No wall motion abnormalities.     6 - Trivial mitral regurgitation.     7 - Trivial to mild tricuspid regurgitation.     8 - Anterior mitral valve leaflet prolapse - mild.       This document has been electronically    SIGNED BY: Stanley Mary MD On: 08/16/2019 13:24    ECG stress  EKG Conclusions:    1. The EKG portion of this study is negative for ischemia at a moderate workload, and peak heart rate of 169 bpm (103% of predicted).   2. Blood  pressure response to exercise was normal (Presenting BP: 131/95 Peak BP: 173/91).   3. No significant arrhythmias were present.   4. There were no symptoms of chest discomfort or significant dyspnea throughout the protocol.   5. The Taylor treadmill score was 6 suggesting a low probability for future cardiovascular events.        This document has been electronically    SIGNED BY: Jeremias Huber MD On: 08/05/2019 16:08    Past Medical History:   Diagnosis Date    [facility]    Cervical spondylolysis     DDD (degenerative disc disease), lumbosacral     Fibromyalgia     Hyperlipidemia     Hyperthyroidism     Iatrogenic hypothyroidism     Photosensitivity 06/13/2019    Spondylosis     Undifferentiated connective tissue disease        Past Surgical History:   Procedure Laterality Date    AUGMENTATION OF BREAST Bilateral 2009    silicone    BREAST IMPLANTS      BREAST SURGERY Bilateral 2009    silicone    CERVICAL FUSION  01/10/2018    ACDF    COLONOSCOPY N/A 10/13/2017    Procedure: COLONOSCOPY;  Surgeon: Hugh Luciano MD;  Location: [facility];  Service: Endoscopy;  Laterality: N/A;    EAR MASTOIDECTOMY W/ COCHLEAR IMPLANT W/ LANDMARK      ESOPHAGOGASTRODUODENOSCOPY N/A 1/29/2020    Procedure: EGD (ESOPHAGOGASTRODUODENOSCOPY);  Surgeon: Tisha Mendosa MD;  Location: [facility];  Service: Endoscopy;  Laterality: N/A;    STAPEDES SURGERY      sMart Stapedes Piston (Safe to  magnet)    UPPER GASTROINTESTINAL ENDOSCOPY         Social History     Tobacco Use    Smoking status: Never    Smokeless tobacco: Never   Substance Use Topics    Alcohol use: Yes     Alcohol/week: 2.0 standard drinks     Types: 2 Cans of beer per week     Comment: socially     Drug use: No       Family History   Problem Relation Age of Onset    Hypertension Mother     Hypertension Father     Heart disease Father     Cancer Maternal Uncle         stomach cancer     Stomach cancer Maternal Uncle     Celiac disease Maternal Aunt     Breast cancer Paternal Aunt     Cirrhosis Neg Hx     Colon cancer Neg Hx     Colon polyps Neg Hx     Crohn's disease Neg Hx     Cystic fibrosis Neg Hx     Esophageal cancer Neg Hx     Hemochromatosis Neg Hx     Inflammatory bowel disease Neg Hx     Irritable bowel syndrome Neg Hx     Liver cancer Neg Hx     Liver disease Neg Hx     Rectal cancer Neg Hx     Ulcerative colitis Neg Hx     Marin's disease Neg Hx     Lymphoma Neg Hx     Tuberculosis Neg Hx     Scleroderma Neg Hx     Rheum arthritis Neg Hx     Multiple sclerosis Neg Hx     Melanoma Neg Hx     Lupus Neg Hx     Psoriasis Neg Hx     Skin cancer Neg Hx        Patient's Medications   New Prescriptions    ASPIRIN (ECOTRIN) 81 MG EC TABLET    Take 1 tablet (81 mg total) by mouth once daily.    PRAVASTATIN (PRAVACHOL) 20 MG TABLET    Take 1 tablet (20 mg total) by mouth every evening.   Previous Medications    COLLAGEN MISC    by Misc.(Non-Drug; Combo Route) route.    CYCLOSPORINE (RESTASIS) 0.05 % OPHTHALMIC EMULSION    Place 1 drop into both eyes 2 (two) times daily.    DULOXETINE (CYMBALTA) 20 MG CAPSULE    Take 1 capsule (20 mg total) by mouth once daily.    FERROUS SULFATE 220 MG (44 MG IRON)/5 ML SOLUTION    Take 220 mg by mouth once daily.    LEVOTHYROXINE (SYNTHROID) 100 MCG TABLET    TAKE 1 TABLET BEFORE BREAKFAST    METHOCARBAMOL (ROBAXIN) 750 MG TAB    TAKE ONE TABLET BY MOUTH EVERY 6 TO 8 HOURS AS NEEDED FOR MUSCLE SPASMS    MULTIVIT WITH MINERALS/LUTEIN (MULTIVITAMIN 50 PLUS ORAL)    multivitamin Take No date recorded No form recorded No frequency recorded No route recorded No set duration recorded No set duration amount recorded active No dosage strength recorded No dosage strength units of measure recorded    PENCICLOVIR (DENAVIR) 1 % CREAM    Apply topically every 2 (two) hours.    RIZATRIPTAN (MAXALT-MLT) 5 MG DISINTEGRATING TABLET    Take 1 tablet (5 mg total) by mouth as needed  "(for Headache). May repeat in 2 hours if needed    TOPIRAMATE (TOPAMAX) 50 MG TABLET    Take 1 tablet (50 mg total) by mouth nightly.   Modified Medications    No medications on file   Discontinued Medications    ATORVASTATIN (LIPITOR) 20 MG TABLET    Take 1 tablet (20 mg total) by mouth once daily.       Review of Systems   Constitutional:  Positive for malaise/fatigue.   HENT: Negative.     Eyes: Negative.    Respiratory:  Positive for shortness of breath.    Cardiovascular:  Positive for chest pain and palpitations.   Gastrointestinal: Negative.    Genitourinary: Negative.    Musculoskeletal: Negative.    Skin: Negative.    Neurological:  Positive for dizziness.   Endo/Heme/Allergies: Negative.    Psychiatric/Behavioral: Negative.     All 12 systems otherwise negative.    Wt Readings from Last 3 Encounters:   02/03/23 59.8 kg (131 lb 13.4 oz)   11/29/22 59.1 kg (130 lb 4.7 oz)   11/17/22 59.9 kg (132 lb 0.9 oz)     Temp Readings from Last 3 Encounters:   08/01/22 98 °F (36.7 °C)   02/16/22 99.3 °F (37.4 °C) (Temporal)   05/04/21 98.4 °F (36.9 °C) (Tympanic)     BP Readings from Last 3 Encounters:   02/03/23 114/80   11/29/22 126/85   11/17/22 127/88     Pulse Readings from Last 3 Encounters:   02/03/23 73   11/29/22 79   11/17/22 73       /80 (BP Location: Left arm, Patient Position: Sitting, BP Method: Medium (Manual))   Pulse 73   Ht 5' 2" (1.575 m)   Wt 59.8 kg (131 lb 13.4 oz)   LMP 09/28/2018   SpO2 97%   BMI 24.11 kg/m²     Objective:   Physical Exam  Constitutional:       General: She is not in acute distress.     Appearance: She is well-developed. She is not diaphoretic.   HENT:      Head: Normocephalic and atraumatic.      Mouth/Throat:      Pharynx: No oropharyngeal exudate.   Eyes:      General: No scleral icterus.        Right eye: No discharge.         Left eye: No discharge.   Pulmonary:      Effort: Pulmonary effort is normal. No respiratory distress.   Skin:     Coloration: Skin is not " pale.      Findings: No erythema or rash.   Neurological:      Mental Status: She is alert and oriented to person, place, and time.   Psychiatric:         Behavior: Behavior normal.         Thought Content: Thought content normal.         Judgment: Judgment normal.       Lab Results   Component Value Date     10/18/2022    K 4.9 10/18/2022     10/18/2022    CO2 26 10/18/2022    BUN 13 10/18/2022    CREATININE 0.7 10/18/2022    GLU 85 10/18/2022    HGBA1C 5.3 10/18/2022    MG 2.0 11/30/2007    AST 19 10/18/2022    ALT 17 10/18/2022    ALBUMIN 4.1 10/18/2022    PROT 7.1 10/18/2022    BILITOT 0.5 10/18/2022    WBC 5.85 10/18/2022    HGB 12.1 10/18/2022    HCT 39.5 10/18/2022     (H) 10/18/2022     10/18/2022    INR 0.9 09/09/2022    TSH 1.596 10/18/2022    CHOL 302 (H) 10/18/2022     (H) 10/18/2022    LDLCALC 181.8 (H) 10/18/2022    TRIG 76 10/18/2022     Assessment:      1. Localized edema    2. Palpitations    3. Undifferentiated connective tissue disease    4. Gastroesophageal reflux disease, unspecified whether esophagitis present    5. Scleroderma    6. Esophageal dysphagia    7. Chest pain, unspecified type    8. Pain of lower extremity, unspecified laterality    9. Mixed hyperlipidemia    10. Other form of dyspnea    11. Iatrogenic hypothyroidism    12. Atherosclerosis of both carotid arteries          Plan:   1. Chest pain, BRAXTON - atypical  - order stress ECHO  - prior workup neg  - discussed noncardiac etiologies     2. HLD - worse, LDL  - low jeffrey diet   - could not tolerate lipitor, change to prava 20    3. Palpitations  - Holter - neg    4. Scleroderma/TERESA  - f/u with rheum    5. Hypothyroidism, TSH 1.65 --> 1.59  - cont Synthroid    6. GERD/esoph dysphagia  - f/u GI  - is gluten sensitive     7. Leg pain/numbness, possible Raynauds  - leg u/s venous and arterial us - negative 2/2022  - started low dose Norvasc 2.5 mg - has not taken recently     8. Carotid artery disease  -  less than 50% Oct 2022  - has seen [facility] sx    Thank you for allowing me to participate in this patient's care. Please do not hesitate to contact me with any questions or concerns. Consult note has been forwarded to the referral physician.

## 2023-02-07 ENCOUNTER — HOSPITAL ENCOUNTER (OUTPATIENT)
Dept: CARDIOLOGY | Facility: HOSPITAL | Age: 53
Discharge: HOME | End: 2023-02-07
Attending: INTERNAL MEDICINE
Payer: COMMERCIAL

## 2023-02-07 DIAGNOSIS — R07.9 CHEST PAIN, UNSPECIFIED TYPE: Primary | ICD-10-CM

## 2023-02-07 DIAGNOSIS — R07.9 CHEST PAIN, UNSPECIFIED TYPE: ICD-10-CM

## 2023-02-27 DIAGNOSIS — R06.00 DYSPNEA, UNSPECIFIED TYPE: ICD-10-CM

## 2023-02-27 DIAGNOSIS — R07.89 OTHER CHEST PAIN: Primary | ICD-10-CM

## 2023-02-27 DIAGNOSIS — M35.9 UNDIFFERENTIATED CONNECTIVE TISSUE DISEASE: Chronic | ICD-10-CM

## 2023-02-27 DIAGNOSIS — M06.4 UNDIFFERENTIATED INFLAMMATORY ARTHRITIS: ICD-10-CM

## 2023-02-28 ENCOUNTER — OFFICE VISIT (OUTPATIENT)
Dept: RHEUMATOLOGY | Facility: CLINIC | Age: 53
End: 2023-02-28
Payer: COMMERCIAL

## 2023-02-28 ENCOUNTER — TELEPHONE (OUTPATIENT)
Dept: CARDIOLOGY | Facility: HOSPITAL | Age: 53
End: 2023-02-28
Payer: COMMERCIAL

## 2023-02-28 DIAGNOSIS — K13.79 RECURRENT ORAL ULCERS: ICD-10-CM

## 2023-02-28 DIAGNOSIS — R76.8 SCL-70 ANTIBODY POSITIVE: ICD-10-CM

## 2023-02-28 DIAGNOSIS — M06.4 UNDIFFERENTIATED INFLAMMATORY ARTHRITIS: Primary | ICD-10-CM

## 2023-02-28 PROCEDURE — 99214 OFFICE O/P EST MOD 30 MIN: CPT | Mod: 95,,, | Performed by: INTERNAL MEDICINE

## 2023-02-28 PROCEDURE — 99214 PR OFFICE/OUTPT VISIT, EST, LEVL IV, 30-39 MIN: ICD-10-PCS | Mod: 95,,, | Performed by: INTERNAL MEDICINE

## 2023-02-28 RX ORDER — HYDROXYCHLOROQUINE SULFATE 200 MG/1
200 TABLET, FILM COATED ORAL 2 TIMES DAILY
Qty: 60 TABLET | Refills: 2 | Status: SHIPPED | OUTPATIENT
Start: 2023-02-28 | End: 2023-05-29

## 2023-02-28 NOTE — PROGRESS NOTES
RHEUMATOLOGY FOLLOW UP - TELE VISIT     The patient location is: [place]  The chief complaint leading to consultation is: Arthritis follow up  Visit type: Virtual visit with synchronous audio and video  Total time spent with patient: 15 minutes  Each patient to whom he or she provides medical services by telemedicine is:  (1) informed of the relationship between the physician and patient and the respective role of any other health care provider with respect to management of the patient; and (2) notified that he or she may decline to receive medical services by telemedicine and may withdraw from such care at any time.    Chief complaints, HPI, ROS, EXAM, Assessment & Plans:-  Sonia Muñoz a 52 y.o. pleasant female seen today for follow-up visit.  Low-grade undifferentiated inflammatory arthritis and longstanding history of Scl 70 antibody positivity without evidence of scleroderma.  Reports significant improvement of hands stiffness and pain with prednisone taper.  Not on prednisone at this time.  Reports mild swelling and stiffness of bilateral PIP joints.  No prolonged morning stiffness.  Mild to moderate recurrent oral ulcers without any associated genital ulcer or uveitis.  Rheumatological review of system negative otherwise.  90% fist formation bilateral hands..    1. Undifferentiated inflammatory arthritis    2. 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 Recurrent oral ulcers      Problem List Items Addressed This Visit       Scl-70 antibody positive    Undifferentiated inflammatory arthritis - Primary    Relevant Medications    hydrOXYchloroQUINE (PLAQUENIL) 200 mg tablet    Recurrent oral ulcers    Relevant Medications    (Magic mouthwash) 1:1:1 diphenhydrAMINE(Benadryl) 12.5mg/5ml liq, aluminum & magnesium hydroxide-simethicone (Maalox), LIDOcaine viscous 2%      Latest Reference Range & Units Most Recent   MARYAM Screen Negative <1:80  Positive !  11/29/22 17:13   MARYAM HEP-2 Titer  Positive 1:320 Homogeneous  7/26/18  11:28   MARYAM Titer 1  1:320  11/29/22 17:13   MARYAM PATTERN 1  Homogeneous  11/29/22 17:13   ds DNA Ab Negative 1:10  Negative 1:10  11/29/22 17:13   Anti-SSA Antibody 0.00 - 0.99 Ratio 0.09  11/29/22 17:13   Anti-SSA Interpretation Negative  Negative  11/29/22 17:13   Anti-SSB Antibody 0.00 - 0.99 Ratio 0.07  11/29/22 17:13   Anti-SSB Interpretation Negative  Negative  11/29/22 17:13   Anti Sm Antibody 0.00 - 0.99 Ratio 0.07  11/29/22 17:13   Anti-Sm Interpretation Negative  Negative  11/29/22 17:13   Anti Sm/RNP Antibody 0.00 - 0.99 Ratio 0.08  11/29/22 17:13   Anti-Sm/RNP Interpretation Negative  Negative  11/29/22 17:13   Antigliadin Ab IgG <20 UNITS 2  5/17/21 09:23   Antigliadin Abs, IgA <20 UNITS 4  5/17/21 09:23   TTG IgA <20 UNITS 8  5/17/21 09:23   TTG IgG <20 UNITS 4  5/17/21 09:23   Immunoglobulin A (IgA) 70 - 400 mg/dL 185  5/17/21 09:23   HLA DQA1 1  *03  6/4/21 15:38   HLA DQA1 2  *05  6/4/21 15:38   SCL-70 Antibody <1.0 (Negative) U >8.0 (H)  7/21/16 11:45   Scleroderma SCL- <20 UNITS 4  6/5/17 10:54   IgG 650 - 1600 mg/dL 1315  7/26/18 11:28   IgM 50 - 300 mg/dL 98  7/26/18 11:28   IgA 40 - 350 mg/dL 173  7/26/18 11:28   Protein, Serum 6.0 - 8.4 g/dL 6.9  7/26/18 11:28   Albumin grams/dl 3.35 - 5.55 g/dL 4.06  7/26/18 11:28   Alpha-1 grams/dl 0.17 - 0.41 g/dL 0.28  7/26/18 11:28   Alpha-2 0.43 - 0.99 g/dL 0.62  7/26/18 11:28   Beta 0.50 - 1.10 g/dL 0.77  7/26/18 11:28   Gamma 0.67 - 1.58 g/dL 1.16  7/26/18 11:28   Pathologist Interpretation SPE  REVIEWED  7/26/18 11:28   Pathologist Interpretation TANG  REVIEWED  7/26/18 11:28   Immunofix Interp.  SEE COMMENT  7/26/18 11:28   Anti-Anushka-1 Antibody <20 Units <20  7/21/16 11:45   Anti-PM/Scl Ab <20 Units <20  7/21/16 11:45   Anti-SS-A 52 kD Ab, IgG <20 Units <20  7/21/16 11:45   Anti-U1-RNP  Ab <20 Units <20  7/21/16 11:45   CCP Antibodies <5.0 U/mL <0.5  6/30/16 16:28   EJ Negative  Negative  7/21/16 11:45   Fibrillarin (U3 RNP) Negative  Negative  7/21/16  11:45   Ku Negative  Negative  7/21/16 11:45   MDA-5 (P140) (CADM-140) <20 Units <20  7/21/16 11:45   MI-2 Negative  Negative  7/21/16 11:45   NXP-2 (P140) <20 Units <20  7/21/16 11:45   OJ Negative  Negative  7/21/16 11:45   PL-12 Negative  Negative  7/21/16 11:45   PL-7 Negative  Negative  7/21/16 11:45   Rheumatoid Factor 0.0 - 15.0 IU/mL <10.0  6/30/16 16:28   SRP Negative  Negative  7/21/16 11:45   TIF1 GAMMA (P155/140) <20 Units <20  7/21/16 11:45   U2 snRNP Negative  Negative  7/21/16 11:45   !: Data is abnormal  (H): Data is abnormally high    Low-grade undifferentiated inflammatory arthritis.  Try Plaquenil.  Tolerated Plaquenil well in the past which was discontinued due to an active disease.  Consider low-dose prednisone 5 mg daily p.r.n. if needed.  Try magic mouthwash for oral ulcers.  If persistent worsening disease, consider Otezla.  I have explained all of the above in detail and the patient understands all of the current recommendation(s). I have answered all questions to the best of my ability and to their complete satisfaction.   # Follow up in about 6 months (around 8/28/2023).      Past Medical History:   Diagnosis Date    [facility]    [facility]    Cervical spondylolysis     DDD (degenerative disc disease), lumbosacral     Fibromyalgia     Hyperlipidemia     Hyperthyroidism     Iatrogenic hypothyroidism     Photosensitivity 06/13/2019    Spondylosis     Undifferentiated connective tissue disease        Past Surgical History:   Procedure Laterality Date    AUGMENTATION OF BREAST Bilateral 2009    silicone    BREAST IMPLANTS      BREAST SURGERY Bilateral 2009    silicone    CERVICAL FUSION  01/10/2018    ACDF    COLONOSCOPY N/A 10/13/2017    Procedure: COLONOSCOPY;  Surgeon: Hugh Luciano MD;  Location: [facility];  Service: Endoscopy;  Laterality: N/A;    EAR MASTOIDECTOMY W/ COCHLEAR IMPLANT W/ LANDMARK       ESOPHAGOGASTRODUODENOSCOPY N/A 1/29/2020    Procedure: EGD (ESOPHAGOGASTRODUODENOSCOPY);  Surgeon: Tisha Mendosa MD;  Location: [facility];  Service: Endoscopy;  Laterality: N/A;    STAPEDES SURGERY      sMart Stapedes Piston (Safe to 3T magnet)    UPPER GASTROINTESTINAL ENDOSCOPY          Social History     Tobacco Use    Smoking status: Never    Smokeless tobacco: Never   Substance Use Topics    Alcohol use: Yes     Alcohol/week: 2.0 standard drinks     Types: 2 Cans of beer per week     Comment: socially     Drug use: No       Family History   Problem Relation Age of Onset    Hypertension Mother     Hypertension Father     Heart disease Father     Cancer Maternal Uncle         stomach cancer    Stomach cancer Maternal Uncle     Celiac disease Maternal Aunt     Breast cancer Paternal Aunt     Cirrhosis Neg Hx     Colon cancer Neg Hx     Colon polyps Neg Hx     Crohn's disease Neg Hx     Cystic fibrosis Neg Hx     Esophageal cancer Neg Hx     Hemochromatosis Neg Hx     Inflammatory bowel disease Neg Hx     Irritable bowel syndrome Neg Hx     Liver cancer Neg Hx     Liver disease Neg Hx     Rectal cancer Neg Hx     Ulcerative colitis Neg Hx     Marin's disease Neg Hx     Lymphoma Neg Hx     Tuberculosis Neg Hx     Scleroderma Neg Hx     Rheum arthritis Neg Hx     Multiple sclerosis Neg Hx     Melanoma Neg Hx     Lupus Neg Hx     Psoriasis Neg Hx     Skin cancer Neg Hx        Review of patient's allergies indicates:   Allergen Reactions    Adhesive Rash       Medication List with Changes/Refills   Current Medications    ASPIRIN (ECOTRIN) 81 MG EC TABLET    Take 1 tablet (81 mg total) by mouth once daily.    COLLAGEN MISC    by Misc.(Non-Drug; Combo Route) route.    CYCLOSPORINE (RESTASIS) 0.05 % OPHTHALMIC EMULSION    Place 1 drop into both eyes 2 (two) times daily.    DULOXETINE (CYMBALTA) 20 MG CAPSULE    Take 1 capsule (20 mg total) by mouth once daily.    FERROUS SULFATE 220 MG (44 MG IRON)/5 ML SOLUTION     Take 220 mg by mouth once daily.    LEVOTHYROXINE (SYNTHROID) 100 MCG TABLET    TAKE 1 TABLET BEFORE BREAKFAST    METHOCARBAMOL (ROBAXIN) 750 MG TAB    TAKE ONE TABLET BY MOUTH EVERY 6 TO 8 HOURS AS NEEDED FOR MUSCLE SPASMS    MULTIVIT WITH MINERALS/LUTEIN (MULTIVITAMIN 50 PLUS ORAL)    multivitamin Take No date recorded No form recorded No frequency recorded No route recorded No set duration recorded No set duration amount recorded active No dosage strength recorded No dosage strength units of measure recorded    PENCICLOVIR (DENAVIR) 1 % CREAM    Apply topically every 2 (two) hours.    PRAVASTATIN (PRAVACHOL) 20 MG TABLET    Take 1 tablet (20 mg total) by mouth every evening.    RIZATRIPTAN (MAXALT-MLT) 5 MG DISINTEGRATING TABLET    Take 1 tablet (5 mg total) by mouth as needed (for Headache). May repeat in 2 hours if needed    TOPIRAMATE (TOPAMAX) 50 MG TABLET    Take 1 tablet (50 mg total) by mouth nightly.   Changed and/or Refilled Medications    Modified Medication Previous Medication    (MAGIC MOUTHWASH) 1:1:1 DIPHENHYDRAMINE(BENADRYL) 12.5MG/5ML LIQ, ALUMINUM & MAGNESIUM HYDROXIDE-SIMETHICONE (MAALOX), LIDOCAINE VISCOUS 2% (Magic mouthwash) 1:1:1 Benadryl 12.5mg/5ml liq, aluminum & magnesium hydroxide-simehticone (Maalox), lidocaine viscous 2%       Swish and spit 5 mLs every 4 (four) hours as needed (mouth ulcer). for mouth sores    Swish and spit 5 mLs every 4 (four) hours as needed. for mouth sores    HYDROXYCHLOROQUINE (PLAQUENIL) 200 MG TABLET hydroxychloroquine (PLAQUENIL) 200 mg tablet       Take 1 tablet (200 mg total) by mouth 2 (two) times daily.    Take 200 mg by mouth 2 (two) times daily.        Disclaimer: This note was prepared using voice recognition system and is likely to have sound alike errors and is not proof read.  Please call me with any questions.

## 2023-03-22 ENCOUNTER — HOSPITAL ENCOUNTER (OUTPATIENT)
Dept: CARDIOLOGY | Facility: HOSPITAL | Age: 53
Discharge: HOME | End: 2023-03-22
Attending: INTERNAL MEDICINE
Payer: COMMERCIAL

## 2023-03-22 VITALS — BODY MASS INDEX: 24.11 KG/M2 | HEIGHT: 62 IN | WEIGHT: 131 LBS

## 2023-03-22 DIAGNOSIS — M35.9 UNDIFFERENTIATED CONNECTIVE TISSUE DISEASE: ICD-10-CM

## 2023-03-22 DIAGNOSIS — M35.9 UNDIFFERENTIATED CONNECTIVE TISSUE DISEASE: Chronic | ICD-10-CM

## 2023-03-22 DIAGNOSIS — R06.00 DYSPNEA, UNSPECIFIED TYPE: ICD-10-CM

## 2023-03-22 DIAGNOSIS — R07.89 OTHER CHEST PAIN: ICD-10-CM

## 2023-03-22 DIAGNOSIS — M06.4 UNDIFFERENTIATED INFLAMMATORY ARTHRITIS: ICD-10-CM

## 2023-03-22 LAB
AV INDEX (PROSTH): 0.8
AV MEAN GRADIENT: 3 MMHG
AV PEAK GRADIENT: 6 MMHG
AV VALVE AREA: 2.49 CM2
AV VELOCITY RATIO: 0.83
BSA FOR ECHO PROCEDURE: 1.61 M2
CV ECHO LV RWT: 0.41 CM
CV STRESS BASE HR: 89 BPM
DIASTOLIC BLOOD PRESSURE: 87 MMHG
DOP CALC AO PEAK VEL: 1.2 M/S
DOP CALC AO VTI: 24.3 CM
DOP CALC LVOT AREA: 3.1 CM2
DOP CALC LVOT DIAMETER: 1.99 CM
DOP CALC LVOT PEAK VEL: 0.99 M/S
DOP CALC LVOT STROKE VOLUME: 60.62 CM3
DOP CALC RVOT PEAK VEL: 0.6 M/S
DOP CALC RVOT VTI: 12.5 CM
DOP CALCLVOT PEAK VEL VTI: 19.5 CM
E WAVE DECELERATION TIME: 189.07 MSEC
E/A RATIO: 0.8
E/E' RATIO: 7.09 M/S
ECHO LV POSTERIOR WALL: 0.77 CM (ref 0.6–1.1)
EJECTION FRACTION: 60 %
FRACTIONAL SHORTENING: 39 % (ref 28–44)
INTERVENTRICULAR SEPTUM: 0.76 CM (ref 0.6–1.1)
IVRT: 87.54 MSEC
LA MAJOR: 4.8 CM
LA WIDTH: 3.2 CM
LEFT ATRIUM SIZE: 3.3 CM
LEFT INTERNAL DIMENSION IN SYSTOLE: 2.32 CM (ref 2.1–4)
LEFT VENTRICLE DIASTOLIC VOLUME INDEX: 38.74 ML/M2
LEFT VENTRICLE DIASTOLIC VOLUME: 61.99 ML
LEFT VENTRICLE MASS INDEX: 51 G/M2
LEFT VENTRICLE SYSTOLIC VOLUME INDEX: 11.6 ML/M2
LEFT VENTRICLE SYSTOLIC VOLUME: 18.59 ML
LEFT VENTRICULAR INTERNAL DIMENSION IN DIASTOLE: 3.8 CM (ref 3.5–6)
LEFT VENTRICULAR MASS: 80.93 G
LV LATERAL E/E' RATIO: 6.5 M/S
LV SEPTAL E/E' RATIO: 7.8 M/S
LVOT MG: 1.98 MMHG
LVOT MV: 0.65 CM/S
MV PEAK A VEL: 0.97 M/S
MV PEAK E VEL: 0.78 M/S
OHS CV CPX 1 MINUTE RECOVERY HEART RATE: 136 BPM
OHS CV CPX 85 PERCENT MAX PREDICTED HEART RATE MALE: 137
OHS CV CPX ESTIMATED METS: 7
OHS CV CPX MAX PREDICTED HEART RATE: 161
OHS CV CPX PATIENT IS FEMALE: 1
OHS CV CPX PATIENT IS MALE: 0
OHS CV CPX PEAK DIASTOLIC BLOOD PRESSURE: 89 MMHG
OHS CV CPX PEAK HEAR RATE: 160 BPM
OHS CV CPX PEAK RATE PRESSURE PRODUCT: NORMAL
OHS CV CPX PEAK SYSTOLIC BLOOD PRESSURE: 170 MMHG
OHS CV CPX PERCENT MAX PREDICTED HEART RATE ACHIEVED: 99
OHS CV CPX RATE PRESSURE PRODUCT PRESENTING: NORMAL
PISA TR MAX VEL: 2.06 M/S
PV MEAN GRADIENT: 0.77 MMHG
PV PEAK VELOCITY: 0.77 CM/S
RA MAJOR: 4.13 CM
RA PRESSURE: 3 MMHG
RA WIDTH: 3.6 CM
RIGHT VENTRICULAR END-DIASTOLIC DIMENSION: 2.9 CM
SINUS: 2.87 CM
STJ: 2.79 CM
STRESS ECHO POST EXERCISE DUR MIN: 7 MINUTES
STRESS ECHO POST EXERCISE DUR SEC: 30 SECONDS
SYSTOLIC BLOOD PRESSURE: 125 MMHG
TDI LATERAL: 0.12 M/S
TDI SEPTAL: 0.1 M/S
TDI: 0.11 M/S
TR MAX PG: 17 MMHG
TRICUSPID ANNULAR PLANE SYSTOLIC EXCURSION: 1.89 CM
TV REST PULMONARY ARTERY PRESSURE: 20 MMHG

## 2023-03-22 PROCEDURE — 93306 TTE W/DOPPLER COMPLETE: CPT | Mod: 26,,, | Performed by: INTERNAL MEDICINE

## 2023-03-22 PROCEDURE — 93306 ECHO (CUPID ONLY): ICD-10-PCS | Mod: 26,,, | Performed by: INTERNAL MEDICINE

## 2023-03-22 PROCEDURE — 93018 EXERCISE STRESS - EKG (CUPID ONLY): ICD-10-PCS | Mod: ,,, | Performed by: INTERNAL MEDICINE

## 2023-03-22 PROCEDURE — 93017 CV STRESS TEST TRACING ONLY: CPT

## 2023-03-22 PROCEDURE — 93016 CV STRESS TEST SUPVJ ONLY: CPT | Mod: ,,, | Performed by: INTERNAL MEDICINE

## 2023-03-22 PROCEDURE — 93306 TTE W/DOPPLER COMPLETE: CPT

## 2023-03-22 PROCEDURE — 93016 EXERCISE STRESS - EKG (CUPID ONLY): ICD-10-PCS | Mod: ,,, | Performed by: INTERNAL MEDICINE

## 2023-03-22 PROCEDURE — 93018 CV STRESS TEST I&R ONLY: CPT | Mod: ,,, | Performed by: INTERNAL MEDICINE

## 2023-04-27 ENCOUNTER — PATIENT MESSAGE (OUTPATIENT)
Dept: INTERNAL MEDICINE | Facility: CLINIC | Age: 53
End: 2023-04-27
Payer: COMMERCIAL

## 2023-04-28 ENCOUNTER — PATIENT MESSAGE (OUTPATIENT)
Dept: OBSTETRICS AND GYNECOLOGY | Facility: CLINIC | Age: 53
End: 2023-04-28
Payer: COMMERCIAL

## 2023-04-28 DIAGNOSIS — E03.2 IATROGENIC HYPOTHYROIDISM: Primary | ICD-10-CM

## 2023-05-02 ENCOUNTER — OFFICE VISIT (OUTPATIENT)
Dept: OBSTETRICS AND GYNECOLOGY | Facility: CLINIC | Age: 53
End: 2023-05-02
Payer: COMMERCIAL

## 2023-05-02 ENCOUNTER — HOSPITAL ENCOUNTER (OUTPATIENT)
Dept: RADIOLOGY | Facility: HOSPITAL | Age: 53
Discharge: HOME | End: 2023-05-02
Attending: FAMILY MEDICINE
Payer: COMMERCIAL

## 2023-05-02 VITALS
HEIGHT: 62 IN | DIASTOLIC BLOOD PRESSURE: 84 MMHG | BODY MASS INDEX: 23.77 KG/M2 | WEIGHT: 129.19 LBS | SYSTOLIC BLOOD PRESSURE: 120 MMHG

## 2023-05-02 DIAGNOSIS — Z12.31 OTHER SCREENING MAMMOGRAM: ICD-10-CM

## 2023-05-02 DIAGNOSIS — Z78.0 POSTMENOPAUSAL: ICD-10-CM

## 2023-05-02 DIAGNOSIS — Z01.419 ENCOUNTER FOR GYNECOLOGICAL EXAMINATION WITHOUT ABNORMAL FINDING: Primary | ICD-10-CM

## 2023-05-02 PROCEDURE — 99396 PREV VISIT EST AGE 40-64: CPT | Mod: S$GLB,,, | Performed by: NURSE PRACTITIONER

## 2023-05-02 PROCEDURE — 77063 MAMMO DIGITAL SCREENING BILAT WITH TOMO: ICD-10-PCS | Mod: 26,,, | Performed by: RADIOLOGY

## 2023-05-02 PROCEDURE — 99396 PR PREVENTIVE VISIT,EST,40-64: ICD-10-PCS | Mod: S$GLB,,, | Performed by: NURSE PRACTITIONER

## 2023-05-02 PROCEDURE — 99999 PR PBB SHADOW E&M-EST. PATIENT-LVL IV: CPT | Mod: PBBFAC,,, | Performed by: NURSE PRACTITIONER

## 2023-05-02 PROCEDURE — 77067 MAMMO DIGITAL SCREENING BILAT WITH TOMO: ICD-10-PCS | Mod: 26,,, | Performed by: RADIOLOGY

## 2023-05-02 PROCEDURE — 99999 PR PBB SHADOW E&M-EST. PATIENT-LVL IV: ICD-10-PCS | Mod: PBBFAC,,, | Performed by: NURSE PRACTITIONER

## 2023-05-02 PROCEDURE — 77067 SCR MAMMO BI INCL CAD: CPT | Mod: 26,,, | Performed by: RADIOLOGY

## 2023-05-02 PROCEDURE — 77063 BREAST TOMOSYNTHESIS BI: CPT | Mod: 26,,, | Performed by: RADIOLOGY

## 2023-05-02 PROCEDURE — 77067 SCR MAMMO BI INCL CAD: CPT | Mod: TC,PO

## 2023-05-02 PROCEDURE — 88175 CYTOPATH C/V AUTO FLUID REDO: CPT | Performed by: NURSE PRACTITIONER

## 2023-05-02 PROCEDURE — 87624 HPV HI-RISK TYP POOLED RSLT: CPT | Performed by: NURSE PRACTITIONER
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
## 2023-05-08 LAB
FINAL PATHOLOGIC DIAGNOSIS: NORMAL
Lab: NORMAL

## 2023-05-09 LAB
HPV HR 12 DNA SPEC QL NAA+PROBE: NEGATIVE
HPV16 AG SPEC QL: NEGATIVE
HPV18 DNA SPEC QL NAA+PROBE: NEGATIVE

## 2023-05-16 ENCOUNTER — LAB VISIT (OUTPATIENT)
Dept: LAB | Facility: HOSPITAL | Age: 53
End: 2023-05-16
Attending: INTERNAL MEDICINE
Payer: COMMERCIAL

## 2023-05-16 DIAGNOSIS — E78.2 MIXED HYPERLIPIDEMIA: ICD-10-CM

## 2023-05-16 LAB
ALBUMIN SERPL BCP-MCNC: 4.3 G/DL (ref 3.5–5.2)
ALP SERPL-CCNC: 75 U/L (ref 55–135)
ALT SERPL W/O P-5'-P-CCNC: 15 U/L (ref 10–44)
ANION GAP SERPL CALC-SCNC: 9 MMOL/L (ref 8–16)
AST SERPL-CCNC: 21 U/L (ref 10–40)
BILIRUB SERPL-MCNC: 0.9 MG/DL (ref 0.1–1)
BUN SERPL-MCNC: 13 MG/DL (ref 6–20)
CALCIUM SERPL-MCNC: 9.9 MG/DL (ref 8.7–10.5)
CHLORIDE SERPL-SCNC: 106 MMOL/L (ref 95–110)
CHOLEST SERPL-MCNC: 279 MG/DL (ref 120–199)
CHOLEST/HDLC SERPL: 2.8 {RATIO} (ref 2–5)
CK SERPL-CCNC: 110 U/L (ref 20–180)
CO2 SERPL-SCNC: 27 MMOL/L (ref 23–29)
CREAT SERPL-MCNC: 0.7 MG/DL (ref 0.5–1.4)
EST. GFR  (NO RACE VARIABLE): >60 ML/MIN/1.73 M^2
GLUCOSE SERPL-MCNC: 99 MG/DL (ref 70–110)
HDLC SERPL-MCNC: 98 MG/DL (ref 40–75)
HDLC SERPL: 35.1 % (ref 20–50)
LDLC SERPL CALC-MCNC: 162.4 MG/DL (ref 63–159)
NONHDLC SERPL-MCNC: 181 MG/DL
POTASSIUM SERPL-SCNC: 4.9 MMOL/L (ref 3.5–5.1)
PROT SERPL-MCNC: 7.7 G/DL (ref 6–8.4)
SODIUM SERPL-SCNC: 142 MMOL/L (ref 136–145)
TRIGL SERPL-MCNC: 93 MG/DL (ref 30–150)

## 2023-05-16 PROCEDURE — 82550 ASSAY OF CK (CPK): CPT | Mod: PO | Performed by: INTERNAL MEDICINE

## 2023-05-16 PROCEDURE — 80061 LIPID PANEL: CPT | Performed by: INTERNAL MEDICINE

## 2023-05-16 PROCEDURE — 80053 COMPREHEN METABOLIC PANEL: CPT | Mod: PO | Performed by: INTERNAL MEDICINE

## 2023-05-16 PROCEDURE — 36415 COLL VENOUS BLD VENIPUNCTURE: CPT | Mod: PO | Performed by: INTERNAL MEDICINE

## 2023-08-18 ENCOUNTER — OFFICE VISIT (OUTPATIENT)
Dept: CARDIOLOGY | Facility: CLINIC | Age: 53
End: 2023-08-18
Payer: COMMERCIAL

## 2023-08-18 VITALS
OXYGEN SATURATION: 99 % | HEART RATE: 78 BPM | BODY MASS INDEX: 23.79 KG/M2 | SYSTOLIC BLOOD PRESSURE: 119 MMHG | DIASTOLIC BLOOD PRESSURE: 78 MMHG | WEIGHT: 130.06 LBS

## 2023-08-18 DIAGNOSIS — R06.09 OTHER FORM OF DYSPNEA: ICD-10-CM

## 2023-08-18 DIAGNOSIS — M06.4 UNDIFFERENTIATED INFLAMMATORY ARTHRITIS: ICD-10-CM

## 2023-08-18 DIAGNOSIS — Z78.9 STATIN INTOLERANCE: ICD-10-CM

## 2023-08-18 DIAGNOSIS — I65.23 ATHEROSCLEROSIS OF BOTH CAROTID ARTERIES: ICD-10-CM

## 2023-08-18 DIAGNOSIS — R60.0 LOCALIZED EDEMA: ICD-10-CM

## 2023-08-18 DIAGNOSIS — R00.2 PALPITATIONS: ICD-10-CM

## 2023-08-18 DIAGNOSIS — M34.9 SCLERODERMA: ICD-10-CM

## 2023-08-18 DIAGNOSIS — R13.19 ESOPHAGEAL DYSPHAGIA: ICD-10-CM

## 2023-08-18 DIAGNOSIS — R07.89 OTHER CHEST PAIN: Primary | ICD-10-CM

## 2023-08-18 DIAGNOSIS — R06.00 DYSPNEA, UNSPECIFIED TYPE: ICD-10-CM

## 2023-08-18 DIAGNOSIS — E03.2 IATROGENIC HYPOTHYROIDISM: ICD-10-CM

## 2023-08-18 DIAGNOSIS — R07.9 CHEST PAIN, UNSPECIFIED TYPE: ICD-10-CM

## 2023-08-18 DIAGNOSIS — M35.9 UNDIFFERENTIATED CONNECTIVE TISSUE DISEASE: ICD-10-CM

## 2023-08-18 DIAGNOSIS — K21.9 GASTROESOPHAGEAL REFLUX DISEASE, UNSPECIFIED WHETHER ESOPHAGITIS PRESENT: ICD-10-CM

## 2023-08-18 DIAGNOSIS — E78.2 MIXED HYPERLIPIDEMIA: ICD-10-CM

## 2023-08-18 PROCEDURE — 99999 PR PBB SHADOW E&M-EST. PATIENT-LVL III: CPT | Mod: PBBFAC,,, | Performed by: INTERNAL MEDICINE

## 2023-08-18 PROCEDURE — 99214 OFFICE O/P EST MOD 30 MIN: CPT | Mod: S$GLB,,, | Performed by: INTERNAL MEDICINE

## 2023-08-18 PROCEDURE — 99214 PR OFFICE/OUTPT VISIT, EST, LEVL IV, 30-39 MIN: ICD-10-PCS | Mod: S$GLB,,, | Performed by: INTERNAL MEDICINE

## 2023-08-18 PROCEDURE — 99999 PR PBB SHADOW E&M-EST. PATIENT-LVL III: ICD-10-PCS | Mod: PBBFAC,,, | Performed by: INTERNAL MEDICINE

## 2023-08-18 NOTE — PROGRESS NOTES
Subjective:   Patient ID:  Sonia Melo is a 53 y.o. female who presents for cardiac consult of No chief complaint on file.    The patient came in today for cardiac consult of No chief complaint on file.      Sonia Melo is a 53 y.o. female pt with carotid artery disease, connective tissue disorder/scleroderma, HLD, hypothyroidism presents for follow up CV eval.     7/26/19  Pt had a flutter/palpitations in the past, has been seeing Dr. Williamson. She had a stress test > 3 years ago. NO prior echo.   She was in ER recently, had a flutter every 3rd or 4th rhythm per pt NOT AFluter. She does have connective tissue disease, sometimes has sharp chest pain in center of chest. She also gets numbness and tingling. SHe has been getting more tired and BRAXTON. Discussed will do cardiac testing and may need pulm eval as well.     11/6/19  ECG stress negative, ECHO negative, Holter with rare PVCs. Overall feels well lately, No significant CP but occ gets it, pulse can fluctuate. Woke up once with tachycardia/palpitations.     12/9/20  Follow up since 11/2019. Overall stable now, had trouble swallowing at time but stable. Occ chest tightness/ maybe muscle pain. Occ has palpitations- rare. Drinks one cup coffee. Started taking Maxalt for migraines.   ECG - NSR    2/1/22  Follow up since 12/2020. BP and Hr stable. She has been gluten free since 6 months had more esoph issues. Her kids were diagnosed with celiac. She has issues with blood vessels - feels burning in hands with purple spots.   ECG - NSR     4/29/22  LE u/s neg for DVT and PAD in Feb 2022. She has some purple spots still in legs, unsure etiology ? Vasculitis? Needs to f/u with rheum. Has not tried any compression stockings yet. Had atypical CP from sleep a few times which resolved, thinks due to anxiety.     2/3/23  Pt had CTA H&N with < 50% carotid artery disease last Oct. LDL is worse Oct 2022. BP and Hr well controlled. She was on Lipitor and could not tolerate  it.     8/18/23  ECHO 3/2023 with normal bi V function and valves, PASP 20 mmHg. ECG stress test 3/2023 neg for ischemia.   Lipids improving mildly 5/2023 - Tc 279, .     BP and HR stable. She is currently building a tiny home. She has a fitbit and has spikes in HR a few times a week. She is swimming     Patient feels no leg swelling, no PND, no dizziness, no syncope, no CNS symptoms.    Patient has fairly good exercise tolerance.    Patient is compliant with medications.    FH - CAD      Results for orders placed during the hospital encounter of 03/22/23    Echo    Interpretation Summary  · The left ventricle is normal in size with normal systolic function.  · The estimated ejection fraction is 60%.  · Normal left ventricular diastolic function.  · Normal right ventricular size with normal right ventricular systolic function.  · Normal central venous pressure (3 mmHg).  · The estimated PA systolic pressure is 20 mmHg.      Results for orders placed during the hospital encounter of 03/22/23    Exercise Stress - EKG    Interpretation Summary    The patient exercised for 7 minutes 30 seconds on a Michael protocol, corresponding to a functional capacity of 7 METS, achieving a peak heart rate of 160 bpm, which is 99 % of the age predicted maximum heart rate.    The ECG portion of the study is negative for ischemia.    The patient reported no chest pain during the stress test.    The blood pressure response to stress was normal.    There were no arrhythmias during stress.    Short-segment stenosis of the left ICA origin with stenosis felt to be less than 50%.  Correlate with carotid ultrasound as clinically indicated.  Otherwise unremarkable CTA head/neck.     Electronically signed by: Cosme Toth MD  Date:                                            10/27/2022  Time:                                           11:42    Conclusion    Triphasic waveforms B LE.  Normal COOKIE B LE.  Normal  study.    Conclusion    There is no evidence of a right lower extremity DVT.  There is no evidence of a left lower extremity DVT.       HOLTER    TEST DESCRIPTION   PREDOMINANT RHYTHM  1. Sinus rhythm with heart rates varying between 51 and 145 bpm with an average of 86 bpm.     VENTRICULAR ARRHYTHMIAS  1. There were very rare PVCs recorded totalling 2 and averaging less than 1 per hour.   2. There were no episodes of ventricular tachycardia.    SUPRA VENTRICULAR ARRHYTHMIAS  1. There were very rare PACs recorded totalling 4 and averaging less than 1 per hour.   2. There were no episodes of sustained supraventricular tachycardia.    CONCLUSIONS     1 - Normal left ventricular systolic function (EF 60-65%).     2 - Normal left ventricular diastolic function.     3 - Normal right ventricular systolic function .     4 - The estimated PA systolic pressure is 15 mmHg.     5 - No wall motion abnormalities.     6 - Trivial mitral regurgitation.     7 - Trivial to mild tricuspid regurgitation.     8 - Anterior mitral valve leaflet prolapse - mild.       This document has been electronically    SIGNED BY: Stnaley Mary MD On: 08/16/2019 13:24    ECG stress  EKG Conclusions:    1. The EKG portion of this study is negative for ischemia at a moderate workload, and peak heart rate of 169 bpm (103% of predicted).   2. Blood pressure response to exercise was normal (Presenting BP: 131/95 Peak BP: 173/91).   3. No significant arrhythmias were present.   4. There were no symptoms of chest discomfort or significant dyspnea throughout the protocol.   5. The Taylor treadmill score was 6 suggesting a low probability for future cardiovascular events.        This document has been electronically    SIGNED BY: Jeremias Huber MD On: 08/05/2019 16:08    Past Medical History:   Diagnosis Date    [facility]ix 2016    [facility]    Cervical spondylolysis     DDD (degenerative disc disease),  lumbosacral     Fibromyalgia     Hyperlipidemia     Hyperthyroidism     Iatrogenic hypothyroidism     Photosensitivity 06/13/2019    Spondylosis     Undifferentiated connective tissue disease        Past Surgical History:   Procedure Laterality Date    AUGMENTATION OF BREAST Bilateral 2009    silicone    BREAST IMPLANTS      BREAST SURGERY Bilateral 2009    silicone    CERVICAL FUSION  01/10/2018    ACDF    COLONOSCOPY N/A 10/13/2017    Procedure: COLONOSCOPY;  Surgeon: Hugh Luciano MD;  Location: [facility];  Service: Endoscopy;  Laterality: N/A;    EAR MASTOIDECTOMY W/ COCHLEAR IMPLANT W/ LANDMARK      ESOPHAGOGASTRODUODENOSCOPY N/A 1/29/2020    Procedure: EGD (ESOPHAGOGASTRODUODENOSCOPY);  Surgeon: Tisha Mendosa MD;  Location: [facility];  Service: Endoscopy;  Laterality: N/A;    STAPEDES SURGERY      sMart Stapedes Piston (Safe to 3T magnet)    UPPER GASTROINTESTINAL ENDOSCOPY         Social History     Tobacco Use    Smoking status: Never    Smokeless tobacco: Never   Substance Use Topics    Alcohol use: Yes     Alcohol/week: 2.0 standard drinks of alcohol     Types: 2 Cans of beer per week     Comment: socially     Drug use: No       Family History   Problem Relation Age of Onset    Hypertension Mother     Hypertension Father     Heart disease Father     Cancer Maternal Uncle         stomach cancer    Stomach cancer Maternal Uncle     Celiac disease Maternal Aunt     Breast cancer Paternal Aunt     Cirrhosis Neg Hx     Colon cancer Neg Hx     Colon polyps Neg Hx     Crohn's disease Neg Hx     Cystic fibrosis Neg Hx     Esophageal cancer Neg Hx     Hemochromatosis Neg Hx     Inflammatory bowel disease Neg Hx     Irritable bowel syndrome Neg Hx     Liver cancer Neg Hx     Liver disease Neg Hx     Rectal cancer Neg Hx     Ulcerative colitis Neg Hx     Marin's disease Neg Hx     Lymphoma Neg Hx     Tuberculosis Neg Hx     Scleroderma Neg Hx     Rheum arthritis Neg Hx     Multiple sclerosis Neg Hx      Melanoma Neg Hx     Lupus Neg Hx     Psoriasis Neg Hx     Skin cancer Neg Hx        Patient's Medications   New Prescriptions    No medications on file   Previous Medications    ASPIRIN (ECOTRIN) 81 MG EC TABLET    Take 1 tablet (81 mg total) by mouth once daily.    COLLAGEN MISC    by Misc.(Non-Drug; Combo Route) route.    CYCLOSPORINE (RESTASIS) 0.05 % OPHTHALMIC EMULSION    Place 1 drop into both eyes 2 (two) times daily.    FERROUS SULFATE 220 MG (44 MG IRON)/5 ML SOLUTION    Take 220 mg by mouth once daily.    LEVOTHYROXINE (SYNTHROID) 100 MCG TABLET    TAKE 1 TABLET BEFORE BREAKFAST    METHOCARBAMOL (ROBAXIN) 750 MG TAB    TAKE ONE TABLET BY MOUTH EVERY 6 TO 8 HOURS AS NEEDED FOR MUSCLE SPASMS    MULTIVIT WITH MINERALS/LUTEIN (MULTIVITAMIN 50 PLUS ORAL)    multivitamin Take No date recorded No form recorded No frequency recorded No route recorded No set duration recorded No set duration amount recorded active No dosage strength recorded No dosage strength units of measure recorded    PENCICLOVIR (DENAVIR) 1 % CREAM    Apply topically every 2 (two) hours.    PRAVASTATIN (PRAVACHOL) 20 MG TABLET    Take 1 tablet (20 mg total) by mouth every evening.    RIZATRIPTAN (MAXALT-MLT) 5 MG DISINTEGRATING TABLET    Take 1 tablet (5 mg total) by mouth as needed (for Headache). May repeat in 2 hours if needed   Modified Medications    No medications on file   Discontinued Medications    No medications on file       Review of Systems   Constitutional:  Positive for malaise/fatigue.   HENT: Negative.     Eyes: Negative.    Respiratory:  Positive for shortness of breath.    Cardiovascular:  Positive for chest pain and palpitations.   Gastrointestinal: Negative.    Genitourinary: Negative.    Musculoskeletal: Negative.    Skin: Negative.    Neurological:  Positive for dizziness.   Endo/Heme/Allergies: Negative.    Psychiatric/Behavioral: Negative.     All 12 systems otherwise negative.      Wt Readings from Last 3 Encounters:    08/18/23 59 kg (130 lb 1.1 oz)   05/02/23 58.6 kg (129 lb 3 oz)   03/22/23 59.4 kg (131 lb)     Temp Readings from Last 3 Encounters:   08/01/22 98 °F (36.7 °C)   02/16/22 99.3 °F (37.4 °C) (Temporal)   05/04/21 98.4 °F (36.9 °C) (Tympanic)     BP Readings from Last 3 Encounters:   08/18/23 119/78   05/02/23 120/84   02/03/23 114/80     Pulse Readings from Last 3 Encounters:   08/18/23 78   02/03/23 73   11/29/22 79       /78 (BP Location: Right arm, Patient Position: Sitting)   Pulse 78   Wt 59 kg (130 lb 1.1 oz)   LMP 09/09/2018   SpO2 99%   BMI 23.79 kg/m²     Objective:   Physical Exam  Constitutional:       General: She is not in acute distress.     Appearance: She is well-developed. She is not diaphoretic.   HENT:      Head: Normocephalic and atraumatic.      Mouth/Throat:      Pharynx: No oropharyngeal exudate.   Eyes:      General: No scleral icterus.        Right eye: No discharge.         Left eye: No discharge.   Pulmonary:      Effort: Pulmonary effort is normal. No respiratory distress.   Skin:     Coloration: Skin is not pale.      Findings: No erythema or rash.   Neurological:      Mental Status: She is alert and oriented to person, place, and time.   Psychiatric:         Behavior: Behavior normal.         Thought Content: Thought content normal.         Judgment: Judgment normal.         Lab Results   Component Value Date     05/16/2023    K 4.9 05/16/2023     05/16/2023    CO2 27 05/16/2023    BUN 13 05/16/2023    CREATININE 0.7 05/16/2023    GLU 99 05/16/2023    HGBA1C 5.3 10/18/2022    MG 2.0 11/30/2007    AST 21 05/16/2023    ALT 15 05/16/2023    ALBUMIN 4.3 05/16/2023    PROT 7.7 05/16/2023    BILITOT 0.9 05/16/2023    WBC 5.85 10/18/2022    HGB 12.1 10/18/2022    HCT 39.5 10/18/2022     (H) 10/18/2022     10/18/2022    INR 0.9 09/09/2022    TSH 1.596 10/18/2022    CHOL 279 (H) 05/16/2023    HDL 98 (H) 05/16/2023    LDLCALC 162.4 (H) 05/16/2023    TRIG 93  05/16/2023     Assessment:      1. Other chest pain    2. Undifferentiated inflammatory arthritis    3. Undifferentiated connective tissue disease    4. Dyspnea, unspecified type    5. Chest pain, unspecified type    6. Localized edema    7. Gastroesophageal reflux disease, unspecified whether esophagitis present    8. Palpitations    9. Scleroderma    10. Esophageal dysphagia    11. Mixed hyperlipidemia    12. Iatrogenic hypothyroidism    13. Other form of dyspnea    14. Atherosclerosis of both carotid arteries            Plan:   1. Chest pain, BRAXTON - atypical  -ECHO 3/2023 with normal bi V function and valves, PASP 20 mmHg.   -ECG stress test 3/2023 neg for ischemia.   - prior workup neg  - discussed noncardiac etiologies     2. HLD   - low jeffrey diet   - Lipids improving mildly 5/2023 - Tc 279, .   - could not tolerate lipitor, change to prava 20 - has stopped taking it   - may need non statin tx     3. Palpitations  - Holter - neg    4. Scleroderma/TERESA  - f/u with rheum    5. Hypothyroidism, TSH 1.65 --> 1.59  - cont Synthroid    6. GERD/esoph dysphagia  - f/u GI  - is gluten sensitive     7. Leg pain/numbness, possible Raynauds  - leg u/s venous and arterial us - negative 2/2022  - started low dose Norvasc 2.5 mg - has not taken recently     8. Carotid artery disease  - less than 50% Oct 2022  - has seen vasc sx    Thank you for allowing me to participate in this patient's care. Please do not hesitate to contact me with any questions or concerns. Consult note has been forwarded to the referral physician.

## 2023-09-09 NOTE — ANESTHESIA POSTPROCEDURE EVALUATION
Anesthesia Post Evaluation    Patient: Sonia Melo    Procedure(s) Performed: Procedure(s) (LRB):  EGD (ESOPHAGOGASTRODUODENOSCOPY) (N/A)  PH MONITORING, ESOPHAGUS, WIRELESS, (OFF REFLUX MEDS) (N/A)    Final Anesthesia Type: MAC    Patient location during evaluation: GI PACU  Patient participation: Yes- Able to Participate  Level of consciousness: awake and alert  Post-procedure vital signs: reviewed and stable  Pain management: adequate  Airway patency: patent    PONV status at discharge: No PONV  Anesthetic complications: no      Cardiovascular status: blood pressure returned to baseline  Respiratory status: unassisted  Hydration status: euvolemic  Follow-up not needed.          Vitals Value Taken Time   /71 1/29/2020  6:21 AM   Temp 36.5 °C (97.7 °F) 1/29/2020  6:21 AM   Pulse 87 1/29/2020  6:21 AM   Resp 20 1/29/2020  6:21 AM   SpO2 99 % 1/29/2020  6:21 AM         No case tracking events are documented in the log.      Pain/Thanh Score: No data recorded       none

## 2023-10-26 ENCOUNTER — LAB VISIT (OUTPATIENT)
Dept: LAB | Facility: HOSPITAL | Age: 53
End: 2023-10-26
Attending: FAMILY MEDICINE
Payer: COMMERCIAL

## 2023-10-26 ENCOUNTER — OFFICE VISIT (OUTPATIENT)
Dept: INTERNAL MEDICINE | Facility: CLINIC | Age: 53
End: 2023-10-26
Payer: COMMERCIAL

## 2023-10-26 VITALS
WEIGHT: 130.5 LBS | HEIGHT: 62 IN | TEMPERATURE: 98 F | BODY MASS INDEX: 24.01 KG/M2 | OXYGEN SATURATION: 97 % | SYSTOLIC BLOOD PRESSURE: 116 MMHG | DIASTOLIC BLOOD PRESSURE: 72 MMHG | HEART RATE: 82 BPM

## 2023-10-26 DIAGNOSIS — R13.19 ESOPHAGEAL DYSPHAGIA: ICD-10-CM

## 2023-10-26 DIAGNOSIS — Z00.00 ANNUAL PHYSICAL EXAM: Primary | ICD-10-CM

## 2023-10-26 DIAGNOSIS — E78.2 MIXED HYPERLIPIDEMIA: Chronic | ICD-10-CM

## 2023-10-26 DIAGNOSIS — K90.41 NCGS (NON-CELIAC GLUTEN SENSITIVITY): Chronic | ICD-10-CM

## 2023-10-26 DIAGNOSIS — Z00.00 ANNUAL PHYSICAL EXAM: ICD-10-CM

## 2023-10-26 LAB
ALBUMIN SERPL BCP-MCNC: 4.2 G/DL (ref 3.5–5.2)
ALP SERPL-CCNC: 78 U/L (ref 55–135)
ALT SERPL W/O P-5'-P-CCNC: 18 U/L (ref 10–44)
ANION GAP SERPL CALC-SCNC: 15 MMOL/L (ref 8–16)
AST SERPL-CCNC: 25 U/L (ref 10–40)
BILIRUB SERPL-MCNC: 0.5 MG/DL (ref 0.1–1)
BUN SERPL-MCNC: 15 MG/DL (ref 6–20)
CALCIUM SERPL-MCNC: 10.4 MG/DL (ref 8.7–10.5)
CHLORIDE SERPL-SCNC: 103 MMOL/L (ref 95–110)
CHOLEST SERPL-MCNC: 268 MG/DL (ref 120–199)
CHOLEST/HDLC SERPL: 3.1 {RATIO} (ref 2–5)
CO2 SERPL-SCNC: 22 MMOL/L (ref 23–29)
CREAT SERPL-MCNC: 0.7 MG/DL (ref 0.5–1.4)
ERYTHROCYTE [DISTWIDTH] IN BLOOD BY AUTOMATED COUNT: 13.2 % (ref 11.5–14.5)
EST. GFR  (NO RACE VARIABLE): >60 ML/MIN/1.73 M^2
ESTIMATED AVG GLUCOSE: 105 MG/DL (ref 68–131)
GLUCOSE SERPL-MCNC: 85 MG/DL (ref 70–110)
HBA1C MFR BLD: 5.3 % (ref 4–5.6)
HCT VFR BLD AUTO: 38.1 % (ref 37–48.5)
HDLC SERPL-MCNC: 87 MG/DL (ref 40–75)
HDLC SERPL: 32.5 % (ref 20–50)
HGB BLD-MCNC: 12.5 G/DL (ref 12–16)
LDLC SERPL CALC-MCNC: 164 MG/DL (ref 63–159)
MCH RBC QN AUTO: 30.3 PG (ref 27–31)
MCHC RBC AUTO-ENTMCNC: 32.8 G/DL (ref 32–36)
MCV RBC AUTO: 92 FL (ref 82–98)
NONHDLC SERPL-MCNC: 181 MG/DL
PLATELET # BLD AUTO: 255 K/UL (ref 150–450)
PMV BLD AUTO: 10.2 FL (ref 9.2–12.9)
POTASSIUM SERPL-SCNC: 5.2 MMOL/L (ref 3.5–5.1)
PROT SERPL-MCNC: 7.8 G/DL (ref 6–8.4)
RBC # BLD AUTO: 4.13 M/UL (ref 4–5.4)
SODIUM SERPL-SCNC: 140 MMOL/L (ref 136–145)
TRIGL SERPL-MCNC: 85 MG/DL (ref 30–150)
TSH SERPL DL<=0.005 MIU/L-ACNC: 2.21 UIU/ML (ref 0.4–4)
WBC # BLD AUTO: 6.44 K/UL (ref 3.9–12.7)

## 2023-10-26 PROCEDURE — 99999 PR PBB SHADOW E&M-EST. PATIENT-LVL III: CPT | Mod: PBBFAC,,, | Performed by: FAMILY MEDICINE

## 2023-10-26 PROCEDURE — 99396 PREV VISIT EST AGE 40-64: CPT | Mod: S$GLB,,, | Performed by: FAMILY MEDICINE

## 2023-10-26 PROCEDURE — 83036 HEMOGLOBIN GLYCOSYLATED A1C: CPT | Performed by: FAMILY MEDICINE

## 2023-10-26 PROCEDURE — 99999 PR PBB SHADOW E&M-EST. PATIENT-LVL III: ICD-10-PCS | Mod: PBBFAC,,, | Performed by: FAMILY MEDICINE

## 2023-10-26 PROCEDURE — 80053 COMPREHEN METABOLIC PANEL: CPT | Performed by: FAMILY MEDICINE

## 2023-10-26 PROCEDURE — 84443 ASSAY THYROID STIM HORMONE: CPT | Performed by: FAMILY MEDICINE

## 2023-10-26 PROCEDURE — 80061 LIPID PANEL: CPT | Performed by: FAMILY MEDICINE

## 2023-10-26 PROCEDURE — 85027 COMPLETE CBC AUTOMATED: CPT | Performed by: FAMILY MEDICINE

## 2023-10-26 PROCEDURE — 99396 PR PREVENTIVE VISIT,EST,40-64: ICD-10-PCS | Mod: S$GLB,,, | Performed by: FAMILY MEDICINE

## 2023-10-26 PROCEDURE — 36415 COLL VENOUS BLD VENIPUNCTURE: CPT | Performed by: FAMILY MEDICINE

## 2023-10-26 NOTE — PROGRESS NOTES
"Subjective:   Patient ID: Sonia Melo is a 53 y.o. female.  Chief Complaint:  Annual Exam    Presents for annual physical exam.    Last annual physical exam October 2022  Cholesterol tests are elevated. 10-year risk of a heart disease or stroke is 1%.  CBC with normal white blood cell count, red blood cell count, and platelet levels.  Sugar, Kidney, Liver, and Electrolyte tests are all normal.  Thyroid testing is normal.  A1c is in a normal range. No Prediabtes or Diabetes  Hepatitis C test is negative.     Medical history:  - Hypothyroidism.  On Synthroid 100 mcg daily.  Reports compliance.  Denies side effects.  No symptoms of hypo or hyperthyroidism.  - Recurrent fever blister/cold sores.  Use Denavir topically for outbreaks.  - Postcoital headache.  Stable with Maxalt as needed  - Mixed hyperlipidemia.  Not requiring medical treatment.    - Undifferentiated autoimmune disease.  Followed by Rheumatology.  - Non celiac gluten sensitivity esophageal dysphagia.  Needs referral to new gastroenterologist.  - Osteoarthritis cervical and lumbar spine     Health maintenance:  - Colon cancer screening up-to-date  - Gyn exam and mammogram up-to-date   - Needs Prevnar 20 vaccine, shingles vaccine, flu vaccine, and COVID booster     No new complaints or concerns today      Objective:   /72 (BP Location: Right arm, Patient Position: Sitting, BP Method: Small (Manual))   Pulse 82   Temp 97.9 °F (36.6 °C) (Tympanic)   Ht 5' 2" (1.575 m)   Wt 59.2 kg (130 lb 8.2 oz)   LMP 09/09/2018   SpO2 97%   BMI 23.87 kg/m²     Physical Exam  Vitals and nursing note reviewed.   Constitutional:       Appearance: Normal appearance. She is well-developed and normal weight.   Neck:      Thyroid: No thyroid mass, thyromegaly or thyroid tenderness.   Cardiovascular:      Rate and Rhythm: Normal rate and regular rhythm.      Heart sounds: Normal heart sounds. No murmur heard.     No friction rub. No gallop.   Pulmonary:      " Effort: Pulmonary effort is normal.      Breath sounds: Normal breath sounds. No decreased breath sounds, wheezing, rhonchi or rales.   Abdominal:      General: There is no distension.      Palpations: Abdomen is soft.      Tenderness: There is no abdominal tenderness.   Musculoskeletal:      Right lower leg: No edema.      Left lower leg: No edema.   Lymphadenopathy:      Cervical: No cervical adenopathy.   Skin:     Findings: No rash.   Neurological:      General: No focal deficit present.      Mental Status: She is alert and oriented to person, place, and time.   Psychiatric:         Mood and Affect: Mood and affect normal.       Assessment:       ICD-10-CM ICD-9-CM   1. Annual physical exam  Z00.00 V70.0   2. Mixed hyperlipidemia  E78.2 272.2   3. NCGS (non-celiac gluten sensitivity)  K90.41 V15.05   4. Esophageal dysphagia  R13.19 787.29     Plan:   Annual physical exam  -     CBC Without Differential; Future; Expected date: 10/26/2023  -     Comprehensive Metabolic Panel; Future; Expected date: 10/26/2023  -     Lipid Panel; Future; Expected date: 10/26/2023  -     TSH; Future; Expected date: 10/26/2023  -     Hemoglobin A1C; Future; Expected date: 10/26/2023  Blood pressure normal.  BMI 24.  Exam stable.    Check labs.  Treat as indicated.    Colon cancer screening up-to-date  Gyn exam and mammogram up-to-date   Declines all recommended vaccines      Mixed hyperlipidemia  Asymptomatic   Start statin if indicated     NCGS (non-celiac gluten sensitivity)  Esophageal dysphagia  -     Ambulatory referral/consult to Gastroenterology; Future; Expected date: 11/02/2023    Return to clinic 1 year for annual physical exam or sooner if needed

## 2024-02-28 DIAGNOSIS — R07.89 OTHER CHEST PAIN: ICD-10-CM

## 2024-02-28 DIAGNOSIS — Z00.00 ROUTINE ADULT HEALTH MAINTENANCE: Primary | ICD-10-CM

## 2024-03-07 ENCOUNTER — PATIENT MESSAGE (OUTPATIENT)
Dept: CARDIOLOGY | Facility: CLINIC | Age: 54
End: 2024-03-07
Payer: COMMERCIAL

## 2024-03-11 NOTE — PROGRESS NOTES
Subjective:   Patient ID:  Sonia Melo is a 54 y.o. female who presents for cardiac consult of No chief complaint on file.    The patient came in today for cardiac consult of No chief complaint on file.      Sonia Melo is a 54 y.o. female pt with carotid artery disease, connective tissue disorder/scleroderma, HLD, hypothyroidism presents for follow up CV eval.     7/26/19  Pt had a flutter/palpitations in the past, has been seeing Dr. Williamson. She had a stress test > 3 years ago. NO prior echo.   She was in ER recently, had a flutter every 3rd or 4th rhythm per pt NOT AFluter. She does have connective tissue disease, sometimes has sharp chest pain in center of chest. She also gets numbness and tingling. SHe has been getting more tired and BRAXTON. Discussed will do cardiac testing and may need pulm eval as well.     8/18/23  ECHO 3/2023 with normal bi V function and valves, PASP 20 mmHg. ECG stress test 3/2023 neg for ischemia.   Lipids improving mildly 5/2023 - Tc 279, .   BP and HR stable. She is currently building a tiny home. She has a fitbit and has spikes in HR a few times a week. She is swimming     3/12/24  Lipids remain elevated Oct 2023 - Tc 268, HDL 87, , Tg 85.   BP and Hr stable. BMI 24 - 135 lbs    Patient is compliant with medications.    FH - CAD      Results for orders placed during the hospital encounter of 03/22/23    Echo    Interpretation Summary  · The left ventricle is normal in size with normal systolic function.  · The estimated ejection fraction is 60%.  · Normal left ventricular diastolic function.  · Normal right ventricular size with normal right ventricular systolic function.  · Normal central venous pressure (3 mmHg).  · The estimated PA systolic pressure is 20 mmHg.      Results for orders placed during the hospital encounter of 03/22/23    Exercise Stress - EKG    Interpretation Summary    The patient exercised for 7 minutes 30 seconds on a Michael protocol,  corresponding to a functional capacity of 7 METS, achieving a peak heart rate of 160 bpm, which is 99 % of the age predicted maximum heart rate.    The ECG portion of the study is negative for ischemia.    The patient reported no chest pain during the stress test.    The blood pressure response to stress was normal.    There were no arrhythmias during stress.    Short-segment stenosis of the left ICA origin with stenosis felt to be less than 50%.  Correlate with carotid ultrasound as clinically indicated.  Otherwise unremarkable CTA head/neck.     Electronically signed by: Cosme Toth MD  Date:                                            10/27/2022  Time:                                           11:42    Conclusion    Triphasic waveforms B LE.  Normal COOKIE B LE.  Normal study.    Conclusion    There is no evidence of a right lower extremity DVT.  There is no evidence of a left lower extremity DVT.       HOLTER    TEST DESCRIPTION   PREDOMINANT RHYTHM  1. Sinus rhythm with heart rates varying between 51 and 145 bpm with an average of 86 bpm.     VENTRICULAR ARRHYTHMIAS  1. There were very rare PVCs recorded totalling 2 and averaging less than 1 per hour.   2. There were no episodes of ventricular tachycardia.    SUPRA VENTRICULAR ARRHYTHMIAS  1. There were very rare PACs recorded totalling 4 and averaging less than 1 per hour.   2. There were no episodes of sustained supraventricular tachycardia.    CONCLUSIONS     1 - Normal left ventricular systolic function (EF 60-65%).     2 - Normal left ventricular diastolic function.     3 - Normal right ventricular systolic function .     4 - The estimated PA systolic pressure is 15 mmHg.     5 - No wall motion abnormalities.     6 - Trivial mitral regurgitation.     7 - Trivial to mild tricuspid regurgitation.     8 - Anterior mitral valve leaflet prolapse - mild.       This document has been electronically    SIGNED BY: Stanley Mary MD On: 08/16/2019 13:24    ECG  stress  EKG Conclusions:    1. The EKG portion of this study is negative for ischemia at a moderate workload, and peak heart rate of 169 bpm (103% of predicted).   2. Blood pressure response to exercise was normal (Presenting BP: 131/95 Peak BP: 173/91).   3. No significant arrhythmias were present.   4. There were no symptoms of chest discomfort or significant dyspnea throughout the protocol.   5. The Taylor treadmill score was 6 suggesting a low probability for future cardiovascular events.        This document has been electronically    SIGNED BY: Jeremias Huber MD On: 08/05/2019 16:08    Past Medical History:   Diagnosis Date    [facility]ix [facility]    Cervical spondylolysis     DDD (degenerative disc disease), lumbosacral     Fibromyalgia     Hyperlipidemia     Hyperthyroidism     Iatrogenic hypothyroidism     Photosensitivity 06/13/2019    Spondylosis     Undifferentiated connective tissue disease        Past Surgical History:   Procedure Laterality Date    AUGMENTATION OF BREAST Bilateral 2009    silicone    BREAST IMPLANTS      BREAST SURGERY Bilateral 2009    silicone    CERVICAL FUSION  01/10/2018    ACDF    COLONOSCOPY N/A 10/13/2017    Procedure: COLONOSCOPY;  Surgeon: Hugh Luciano MD;  Location: [facility];  Service: Endoscopy;  Laterality: N/A;    EAR MASTOIDECTOMY W/ COCHLEAR IMPLANT W/ LANDMARK      ESOPHAGOGASTRODUODENOSCOPY N/A 1/29/2020    Procedure: EGD (ESOPHAGOGASTRODUODENOSCOPY);  Surgeon: Tisha Mendosa MD;  Location: [facility];  Service: Endoscopy;  Laterality: N/A;    STAPEDES SURGERY      sMart Stapedes Piston (Safe to  magnet)    UPPER GASTROINTESTINAL ENDOSCOPY         Social History     Tobacco Use    Smoking status: Never    Smokeless tobacco: Never   Substance Use Topics    Alcohol use: Yes     Alcohol/week: 2.0 standard drinks of alcohol     Types: 2 Cans of beer per week     Comment: socially     Drug  use: No       Family History   Problem Relation Age of Onset    Hypertension Mother     Hypertension Father     Heart disease Father     Cancer Maternal Uncle         stomach cancer    Stomach cancer Maternal Uncle     Celiac disease Maternal Aunt     Breast cancer Paternal Aunt     Cirrhosis Neg Hx     Colon cancer Neg Hx     Colon polyps Neg Hx     Crohn's disease Neg Hx     Cystic fibrosis Neg Hx     Esophageal cancer Neg Hx     Hemochromatosis Neg Hx     Inflammatory bowel disease Neg Hx     Irritable bowel syndrome Neg Hx     Liver cancer Neg Hx     Liver disease Neg Hx     Rectal cancer Neg Hx     Ulcerative colitis Neg Hx     Marin's disease Neg Hx     Lymphoma Neg Hx     Tuberculosis Neg Hx     Scleroderma Neg Hx     Rheum arthritis Neg Hx     Multiple sclerosis Neg Hx     Melanoma Neg Hx     Lupus Neg Hx     Psoriasis Neg Hx     Skin cancer Neg Hx        Patient's Medications   New Prescriptions    No medications on file   Previous Medications    ASPIRIN (ECOTRIN) 81 MG EC TABLET    Take 1 tablet (81 mg total) by mouth once daily.    COLLAGEN MISC    by Misc.(Non-Drug; Combo Route) route.    CYCLOSPORINE (RESTASIS) 0.05 % OPHTHALMIC EMULSION    Place 1 drop into both eyes 2 (two) times daily.    FERROUS SULFATE 220 MG (44 MG IRON)/5 ML SOLUTION    Take 220 mg by mouth once daily.    LEVOTHYROXINE (SYNTHROID) 100 MCG TABLET    TAKE 1 TABLET BEFORE BREAKFAST    METHOCARBAMOL (ROBAXIN) 750 MG TAB    TAKE ONE TABLET BY MOUTH EVERY 6 TO 8 HOURS AS NEEDED FOR MUSCLE SPASMS    MULTIVIT WITH MINERALS/LUTEIN (MULTIVITAMIN 50 PLUS ORAL)    multivitamin Take No date recorded No form recorded No frequency recorded No route recorded No set duration recorded No set duration amount recorded active No dosage strength recorded No dosage strength units of measure recorded    PENCICLOVIR (DENAVIR) 1 % CREAM    Apply topically every 2 (two) hours.    RIZATRIPTAN (MAXALT-MLT) 5 MG DISINTEGRATING TABLET    Take 1 tablet (5 mg  "total) by mouth as needed (for Headache). May repeat in 2 hours if needed   Modified Medications    No medications on file   Discontinued Medications    No medications on file       Review of Systems   Constitutional:  Positive for malaise/fatigue.   HENT: Negative.     Eyes: Negative.    Respiratory:  Positive for shortness of breath.    Cardiovascular:  Positive for chest pain and palpitations.   Gastrointestinal: Negative.    Genitourinary: Negative.    Musculoskeletal: Negative.    Skin: Negative.    Neurological:  Positive for dizziness.   Endo/Heme/Allergies: Negative.    Psychiatric/Behavioral: Negative.     All 12 systems otherwise negative.      Wt Readings from Last 3 Encounters:   03/12/24 61.4 kg (135 lb 5.8 oz)   10/26/23 59.2 kg (130 lb 8.2 oz)   08/18/23 59 kg (130 lb 1.1 oz)     Temp Readings from Last 3 Encounters:   10/26/23 97.9 °F (36.6 °C) (Tympanic)   08/01/22 98 °F (36.7 °C)   02/16/22 99.3 °F (37.4 °C) (Temporal)     BP Readings from Last 3 Encounters:   03/12/24 126/86   10/26/23 116/72   08/18/23 119/78     Pulse Readings from Last 3 Encounters:   03/12/24 79   10/26/23 82   08/18/23 78       /86 (BP Location: Right arm, Patient Position: Sitting, BP Method: Medium (Manual))   Pulse 79   Ht 5' 2" (1.575 m)   Wt 61.4 kg (135 lb 5.8 oz)   LMP 09/09/2018   SpO2 100%   BMI 24.76 kg/m²     Objective:   Physical Exam  Constitutional:       General: She is not in acute distress.     Appearance: She is well-developed. She is not diaphoretic.   HENT:      Head: Normocephalic and atraumatic.      Mouth/Throat:      Pharynx: No oropharyngeal exudate.   Eyes:      General: No scleral icterus.        Right eye: No discharge.         Left eye: No discharge.   Pulmonary:      Effort: Pulmonary effort is normal. No respiratory distress.   Skin:     Coloration: Skin is not pale.      Findings: No erythema or rash.   Neurological:      Mental Status: She is alert and oriented to person, place, " and time.   Psychiatric:         Behavior: Behavior normal.         Thought Content: Thought content normal.         Judgment: Judgment normal.         Lab Results   Component Value Date     10/26/2023    K 5.2 (H) 10/26/2023     10/26/2023    CO2 22 (L) 10/26/2023    BUN 15 10/26/2023    CREATININE 0.7 10/26/2023    GLU 85 10/26/2023    HGBA1C 5.3 10/26/2023    MG 2.0 11/30/2007    AST 25 10/26/2023    ALT 18 10/26/2023    ALBUMIN 4.2 10/26/2023    PROT 7.8 10/26/2023    BILITOT 0.5 10/26/2023    WBC 6.44 10/26/2023    HGB 12.5 10/26/2023    HCT 38.1 10/26/2023    MCV 92 10/26/2023     10/26/2023    INR 0.9 09/09/2022    TSH 2.207 10/26/2023    CHOL 268 (H) 10/26/2023    HDL 87 (H) 10/26/2023    LDLCALC 164.0 (H) 10/26/2023    TRIG 85 10/26/2023     Assessment:      1. Mixed hyperlipidemia    2. Undifferentiated inflammatory arthritis    3. Undifferentiated connective tissue disease    4. Other chest pain    5. Dyspnea, unspecified type    6. Chest pain, unspecified type    7. Gastroesophageal reflux disease, unspecified whether esophagitis present    8. Palpitations    9. Localized edema    10. Scleroderma    11. Iatrogenic hypothyroidism    12. Other form of dyspnea    13. Esophageal dysphagia    14. Atherosclerosis of both carotid arteries    15. Statin intolerance              Plan:   1. Chest pain, BRAXTON - atypical  -ECHO 3/2023 with normal bi V function and valves, PASP 20 mmHg.   -ECG stress test 3/2023 neg for ischemia.   - prior workup neg  - discussed noncardiac etiologies     2. HLD   -  cont low jeffrey diet   - Lipids remain elevated Oct 2023 - Tc 268, HDL 87, , Tg 85.   - could not tolerate lipitor, change to prava 20 - has stopped taking it   - may need non statin tx - start Zetia    3. Palpitations  - Holter - neg    4. Scleroderma/TERESA  - f/u with rheum    5. Hypothyroidism, TSH 1.65 --> 1.59  - cont Synthroid    6. GERD/esoph dysphagia  - f/u GI  - is gluten sensitive     7.  Leg pain/numbness, possible Raynauds  - leg u/s venous and arterial us - negative 2/2022  - started low dose Norvasc 2.5 mg - has not taken recently     8. Carotid artery disease  - less than 50% Oct 2022  - has seen vasc sx=-  - repeat carotid u/s     Visit today included increased complexity associated with the care of the episodic problem chest pain addressed and managing the longitudinal care of the patient due to the serious and/or complex managed problem(s) .      Thank you for allowing me to participate in this patient's care. Please do not hesitate to contact me with any questions or concerns. Consult note has been forwarded to the referral physician.

## 2024-03-12 ENCOUNTER — HOSPITAL ENCOUNTER (OUTPATIENT)
Dept: CARDIOLOGY | Facility: HOSPITAL | Age: 54
Discharge: HOME | End: 2024-03-12
Attending: INTERNAL MEDICINE
Payer: COMMERCIAL

## 2024-03-12 ENCOUNTER — OFFICE VISIT (OUTPATIENT)
Dept: CARDIOLOGY | Facility: CLINIC | Age: 54
End: 2024-03-12
Payer: COMMERCIAL

## 2024-03-12 VITALS
DIASTOLIC BLOOD PRESSURE: 86 MMHG | HEIGHT: 62 IN | WEIGHT: 135.38 LBS | HEART RATE: 79 BPM | OXYGEN SATURATION: 100 % | SYSTOLIC BLOOD PRESSURE: 126 MMHG | BODY MASS INDEX: 24.91 KG/M2

## 2024-03-12 DIAGNOSIS — E78.2 MIXED HYPERLIPIDEMIA: Primary | ICD-10-CM

## 2024-03-12 DIAGNOSIS — K21.9 GASTROESOPHAGEAL REFLUX DISEASE, UNSPECIFIED WHETHER ESOPHAGITIS PRESENT: ICD-10-CM

## 2024-03-12 DIAGNOSIS — M06.4 UNDIFFERENTIATED INFLAMMATORY ARTHRITIS: ICD-10-CM

## 2024-03-12 DIAGNOSIS — R60.0 LOCALIZED EDEMA: ICD-10-CM

## 2024-03-12 DIAGNOSIS — R07.9 CHEST PAIN, UNSPECIFIED TYPE: ICD-10-CM

## 2024-03-12 DIAGNOSIS — R06.09 OTHER FORM OF DYSPNEA: ICD-10-CM

## 2024-03-12 DIAGNOSIS — R07.89 OTHER CHEST PAIN: ICD-10-CM

## 2024-03-12 DIAGNOSIS — R06.00 DYSPNEA, UNSPECIFIED TYPE: ICD-10-CM

## 2024-03-12 DIAGNOSIS — M35.9 UNDIFFERENTIATED CONNECTIVE TISSUE DISEASE: ICD-10-CM

## 2024-03-12 DIAGNOSIS — E03.2 IATROGENIC HYPOTHYROIDISM: ICD-10-CM

## 2024-03-12 DIAGNOSIS — M34.9 SCLERODERMA: ICD-10-CM

## 2024-03-12 DIAGNOSIS — I65.23 ATHEROSCLEROSIS OF BOTH CAROTID ARTERIES: ICD-10-CM

## 2024-03-12 DIAGNOSIS — Z78.9 STATIN INTOLERANCE: ICD-10-CM

## 2024-03-12 DIAGNOSIS — R00.2 PALPITATIONS: ICD-10-CM

## 2024-03-12 DIAGNOSIS — R13.19 ESOPHAGEAL DYSPHAGIA: ICD-10-CM

## 2024-03-12 DIAGNOSIS — Z00.00 ROUTINE ADULT HEALTH MAINTENANCE: ICD-10-CM

## 2024-03-12 PROCEDURE — 93005 ELECTROCARDIOGRAM TRACING: CPT

## 2024-03-12 PROCEDURE — 99999 PR PBB SHADOW E&M-EST. PATIENT-LVL IV: CPT | Mod: PBBFAC,,, | Performed by: INTERNAL MEDICINE

## 2024-03-12 PROCEDURE — 99214 OFFICE O/P EST MOD 30 MIN: CPT | Mod: 25,S$GLB,, | Performed by: INTERNAL MEDICINE

## 2024-03-12 PROCEDURE — 93010 ELECTROCARDIOGRAM REPORT: CPT | Mod: ,,, | Performed by: INTERNAL MEDICINE

## 2024-03-12 RX ORDER — EZETIMIBE 10 MG/1
10 TABLET ORAL DAILY
Qty: 90 TABLET | Refills: 3 | Status: SHIPPED | OUTPATIENT
Start: 2024-03-12 | End: 2024-06-12 | Stop reason: SDUPTHER

## 2024-03-13 LAB
OHS QRS DURATION: 76 MS
OHS QTC CALCULATION: 434 MS

## 2024-04-11 ENCOUNTER — TELEPHONE (OUTPATIENT)
Dept: RHEUMATOLOGY | Facility: CLINIC | Age: 54
End: 2024-04-11
Payer: COMMERCIAL

## 2024-05-02 ENCOUNTER — OFFICE VISIT (OUTPATIENT)
Dept: OPHTHALMOLOGY | Facility: CLINIC | Age: 54
End: 2024-05-02
Payer: COMMERCIAL

## 2024-05-02 ENCOUNTER — PATIENT MESSAGE (OUTPATIENT)
Dept: OPHTHALMOLOGY | Facility: CLINIC | Age: 54
End: 2024-05-02

## 2024-05-02 DIAGNOSIS — H18.831 RCE (RECURRENT CORNEAL EROSION), RIGHT: ICD-10-CM

## 2024-05-02 DIAGNOSIS — H18.30 CORNEAL EPITHELIAL DEFECT: Primary | ICD-10-CM

## 2024-05-02 DIAGNOSIS — H40.013 OPEN ANGLE WITH BORDERLINE FINDINGS OF BOTH EYES: ICD-10-CM

## 2024-05-02 PROCEDURE — 99999 PR PBB SHADOW E&M-EST. PATIENT-LVL III: CPT | Mod: PBBFAC,,, | Performed by: OPTOMETRIST

## 2024-05-02 PROCEDURE — 99213 OFFICE O/P EST LOW 20 MIN: CPT | Mod: S$GLB,,, | Performed by: OPTOMETRIST

## 2024-05-02 RX ORDER — ERYTHROMYCIN 5 MG/G
OINTMENT OPHTHALMIC 3 TIMES DAILY
Qty: 3.5 G | Refills: 0 | Status: SHIPPED | OUTPATIENT
Start: 2024-05-02

## 2024-05-02 NOTE — PROGRESS NOTES
HPI     Eye Problem            Comments: Pt states that her right eye burns and feels very irritated,   red.  Started up 2 days ago and has gotten worse.           Last edited by Clau Carrion on 5/2/2024  2:43 PM.            Assessment /Plan     For exam results, see Encounter Report.    Corneal epithelial defect    Rce (recurrent corneal erosion), right  -     erythromycin (ROMYCIN) ophthalmic ointment; Place into the right eye 3 (three) times daily.  Dispense: 3.5 g; Refill: 0  1-2mm healing epi defect today OD   Discussed bandage lens and Tobrex vs emycin nayely  PT chooses to use emycin nayely  Start emycin nayely tid OD  Recommended iVizia prn and genteal gel qhs OU    Open angle with borderline findings of both eyes  Suspect based on elevated IOP today OS  IOP elevated today OS, normal OD  RTC for testing      RTC next available for 24-2 VF, dilated exam, gOCT and pach or PRN  Discussed above and all questions were answered.

## 2024-05-03 ENCOUNTER — HOSPITAL ENCOUNTER (OUTPATIENT)
Dept: RADIOLOGY | Facility: HOSPITAL | Age: 54
Discharge: HOME | End: 2024-05-03
Attending: FAMILY MEDICINE
Payer: COMMERCIAL

## 2024-05-03 VITALS — HEIGHT: 62 IN | BODY MASS INDEX: 24.84 KG/M2 | WEIGHT: 135 LBS

## 2024-05-03 DIAGNOSIS — Z12.31 ENCOUNTER FOR SCREENING MAMMOGRAM FOR BREAST CANCER: ICD-10-CM

## 2024-05-03 PROCEDURE — 77067 SCR MAMMO BI INCL CAD: CPT | Mod: 26,,, | Performed by: RADIOLOGY

## 2024-05-03 PROCEDURE — 77063 BREAST TOMOSYNTHESIS BI: CPT | Mod: TC,PO

## 2024-05-03 PROCEDURE — 77063 BREAST TOMOSYNTHESIS BI: CPT | Mod: 26,,, | Performed by: RADIOLOGY

## 2024-06-12 ENCOUNTER — HOSPITAL ENCOUNTER (OUTPATIENT)
Dept: CARDIOLOGY | Facility: HOSPITAL | Age: 54
Discharge: HOME | End: 2024-06-12
Attending: INTERNAL MEDICINE
Payer: COMMERCIAL

## 2024-06-12 ENCOUNTER — PATIENT MESSAGE (OUTPATIENT)
Dept: CARDIOLOGY | Facility: CLINIC | Age: 54
End: 2024-06-12
Payer: COMMERCIAL

## 2024-06-12 VITALS
HEIGHT: 62 IN | WEIGHT: 135 LBS | SYSTOLIC BLOOD PRESSURE: 122 MMHG | BODY MASS INDEX: 24.84 KG/M2 | DIASTOLIC BLOOD PRESSURE: 86 MMHG

## 2024-06-12 DIAGNOSIS — Z78.9 STATIN INTOLERANCE: ICD-10-CM

## 2024-06-12 DIAGNOSIS — E78.2 MIXED HYPERLIPIDEMIA: ICD-10-CM

## 2024-06-12 DIAGNOSIS — I65.23 ATHEROSCLEROSIS OF BOTH CAROTID ARTERIES: ICD-10-CM

## 2024-06-12 DIAGNOSIS — I65.23 ATHEROSCLEROSIS OF BOTH CAROTID ARTERIES: Primary | ICD-10-CM

## 2024-06-12 LAB
LEFT ARM DIASTOLIC BLOOD PRESSURE: 86 MMHG
LEFT ARM SYSTOLIC BLOOD PRESSURE: 122 MMHG
LEFT CBA DIAS: 27 CM/S
LEFT CBA SYS: 67 CM/S
LEFT CCA DIST DIAS: 29 CM/S
LEFT CCA DIST SYS: 77 CM/S
LEFT CCA MID DIAS: 30 CM/S
LEFT CCA MID SYS: 85 CM/S
LEFT CCA PROX DIAS: 26 CM/S
LEFT CCA PROX SYS: 98 CM/S
LEFT ECA DIAS: 29 CM/S
LEFT ECA SYS: 77 CM/S
LEFT ICA DIST DIAS: 34 CM/S
LEFT ICA DIST SYS: 86 CM/S
LEFT ICA MID DIAS: 56 CM/S
LEFT ICA MID SYS: 113 CM/S
LEFT ICA PROX DIAS: 32 CM/S
LEFT ICA PROX SYS: 77 CM/S
LEFT VERTEBRAL DIAS: 17 CM/S
LEFT VERTEBRAL SYS: 53 CM/S
OHS CV CAROTID RIGHT ICA EDV HIGHEST: 56
OHS CV CAROTID ULTRASOUND LEFT ICA/CCA RATIO: 1.47
OHS CV CAROTID ULTRASOUND RIGHT ICA/CCA RATIO: 1.44
OHS CV PV CAROTID LEFT HIGHEST CCA: 98
OHS CV PV CAROTID LEFT HIGHEST ICA: 113
OHS CV PV CAROTID RIGHT HIGHEST CCA: 92
OHS CV PV CAROTID RIGHT HIGHEST ICA: 115
OHS CV US CAROTID LEFT HIGHEST EDV: 56
RIGHT ARM DIASTOLIC BLOOD PRESSURE: 81 MMHG
RIGHT ARM SYSTOLIC BLOOD PRESSURE: 120 MMHG
RIGHT CBA DIAS: 25 CM/S
RIGHT CBA SYS: 64 CM/S
RIGHT CCA DIST DIAS: 29 CM/S
RIGHT CCA DIST SYS: 80 CM/S
RIGHT CCA MID DIAS: 24 CM/S
RIGHT CCA MID SYS: 73 CM/S
RIGHT CCA PROX DIAS: 33 CM/S
RIGHT CCA PROX SYS: 92 CM/S
RIGHT ECA DIAS: 15 CM/S
RIGHT ECA SYS: 76 CM/S
RIGHT ICA DIST DIAS: 56 CM/S
RIGHT ICA DIST SYS: 115 CM/S
RIGHT ICA MID DIAS: 53 CM/S
RIGHT ICA MID SYS: 108 CM/S
RIGHT ICA PROX DIAS: 40 CM/S
RIGHT ICA PROX SYS: 94 CM/S
RIGHT VERTEBRAL DIAS: 29 CM/S
RIGHT VERTEBRAL SYS: 101 CM/S

## 2024-06-12 PROCEDURE — 93880 EXTRACRANIAL BILAT STUDY: CPT

## 2024-06-12 PROCEDURE — 93880 EXTRACRANIAL BILAT STUDY: CPT | Mod: 26,,, | Performed by: INTERNAL MEDICINE

## 2024-06-12 RX ORDER — EZETIMIBE 10 MG/1
10 TABLET ORAL DAILY
Qty: 90 TABLET | Refills: 10 | Status: SHIPPED | OUTPATIENT
Start: 2024-06-12

## 2024-06-21 ENCOUNTER — OFFICE VISIT (OUTPATIENT)
Dept: OPHTHALMOLOGY | Facility: CLINIC | Age: 54
End: 2024-06-21
Payer: COMMERCIAL

## 2024-06-21 DIAGNOSIS — H40.013 OPEN ANGLE WITH BORDERLINE FINDINGS OF BOTH EYES: Primary | ICD-10-CM

## 2024-06-21 PROCEDURE — 99999 PR PBB SHADOW E&M-EST. PATIENT-LVL III: CPT | Mod: PBBFAC,,, | Performed by: OPTOMETRIST

## 2024-06-21 NOTE — PROGRESS NOTES
HPI     Follow-up            Comments: RTC next available for 24-2 VF, dilated exam, gOCT and pach   Pt was compliant with erythromycin (ROMYCIN) ophthalmic ointment and eye   is fine  Pt says VA is fine         Last edited by Karyn Reno on 6/21/2024 10:46 AM.            Assessment /Plan     For exam results, see Encounter Report.    Open angle with borderline findings of both eyes  -     Posterior Segment OCT Optic Nerve- Both eyes    Elevated IOP today OD, OS but thicker pachs  Central 10-2 VF done today with no VF defects, will get 24-2 if any changes on gOCT in the future  Normal gOCT today with nice, symmetric GCL OU    No treatment required at this time but based on risk factors recommend to continue monitoring.   Monitor 6 months        RTC 6 months for IOP check or PRN  Discussed above and all questions were answered.

## 2024-08-02 ENCOUNTER — PATIENT MESSAGE (OUTPATIENT)
Dept: CARDIOLOGY | Facility: CLINIC | Age: 54
End: 2024-08-02
Payer: COMMERCIAL

## 2024-08-12 DIAGNOSIS — R07.89 OTHER CHEST PAIN: ICD-10-CM

## 2024-08-12 DIAGNOSIS — E78.2 MIXED HYPERLIPIDEMIA: Primary | ICD-10-CM

## 2024-09-20 ENCOUNTER — PATIENT MESSAGE (OUTPATIENT)
Dept: OPHTHALMOLOGY | Facility: CLINIC | Age: 54
End: 2024-09-20
Payer: COMMERCIAL

## 2024-10-14 ENCOUNTER — OFFICE VISIT (OUTPATIENT)
Dept: INTERNAL MEDICINE | Facility: CLINIC | Age: 54
End: 2024-10-14
Payer: COMMERCIAL

## 2024-10-14 DIAGNOSIS — E03.2 IATROGENIC HYPOTHYROIDISM: ICD-10-CM

## 2024-10-14 DIAGNOSIS — R05.8 POST-VIRAL COUGH SYNDROME: Primary | ICD-10-CM

## 2024-10-14 PROCEDURE — 99214 OFFICE O/P EST MOD 30 MIN: CPT | Mod: 95,,, | Performed by: FAMILY MEDICINE

## 2024-10-14 PROCEDURE — G2211 COMPLEX E/M VISIT ADD ON: HCPCS | Mod: 95,,, | Performed by: FAMILY MEDICINE

## 2024-10-14 RX ORDER — AZELASTINE 1 MG/ML
2 SPRAY, METERED NASAL 2 TIMES DAILY PRN
Qty: 30 ML | Refills: 0 | Status: SHIPPED | OUTPATIENT
Start: 2024-10-14

## 2024-10-14 RX ORDER — PROMETHAZINE HYDROCHLORIDE AND DEXTROMETHORPHAN HYDROBROMIDE 6.25; 15 MG/5ML; MG/5ML
5 SYRUP ORAL EVERY 6 HOURS PRN
Qty: 180 ML | Refills: 0 | Status: SHIPPED | OUTPATIENT
Start: 2024-10-14

## 2024-10-14 RX ORDER — LEVOTHYROXINE SODIUM 100 UG/1
100 TABLET ORAL
Qty: 90 TABLET | Refills: 3 | Status: SHIPPED | OUTPATIENT
Start: 2024-10-14

## 2024-10-14 RX ORDER — ALBUTEROL SULFATE 90 UG/1
2 INHALANT RESPIRATORY (INHALATION) EVERY 6 HOURS PRN
Qty: 18 G | Refills: 0 | Status: SHIPPED | OUTPATIENT
Start: 2024-10-14

## 2024-10-14 NOTE — PROGRESS NOTES
Subjective:   Patient ID: Sonia Melo is a 54 y.o. female.  Chief Complaint:  Cough and Medication Refill    The patient location is: [place], [place]  The chief complaint leading to consultation is: Cough and Medication Refill    Visit type: audiovisual    Face to Face time with patient: 15 minnutes  20 minutes of total time spent on the encounter, which includes face to face time and non-face to face time preparing to see the patient (eg, review of tests), Obtaining and/or reviewing separately obtained history, Documenting clinical information in the electronic or other health record, Independently interpreting results (not separately reported) and communicating results to the patient/family/caregiver, or Care coordination (not separately reported).     Each patient to whom he or she provides medical services by telemedicine is:  (1) informed of the relationship between the physician and patient and the respective role of any other health care provider with respect to management of the patient; and (2) notified that he or she may decline to receive medical services by telemedicine and may withdraw from such care at any time.    Last annual exam October 2023  Cholesterol tests are elevated. 10-year risk of a heart attack or stroke is 1%.  CBC with normal white blood cell count, red blood cell count, and platelet levels.  Sugar, Kidney, Liver, and Electrolyte tests are all normal.  A1c is in a normal range. No Prediabtes or Diabetes  Thyroid testing is normal.    Presents for evaluation of cough   Greater than 2 week duration   Grandchildren sick but flu and COVID testing negative, no RSV testing done    Also needs refill of her thyroid medication before her annual exam for this year which still needs to be scheduled      Cough  This is a new problem. The current episode started 1 to 4 weeks ago. The problem has been waxing and waning. The problem occurs hourly. The cough is Non-productive. Associated symptoms  include headaches, nasal congestion, postnasal drip and rhinorrhea. Pertinent negatives include no chest pain, chills, ear congestion, ear pain, fever, heartburn, hemoptysis, myalgias, rash, sore throat, shortness of breath, sweats, weight loss or wheezing. The symptoms are aggravated by cold air, lying down and pollens. She has tried OTC cough suppressant for the symptoms. The treatment provided mild relief. There is no history of asthma, bronchiectasis, bronchitis, COPD, emphysema, environmental allergies or pneumonia.     Review of Systems   Constitutional:  Negative for chills, fatigue, fever and weight loss.   HENT:  Positive for congestion, postnasal drip and rhinorrhea. Negative for dental problem, ear discharge, ear pain, sinus pressure, sinus pain, sneezing, sore throat and trouble swallowing.    Eyes:  Negative for discharge.   Respiratory:  Positive for cough. Negative for hemoptysis, choking, chest tightness, shortness of breath, wheezing and stridor.    Cardiovascular:  Negative for chest pain and leg swelling.   Gastrointestinal:  Negative for diarrhea, heartburn, nausea and vomiting.   Musculoskeletal:  Negative for myalgias.   Skin:  Negative for rash.   Allergic/Immunologic: Negative for environmental allergies.   Neurological:  Positive for headaches.     Objective:     Physical Exam  Constitutional:       Appearance: Normal appearance. She is not ill-appearing.   HENT:      Nose: Congestion and rhinorrhea present.   Eyes:      General:         Right eye: No discharge.         Left eye: No discharge.      Conjunctiva/sclera: Conjunctivae normal.   Pulmonary:      Effort: Pulmonary effort is normal.   Neurological:      Mental Status: She is alert.   Psychiatric:         Mood and Affect: Mood and affect normal.         Assessment:       ICD-10-CM ICD-9-CM   1. Post-viral cough syndrome  R05.8 786.2   2. Iatrogenic hypothyroidism  E03.2 244.3     Plan:   Post-viral cough syndrome  -      promethazine-dextromethorphan (PROMETHAZINE-DM) 6.25-15 mg/5 mL Syrp; Take 5 mLs by mouth every 6 (six) hours as needed (Cough).  Dispense: 180 mL; Refill: 0  -     albuterol (PROVENTIL/VENTOLIN HFA) 90 mcg/actuation inhaler; Inhale 2 puffs into the lungs every 6 (six) hours as needed for Wheezing or Shortness of Breath (or Cough).  Dispense: 18 g; Refill: 0  -     azelastine (ASTELIN) 137 mcg (0.1 %) nasal spray; 2 sprays (274 mcg total) by Nasal route 2 (two) times daily as needed for Rhinitis.  Dispense: 30 mL; Refill: 0  Patient education/handout on post viral cough syndrome.    Discussed potential 6 week duration for complete resolution.    Phenergan DM as needed for cough congestion   Albuterol inhaler as needed for cough, wheezing, shortness for breath   Astelin nasal spray for postnasal drip   No antibiotics indicated at this time   Steroids proven very little benefit for symptom relief   However, if any worsening despite above treatment contact office to obtain prescription for doxycycline.     Iatrogenic hypothyroidism  -     levothyroxine (SYNTHROID) 100 MCG tablet; Take 1 tablet (100 mcg total) by mouth before breakfast.  Dispense: 90 tablet; Refill: 3  Asymptomatic   Last TSH normal   Continue Synthroid 100 mcg daily   Recheck TSH upcoming annual exam    Return to clinic one-month for annual exam    Visit today included increased complexity associated with managing the longitudinal care of the patient due to the serious and/or complex managed problem(s) listed above.

## 2024-11-22 ENCOUNTER — PATIENT MESSAGE (OUTPATIENT)
Dept: GASTROENTEROLOGY | Facility: CLINIC | Age: 54
End: 2024-11-22

## 2024-11-22 ENCOUNTER — OFFICE VISIT (OUTPATIENT)
Dept: GASTROENTEROLOGY | Facility: CLINIC | Age: 54
End: 2024-11-22
Payer: COMMERCIAL

## 2024-11-22 VITALS
HEART RATE: 85 BPM | SYSTOLIC BLOOD PRESSURE: 133 MMHG | WEIGHT: 129.63 LBS | BODY MASS INDEX: 23.71 KG/M2 | DIASTOLIC BLOOD PRESSURE: 93 MMHG

## 2024-11-22 DIAGNOSIS — K21.9 GASTROESOPHAGEAL REFLUX DISEASE WITHOUT ESOPHAGITIS: Primary | ICD-10-CM

## 2024-11-22 DIAGNOSIS — R10.13 EPIGASTRIC BURNING SENSATION: ICD-10-CM

## 2024-11-22 DIAGNOSIS — K90.41 NCGS (NON-CELIAC GLUTEN SENSITIVITY): Chronic | ICD-10-CM

## 2024-11-22 DIAGNOSIS — R10.13 DYSPEPSIA: ICD-10-CM

## 2024-11-22 PROCEDURE — 99214 OFFICE O/P EST MOD 30 MIN: CPT | Mod: S$GLB,,, | Performed by: INTERNAL MEDICINE

## 2024-11-22 PROCEDURE — 99999 PR PBB SHADOW E&M-EST. PATIENT-LVL IV: CPT | Mod: PBBFAC,,, | Performed by: INTERNAL MEDICINE

## 2024-11-22 RX ORDER — PANTOPRAZOLE SODIUM 40 MG/1
40 TABLET, DELAYED RELEASE ORAL DAILY
Qty: 90 TABLET | Refills: 3 | Status: SHIPPED | OUTPATIENT
Start: 2024-11-22

## 2024-11-22 NOTE — PROGRESS NOTES
[facility]  Gastroenterology    Patient evaluated at the request of Leonardo Frey MD  [address] [facility] [place] [address]    PCP: Leonardo Frey MD    11/22/24    HPI       Abdominal Pain     Additional comments: gastritis          Last edited by Lani Bowers MA on 11/22/2024 11:53 AM.          Subjective:     Mrs. Melo is a 54 year old female who comes to see us to establish care with me. She has a history of Gluten sensitive enteropathy. Mrs. Melo underwent celiac serologies that was normal and EGD was normal. She was not able to tolerate a gluten challenge followed by EGD or serological testing. 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 When she is exposed to gluten she reports having diarrhea, abdominal cramping, nausea, bloating. She occasionally has hematochezia. Symptoms are relieved when on a gluten free diet. Last colonoscopy was in 10/13/2017 and no polyps were removed. Repeat recommended in 10 years for screening. She also had experienced dysphagia and underwent EGD/Bravo and Manometry in 2020, followed by [place] Esophagogram. The findings of the manometry were diagnostic of weak peristalsis with large peristaltic defects. The results of esophageal manometry were suspicious for scleroderma but not diagnostic. A FL Esophagogram was normal. A Bravo pH study revealed increased acid exposure but no symptoms correlation.   In essence, she does have GERD and the manometry findings are consistent with chronic GERD. She is also experiencing intermittent epigastric burning and dyspepsia.       Past Medical History:   Diagnosis Date    [facility]ix [facility]    Cervical spondylolysis     DDD (degenerative disc disease), lumbosacral     Fibromyalgia     GERD (gastroesophageal reflux disease)     Gluten-sensitive enteropathy     Hyperlipidemia     Hyperthyroidism     Iatrogenic hypothyroidism     Photosensitivity 06/13/2019     Spondylosis     Undifferentiated connective tissue disease        Past Surgical History:   Procedure Laterality Date    AUGMENTATION OF BREAST Bilateral 2009    silicone    BREAST IMPLANTS      BREAST SURGERY Bilateral 2009    silicone    CERVICAL FUSION  01/10/2018    ACDF    COLONOSCOPY N/A 10/13/2017    Procedure: COLONOSCOPY;  Surgeon: Hugh Luciano MD;  Location: [facility];  Service: Endoscopy;  Laterality: N/A;    EAR MASTOIDECTOMY W/ COCHLEAR IMPLANT W/ LANDMARK      ESOPHAGOGASTRODUODENOSCOPY N/A 01/29/2020    Procedure: EGD (ESOPHAGOGASTRODUODENOSCOPY);  Surgeon: Tisha Mendosa MD;  Location: [facility] END[facility];  Service: Endoscopy;  Laterality: N/A;    EYE SURGERY      SPINE SURGERY  Jan 2018    ACDF    STAPEDES SURGERY      sMart Stapedes Piston (Safe to 3T magnet)    UPPER GASTROINTESTINAL ENDOSCOPY         Current Outpatient Medications on File Prior to Visit   Medication Sig Dispense Refill    azelastine (ASTELIN) 137 mcg (0.1 %) nasal spray 2 sprays (274 mcg total) by Nasal route 2 (two) times daily as needed for Rhinitis. 30 mL 0    cycloSPORINE (RESTASIS) 0.05 % ophthalmic emulsion Place 1 drop into both eyes 2 (two) times daily. 60 each 11    levothyroxine (SYNTHROID) 100 MCG tablet Take 1 tablet (100 mcg total) by mouth before breakfast. 90 tablet 3    multivit with minerals/lutein (MULTIVITAMIN 50 PLUS ORAL) multivitamin Take No date recorded No form recorded No frequency recorded No route recorded No set duration recorded No set duration amount recorded active No dosage strength recorded No dosage strength units of measure recorded      aspirin (ECOTRIN) 81 MG EC tablet Take 1 tablet (81 mg total) by mouth once daily. 90 tablet 3    rizatriptan (MAXALT-MLT) 5 MG disintegrating tablet Take 1 tablet (5 mg total) by mouth as needed (for Headache). May repeat in 2 hours if needed 30 tablet 0    [DISCONTINUED] albuterol (PROVENTIL/VENTOLIN HFA) 90 mcg/actuation inhaler Inhale 2 puffs into the  lungs every 6 (six) hours as needed for Wheezing or Shortness of Breath (or Cough). (Patient not taking: Reported on 11/22/2024) 18 g 0    [DISCONTINUED] COLLAGEN MISC by Misc.(Non-Drug; Combo Route) route. (Patient not taking: Reported on 11/22/2024)      [DISCONTINUED] erythromycin (ROMYCIN) ophthalmic ointment Place into the right eye 3 (three) times daily. (Patient not taking: Reported on 11/22/2024) 3.5 g 0    [DISCONTINUED] ezetimibe (ZETIA) 10 mg tablet Take 1 tablet (10 mg total) by mouth once daily. (Patient not taking: Reported on 11/22/2024) 90 tablet 10    [DISCONTINUED] ferrous sulfate 220 mg (44 mg iron)/5 mL solution Take 220 mg by mouth once daily. (Patient not taking: Reported on 11/22/2024)      [DISCONTINUED] methocarbamoL (ROBAXIN) 750 MG Tab TAKE ONE TABLET BY MOUTH EVERY 6 TO 8 HOURS AS NEEDED FOR MUSCLE SPASMS (Patient not taking: Reported on 11/22/2024)      [DISCONTINUED] penciclovir (DENAVIR) 1 % cream Apply topically every 2 (two) hours. (Patient not taking: Reported on 11/22/2024) 1.5 g 3    [DISCONTINUED] promethazine-dextromethorphan (PROMETHAZINE-DM) 6.25-15 mg/5 mL Syrp Take 5 mLs by mouth every 6 (six) hours as needed (Cough). (Patient not taking: Reported on 11/22/2024) 180 mL 0     No current facility-administered medications on file prior to visit.       Review of patient's allergies indicates:   Allergen Reactions    Adhesive Rash       Social History     Socioeconomic History    Marital status:     Number of children: 2   Occupational History     Employer: [employer]   Tobacco Use    Smoking status: Never    Smokeless tobacco: Never   Substance and Sexual Activity    Alcohol use: Yes     Alcohol/week: 3.0 standard drinks of alcohol     Types: 1 Glasses of wine, 1 Shots of liquor, 1 Drinks containing 0.5 oz of alcohol per week     Comment: socially     Drug use: No    Sexual activity: Yes     Partners: Male     Birth control/protection: Post-menopausal, None      Comment: Havent been tested for menopause   Social History Narrative    Wears a seatbelt.     Social Drivers of Health     Financial Resource Strain: Low Risk  (10/14/2024)    Overall Financial Resource Strain (CARDIA)     Difficulty of Paying Living Expenses: Not hard at all   Food Insecurity: No Food Insecurity (10/14/2024)    Hunger Vital Sign     Worried About Running Out of Food in the Last Year: Never true     Ran Out of Food in the Last Year: Never true   Physical Activity: Insufficiently Active (10/14/2024)    Exercise Vital Sign     Days of Exercise per Week: 4 days     Minutes of Exercise per Session: 20 min   Stress: No Stress Concern Present (10/14/2024)    [employer] - Occupational Stress Questionnaire     Feeling of Stress : Only a little   Housing Stability: Unknown (10/14/2024)    Housing Stability Vital Sign     Unable to Pay for Housing in the Last Year: No       Family History   Problem Relation Name Age of Onset    Hypertension Mother Zoila Mason     Heart disease Mother Zoila Mason     Hypertension Father Oseas Mason     Heart disease Father Oseas Mason     Hearing loss Father Oseas Mason     Cancer Maternal Uncle Hamilton Elkins         stomach cancer    Stomach cancer Maternal Uncle Hamilton Elkins     Celiac disease Maternal Aunt Lona Story     Cancer Maternal Aunt Lona Story     Breast cancer Paternal Aunt      Cirrhosis Neg Hx      Colon cancer Neg Hx      Colon polyps Neg Hx      Crohn's disease Neg Hx      Cystic fibrosis Neg Hx      Esophageal cancer Neg Hx      Hemochromatosis Neg Hx      Inflammatory bowel disease Neg Hx      Irritable bowel syndrome Neg Hx      Liver cancer Neg Hx      Liver disease Neg Hx      Rectal cancer Neg Hx      Ulcerative colitis Neg Hx      Marin's disease Neg Hx      Lymphoma Neg Hx      Tuberculosis Neg Hx      Scleroderma Neg Hx      Rheum arthritis Neg Hx      Multiple sclerosis Neg Hx      Melanoma Neg Hx      Lupus Neg Hx       Psoriasis Neg Hx      Skin cancer Neg Hx         Objective:   Vitals:   Vitals:    11/22/24 1153   BP: (!) 133/93   Pulse: 85       Physical Exam  Constitutional:       Appearance: She is well-developed.   HENT:      Head: Normocephalic and atraumatic.   Eyes:      Pupils: Pupils are equal, round, and reactive to light.   Neck:      Thyroid: No thyromegaly.   Cardiovascular:      Rate and Rhythm: Normal rate and regular rhythm.      Heart sounds: Normal heart sounds. No murmur heard.  Pulmonary:      Effort: Pulmonary effort is normal. No respiratory distress.      Breath sounds: Normal breath sounds. No wheezing or rales.   Abdominal:      General: Bowel sounds are normal. There is no distension.      Palpations: Abdomen is soft. There is no mass.      Tenderness: There is no abdominal tenderness.   Musculoskeletal:         General: No tenderness. Normal range of motion.      Cervical back: Normal range of motion and neck supple.   Lymphadenopathy:      Cervical: No cervical adenopathy.   Skin:     General: Skin is warm and dry.      Findings: No erythema.   Neurological:      Mental Status: She is alert and oriented to person, place, and time.      Cranial Nerves: No cranial nerve deficit.      Deep Tendon Reflexes: Reflexes are normal and symmetric.   Psychiatric:         Behavior: Behavior normal.         X-Ray Chest PA And Lateral    Result Date: 10/24/2024  Examination Description: Chest xray, frontal and lateral views Comparisons: None provided.   Findings The cardiomediastinal silhouette is within normal limits. There are peribronchial markings in both lungs suggesting reactive airway disease No pleural effusions are seen. There is no pneumothorax present. Surgical fusion in the cervical spine Multi level degenerative change in the spine.     There are peribronchial markings in both lungs suggesting reactive airway disease Electronically signed by: Jean Carlos Billings MD on 10/24/2024 11:53:57 AM  US/Central      IMPRESSION     Problem List Items Addressed This Visit       NCGS (non-celiac gluten sensitivity) (Chronic)     Other Visit Diagnoses       Gastroesophageal reflux disease without esophagitis    -  Primary    Relevant Medications    pantoprazole (PROTONIX) 40 MG tablet    Dyspepsia        Relevant Orders    H. pylori antigen, stool    Epigastric burning sensation                PLANS:    Gastroesophageal reflux disease without esophagitis  -     pantoprazole (PROTONIX) 40 MG tablet; Take 1 tablet (40 mg total) by mouth once daily.  Dispense: 90 tablet; Refill: 3    NCGS (non-celiac gluten sensitivity)    Dyspepsia  -     H. pylori antigen, stool; Future; Expected date: 11/22/2024    Epigastric burning sensation      Lifestyle modifications for reflux discussed with the patient. These include weight loss, proper elevation of the head of the bed, avoiding foods that trigger reflux, avoiding late meals and staying upright for at least 1.5 hours after meals.    Thanks for letting us participate in the care of your patient.    Dontae Haq M.D.

## 2024-11-22 NOTE — PATIENT INSTRUCTIONS
11/22/2024    Dear Sonia Melo,    We are writing to express our heartfelt gratitude for choosing us as your healthcare provider and entrusting us with your well-being. Your health and satisfaction are our top priorities, and we are truly honored to have the opportunity to serve you.    At [facility], we strive to provide the best possible care and support for our patients. My assessment and recommendations are as follows:    Gastroesophageal reflux disease without esophagitis  -     pantoprazole (PROTONIX) 40 MG tablet; Take 1 tablet (40 mg total) by mouth once daily.  Dispense: 90 tablet; Refill: 3    NCGS (non-celiac gluten sensitivity)    Dyspepsia  -     H. pylori antigen, stool; Future; Expected date: 11/22/2024    Epigastric burning sensation        We genuinely value your feedback and believe that it plays a crucial role in our pursuit of excellence. We kindly request you to take a few minutes of your time to share your experience with us by posting a Google review. Your honest thoughts and opinions can make a significant difference for others seeking healthcare services and will help us better understand how we can further enhance our patient care.    Your kind words and positive reviews will not only motivate our dedicated team but will also inspire confidence in potential patients who are looking for exceptional healthcare services.    Once again, we extend our sincere appreciation for giving us the opportunity to serve you. If there is anything else we can do to improve your experience or assist you further, please do not hesitate to reach out to us.    Thank you for being part of our [facility] family, and we look forward to continuing to provide you with the best care possible.    Thanks for trusting us with your healthcare needs and using [facility]. If you want to ask us a question, you can do so by replying to this message or by calling [phone].    Sincerely,    Dontae Haq,  "M.D.    PS: At [facility] we offer virtual visits. Please use your [facility] rosibel to schedule a virtual visit.     To rate your experience with Dr. Haq please click on the link below:    [email or web address]    To post a review, simply follow these quick steps:  1. Visit [facility] or search for my name on Google.  2. Click on the "Write a review" button on the left-hand side of the page.  3. Rate your experience by choosing the number of stars that reflect your satisfaction.  4. Share your thoughts and insights about your visit - we'd love to hear your feedback!    "

## 2024-12-10 ENCOUNTER — OFFICE VISIT (OUTPATIENT)
Dept: INTERNAL MEDICINE | Facility: CLINIC | Age: 54
End: 2024-12-10
Payer: COMMERCIAL

## 2024-12-10 ENCOUNTER — LAB VISIT (OUTPATIENT)
Dept: LAB | Facility: HOSPITAL | Age: 54
End: 2024-12-10
Attending: FAMILY MEDICINE
Payer: COMMERCIAL

## 2024-12-10 VITALS
OXYGEN SATURATION: 98 % | SYSTOLIC BLOOD PRESSURE: 142 MMHG | HEIGHT: 62 IN | HEART RATE: 87 BPM | WEIGHT: 131.19 LBS | BODY MASS INDEX: 24.14 KG/M2 | DIASTOLIC BLOOD PRESSURE: 90 MMHG

## 2024-12-10 DIAGNOSIS — Z00.00 ANNUAL PHYSICAL EXAM: ICD-10-CM

## 2024-12-10 DIAGNOSIS — R68.2 DRY MOUTH: ICD-10-CM

## 2024-12-10 DIAGNOSIS — R03.0 ELEVATED BLOOD PRESSURE READING WITHOUT DIAGNOSIS OF HYPERTENSION: ICD-10-CM

## 2024-12-10 DIAGNOSIS — Z00.00 ANNUAL PHYSICAL EXAM: Primary | ICD-10-CM

## 2024-12-10 DIAGNOSIS — E03.2 IATROGENIC HYPOTHYROIDISM: ICD-10-CM

## 2024-12-10 DIAGNOSIS — G44.82 COITAL HEADACHE: ICD-10-CM

## 2024-12-10 DIAGNOSIS — E78.2 MIXED HYPERLIPIDEMIA: ICD-10-CM

## 2024-12-10 LAB
ALBUMIN SERPL BCP-MCNC: 4.3 G/DL (ref 3.5–5.2)
ALP SERPL-CCNC: 86 U/L (ref 40–150)
ALT SERPL W/O P-5'-P-CCNC: 14 U/L (ref 10–44)
ANION GAP SERPL CALC-SCNC: 11 MMOL/L (ref 8–16)
AST SERPL-CCNC: 23 U/L (ref 10–40)
BILIRUB SERPL-MCNC: 0.5 MG/DL (ref 0.1–1)
BUN SERPL-MCNC: 17 MG/DL (ref 6–20)
CALCIUM SERPL-MCNC: 10.2 MG/DL (ref 8.7–10.5)
CHLORIDE SERPL-SCNC: 107 MMOL/L (ref 95–110)
CHOLEST SERPL-MCNC: 276 MG/DL (ref 120–199)
CHOLEST/HDLC SERPL: 3.3 {RATIO} (ref 2–5)
CO2 SERPL-SCNC: 25 MMOL/L (ref 23–29)
CREAT SERPL-MCNC: 0.7 MG/DL (ref 0.5–1.4)
ERYTHROCYTE [DISTWIDTH] IN BLOOD BY AUTOMATED COUNT: 13.2 % (ref 11.5–14.5)
EST. GFR  (NO RACE VARIABLE): >60 ML/MIN/1.73 M^2
ESTIMATED AVG GLUCOSE: 108 MG/DL (ref 68–131)
GLUCOSE SERPL-MCNC: 93 MG/DL (ref 70–110)
HBA1C MFR BLD: 5.4 % (ref 4–5.6)
HCT VFR BLD AUTO: 38.2 % (ref 37–48.5)
HDLC SERPL-MCNC: 83 MG/DL (ref 40–75)
HDLC SERPL: 30.1 % (ref 20–50)
HGB BLD-MCNC: 12.2 G/DL (ref 12–16)
LDLC SERPL CALC-MCNC: 172.4 MG/DL (ref 63–159)
MCH RBC QN AUTO: 30.2 PG (ref 27–31)
MCHC RBC AUTO-ENTMCNC: 31.9 G/DL (ref 32–36)
MCV RBC AUTO: 95 FL (ref 82–98)
NONHDLC SERPL-MCNC: 193 MG/DL
PLATELET # BLD AUTO: 236 K/UL (ref 150–450)
PMV BLD AUTO: 10.2 FL (ref 9.2–12.9)
POTASSIUM SERPL-SCNC: 4.8 MMOL/L (ref 3.5–5.1)
PROT SERPL-MCNC: 8 G/DL (ref 6–8.4)
RBC # BLD AUTO: 4.04 M/UL (ref 4–5.4)
SODIUM SERPL-SCNC: 143 MMOL/L (ref 136–145)
TRIGL SERPL-MCNC: 103 MG/DL (ref 30–150)
TSH SERPL DL<=0.005 MIU/L-ACNC: 0.88 UIU/ML (ref 0.4–4)
WBC # BLD AUTO: 6.12 K/UL (ref 3.9–12.7)

## 2024-12-10 PROCEDURE — 99999 PR PBB SHADOW E&M-EST. PATIENT-LVL III: CPT | Mod: PBBFAC,,, | Performed by: FAMILY MEDICINE

## 2024-12-10 PROCEDURE — 83036 HEMOGLOBIN GLYCOSYLATED A1C: CPT | Performed by: FAMILY MEDICINE

## 2024-12-10 PROCEDURE — 99396 PREV VISIT EST AGE 40-64: CPT | Mod: S$GLB,,, | Performed by: FAMILY MEDICINE

## 2024-12-10 PROCEDURE — 86235 NUCLEAR ANTIGEN ANTIBODY: CPT | Performed by: FAMILY MEDICINE

## 2024-12-10 PROCEDURE — 85027 COMPLETE CBC AUTOMATED: CPT | Performed by: FAMILY MEDICINE

## 2024-12-10 PROCEDURE — 86235 NUCLEAR ANTIGEN ANTIBODY: CPT | Mod: 59 | Performed by: FAMILY MEDICINE

## 2024-12-10 PROCEDURE — 36415 COLL VENOUS BLD VENIPUNCTURE: CPT | Performed by: FAMILY MEDICINE

## 2024-12-10 PROCEDURE — 80061 LIPID PANEL: CPT | Performed by: FAMILY MEDICINE

## 2024-12-10 PROCEDURE — 84443 ASSAY THYROID STIM HORMONE: CPT | Performed by: FAMILY MEDICINE

## 2024-12-10 PROCEDURE — 80053 COMPREHEN METABOLIC PANEL: CPT | Performed by: FAMILY MEDICINE

## 2024-12-10 RX ORDER — ASPIRIN 81 MG/1
81 TABLET ORAL DAILY
Qty: 90 TABLET | Refills: 3 | Status: SHIPPED | OUTPATIENT
Start: 2024-12-10 | End: 2025-12-10

## 2024-12-10 RX ORDER — PENCICLOVIR 10 MG/G
CREAM TOPICAL
Qty: 5 G | Refills: 0 | Status: SHIPPED | OUTPATIENT
Start: 2024-12-10

## 2024-12-10 RX ORDER — RIZATRIPTAN BENZOATE 5 MG/1
5 TABLET, ORALLY DISINTEGRATING ORAL
Qty: 30 TABLET | Refills: 0 | Status: SHIPPED | OUTPATIENT
Start: 2024-12-10 | End: 2025-01-09

## 2024-12-10 NOTE — PROGRESS NOTES
Subjective:   Patient ID: Sonia Melo is a 54 y.o. female.  Chief Complaint:  Annual Exam    resents for annual physical exam.     Last annual physical exam October 2023  Cholesterol tests are elevated. 10-year risk of a heart attack or stroke is 1%.  CBC with normal white blood cell count, red blood cell count, and platelet levels.  Sugar, Kidney, Liver, and Electrolyte tests are all normal.  A1c is in a normal range. No Prediabtes or Diabetes  Thyroid testing is normal.     Medical History:  - Hypothyroidism.  On Synthroid 100 mcg daily.  Reports compliance.  Denies side effects.  No symptoms of hypo or hyperthyroidism.  Needs repeat TSH.  - Recurrent fever blister/cold sores.  Uses Denavir topically for outbreaks..  Needs refill.  - Postcoital headache.  Stable with Maxalt as needed.  Needs refill.  - Mixed hyperlipidemia.  On aspirin 81 mg daily.  Ten year cardiovascular risk less than 5%.  Remains asymptomatic.  Not requiring medical treatment.  Needs repeat lipid panel.    - Undifferentiated autoimmune disease.  Followed by Rheumatology.  Concerns you might have Sjogren's based on consistent dry eyes and dry mouth.  Would like additional lab work done to test for that  - Non celiac gluten sensitivity and esophageal dysphagia.  Followed by Gastroenterology.  On Protonix 40 mg daily.  - Osteoarthritis cervical and lumbar spine.  Stable.     Health maintenance:  - Colon cancer screening up-to-date  - Gyn exam and mammogram up-to-date   - Needs Prevnar 20 vaccine, shingles vaccine, flu vaccine, and COVID booster      No new complaints or concerns today        Review of Systems   Constitutional:  Negative for chills, fatigue and fever.   HENT:  Positive for dental problem and mouth sores. Negative for congestion, ear pain, postnasal drip, rhinorrhea, sinus pressure and sore throat.    Eyes:  Negative for visual disturbance.   Respiratory:  Negative for cough, chest tightness, shortness of breath and wheezing.   "  Cardiovascular:  Negative for chest pain, palpitations and leg swelling.   Gastrointestinal:  Negative for abdominal pain, blood in stool, constipation, diarrhea, nausea and vomiting.   Endocrine: Negative for polydipsia, polyphagia and polyuria.   Genitourinary:  Negative for difficulty urinating, dysuria, flank pain, hematuria and pelvic pain.   Musculoskeletal:  Negative for myalgias.   Skin:  Negative for rash.   Neurological:  Positive for headaches. Negative for dizziness, syncope, weakness and light-headedness.   Hematological:  Negative for adenopathy.   Psychiatric/Behavioral:  Negative for decreased concentration, dysphoric mood and sleep disturbance. The patient is not nervous/anxious.        Objective:   BP (!) 142/90 (BP Location: Left arm, Patient Position: Sitting)   Pulse 87   Ht 5' 2" (1.575 m)   Wt 59.5 kg (131 lb 2.8 oz)   LMP 09/09/2018   SpO2 98%   BMI 23.99 kg/m²     Physical Exam  Vitals and nursing note reviewed.   Constitutional:       Appearance: Normal appearance. She is well-developed and normal weight.      Comments:   Blood pressure mildly elevated   HENT:      Right Ear: Tympanic membrane and ear canal normal.      Left Ear: Tympanic membrane and ear canal normal.      Mouth/Throat:      Pharynx: Oropharynx is clear. Uvula midline.   Neck:      Thyroid: No thyroid mass, thyromegaly or thyroid tenderness.   Cardiovascular:      Rate and Rhythm: Normal rate and regular rhythm.      Heart sounds: Normal heart sounds.   Pulmonary:      Effort: Pulmonary effort is normal.      Breath sounds: Normal breath sounds.   Abdominal:      General: There is no distension.      Palpations: Abdomen is soft.      Tenderness: There is no abdominal tenderness.   Musculoskeletal:      Right lower leg: No edema.      Left lower leg: No edema.   Skin:     Findings: No rash.   Neurological:      Mental Status: She is alert and oriented to person, place, and time.   Psychiatric:         Mood and " Affect: Mood and affect normal.       Assessment:       ICD-10-CM ICD-9-CM   1. Annual physical exam  Z00.00 V70.0   2. Iatrogenic hypothyroidism  E03.2 244.3   3. Mixed hyperlipidemia  E78.2 272.2   4. Elevated blood pressure reading without diagnosis of hypertension  R03.0 796.2   5. Dry mouth  R68.2 527.7   6. Coital headache  G44.82 339.82     Plan:   Annual physical exam  -     CBC Without Differential; Future; Expected date: 12/10/2024  -     Comprehensive Metabolic Panel; Future; Expected date: 12/10/2024  -     Lipid Panel; Future; Expected date: 12/10/2024  -     TSH; Future; Expected date: 12/10/2024  -     Hemoglobin A1C; Future; Expected date: 12/10/2024  -     Urinalysis, Reflex to Urine Culture Urine, Clean Catch; Future; Expected date: 12/10/2024  Blood pressure mildly elevated.  BMI 24.    Check labs.  Treat as indicated.    Colon cancer screening up-to-date   Gyn exam up-to-date   Breast cancer screening up-to-date   Declines/defers all recommended vaccines     Iatrogenic hypothyroidism  -     TSH; Future; Expected date: 12/10/2024  Asymptomatic   Check TSH   Adjust Synthroid 100 mcg daily as needed     Mixed hyperlipidemia  -     Lipid Panel; Future; Expected date: 12/10/2024  -     aspirin (ECOTRIN) 81 MG EC tablet; Take 1 tablet (81 mg total) by mouth once daily.  Dispense: 90 tablet; Refill: 3  Asymptomatic   Continue aspirin 81 mg daily   Check lipid panel   Intolerant of statins and Zetia   Consider Nexletol if indicated     Elevated blood pressure reading without diagnosis of hypertension  Patient education/handout on diet and lifestyle changes to help promote better blood pressure control  Check blood pressure daily on a rotating basis  If average blood pressure greater than 140/90 and/or any readings greater than 160/100, will need to start antihypertensive medication   If average blood pressure less than 140/90, okay to remain off medications    Dry mouth  -     ANTI -SSA ANTIBODY; Future;  Expected date: 12/10/2024  -     ANTI-SSB ANTIBODY; Future; Expected date: 12/10/2024  -     Microalbumin/Creatinine Ratio, Urine; Future; Expected date: 12/10/2024  Follow-up rheumatology as scheduled     Coital headache  -     rizatriptan (MAXALT-MLT) 5 MG disintegrating tablet; Take 1 tablet (5 mg total) by mouth as needed (for Headache). May repeat in 2 hours if needed  Dispense: 30 tablet; Refill: 0    Other orders  -     penciclovir (DENAVIR) 1 % cream; Apply topically every 2 (two) hours.  Dispense: 5 g; Refill: 0    Return to clinic 1 year for annual exam or sooner as needed

## 2024-12-12 LAB
ANTI-SSA ANTIBODY: 0.09 RATIO (ref 0–0.99)
ANTI-SSA INTERPRETATION: NEGATIVE
ANTI-SSB ANTIBODY: 0.13 RATIO (ref 0–0.99)
ANTI-SSB INTERPRETATION: NEGATIVE

## 2024-12-13 ENCOUNTER — PATIENT MESSAGE (OUTPATIENT)
Dept: GASTROENTEROLOGY | Facility: CLINIC | Age: 54
End: 2024-12-13
Payer: COMMERCIAL

## 2025-01-07 DIAGNOSIS — Z00.00 ROUTINE ADULT HEALTH MAINTENANCE: Primary | ICD-10-CM

## 2025-01-08 ENCOUNTER — HOSPITAL ENCOUNTER (OUTPATIENT)
Dept: CARDIOLOGY | Facility: HOSPITAL | Age: 55
Discharge: HOME | End: 2025-01-08
Attending: INTERNAL MEDICINE
Payer: COMMERCIAL

## 2025-01-08 ENCOUNTER — OFFICE VISIT (OUTPATIENT)
Dept: CARDIOLOGY | Facility: CLINIC | Age: 55
End: 2025-01-08
Payer: COMMERCIAL

## 2025-01-08 VITALS
DIASTOLIC BLOOD PRESSURE: 80 MMHG | HEIGHT: 62 IN | SYSTOLIC BLOOD PRESSURE: 112 MMHG | BODY MASS INDEX: 24.63 KG/M2 | WEIGHT: 133.81 LBS | HEART RATE: 77 BPM

## 2025-01-08 DIAGNOSIS — E78.01 FAMILIAL HYPERCHOLESTEREMIA: ICD-10-CM

## 2025-01-08 DIAGNOSIS — Z13.6 ENCOUNTER FOR SCREENING FOR CARDIOVASCULAR DISORDERS: ICD-10-CM

## 2025-01-08 DIAGNOSIS — R07.89 OTHER CHEST PAIN: ICD-10-CM

## 2025-01-08 DIAGNOSIS — E78.2 MIXED HYPERLIPIDEMIA: ICD-10-CM

## 2025-01-08 DIAGNOSIS — R07.9 CHEST PAIN, UNSPECIFIED TYPE: ICD-10-CM

## 2025-01-08 DIAGNOSIS — Z00.00 ROUTINE ADULT HEALTH MAINTENANCE: ICD-10-CM

## 2025-01-08 DIAGNOSIS — E03.2 IATROGENIC HYPOTHYROIDISM: ICD-10-CM

## 2025-01-08 DIAGNOSIS — I65.23 ATHEROSCLEROSIS OF BOTH CAROTID ARTERIES: Primary | ICD-10-CM

## 2025-01-08 DIAGNOSIS — R00.2 PALPITATIONS: ICD-10-CM

## 2025-01-08 DIAGNOSIS — R06.09 OTHER FORM OF DYSPNEA: ICD-10-CM

## 2025-01-08 DIAGNOSIS — K21.9 GASTROESOPHAGEAL REFLUX DISEASE, UNSPECIFIED WHETHER ESOPHAGITIS PRESENT: ICD-10-CM

## 2025-01-08 DIAGNOSIS — R60.0 LOCALIZED EDEMA: ICD-10-CM

## 2025-01-08 DIAGNOSIS — M06.4 UNDIFFERENTIATED INFLAMMATORY ARTHRITIS: ICD-10-CM

## 2025-01-08 DIAGNOSIS — M34.9 SCLERODERMA: ICD-10-CM

## 2025-01-08 DIAGNOSIS — M35.9 UNDIFFERENTIATED CONNECTIVE TISSUE DISEASE: ICD-10-CM

## 2025-01-08 DIAGNOSIS — R06.00 DYSPNEA, UNSPECIFIED TYPE: ICD-10-CM

## 2025-01-08 DIAGNOSIS — Z78.9 STATIN INTOLERANCE: ICD-10-CM

## 2025-01-08 PROCEDURE — 99999 PR PBB SHADOW E&M-EST. PATIENT-LVL III: CPT | Mod: PBBFAC,,, | Performed by: INTERNAL MEDICINE

## 2025-01-08 PROCEDURE — 3008F BODY MASS INDEX DOCD: CPT | Mod: CPTII,S$GLB,, | Performed by: INTERNAL MEDICINE

## 2025-01-08 PROCEDURE — G2211 COMPLEX E/M VISIT ADD ON: HCPCS | Mod: S$GLB,,, | Performed by: INTERNAL MEDICINE

## 2025-01-08 PROCEDURE — 93010 ELECTROCARDIOGRAM REPORT: CPT | Mod: ,,, | Performed by: INTERNAL MEDICINE

## 2025-01-08 PROCEDURE — 1159F MED LIST DOCD IN RCRD: CPT | Mod: CPTII,S$GLB,, | Performed by: INTERNAL MEDICINE

## 2025-01-08 PROCEDURE — 3079F DIAST BP 80-89 MM HG: CPT | Mod: CPTII,S$GLB,, | Performed by: INTERNAL MEDICINE

## 2025-01-08 PROCEDURE — 3074F SYST BP LT 130 MM HG: CPT | Mod: CPTII,S$GLB,, | Performed by: INTERNAL MEDICINE

## 2025-01-08 PROCEDURE — 1160F RVW MEDS BY RX/DR IN RCRD: CPT | Mod: CPTII,S$GLB,, | Performed by: INTERNAL MEDICINE

## 2025-01-08 PROCEDURE — 99215 OFFICE O/P EST HI 40 MIN: CPT | Mod: S$GLB,,, | Performed by: INTERNAL MEDICINE

## 2025-01-08 PROCEDURE — 93005 ELECTROCARDIOGRAM TRACING: CPT

## 2025-01-08 RX ORDER — BEMPEDOIC ACID 180 MG/1
180 TABLET, FILM COATED ORAL NIGHTLY
Qty: 90 TABLET | Refills: 1 | Status: SHIPPED | OUTPATIENT
Start: 2025-01-08

## 2025-01-08 NOTE — PROGRESS NOTES
Subjective:   Patient ID:  Sonia Melo is a 54 y.o. female who presents for cardiac consult of No chief complaint on file.    The patient came in today for cardiac consult of No chief complaint on file.      Sonia Melo is a 54 y.o. female pt with carotid artery disease, connective tissue disorder/scleroderma, HLD, hypothyroidism presents for follow up CV eval.       3/12/24  Lipids remain elevated Oct 2023 - Tc 268, HDL 87, , Tg 85.   BP and Hr stable. BMI 24 - 135 lbs    1/8/25  Carotids with mild disease 6/2024  BP and HR stable. BMI 24 - 133 lbs   She could not tolerate Zetia due to muscle pain.   She is back on PPI, saw Dr. Haq.     FH - CAD     Interpretation Summary  Show Result Comparison     There is 20-39% right Internal Carotid Stenosis.    There is 20-39% left Internal Carotid Stenosis.        Results for orders placed during the hospital encounter of 03/22/23    Echo    Interpretation Summary  · The left ventricle is normal in size with normal systolic function.  · The estimated ejection fraction is 60%.  · Normal left ventricular diastolic function.  · Normal right ventricular size with normal right ventricular systolic function.  · Normal central venous pressure (3 mmHg).  · The estimated PA systolic pressure is 20 mmHg.      Results for orders placed during the hospital encounter of 03/22/23    Exercise Stress - EKG    Interpretation Summary    The patient exercised for 7 minutes 30 seconds on a Michael protocol, corresponding to a functional capacity of 7 METS, achieving a peak heart rate of 160 bpm, which is 99 % of the age predicted maximum heart rate.    The ECG portion of the study is negative for ischemia.    The patient reported no chest pain during the stress test.    The blood pressure response to stress was normal.    There were no arrhythmias during stress.    Short-segment stenosis of the left ICA origin with stenosis felt to be less than 50%.  Correlate with carotid  ultrasound as clinically indicated.  Otherwise unremarkable CTA head/neck.     Electronically signed by: Cosme Toth MD  Date:                                            10/27/2022  Time:                                           11:42    Conclusion    Triphasic waveforms B LE.  Normal COOKIE B LE.  Normal study.    Conclusion    There is no evidence of a right lower extremity DVT.  There is no evidence of a left lower extremity DVT.       HOLTER    TEST DESCRIPTION   PREDOMINANT RHYTHM  1. Sinus rhythm with heart rates varying between 51 and 145 bpm with an average of 86 bpm.     VENTRICULAR ARRHYTHMIAS  1. There were very rare PVCs recorded totalling 2 and averaging less than 1 per hour.   2. There were no episodes of ventricular tachycardia.    SUPRA VENTRICULAR ARRHYTHMIAS  1. There were very rare PACs recorded totalling 4 and averaging less than 1 per hour.   2. There were no episodes of sustained supraventricular tachycardia.    CONCLUSIONS     1 - Normal left ventricular systolic function (EF 60-65%).     2 - Normal left ventricular diastolic function.     3 - Normal right ventricular systolic function .     4 - The estimated PA systolic pressure is 15 mmHg.     5 - No wall motion abnormalities.     6 - Trivial mitral regurgitation.     7 - Trivial to mild tricuspid regurgitation.     8 - Anterior mitral valve leaflet prolapse - mild.       This document has been electronically    SIGNED BY: Stanley Mary MD On: 08/16/2019 13:24    ECG stress  EKG Conclusions:    1. The EKG portion of this study is negative for ischemia at a moderate workload, and peak heart rate of 169 bpm (103% of predicted).   2. Blood pressure response to exercise was normal (Presenting BP: 131/95 Peak BP: 173/91).   3. No significant arrhythmias were present.   4. There were no symptoms of chest discomfort or significant dyspnea throughout the protocol.   5. The Taylor treadmill score was 6 suggesting a low probability for future  cardiovascular events.        This document has been electronically    SIGNED BY: Jeremias Huber MD On: 08/05/2019 16:08    Past Medical History:   Diagnosis Date    [facility]ix [facility]    [facility]    Cervical spondylolysis     DDD (degenerative disc disease), lumbosacral     Fibromyalgia     GERD (gastroesophageal reflux disease)     Gluten-sensitive enteropathy     Hyperlipidemia     Hyperthyroidism     Iatrogenic hypothyroidism     Photosensitivity 06/13/2019    Spondylosis     Undifferentiated connective tissue disease        Past Surgical History:   Procedure Laterality Date    AUGMENTATION OF BREAST Bilateral 2009    silicone    BREAST IMPLANTS      BREAST SURGERY Bilateral 2009    silicone    CERVICAL FUSION  01/10/2018    ACDF    COLONOSCOPY N/A 10/13/2017    Procedure: COLONOSCOPY;  Surgeon: Hugh Luciano MD;  Location: [facility] ENDO;  Service: Endoscopy;  Laterality: N/A;    EAR MASTOIDECTOMY W/ COCHLEAR IMPLANT W/ LANDMARK      ESOPHAGOGASTRODUODENOSCOPY N/A 01/29/2020    Procedure: EGD (ESOPHAGOGASTRODUODENOSCOPY);  Surgeon: Tisha Mendosa MD;  Location: [facility];  Service: Endoscopy;  Laterality: N/A;    EYE SURGERY      SPINE SURGERY  Jan 2018    ACDF    STAPEDES SURGERY      sMart Stapedes Piston (Safe to 3T magnet)    UPPER GASTROINTESTINAL ENDOSCOPY         Social History     Tobacco Use    Smoking status: Never    Smokeless tobacco: Never   Substance Use Topics    Alcohol use: Yes     Alcohol/week: 3.0 standard drinks of alcohol     Types: 1 Glasses of wine, 1 Shots of liquor, 1 Drinks containing 0.5 oz of alcohol per week     Comment: socially     Drug use: No       Family History   Problem Relation Name Age of Onset    Hypertension Mother Zoila Mason     Heart disease Mother Zoila Mason     Hypertension Father Oseas Mason     Heart disease Father Oseas Mason     Hearing loss Father [place] Mason     Cancer Maternal Uncle Hamilton Elkins          stomach cancer    Stomach cancer Maternal Uncle Hamilton Elkins     Celiac disease Maternal Aunt Lona Story     Cancer Maternal Aunt Lona Story     Breast cancer Paternal Aunt      Cirrhosis Neg Hx      Colon cancer Neg Hx      Colon polyps Neg Hx      Crohn's disease Neg Hx      Cystic fibrosis Neg Hx      Esophageal cancer Neg Hx      Hemochromatosis Neg Hx      Inflammatory bowel disease Neg Hx      Irritable bowel syndrome Neg Hx      Liver cancer Neg Hx      Liver disease Neg Hx      Rectal cancer Neg Hx      Ulcerative colitis Neg Hx      Marin's disease Neg Hx      Lymphoma Neg Hx      Tuberculosis Neg Hx      Scleroderma Neg Hx      Rheum arthritis Neg Hx      Multiple sclerosis Neg Hx      Melanoma Neg Hx      Lupus Neg Hx      Psoriasis Neg Hx      Skin cancer Neg Hx         Patient's Medications   New Prescriptions    No medications on file   Previous Medications    ASPIRIN (ECOTRIN) 81 MG EC TABLET    Take 1 tablet (81 mg total) by mouth once daily.    AZELASTINE (ASTELIN) 137 MCG (0.1 %) NASAL SPRAY    2 sprays (274 mcg total) by Nasal route 2 (two) times daily as needed for Rhinitis.    LEVOTHYROXINE (SYNTHROID) 100 MCG TABLET    Take 1 tablet (100 mcg total) by mouth before breakfast.    MULTIVIT WITH MINERALS/LUTEIN (MULTIVITAMIN 50 PLUS ORAL)    multivitamin Take No date recorded No form recorded No frequency recorded No route recorded No set duration recorded No set duration amount recorded active No dosage strength recorded No dosage strength units of measure recorded    PANTOPRAZOLE (PROTONIX) 40 MG TABLET    Take 1 tablet (40 mg total) by mouth once daily.    PENCICLOVIR (DENAVIR) 1 % CREAM    Apply topically every 2 (two) hours.    RIZATRIPTAN (MAXALT-MLT) 5 MG DISINTEGRATING TABLET    Take 1 tablet (5 mg total) by mouth as needed (for Headache). May repeat in 2 hours if needed   Modified Medications    No medications on file   Discontinued Medications    No medications on file       Review of  "Systems   Constitutional:  Positive for malaise/fatigue.   HENT: Negative.     Eyes: Negative.    Respiratory:  Positive for shortness of breath.    Cardiovascular:  Positive for chest pain and palpitations.   Gastrointestinal: Negative.    Genitourinary: Negative.    Musculoskeletal: Negative.    Skin: Negative.    Neurological:  Positive for dizziness.   Endo/Heme/Allergies: Negative.    Psychiatric/Behavioral: Negative.     All 12 systems otherwise negative.      Wt Readings from Last 3 Encounters:   01/08/25 60.7 kg (133 lb 13.1 oz)   12/10/24 59.5 kg (131 lb 2.8 oz)   11/22/24 58.8 kg (129 lb 10.1 oz)     Temp Readings from Last 3 Encounters:   10/26/23 97.9 °F (36.6 °C) (Tympanic)   08/01/22 98 °F (36.7 °C)   02/16/22 99.3 °F (37.4 °C) (Temporal)     BP Readings from Last 3 Encounters:   01/08/25 112/80   12/10/24 (!) 142/90   11/22/24 (!) 133/93     Pulse Readings from Last 3 Encounters:   01/08/25 77   12/10/24 87   11/22/24 85       /80 (BP Location: Left arm, Patient Position: Sitting)   Pulse 77   Ht 5' 2" (1.575 m)   Wt 60.7 kg (133 lb 13.1 oz)   LMP 09/09/2018   BMI 24.48 kg/m²     Objective:   Physical Exam  Constitutional:       General: She is not in acute distress.     Appearance: She is well-developed. She is not diaphoretic.   HENT:      Head: Normocephalic and atraumatic.      Mouth/Throat:      Pharynx: No oropharyngeal exudate.   Eyes:      General: No scleral icterus.        Right eye: No discharge.         Left eye: No discharge.   Pulmonary:      Effort: Pulmonary effort is normal. No respiratory distress.   Skin:     Coloration: Skin is not pale.      Findings: No erythema or rash.   Neurological:      Mental Status: She is alert and oriented to person, place, and time.   Psychiatric:         Behavior: Behavior normal.         Thought Content: Thought content normal.         Judgment: Judgment normal.         Lab Results   Component Value Date     12/10/2024    K 4.8 " 12/10/2024     12/10/2024    CO2 25 12/10/2024    BUN 17 12/10/2024    CREATININE 0.7 12/10/2024    GLU 93 12/10/2024    HGBA1C 5.4 12/10/2024    MG 2.0 11/30/2007    AST 23 12/10/2024    ALT 14 12/10/2024    ALBUMIN 4.3 12/10/2024    PROT 8.0 12/10/2024    BILITOT 0.5 12/10/2024    WBC 6.12 12/10/2024    HGB 12.2 12/10/2024    HCT 38.2 12/10/2024    MCV 95 12/10/2024     12/10/2024    INR 0.9 09/09/2022    TSH 0.880 12/10/2024    CHOL 276 (H) 12/10/2024    HDL 83 (H) 12/10/2024    LDLCALC 172.4 (H) 12/10/2024    TRIG 103 12/10/2024     Assessment:      1. Atherosclerosis of both carotid arteries    2. Mixed hyperlipidemia    3. Other chest pain    4. Statin intolerance    5. Undifferentiated inflammatory arthritis    6. Undifferentiated connective tissue disease    7. Dyspnea, unspecified type    8. Chest pain, unspecified type    9. Gastroesophageal reflux disease, unspecified whether esophagitis present    10. Palpitations    11. Localized edema    12. Scleroderma    13. Iatrogenic hypothyroidism    14. Other form of dyspnea            Plan:   1. Chest pain, BRAXTON - atypical  -ECHO 3/2023 with normal bi V function and valves, PASP 20 mmHg.   -ECG stress test 3/2023 neg for ischemia.   - prior workup neg  - discussed noncardiac etiologies   - order CA score    2. HLD , FH  -  cont low jeffrey diet   - Lipids remain elevated Oct 2023 - Tc 268, HDL 87, , Tg 85.   - could not tolerate lipitor, change to prava 20 - has stopped taking it   - may need non statin tx - start Zetia - had side effects - muscle pain   - start Nexletol     3. Palpitations  - Holter - neg    4. Scleroderma/TERESA  - f/u with rheum    5. Hypothyroidism, TSH 1.65 --> 1.59  - cont Synthroid    6. GERD/esoph dysphagia  - f/u GI, cont PPI   - is gluten sensitive     7. Leg pain/numbness, possible Raynauds  - leg u/s venous and arterial us - negative 2/2022  - started low dose Norvasc 2.5 mg - has not taken recently     8. Carotid  artery disease  - less than 50% Oct 2022  [facility]  - Carotids with mild disease 6/2024    Visit today included increased complexity associated with the care of the episodic problem chest pain addressed and managing the longitudinal care of the patient due to the serious and/or complex managed problem(s) .      Thank you for allowing me to participate in this patient's care. Please do not hesitate to contact me with any questions or concerns. Consult note has been forwarded to the referral physician.

## 2025-01-09 LAB
OHS QRS DURATION: 78 MS
OHS QTC CALCULATION: 430 MS

## 2025-01-14 ENCOUNTER — TELEPHONE (OUTPATIENT)
Dept: CARDIOLOGY | Facility: CLINIC | Age: 55
End: 2025-01-14
Payer: COMMERCIAL

## 2025-01-14 NOTE — TELEPHONE ENCOUNTER
PA initiated for Nexletol through CoverMyMeds.      [employer] is reviewing your PA request and will respond within 24 hours for Medicaid or up to 72 hours for non-Medicaid plans, based on the required timeframe determined by state or federal regulations. To check for an update later, open this request from your dashboard.

## 2025-01-15 ENCOUNTER — OFFICE VISIT (OUTPATIENT)
Dept: RHEUMATOLOGY | Facility: CLINIC | Age: 55
End: 2025-01-15
Payer: COMMERCIAL

## 2025-01-15 ENCOUNTER — HOSPITAL ENCOUNTER (OUTPATIENT)
Dept: RADIOLOGY | Facility: HOSPITAL | Age: 55
Discharge: HOME | End: 2025-01-15
Attending: INTERNAL MEDICINE
Payer: COMMERCIAL

## 2025-01-15 ENCOUNTER — PATIENT MESSAGE (OUTPATIENT)
Dept: RHEUMATOLOGY | Facility: CLINIC | Age: 55
End: 2025-01-15

## 2025-01-15 VITALS
WEIGHT: 132.69 LBS | BODY MASS INDEX: 24.27 KG/M2 | HEART RATE: 71 BPM | SYSTOLIC BLOOD PRESSURE: 133 MMHG | DIASTOLIC BLOOD PRESSURE: 88 MMHG

## 2025-01-15 DIAGNOSIS — K90.0 TRANSIENT GLUTEN SENSITIVITY: ICD-10-CM

## 2025-01-15 DIAGNOSIS — M06.4 UNDIFFERENTIATED INFLAMMATORY ARTHRITIS: Primary | ICD-10-CM

## 2025-01-15 DIAGNOSIS — M06.4 UNDIFFERENTIATED INFLAMMATORY ARTHRITIS: ICD-10-CM

## 2025-01-15 DIAGNOSIS — R25.2 MUSCLE CRAMPS: ICD-10-CM

## 2025-01-15 DIAGNOSIS — R76.8 SCL-70 ANTIBODY POSITIVE: ICD-10-CM

## 2025-01-15 DIAGNOSIS — M35.00 SICCA SYNDROME: ICD-10-CM

## 2025-01-15 PROCEDURE — 99214 OFFICE O/P EST MOD 30 MIN: CPT | Mod: S$GLB,,, | Performed by: INTERNAL MEDICINE

## 2025-01-15 PROCEDURE — G2211 COMPLEX E/M VISIT ADD ON: HCPCS | Mod: S$GLB,,, | Performed by: INTERNAL MEDICINE

## 2025-01-15 PROCEDURE — 3075F SYST BP GE 130 - 139MM HG: CPT | Mod: CPTII,S$GLB,, | Performed by: INTERNAL MEDICINE

## 2025-01-15 PROCEDURE — 99999 PR PBB SHADOW E&M-EST. PATIENT-LVL IV: CPT | Mod: PBBFAC,,, | Performed by: INTERNAL MEDICINE

## 2025-01-15 PROCEDURE — 1160F RVW MEDS BY RX/DR IN RCRD: CPT | Mod: CPTII,S$GLB,, | Performed by: INTERNAL MEDICINE

## 2025-01-15 PROCEDURE — 3008F BODY MASS INDEX DOCD: CPT | Mod: CPTII,S$GLB,, | Performed by: INTERNAL MEDICINE

## 2025-01-15 PROCEDURE — 73130 X-RAY EXAM OF HAND: CPT | Mod: 26,,, | Performed by: RADIOLOGY

## 2025-01-15 PROCEDURE — 1159F MED LIST DOCD IN RCRD: CPT | Mod: CPTII,S$GLB,, | Performed by: INTERNAL MEDICINE

## 2025-01-15 PROCEDURE — 3079F DIAST BP 80-89 MM HG: CPT | Mod: CPTII,S$GLB,, | Performed by: INTERNAL MEDICINE

## 2025-01-15 PROCEDURE — 73130 X-RAY EXAM OF HAND: CPT | Mod: TC,50

## 2025-01-15 RX ORDER — PILOCARPINE HYDROCHLORIDE 5 MG/1
5 TABLET, FILM COATED ORAL 2 TIMES DAILY
Qty: 60 TABLET | Refills: 11 | Status: SHIPPED | OUTPATIENT
Start: 2025-01-15 | End: 2026-01-15

## 2025-01-15 NOTE — PROGRESS NOTES
RHEUMATOLOGY FOLLOW UP  VISIT     Chief complaints, HPI, ROS, EXAM, Assessment & Plans:-  Sonia Muñoz a 54 y.o. pleasant female seen today for follow-up visit.  Low-grade undifferentiated inflammatory arthritis and longstanding history of Scl 70 antibody positivity without evidence of scleroderma your for follow-up.  Denies any significant pain of small joints.  Complains worsening dry eyes, dry mouth and dry vaginal mucosa.  Well controlled gluten sensitivity with avoidance..  No Raynaud's.  No prolonged morning stiffness.  Mild to moderate recurrent oral ulcers without any associated genital ulcer or uveitis.  Rheumatological review of system negative otherwise.  No synovitis of small joints.  Dry skin.    1. Undifferentiated inflammatory arthritis    2. 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 Sicca syndrome    4. Muscle cramps    5. Transient gluten sensitivity      Problem List Items Addressed This Visit       Muscle cramps    Relevant Orders    CK    Aldolase    Magnesium (Completed)    Comprehensive Metabolic Panel    Scl-70 antibody positive    Sicca syndrome    Relevant Medications    pilocarpine (SALAGEN) 5 MG Tab    Transient gluten sensitivity    Undifferentiated inflammatory arthritis - Primary    Relevant Orders    X-Ray Hand 3 View Bilateral (Completed)      Latest Reference Range & Units 01/15/25 09:54   Sodium 136 - 145 mmol/L 142   Potassium 3.5 - 5.1 mmol/L 4.7   Chloride 95 - 110 mmol/L 104   CO2 23 - 29 mmol/L 27   Anion Gap 8 - 16 mmol/L 11   BUN 6 - 20 mg/dL 15   Creatinine 0.5 - 1.4 mg/dL 0.8   eGFR >60 mL/min/1.73 m^2 >60   Glucose 70 - 110 mg/dL 105   Calcium 8.7 - 10.5 mg/dL 10.2   Magnesium  1.6 - 2.6 mg/dL 2.1   ALP 40 - 150 U/L 94   PROTEIN TOTAL 6.0 - 8.4 g/dL 8.0   Albumin 3.5 - 5.2 g/dL 4.2   BILIRUBIN TOTAL 0.1 - 1.0 mg/dL 0.6   AST 10 - 40 U/L 18   ALT 10 - 44 U/L 12   CPK 20 - 180 U/L 97        Latest Reference Range & Units Most Recent   MARYAM Screen Negative <1:80  Positive  !  11/29/22 17:13   MARYAM HEP-2 Titer  Positive 1:320 Homogeneous  7/26/18 11:28   MARYAM Titer 1  1:320  11/29/22 17:13   MARYAM PATTERN 1  Homogeneous  11/29/22 17:13   ds DNA Ab Negative 1:10  Negative 1:10  11/29/22 17:13   Anti-SSA Antibody 0.00 - 0.99 Ratio 0.09  11/29/22 17:13   Anti-SSA Interpretation Negative  Negative  11/29/22 17:13   Anti-SSB Antibody 0.00 - 0.99 Ratio 0.07  11/29/22 17:13   Anti-SSB Interpretation Negative  Negative  11/29/22 17:13   Anti Sm Antibody 0.00 - 0.99 Ratio 0.07  11/29/22 17:13   Anti-Sm Interpretation Negative  Negative  11/29/22 17:13   Anti Sm/RNP Antibody 0.00 - 0.99 Ratio 0.08  11/29/22 17:13   Anti-Sm/RNP Interpretation Negative  Negative  11/29/22 17:13   Antigliadin Ab IgG <20 UNITS 2  5/17/21 09:23   Antigliadin Abs, IgA <20 UNITS 4  5/17/21 09:23   TTG IgA <20 UNITS 8  5/17/21 09:23   TTG IgG <20 UNITS 4  5/17/21 09:23   Immunoglobulin A (IgA) 70 - 400 mg/dL 185  5/17/21 09:23   HLA DQA1 1  *03  6/4/21 15:38   HLA DQA1 2  *05  6/4/21 15:38   SCL-70 Antibody <1.0 (Negative) U >8.0 (H)  7/21/16 11:45   Scleroderma SCL- <20 UNITS 4  6/5/17 10:54   IgG 650 - 1600 mg/dL 1315  7/26/18 11:28   IgM 50 - 300 mg/dL 98  7/26/18 11:28   IgA 40 - 350 mg/dL 173  7/26/18 11:28   Protein, Serum 6.0 - 8.4 g/dL 6.9  7/26/18 11:28   Albumin grams/dl 3.35 - 5.55 g/dL 4.06  7/26/18 11:28   Alpha-1 grams/dl 0.17 - 0.41 g/dL 0.28  7/26/18 11:28   Alpha-2 0.43 - 0.99 g/dL 0.62  7/26/18 11:28   Beta 0.50 - 1.10 g/dL 0.77  7/26/18 11:28   Gamma 0.67 - 1.58 g/dL 1.16  7/26/18 11:28   Pathologist Interpretation SPE  REVIEWED  7/26/18 11:28   Pathologist Interpretation TANG  REVIEWED  7/26/18 11:28   Immunofix Interp.  SEE COMMENT  7/26/18 11:28   Anti-Anushka-1 Antibody <20 Units <20  7/21/16 11:45   Anti-PM/Scl Ab <20 Units <20  7/21/16 11:45   Anti-SS-A 52 kD Ab, IgG <20 Units <20  7/21/16 11:45   Anti-U1-RNP  Ab <20 Units <20  7/21/16 11:45   CCP Antibodies <5.0 U/mL <0.5  6/30/16 16:28   EJ Negative   Negative  7/21/16 11:45   Fibrillarin (U3 RNP) Negative  Negative  7/21/16 11:45   Ku Negative  Negative  7/21/16 11:45   MDA-5 (P140) (CADM-140) <20 Units <20  7/21/16 11:45   MI-2 Negative  Negative  7/21/16 11:45   NXP-2 (P140) <20 Units <20  7/21/16 11:45   OJ Negative  Negative  7/21/16 11:45   PL-12 Negative  Negative  7/21/16 11:45   PL-7 Negative  Negative  7/21/16 11:45   Rheumatoid Factor 0.0 - 15.0 IU/mL <10.0  6/30/16 16:28   SRP Negative  Negative  7/21/16 11:45   TIF1 GAMMA (P155/140) <20 Units <20  7/21/16 11:45   U2 snRNP Negative  Negative  7/21/16 11:45   !: Data is abnormal  (H): Data is abnormally high    Low-grade undifferentiated inflammatory arthritis without any significant activity on exam today.  Restart immune suppression therapy if symptoms recur.  Severe worsening sicca syndrome.  No relevant medications which could cause her symptoms.  SSA and SSB antibody negative.  Can not rule out Sjogren's without minor salivary gland biopsy but not clinically indicated at this time.  Try pilocarpine.  Discuss with gynecologist regards to hormone replacement therapy if appropriate.  Unexplained muscle cramps.  Check CMP, magnesium and muscle enzymes today.  I have explained all of the above in detail and the patient understands all of the current recommendation(s). I have answered all questions to the best of my ability and to their complete satisfaction.   # Follow up in about 1 year (around 1/15/2026).      Past Medical History:   Diagnosis Date    [facility]ix [facility]    [facility]    Cervical spondylolysis     DDD (degenerative disc disease), lumbosacral     Fibromyalgia     GERD (gastroesophageal reflux disease)     Gluten-sensitive enteropathy     Hyperlipidemia     Hyperthyroidism     Iatrogenic hypothyroidism     Photosensitivity 06/13/2019    Spondylosis     Undifferentiated connective tissue disease        Past Surgical History:   Procedure  Laterality Date    AUGMENTATION OF BREAST Bilateral 2009    silicone    BREAST IMPLANTS      BREAST SURGERY Bilateral 2009    silicone    CERVICAL FUSION  01/10/2018    ACDF    COLONOSCOPY N/A 10/13/2017    Procedure: COLONOSCOPY;  Surgeon: Hugh Luciano MD;  Location: [facility];  Service: Endoscopy;  Laterality: N/A;    EAR MASTOIDECTOMY W/ COCHLEAR IMPLANT W/ LANDMARK      ESOPHAGOGASTRODUODENOSCOPY N/A 01/29/2020    Procedure: EGD (ESOPHAGOGASTRODUODENOSCOPY);  Surgeon: Tisha Mendosa MD;  Location: [facility];  Service: Endoscopy;  Laterality: N/A;    EYE SURGERY      SPINE SURGERY  Jan 2018    ACDF    STAPEDES SURGERY      sMart Stapedes Piston (Safe to 3T magnet)    UPPER GASTROINTESTINAL ENDOSCOPY          Social History     Tobacco Use    Smoking status: Never    Smokeless tobacco: Never   Substance Use Topics    Alcohol use: Yes     Alcohol/week: 3.0 standard drinks of alcohol     Types: 1 Glasses of wine, 1 Shots of liquor, 1 Drinks containing 0.5 oz of alcohol per week     Comment: socially     Drug use: No       Family History   Problem Relation Name Age of Onset    Hypertension Mother Zoila Mason     Heart disease Mother Zoila Mason     Hypertension Father Fisher Mason     Heart disease Father Oseas Mason     Hearing loss Father Oseas Mason     Cancer Maternal Uncle Hamilton Elkins         stomach cancer    Stomach cancer Maternal Uncle Hamilton Elkins     Celiac disease Maternal Aunt Lona Story     Cancer Maternal Aunt Lona Story     Breast cancer Paternal Aunt      Cirrhosis Neg Hx      Colon cancer Neg Hx      Colon polyps Neg Hx      Crohn's disease Neg Hx      Cystic fibrosis Neg Hx      Esophageal cancer Neg Hx      Hemochromatosis Neg Hx      Inflammatory bowel disease Neg Hx      Irritable bowel syndrome Neg Hx      Liver cancer Neg Hx      Liver disease Neg Hx      Rectal cancer Neg Hx      Ulcerative colitis Neg Hx      Marin's disease Neg Hx      Lymphoma Neg Hx      Tuberculosis Neg Hx       Scleroderma Neg Hx      Rheum arthritis Neg Hx      Multiple sclerosis Neg Hx      Melanoma Neg Hx      Lupus Neg Hx      Psoriasis Neg Hx      Skin cancer Neg Hx         Review of patient's allergies indicates:   Allergen Reactions    Adhesive Rash       Medication List with Changes/Refills   New Medications    PILOCARPINE (SALAGEN) 5 MG TAB    Take 1 tablet (5 mg total) by mouth 2 (two) times daily.   Current Medications    ASPIRIN (ECOTRIN) 81 MG EC TABLET    Take 1 tablet (81 mg total) by mouth once daily.    AZELASTINE (ASTELIN) 137 MCG (0.1 %) NASAL SPRAY    2 sprays (274 mcg total) by Nasal route 2 (two) times daily as needed for Rhinitis.    BEMPEDOIC ACID (NEXLETOL) 180 MG TAB    Take 1 tablet (180 mg total) by mouth every evening.    LEVOTHYROXINE (SYNTHROID) 100 MCG TABLET    Take 1 tablet (100 mcg total) by mouth before breakfast.    MULTIVIT WITH MINERALS/LUTEIN (MULTIVITAMIN 50 PLUS ORAL)    multivitamin Take No date recorded No form recorded No frequency recorded No route recorded No set duration recorded No set duration amount recorded active No dosage strength recorded No dosage strength units of measure recorded    PANTOPRAZOLE (PROTONIX) 40 MG TABLET    Take 1 tablet (40 mg total) by mouth once daily.    PENCICLOVIR (DENAVIR) 1 % CREAM    Apply topically every 2 (two) hours.    RIZATRIPTAN (MAXALT-MLT) 5 MG DISINTEGRATING TABLET    Take 1 tablet (5 mg total) by mouth as needed (for Headache). May repeat in 2 hours if needed       Disclaimer: This note was prepared using voice recognition system and is likely to have sound alike errors and is not proof read.  Please call me with any questions.

## 2025-01-20 ENCOUNTER — PATIENT MESSAGE (OUTPATIENT)
Dept: CARDIOLOGY | Facility: CLINIC | Age: 55
End: 2025-01-20
Payer: COMMERCIAL

## 2025-02-05 DIAGNOSIS — K21.9 GASTROESOPHAGEAL REFLUX DISEASE WITHOUT ESOPHAGITIS: ICD-10-CM

## 2025-02-18 RX ORDER — PANTOPRAZOLE SODIUM 40 MG/1
40 TABLET, DELAYED RELEASE ORAL DAILY
Qty: 90 TABLET | Refills: 3 | Status: SHIPPED | OUTPATIENT
Start: 2025-02-18

## 2025-02-26 ENCOUNTER — HOSPITAL ENCOUNTER (OUTPATIENT)
Dept: RADIOLOGY | Facility: HOSPITAL | Age: 55
Discharge: HOME | End: 2025-02-26
Attending: INTERNAL MEDICINE
Payer: COMMERCIAL

## 2025-02-26 ENCOUNTER — RESULTS FOLLOW-UP (OUTPATIENT)
Dept: CARDIOLOGY | Facility: CLINIC | Age: 55
End: 2025-02-26

## 2025-02-26 DIAGNOSIS — Z78.9 STATIN INTOLERANCE: ICD-10-CM

## 2025-02-26 DIAGNOSIS — R07.89 OTHER CHEST PAIN: ICD-10-CM

## 2025-02-26 DIAGNOSIS — I65.23 ATHEROSCLEROSIS OF BOTH CAROTID ARTERIES: ICD-10-CM

## 2025-02-26 DIAGNOSIS — Z13.6 ENCOUNTER FOR SCREENING FOR CARDIOVASCULAR DISORDERS: ICD-10-CM

## 2025-02-26 PROCEDURE — 75571 CT HRT W/O DYE W/CA TEST: CPT | Mod: TC

## 2025-02-26 PROCEDURE — 75571 CT HRT W/O DYE W/CA TEST: CPT | Mod: 26,,, | Performed by: RADIOLOGY

## 2025-05-05 ENCOUNTER — HOSPITAL ENCOUNTER (OUTPATIENT)
Dept: RADIOLOGY | Facility: HOSPITAL | Age: 55
Discharge: HOME | End: 2025-05-05
Attending: DIETITIAN, REGISTERED
Payer: COMMERCIAL

## 2025-05-05 ENCOUNTER — RESULTS FOLLOW-UP (OUTPATIENT)
Dept: FAMILY MEDICINE | Facility: CLINIC | Age: 55
End: 2025-05-05

## 2025-05-05 ENCOUNTER — OFFICE VISIT (OUTPATIENT)
Dept: FAMILY MEDICINE | Facility: CLINIC | Age: 55
End: 2025-05-05
Payer: COMMERCIAL

## 2025-05-05 VITALS
BODY MASS INDEX: 24.23 KG/M2 | HEIGHT: 62 IN | SYSTOLIC BLOOD PRESSURE: 142 MMHG | HEART RATE: 73 BPM | TEMPERATURE: 98 F | RESPIRATION RATE: 18 BRPM | WEIGHT: 131.69 LBS | OXYGEN SATURATION: 98 % | DIASTOLIC BLOOD PRESSURE: 98 MMHG

## 2025-05-05 DIAGNOSIS — Z78.9 STATIN INTOLERANCE: ICD-10-CM

## 2025-05-05 DIAGNOSIS — Z12.31 BREAST CANCER SCREENING BY MAMMOGRAM: Primary | ICD-10-CM

## 2025-05-05 DIAGNOSIS — D48.9 NEOPLASM OF UNCERTAIN BEHAVIOR: ICD-10-CM

## 2025-05-05 DIAGNOSIS — E78.2 MIXED HYPERLIPIDEMIA: ICD-10-CM

## 2025-05-05 DIAGNOSIS — S99.922A INJURY OF LEFT FOOT, INITIAL ENCOUNTER: Primary | ICD-10-CM

## 2025-05-05 DIAGNOSIS — G44.82 COITAL HEADACHE: ICD-10-CM

## 2025-05-05 DIAGNOSIS — K21.9 GASTROESOPHAGEAL REFLUX DISEASE, UNSPECIFIED WHETHER ESOPHAGITIS PRESENT: ICD-10-CM

## 2025-05-05 DIAGNOSIS — Z79.899 ENCOUNTER FOR LONG-TERM CURRENT USE OF MEDICATION: ICD-10-CM

## 2025-05-05 DIAGNOSIS — Z12.31 BREAST CANCER SCREENING BY MAMMOGRAM: ICD-10-CM

## 2025-05-05 DIAGNOSIS — K13.79 RECURRENT ORAL ULCERS: ICD-10-CM

## 2025-05-05 DIAGNOSIS — S99.922A INJURY OF LEFT FOOT, INITIAL ENCOUNTER: ICD-10-CM

## 2025-05-05 DIAGNOSIS — E03.2 IATROGENIC HYPOTHYROIDISM: ICD-10-CM

## 2025-05-05 PROCEDURE — 1160F RVW MEDS BY RX/DR IN RCRD: CPT | Mod: CPTII,S$GLB,, | Performed by: DIETITIAN, REGISTERED

## 2025-05-05 PROCEDURE — 88305 TISSUE EXAM BY PATHOLOGIST: CPT | Mod: TC,91 | Performed by: DIETITIAN, REGISTERED

## 2025-05-05 PROCEDURE — 3080F DIAST BP >= 90 MM HG: CPT | Mod: CPTII,S$GLB,, | Performed by: DIETITIAN, REGISTERED

## 2025-05-05 PROCEDURE — 99214 OFFICE O/P EST MOD 30 MIN: CPT | Mod: 25,S$GLB,, | Performed by: DIETITIAN, REGISTERED

## 2025-05-05 PROCEDURE — 3008F BODY MASS INDEX DOCD: CPT | Mod: CPTII,S$GLB,, | Performed by: DIETITIAN, REGISTERED

## 2025-05-05 PROCEDURE — 73630 X-RAY EXAM OF FOOT: CPT | Mod: TC,PO,LT

## 2025-05-05 PROCEDURE — 99999 PR PBB SHADOW E&M-EST. PATIENT-LVL IV: CPT | Mod: PBBFAC,,, | Performed by: DIETITIAN, REGISTERED

## 2025-05-05 PROCEDURE — 11402 EXC TR-EXT B9+MARG 1.1-2 CM: CPT | Mod: S$GLB,,, | Performed by: DIETITIAN, REGISTERED

## 2025-05-05 PROCEDURE — 73630 X-RAY EXAM OF FOOT: CPT | Mod: 26,LT,, | Performed by: STUDENT IN AN ORGANIZED HEALTH CARE EDUCATION/TRAINING PROGRAM

## 2025-05-05 PROCEDURE — 3077F SYST BP >= 140 MM HG: CPT | Mod: CPTII,S$GLB,, | Performed by: DIETITIAN, REGISTERED

## 2025-05-05 PROCEDURE — 1159F MED LIST DOCD IN RCRD: CPT | Mod: CPTII,S$GLB,, | Performed by: DIETITIAN, REGISTERED

## 2025-05-05 RX ORDER — EVOLOCUMAB 140 MG/ML
140 INJECTION, SOLUTION SUBCUTANEOUS
Qty: 2 ML | Refills: 11 | Status: ACTIVE | OUTPATIENT
Start: 2025-05-05

## 2025-05-05 RX ORDER — ASPIRIN 81 MG/1
81 TABLET ORAL DAILY
Qty: 90 TABLET | Refills: 3 | Status: SHIPPED | OUTPATIENT
Start: 2025-05-05 | End: 2026-05-05

## 2025-05-05 RX ORDER — PENCICLOVIR 10 MG/G
CREAM TOPICAL
Qty: 5 G | Refills: 11 | Status: SHIPPED | OUTPATIENT
Start: 2025-05-05

## 2025-05-05 RX ORDER — RIZATRIPTAN BENZOATE 5 MG/1
5 TABLET, ORALLY DISINTEGRATING ORAL
Qty: 30 TABLET | Refills: 0 | Status: SHIPPED | OUTPATIENT
Start: 2025-05-05 | End: 2025-06-04

## 2025-05-05 RX ORDER — LEVOTHYROXINE SODIUM 100 UG/1
100 TABLET ORAL
Qty: 90 TABLET | Refills: 3 | Status: SHIPPED | OUTPATIENT
Start: 2025-05-05

## 2025-05-05 NOTE — PROGRESS NOTES
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 No significant swelling or bruising observed. Wrapped in ace bandage, Xray showed bone spur at navicular bone and 1st MTP arthritis.   - Chest lesion appears to be seborrheic keratosis (SK), likely benign, shave biopsy performed.  - Back lesion above bra line, growing in size, removed due to family history of skin cancer, shave biopsy performed.  - Reviewed cholesterol management options given intolerance to statins and muscle cramps with non-statin alternatives.  - Started Repatha (evolocumab) injections for cholesterol management despite low calcium score, given LDL of 172, to be administered by patient at home every 2 weeks.  - Noted history of undifferentiated connective tissue disease and gluten intolerance, though celiac testing was negative.  - Reviewed GI issues including burning sensation in stomach and esophagus, possible motility issues.  - Discussed potential Sjögren's syndrome given positive SCL antibody and symptoms of dry eyes and mouth.    PAIN IN LEFT FOOT:  - Patient reports pain when overextending left foot, especially when climbing or pulling toes back.  - Physical exam revealed no significant swelling or bruising, except for a small bruise likely from dropping salt.  - Ordered XR Left Foot to rule out structural issues.  - Provided ACE wrap for stabilization.  - Will monitor condition and consider further evaluation if pain persists despite normal x-ray results.    HYPOTHYROIDISM:  - Patient has been on levothyroxine 100 mcg daily for over 20 years with stable thyroid function.  - Continued current dosage.  - Ordered routine TSH to be completed before December follow-up to monitor thyroid function.    GASTROESOPHAGEAL REFLUX DISEASE:  - Patient reports stomach and esophagus burning, suspecting gastritis recurrence.  - Continued Protonix for symptom management.  - Advised follow-up  with gastroenterologist as needed.    NON-CELIAC GLUTEN SENSITIVITY:  - Patient reports severe reactions to gluten, including vomiting and hematochezia.  - Previous naproxen test was negative for celiac disease, but gluten avoidance resolves symptoms.  - Advised continued gluten avoidance due to severe symptoms upon ingestion.    CONNECTIVE TISSUE DISORDER:  - Patient has family history of autoimmune disorders and connective tissue disease.  - Noted positive SCL antibody, indicating possible Sjögren's syndrome.  - Discussed patient's interest in testing for Sjögren's syndrome, which would require a salivary biopsy.    SEBORRHEIC KERATOSIS:  - Patient has a pruritic spot under her right breast with black marks on chest.  - Visual assessment confirmed suspected seborrheic keratosis.  - Performed shave biopsy of the lesion, which appears benign.    NEOPLASM OF UNCERTAIN BEHAVIOR:  - Performed shave biopsy of growing back lesion above bra line due to family history of skin cancer.  - Instructed patient to apply aquafor to biopsy sites and contact office if any issues arise.  - Will inform patient of biopsy results.    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  - Refilled Denavir cream with multiple refills for management.    COITAL MIGRAINE:  - Patient experiences migraines related to orgasm.  - Refilled Maxalt for symptom management.    MEDICATION ALLERGY/INTOLERANCE:  - Patient reports myalgia and muscle cramps with statins and notes insurance issues with Nexletol.  - Plan to try Repatha, an injectable medication, as an alternative to statins.    FAMILY HISTORY OF CANCER:  - Patient's paternal aunt had breast cancer, father has multiple skin cancers, and uncle  of stomach cancer.  - This family history prompted removal of growing back lesion via shave biopsy.    GENERAL HEALTH MAINTENANCE:  - Recommend  pneumococcal vaccine for protection against bacterial pneumonia.  - Continued aspirin.  - Ordered routine labs including lipid panel and micro-albumin to be completed before December follow-up.    FOLLOW-UP:  - Patient to complete all ordered lab work the week before December appointment.  - Follow-up visit scheduled in approximately 7 months (December).        Problem List Items Addressed This Visit       Iatrogenic hypothyroidism (Chronic)    Relevant Medications    levothyroxine (SYNTHROID) 100 MCG tablet    Mixed hyperlipidemia (Chronic)    Relevant Medications    evolocumab (REPATHA SURECLICK) 140 mg/mL PnIj    aspirin (ECOTRIN) 81 MG EC tablet    Recurrent oral ulcers    Relevant Medications    penciclovir (DENAVIR) 1 % cream     Other Visit Diagnoses         Injury of left foot, initial encounter    -  Primary    Relevant Orders    X-Ray Foot Complete Left (Completed)      Breast cancer screening by mammogram        Relevant Orders    Mammo Digital Screening Bilat w/ Biju (XPD)      Statin intolerance        Relevant Medications    evolocumab (REPATHA SURECLICK) 140 mg/mL PnIj      Coital headache        Relevant Medications    rizatriptan (MAXALT-MLT) 5 MG disintegrating tablet      Neoplasm of uncertain behavior        Relevant Orders    Specimen to Pathology, Dermatology      Encounter for long-term current use of medication        Relevant Orders    Comprehensive Metabolic Panel    Lipid Panel    Microalbumin/Creatinine Ratio, Urine    TSH    CBC Auto Differential    Hemoglobin A1C          Future Appointments       Date Provider Specialty Appt Notes    5/27/2025  Radiology .    7/8/2025 Stanley Mary MD Cardiology 6months    12/5/2025  Lab labs    12/8/2025 Yvette Crockett DO Family Medicine f/u    1/15/2026 Reinier French MD Rheumatology One year fu.//dt           Medication Management for assessment above:   Medication List with Changes/Refills   New Medications    EVOLOCUMAB (REPATHA SURECLICK)  140 MG/ML PNIJ    Inject 1 mL (140 mg total) into the skin every 14 (fourteen) days.   Current Medications    MULTIVIT WITH MINERALS/LUTEIN (MULTIVITAMIN 50 PLUS ORAL)    multivitamin Take No date recorded No form recorded No frequency recorded No route recorded No set duration recorded No set duration amount recorded active No dosage strength recorded No dosage strength units of measure recorded    PANTOPRAZOLE (PROTONIX) 40 MG TABLET    Take 1 tablet (40 mg total) by mouth once daily.    PILOCARPINE (SALAGEN) 5 MG TAB    Take 1 tablet (5 mg total) by mouth 2 (two) times daily.   Changed and/or Refilled Medications    Modified Medication Previous Medication    ASPIRIN (ECOTRIN) 81 MG EC TABLET aspirin (ECOTRIN) 81 MG EC tablet       Take 1 tablet (81 mg total) by mouth once daily.    Take 1 tablet (81 mg total) by mouth once daily.    LEVOTHYROXINE (SYNTHROID) 100 MCG TABLET levothyroxine (SYNTHROID) 100 MCG tablet       Take 1 tablet (100 mcg total) by mouth before breakfast.    Take 1 tablet (100 mcg total) by mouth before breakfast.    PENCICLOVIR (DENAVIR) 1 % CREAM penciclovir (DENAVIR) 1 % cream       Apply topically every 2 (two) hours.    Apply topically every 2 (two) hours.    RIZATRIPTAN (MAXALT-MLT) 5 MG DISINTEGRATING TABLET rizatriptan (MAXALT-MLT) 5 MG disintegrating tablet       Take 1 tablet (5 mg total) by mouth as needed (for Headache). May repeat in 2 hours if needed    Take 1 tablet (5 mg total) by mouth as needed (for Headache). May repeat in 2 hours if needed   Discontinued Medications    AZELASTINE (ASTELIN) 137 MCG (0.1 %) NASAL SPRAY    2 sprays (274 mcg total) by Nasal route 2 (two) times daily as needed for Rhinitis.    BEMPEDOIC ACID (NEXLETOL) 180 MG TAB    Take 1 tablet (180 mg total) by mouth every evening.       Yvette Crockett DO, LISSETTEN  ==========================================================================  Subjective:   Patient ID: Sonia Melo is a 55 y.o. female.  has a  past medical history of Abnormal Pap smear of cervix (2016), Cervical spondylolysis, DDD (degenerative disc disease), lumbosacral, Fibromyalgia, GERD (gastroesophageal reflux disease), Gluten-sensitive enteropathy, Hyperlipidemia, Hyperthyroidism, Iatrogenic hypothyroidism, Photosensitivity (06/13/2019), Spondylosis, and Undifferentiated connective tissue disease.   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 She denies bruising and can ambulate, with pain only occurring during overextension of the foot.    DERMATOLOGIC:  She presents with two concerning skin lesions: an itchy spot with black marks, and a growing lesion in the middle of the back above the bra line. She experiences recurrent fever blisters triggered by sun exposure, with history dating back to childhood scarlet fever infection. She uses Zovirax cream as needed for fever blisters, which particularly affect the lips and face.    GASTROINTESTINAL:  She reports burning sensation in stomach and esophagus. She has received conflicting diagnoses from multiple providers regarding possible motility issues affecting the lower esophageal sphincter. She has gluten intolerance with severe symptoms including vomiting and passing blood with gluten consumption. She denies having celiac disease as gluten was eliminated from diet prior to testing. She takes Protonix daily.    MEDICAL HISTORY:  She has undifferentiated connective tissue disease and thyroid condition for over 20 years. She underwent radioactive iodine ablation in her 20s and currently takes thyroid medication 100 mcg. She cannot tolerate statins due to muscle aches.    FAMILY HISTORY:  Father has non-Hodgkin's lymphoma and family history  of skin cancer. Paternal aunt had breast cancer and uncle  of stomach cancer. She has family history of autoimmune disorders and statin intolerance. She denies any cancer history in her mother.      ROS:  General: -fever, -chills, -fatigue, -weight gain, -weight loss  Eyes: -vision changes, -redness, -discharge, +dry eyes  ENT: -ear pain, -nasal congestion, -sore throat, +dry mouth, +mouth lesions  Cardiovascular: -chest pain, -palpitations, -lower extremity edema  Respiratory: -cough, -shortness of breath  Gastrointestinal: -abdominal pain, -nausea, -vomiting, -diarrhea, -constipation, -blood in stool, +heartburn, +indigestion  Genitourinary: -dysuria, -hematuria, -frequency  Musculoskeletal: -joint pain, -muscle pain, +pain with movement  Skin: -rash, +lesion, +excessive sun exposure  Neurological: -headache, -dizziness, -numbness, -tingling, +migraines  Psychiatric: -anxiety, -depression, -sleep difficulty          Problem List Items Addressed This Visit       Iatrogenic hypothyroidism (Chronic)    Mixed hyperlipidemia (Chronic)    Recurrent oral ulcers     Other Visit Diagnoses         Injury of left foot, initial encounter    -  Primary      Breast cancer screening by mammogram          Statin intolerance          Coital headache          Neoplasm of uncertain behavior          Encounter for long-term current use of medication                 Review of patient's allergies indicates:   Allergen Reactions    Adhesive Rash     Current Outpatient Medications   Medication Instructions    aspirin (ECOTRIN) 81 mg, Oral, Daily    levothyroxine (SYNTHROID) 100 mcg, Oral, Before breakfast    multivit with minerals/lutein (MULTIVITAMIN 50 PLUS ORAL) multivitamin Take No date recorded No form recorded No frequency recorded No route recorded No set duration recorded No set duration amount recorded active No dosage strength recorded No dosage strength units of measure recorded    pantoprazole (PROTONIX) 40 mg, Oral,  "Daily    penciclovir (DENAVIR) 1 % cream Topical (Top), Every 2 hours    pilocarpine (SALAGEN) 5 mg, Oral, 2 times daily    REPATHA SURECLICK 140 mg, Subcutaneous, Every 14 days    rizatriptan (MAXALT-MLT) 5 mg, Oral, As needed (PRN), May repeat in 2 hours if needed      I have reviewed the PMH, social history, FamilyHx, surgical history, allergies and medications documented / confirmed by the patient at the time of this visit.    Objective:   BP (!) 142/98   Pulse 73   Temp 97.6 °F (36.4 °C)   Resp 18   Ht 5' 2" (1.575 m)   Wt 59.7 kg (131 lb 11.2 oz)   LMP 09/09/2018   SpO2 98%   BMI 24.09 kg/m²   Physical Exam    General: No acute distress. Well-developed. Well-nourished.  Eyes: EOMI. Sclerae anicteric.  HENT: Normocephalic. Atraumatic. Nares patent. Moist oral mucosa.  Ears: Bilateral TMs clear. Bilateral EACs clear.  Cardiovascular: Regular rate. Regular rhythm. No murmurs. No rubs. No gallops. Normal S1, S2.  Respiratory: Normal respiratory effort. Clear to auscultation bilaterally. No rales. No rhonchi. No wheezing.  Abdomen: Soft. Non-tender. Non-distended. Normoactive bowel sounds.  Musculoskeletal: No  obvious deformity.  Extremities: No lower extremity edema.  Neurological: Alert & oriented x3. No slurred speech. Normal gait.  Psychiatric: Normal mood. Normal affect. Good insight. Good judgment.  Skin: Warm. Dry. No rash. Itchy spot with black marks, hyperkeratotic verrucous like lesion to the right chest under the breast, pearly lesion to the mid upper back.  MSK: Foot/Ankle - Left: No swelling in left foot. Bruise on left foot.          Procedure Note: 2 shave biopsies performed  -See quick procedure note for details.    Assessment:     1. Injury of left foot, initial encounter    2. Breast cancer screening by mammogram    3. Mixed hyperlipidemia    4. Statin intolerance    5. Iatrogenic hypothyroidism    6. Coital headache    7. Recurrent oral ulcers    8. Neoplasm of uncertain behavior    9. " Encounter for long-term current use of medication      MDM:   Moderate complexity, more than 2 chronic conditions requiring medication management, unique testing, interpretation of testing and follow up.    Visit today included increased complexity associated with the care of the episodic problem see above assessment addressed and managing the longitudinal care of the patient due to the serious and/or complex managed problem(s) see above.    I have reviewed and summarized old records.  I have performed thorough medication reconciliation today and discussed risk and benefits of medications.  I have reviewed labs and discussed with patient.  All questions were answered.    I have signed for the following orders AND/OR meds.  Orders Placed This Encounter   Procedures    X-Ray Foot Complete Left     Standing Status:   Future     Number of Occurrences:   1     Expected Date:   5/5/2025     Expiration Date:   5/5/2026     May the Radiologist modify the order per protocol to meet the clinical needs of the patient?:   Yes     Release to patient:   Immediate    Mammo Digital Screening Bilat w/ Biju (XPD)     Standing Status:   Future     Expected Date:   5/5/2025     Expiration Date:   5/5/2027     May the Radiologist modify the order per protocol to meet the clinical needs of the patient?:   Yes     Release to patient:   Immediate    Comprehensive Metabolic Panel     Standing Status:   Standing     Number of Occurrences:   99     Expiration Date:   4/30/2045     Send normal result to authorizing provider's In Basket if patient is active on MyChart::   Yes    Lipid Panel     Standing Status:   Standing     Number of Occurrences:   99     Expiration Date:   4/30/2045     Send normal result to authorizing provider's In Basket if patient is active on MyChart::   Yes    Microalbumin/Creatinine Ratio, Urine     Standing Status:   Standing     Number of Occurrences:   99     Expiration Date:   4/30/2045     Specimen Source:    Urine     Send normal result to authorizing provider's In Basket if patient is active on MyChart::   Yes    TSH     Standing Status:   Standing     Number of Occurrences:   99     Expiration Date:   4/30/2045     Send normal result to authorizing provider's In Basket if patient is active on MyChart::   Yes    CBC Auto Differential     Standing Status:   Standing     Number of Occurrences:   99     Next Expected Occurrence:   5/5/2025     Expiration Date:   4/30/2045     Send normal result to authorizing provider's In Basket if patient is active on MyChart::   No    Hemoglobin A1C     Standing Status:   Standing     Number of Occurrences:   99     Next Expected Occurrence:   5/5/2025     Expiration Date:   4/30/2045     Send normal result to authorizing provider's In Basket if patient is active on MyChart::   Yes     Medications Ordered This Encounter   Medications    aspirin (ECOTRIN) 81 MG EC tablet     Sig: Take 1 tablet (81 mg total) by mouth once daily.     Dispense:  90 tablet     Refill:  3    evolocumab (REPATHA SURECLICK) 140 mg/mL PnIj     Sig: Inject 1 mL (140 mg total) into the skin every 14 (fourteen) days.     Dispense:  2 mL     Refill:  11     For: 1. benefits investigation, prior auth. and appeals; 2. patient financial assistance; and 3. patient education and medication management send to [facility] to fill the prescription.:   Yes[facility]    levothyroxine (SYNTHROID) 100 MCG tablet     Sig: Take 1 tablet (100 mcg total) by mouth before breakfast.     Dispense:  90 tablet     Refill:  3    penciclovir (DENAVIR) 1 % cream     Sig: Apply topically every 2 (two) hours.     Dispense:  5 g     Refill:  11    rizatriptan (MAXALT-MLT) 5 MG disintegrating tablet     Sig: Take 1 tablet (5 mg total) by mouth as needed (for Headache). May repeat in 2 hours if needed     Dispense:  30 tablet     Refill:  0        Follow up in about 7 months (around  12/5/2025).  Future Appointments       Date Provider Specialty Appt Notes    5/27/2025  Radiology .    7/8/2025 Stanley Mary MD Cardiology 6months    12/5/2025  Lab labs    12/8/2025 Yvette Crockett DO Family Medicine f/u    1/15/2026 Reinier French MD Rheumatology One year fu.//dt            If no improvement in symptoms or symptoms worsen, advised to call/follow-up at clinic or go to ER. Patient voiced understanding and all questions/concerns were addressed.     DISCLAIMER: This note was compiled by using a speech recognition dictation system and therefore please be aware that typographical / speech recognition errors can and do occur.  Please contact me if you see any errors specifically.     This note was generated with the assistance of ambient listening technology. Verbal consent was obtained by the patient and accompanying visitor(s) for the recording of patient appointment to facilitate this note. I attest to having reviewed and edited the generated note for accuracy, though some syntax or spelling errors may persist. Please contact the author of this note for any clarification.      Yvette Crockett DO, RDN    Office: [phone]   [address] [place] [address]  FAX: [phone]

## 2025-05-05 NOTE — PROCEDURES
Excision of Lesion    Date/Time: 5/5/2025 11:00 AM    Performed by: Yvette Crockett DO  Authorized by: Yvette Crockett,     Consent Done?:  Yes (Written)  Prep: patient was prepped and draped in usual sterile fashion    Local anesthesia used?: Yes    Anesthesia:  Local infiltration  Local anesthetic:  Lidocaine 1% with epinephrine  Assistants?: Yes     I was present for the entire procedure.  : Neoplasm of uncertain behavior.  Body area:  Chest  Laterality:  Right  Position:  Supine  Anesthesia:  Local infiltration  Local anesthetic:  Lidocaine 1% with epinephrine  Excision type:  Skin  Malignancy:  Benign  Excision size (cm):  1  Scalpel size: Dermablade.  Incision type:  Other (comments)  Specimens?: Yes     Specimens submitted to pathology.   Hemostasis was obtained.   Needle, instrument, and sponge counts were correct.   Patient tolerated the procedure well with no immediate complications.   Post-operative instructions were provided for the patient.   Patient was discharged and will follow up for wound check and pathology results.  Comments:      Shave biopsy, hemostasis achieved with Dry Sol.

## 2025-05-05 NOTE — PROCEDURES
Excision of Lesion    Date/Time: 5/5/2025 11:00 AM    Performed by: Yvette Crockett DO  Authorized by: Yvette Crockett,     Consent Done?:  Yes (Written)  Timeout: prior to procedure the correct patient, procedure, and site was verified    Prep: patient was prepped and draped in usual sterile fashion    Local anesthesia used?: Yes    Anesthesia:  Local infiltration  Local anesthetic:  Lidocaine 1% with epinephrine  Assistants?: Yes     I was present for the entire procedure.  : Neoplasm of uncertain behavior.  Body area:  Back / flank (central)  Position:  Prone   Patient was prepped and draped in the normal sterile fashion.  Anesthesia:  Local infiltration  Local anesthetic:  Lidocaine 1% with epinephrine  Excision type:  Skin  Malignancy:  Benign  Excision size (cm):  1  Scalpel size: Dermablade.  Incision type:  Other (comments)   Hemostasis was obtained.   Needle, instrument, and sponge counts were correct.   Patient tolerated the procedure well with no immediate complications.   Post-operative instructions were provided for the patient.   Patient was discharged and will follow up for wound check and pathology results.  Comments:      Shave biopsy of lesion to the upper central back. Hemostasis achieved with DrySol.     Labs look ok except elevated b12:  No need for b12 supplementation currently.    Also total and LDL cholesterol have gone up:regular exercise with plant based diet. Repeat in 1 year.

## 2025-05-07 LAB
ESTROGEN SERPL-MCNC: NORMAL PG/ML
INSULIN SERPL-ACNC: NORMAL U[IU]/ML
LAB AP CLINICAL INFORMATION: NORMAL
LAB AP GROSS DESCRIPTION: NORMAL
LAB AP REPORT FOOTNOTES: NORMAL
T3RU NFR SERPL: NORMAL %

## 2025-05-20 ENCOUNTER — OFFICE VISIT (OUTPATIENT)
Dept: PODIATRY | Facility: CLINIC | Age: 55
End: 2025-05-20
Payer: COMMERCIAL

## 2025-05-20 ENCOUNTER — HOSPITAL ENCOUNTER (OUTPATIENT)
Dept: RADIOLOGY | Facility: HOSPITAL | Age: 55
Discharge: HOME | End: 2025-05-20
Attending: PODIATRIST
Payer: COMMERCIAL

## 2025-05-20 DIAGNOSIS — M79.672 ACUTE FOOT PAIN, LEFT: ICD-10-CM

## 2025-05-20 DIAGNOSIS — M79.672 ACUTE FOOT PAIN, LEFT: Primary | ICD-10-CM
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
## 2025-05-20 PROCEDURE — 73630 X-RAY EXAM OF FOOT: CPT | Mod: TC,LT

## 2025-05-20 PROCEDURE — 73630 X-RAY EXAM OF FOOT: CPT | Mod: 26,LT,, | Performed by: RADIOLOGY

## 2025-05-20 PROCEDURE — 1159F MED LIST DOCD IN RCRD: CPT | Mod: CPTII,S$GLB,, | Performed by: PODIATRIST

## 2025-05-20 PROCEDURE — 99999 PR PBB SHADOW E&M-EST. PATIENT-LVL III: CPT | Mod: PBBFAC,,, | Performed by: PODIATRIST

## 2025-05-20 PROCEDURE — 99203 OFFICE O/P NEW LOW 30 MIN: CPT | Mod: S$GLB,,, | Performed by: PODIATRIST

## 2025-05-20 PROCEDURE — 1160F RVW MEDS BY RX/DR IN RCRD: CPT | Mod: CPTII,S$GLB,, | Performed by: PODIATRIST

## 2025-05-20 NOTE — PROGRESS NOTES
Subjective:       Patient ID: Sonia Melo is a 55 y.o. female.    Chief Complaint: Foot Injury (Left foot injury: c/o pain rate 1/10 at present, pt is nondiabetic, pt states of right big toe has throbbing pains, left foot 2nd 3rd and 4th toe there is bruising but drop a drill  on foot )      HPI:  Sonia Melo presents to the office today with history of injury to the left foot.  States recently dropping a drill on the top of the lesser digits to the left foot.  Currently ambulating 1/10 pain.  States throbbing sensations to digits 2-4.  Relates bruising to the area.  Does also have noted pain to the proximal left midfoot and of the bilateral great toes.  She states that this is not limiting her activity but does have some discomfort.  Currently participating in building a 20 [employer]. Patient's PMD is Yvette Crockett DO.     Review of patient's allergies indicates:   Allergen Reactions    Adhesive Rash       Past Medical History:   Diagnosis Date    [facility]ix [facility]    [facility]    Cervical spondylolysis     DDD (degenerative disc disease), lumbosacral     Fibromyalgia     GERD (gastroesophageal reflux disease)     Gluten-sensitive enteropathy     Hyperlipidemia     Hyperthyroidism     Iatrogenic hypothyroidism     Photosensitivity 06/13/2019    Spondylosis     Undifferentiated connective tissue disease        Family History   Problem Relation Name Age of Onset    Hypertension Mother Zoila Mason     Heart disease Mother Zoila Mason     Hypertension Father Oseas Mason     Heart disease Father Hughes Mason     Hearing loss Father Oseas Mason     Cancer Maternal Uncle Hamilton Elkins         stomach cancer    Stomach cancer Maternal Uncle Hamilton Elkins     Celiac disease Maternal Aunt Lona Machucaner     Cancer Maternal Aunt Lona Story     Breast cancer Paternal Aunt      Cirrhosis Neg Hx      Colon cancer Neg Hx      Colon polyps Neg Hx      Crohn's disease Neg Hx       Cystic fibrosis Neg Hx      Esophageal cancer Neg Hx      Hemochromatosis Neg Hx      Inflammatory bowel disease Neg Hx      Irritable bowel syndrome Neg Hx      Liver cancer Neg Hx      Liver disease Neg Hx      Rectal cancer Neg Hx      Ulcerative colitis Neg Hx      Marin's disease Neg Hx      Lymphoma Neg Hx      Tuberculosis Neg Hx      Scleroderma Neg Hx      Rheum arthritis Neg Hx      Multiple sclerosis Neg Hx      Melanoma Neg Hx      Lupus Neg Hx      Psoriasis Neg Hx      Skin cancer Neg Hx         Social History[1]    Past Surgical History:   Procedure Laterality Date    AUGMENTATION OF BREAST Bilateral 2009    silicone    BREAST IMPLANTS      BREAST SURGERY Bilateral 2009    silicone    CERVICAL FUSION  01/10/2018    ACDF    COLONOSCOPY N/A 10/13/2017    Procedure: COLONOSCOPY;  Surgeon: Hugh Luciano MD;  Location: [facility];  Service: Endoscopy;  Laterality: N/A;    EAR MASTOIDECTOMY W/ COCHLEAR IMPLANT W/ LANDMARK      ESOPHAGOGASTRODUODENOSCOPY N/A 01/29/2020    Procedure: EGD (ESOPHAGOGASTRODUODENOSCOPY);  Surgeon: Tisha Mendosa MD;  Location: [facility];  Service: Endoscopy;  Laterality: N/A;    EYE SURGERY      SPINE SURGERY  Jan 2018    ACDF    STAPEDES SURGERY      sMart Stapedes Piston (Safe to 3T magnet)    UPPER GASTROINTESTINAL ENDOSCOPY         Review of Systems       Objective:   LMP 09/09/2018     X-Ray Foot Complete Left  Narrative: EXAMINATION:  XR FOOT COMPLETE 3 VIEW LEFT    CLINICAL HISTORY:  Unspecified injury of left foot, initial encounter    TECHNIQUE:  XR FOOT COMPLETE 3 VIEW LEFT    COMPARISON:  None.    FINDINGS:  No evidence of acute fracture or dislocation.  No radiopaque foreign body seen.  Spurring of the dorsal navicular.  Mild degenerative changes of the 1st MTP joint.  Impression: As above.    Electronically signed by: Hugh Bennett  Date:    05/05/2025  Time:    12:42       Physical Exam  LOWER EXTREMITY PHYSICAL EXAMINATION    VASCULAR: The right DP  pulse is 2/4 and the left DP is 2/4. The right PT pulse is 2/4 and the left PT pulse is 2/4. Proximal to distal, warm to warm. No dependent rubor or elevation palor is noted. Capillary refill time is less than 3 seconds. Hair growth is appreciated to the dorsal foot and digits.    NEUROLOGY: Proprioception is intact. Sensation to light touch is intact. Protective sensation is intact via 5.07 [place] Cali monofilament. Straight leg raise is negative. Negative Tinel's Sign and negative Valleix sign. No neurological sensations with compression of the area of Jules's Nerve in the area of the Abductor Hallucis muscle belly. Upon palpation of the interspaces, there are no neurological sensations stated that radiate proximal or distal. Upon compression of the metatarsal heads from medial to lateral, no neurological sensations or symptoms are stated.    DERMATOLOGY: Skin is supple, dry and intact. No ecchymosis is noted. No hypertrophic skin formation. No erythema or cellulitis is noted.     ORTHOPEDIC:  Decrease range motion of the bilateral 1st metatarsophalangeal joint.  There is some mild pain on palpation of the proximal left midfoot near the articulation of the talonavicular joint.  Pain is reproducible with increased pressure to dorsal palpation as well as dorsiflexion of the foot.  There is moderate pain to palpation of the lesser toes at the metatarsophalangeal joints 2-4.  Plantar flexion versus resistance does not elicit pain.    Assessment:     1. Acute foot pain, left    2. Accidentally struck by falling object, initial encounter        Plan:     Acute foot pain, left  -     X-Ray Foot Complete Left; Future; Expected date: 05/20/2025    Accidentally struck by falling object, initial encounter  -     X-Ray Foot Complete Left; Future; Expected date: 05/20/2025    Thorough discussion is had with the patient today, concerning the diagnosis, its etiology, and the treatment algorithm at present.    Previous  x-rays reviewed showing arthritic changes to the 1st metatarsophalangeal joint which appears to be less on the left compared to the right.  Some mild spurring present to the dorsal aspect of the talonavicular joint where patient is having increase in pain.    Repeat x-rays taken to the left foot falling patient injury with dropping a drill to the dorsal aspect of the foot.  On the initial read, there is appears to be some abnormal findings to the proximal medial base of the proximal phalanx of the 2nd, 3rd, 4th toes.  If noted to be fractures, these are nondisplaced at this time.  Would await the official read per radiology.    Did discuss options in regards of management.  Patient would like to continue to watch and monitor as it is not limiting her ability and ambulation.                      Future Appointments   Date Time Provider Department Center   5/27/2025  9:40 AM [facility] MAMMO1 [facility] MAMMO [place]   7/8/2025  1:00 PM Stanley Mary MD [facility] CARDIO [facility]   12/5/2025  8[facility] [place]   12/8/2025 11:40 AM Yvette Crockett DO [facility] FAM MED [place]   1/15/2026  8:45 AM Reinier French MD [facility] RHEUM [facility]           [1]   Social History  Socioeconomic History    Marital status:     Number of children: 2   Occupational History     Employer: [employer]   Tobacco Use    Smoking status: Never    Smokeless tobacco: Never   Substance and Sexual Activity    Alcohol use: Yes     Alcohol/week: 3.0 standard drinks of alcohol     Types: 1 Glasses of wine, 1 Shots of liquor, 1 Drinks containing 0.5 oz of alcohol per week     Comment: socially     Drug use: No    Sexual activity: Yes     Partners: Male     Birth control/protection: Post-menopausal, None     Comment: Havent been tested for menopause   Social History Narrative    Wears a seatbelt.     Social Drivers of Health     Financial Resource Strain: Low Risk  (10/14/2024)    Overall Financial Resource Strain  (CARDIA)     Difficulty of Paying Living Expenses: Not hard at all   Food Insecurity: No Food Insecurity (10/14/2024)    Hunger Vital Sign     Worried About Running Out of Food in the Last Year: Never true     Ran Out of Food in the Last Year: Never true   Physical Activity: Insufficiently Active (10/14/2024)    Exercise Vital Sign     Days of Exercise per Week: 4 days     Minutes of Exercise per Session: 20 min   Stress: No Stress Concern Present (10/14/2024)    [employer] - Occupational Stress Questionnaire     Feeling of Stress : Only a little   Housing Stability: Unknown (10/14/2024)    Housing Stability Vital Sign     Unable to Pay for Housing in the Last Year: No

## 2025-05-27 ENCOUNTER — HOSPITAL ENCOUNTER (OUTPATIENT)
Dept: RADIOLOGY | Facility: HOSPITAL | Age: 55
Discharge: HOME | End: 2025-05-27
Attending: DIETITIAN, REGISTERED
Payer: COMMERCIAL

## 2025-05-27 DIAGNOSIS — Z12.31 BREAST CANCER SCREENING BY MAMMOGRAM: ICD-10-CM

## 2025-05-27 PROCEDURE — 77063 BREAST TOMOSYNTHESIS BI: CPT | Mod: 26,,, | Performed by: RADIOLOGY

## 2025-05-27 PROCEDURE — 77067 SCR MAMMO BI INCL CAD: CPT | Mod: 26,,, | Performed by: RADIOLOGY

## 2025-05-27 PROCEDURE — 77067 SCR MAMMO BI INCL CAD: CPT | Mod: TC,PO

## 2025-07-07 ENCOUNTER — OFFICE VISIT (OUTPATIENT)
Dept: FAMILY MEDICINE | Facility: CLINIC | Age: 55
End: 2025-07-07
Payer: COMMERCIAL

## 2025-07-07 VITALS
RESPIRATION RATE: 18 BRPM | DIASTOLIC BLOOD PRESSURE: 82 MMHG | HEART RATE: 85 BPM | BODY MASS INDEX: 23.83 KG/M2 | WEIGHT: 129.5 LBS | SYSTOLIC BLOOD PRESSURE: 102 MMHG | TEMPERATURE: 98 F | OXYGEN SATURATION: 98 % | HEIGHT: 62 IN

## 2025-07-07 DIAGNOSIS — L23.7 POISON IVY DERMATITIS: Primary | ICD-10-CM

## 2025-07-07 PROCEDURE — 96372 THER/PROPH/DIAG INJ SC/IM: CPT | Mod: S$GLB,,, | Performed by: DIETITIAN, REGISTERED

## 2025-07-07 PROCEDURE — 3074F SYST BP LT 130 MM HG: CPT | Mod: CPTII,S$GLB,, | Performed by: DIETITIAN, REGISTERED

## 2025-07-07 PROCEDURE — 1160F RVW MEDS BY RX/DR IN RCRD: CPT | Mod: CPTII,S$GLB,, | Performed by: DIETITIAN, REGISTERED

## 2025-07-07 PROCEDURE — 99214 OFFICE O/P EST MOD 30 MIN: CPT | Mod: 25,S$GLB,, | Performed by: DIETITIAN, REGISTERED

## 2025-07-07 PROCEDURE — 1159F MED LIST DOCD IN RCRD: CPT | Mod: CPTII,S$GLB,, | Performed by: DIETITIAN, REGISTERED

## 2025-07-07 PROCEDURE — 3008F BODY MASS INDEX DOCD: CPT | Mod: CPTII,S$GLB,, | Performed by: DIETITIAN, REGISTERED

## 2025-07-07 PROCEDURE — 3079F DIAST BP 80-89 MM HG: CPT | Mod: CPTII,S$GLB,, | Performed by: DIETITIAN, REGISTERED

## 2025-07-07 PROCEDURE — 99999 PR PBB SHADOW E&M-EST. PATIENT-LVL V: CPT | Mod: PBBFAC,,, | Performed by: DIETITIAN, REGISTERED

## 2025-07-07 RX ORDER — DEXAMETHASONE SODIUM PHOSPHATE 4 MG/ML
8 INJECTION, SOLUTION INTRA-ARTICULAR; INTRALESIONAL; INTRAMUSCULAR; INTRAVENOUS; SOFT TISSUE ONCE
Status: COMPLETED | OUTPATIENT
Start: 2025-07-07 | End: 2025-07-07

## 2025-07-07 RX ORDER — PREDNISONE 20 MG/1
20 TABLET ORAL DAILY
Qty: 5 TABLET | Refills: 0 | Status: SHIPPED | OUTPATIENT
Start: 2025-07-07

## 2025-07-07 RX ORDER — CALAMINE, PRAMOXIND HCL 8.6; 20; 1 MG/ML; MG/ML; MG/ML
LOTION TOPICAL 2 TIMES DAILY PRN
Qty: 117 ML | Refills: 0 | Status: SHIPPED | OUTPATIENT
Start: 2025-07-07

## 2025-07-07 RX ORDER — HYDROXYZINE HYDROCHLORIDE 25 MG/1
25 TABLET, FILM COATED ORAL 3 TIMES DAILY
Qty: 30 TABLET | Refills: 0 | Status: SHIPPED | OUTPATIENT
Start: 2025-07-07

## 2025-07-07 RX ORDER — MUPIROCIN 20 MG/G
OINTMENT TOPICAL 3 TIMES DAILY
Qty: 15 G | Refills: 0 | Status: SHIPPED | OUTPATIENT
Start: 2025-07-07

## 2025-07-07 RX ADMIN — DEXAMETHASONE SODIUM PHOSPHATE 8 MG: 4 INJECTION, SOLUTION INTRA-ARTICULAR; INTRALESIONAL; INTRAMUSCULAR; INTRAVENOUS; SOFT TISSUE at 02:07

## 2025-07-07 NOTE — PROGRESS NOTES
PLAN:    Assessment & Plan    IMPRESSION:  - Assessed poison ivy rash, noting severity and spread.    POISON IVY DERMTITIS:  - Patient presents with spreading rash and welts after exposure to plants in the garden.  - Physical exam confirms rash and welts on the skin.  - Administered dexamethasone 8 mg injection in office today.  - Will start prednisone 20 mg daily for 5 days beginning tomorrow.  - Prescribed hydroxyzine as needed for itching relief.  - Recommend topical treatments include calamine lotion with zinc oxide and diphenhydramine cream to be applied to affected areas.  - Advised patient to wear pants when working in the garden to prevent future poison ivy exposure.  - One area of the rash appears abnormal with potential for infection.  - Prescribed Mupirocin to be applied to this potentially infected area.         Problem List Items Addressed This Visit    None  Visit Diagnoses         Poison ivy dermatitis    -  Primary    Relevant Medications    dexAMETHasone injection 8 mg (Completed)    predniSONE (DELTASONE) 20 MG tablet    diphenhydramine-calamine (CALAMINE MEDICATED) 1-8 % Lotn    mupirocin (BACTROBAN) 2 % ointment    hydrOXYzine HCL (ATARAX) 25 MG tablet          Future Appointments       Date Provider Specialty Appt Notes    7/8/2025 Stanley Mary MD Cardiology 6months    12/5/2025  Lab labs    12/8/2025 Yvette Crokcett DO Family Medicine f/u    1/15/2026 Reinier French MD Rheumatology One year fu.//dt           Medication Management for assessment above:   Medication List with Changes/Refills   New Medications    DIPHENHYDRAMINE-CALAMINE (CALAMINE MEDICATED) 1-8 % LOTN    Apply topically 2 (two) times daily as needed (itching).    HYDROXYZINE HCL (ATARAX) 25 MG TABLET    Take 1 tablet (25 mg total) by mouth 3 (three) times daily.    MUPIROCIN (BACTROBAN) 2 % OINTMENT    Apply topically 3 (three) times daily.    PREDNISONE (DELTASONE) 20 MG TABLET    Take 1 tablet (20 mg total) by mouth  once daily.   Current Medications    ASPIRIN (ECOTRIN) 81 MG EC TABLET    Take 1 tablet (81 mg total) by mouth once daily.    EVOLOCUMAB (REPATHA SURECLICK) 140 MG/ML PNIJ    Inject 1 mL (140 mg total) into the skin every 14 (fourteen) days.    LEVOTHYROXINE (SYNTHROID) 100 MCG TABLET    Take 1 tablet (100 mcg total) by mouth before breakfast.    MULTIVIT WITH MINERALS/LUTEIN (MULTIVITAMIN 50 PLUS ORAL)    multivitamin Take No date recorded No form recorded No frequency recorded No route recorded No set duration recorded No set duration amount recorded active No dosage strength recorded No dosage strength units of measure recorded    PANTOPRAZOLE (PROTONIX) 40 MG TABLET    Take 1 tablet (40 mg total) by mouth once daily.    PENCICLOVIR (DENAVIR) 1 % CREAM    Apply topically every 2 (two) hours.    PILOCARPINE (SALAGEN) 5 MG TAB    Take 1 tablet (5 mg total) by mouth 2 (two) times daily.    RIZATRIPTAN (MAXALT-MLT) 5 MG DISINTEGRATING TABLET    Take 1 tablet (5 mg total) by mouth as needed (for Headache). May repeat in 2 hours if needed       Yvette Crockett, , RDN  ==========================================================================  Subjective:   Patient ID: Sonia Melo is a 55 y.o. female.  has a past medical history of Abnormal Pap smear of cervix (2016), Cervical spondylolysis, DDD (degenerative disc disease), lumbosacral, Fibromyalgia, GERD (gastroesophageal reflux disease), Gluten-sensitive enteropathy, Hyperlipidemia, Hyperthyroidism, Iatrogenic hypothyroidism, Photosensitivity (06/13/2019), Spondylosis, and Undifferentiated connective tissue disease.   Chief Complaint: Rash (Posion ivy all over)      History of Present Illness    CHIEF COMPLAINT:  Patient presents today with poison ivy rash.    HISTORY OF PRESENT ILLNESS:  She developed a poison ivy rash after gardening in shorts. The rash initially worsened but has shown improvement with topical treatments. She reports severe nighttime itching.  Initially used  children's Benadryl, triamcinolone, and calamine when symptoms developed. Currently treating with Benadryl cream, calamine lotion, and oatmeal soap. She has an annual history of poison ivy requiring injection and prednisone for management.    MEDICATIONS:  She has not started previously prescribed Salazine.      ROS:  General: -fever, -chills, -fatigue, -weight gain, -weight loss  Eyes: -vision changes, -redness, -discharge  ENT: -ear pain, -nasal congestion, -sore throat  Cardiovascular: -chest pain, -palpitations, -lower extremity edema  Respiratory: -cough, -shortness of breath  Gastrointestinal: -abdominal pain, -nausea, -vomiting, -diarrhea, -constipation, -blood in stool  Genitourinary: -dysuria, -hematuria, -frequency  Musculoskeletal: -joint pain, -muscle pain  Skin: +rash, -lesion, +urticaria/ hives, +itching  Neurological: -headache, -dizziness, -numbness, -tingling  Psychiatric: -anxiety, -depression, -sleep difficulty          Problem List Items Addressed This Visit    None  Visit Diagnoses         Poison ivy dermatitis    -  Primary             Review of patient's allergies indicates:   Allergen Reactions    Adhesive Rash     Current Outpatient Medications   Medication Instructions    aspirin (ECOTRIN) 81 mg, Oral, Daily    diphenhydramine-calamine (CALAMINE MEDICATED) 1-8 % Lotn Topical (Top), 2 times daily PRN    hydrOXYzine HCL (ATARAX) 25 mg, Oral, 3 times daily    levothyroxine (SYNTHROID) 100 mcg, Oral, Before breakfast    multivit with minerals/lutein (MULTIVITAMIN 50 PLUS ORAL) multivitamin Take No date recorded No form recorded No frequency recorded No route recorded No set duration recorded No set duration amount recorded active No dosage strength recorded No dosage strength units of measure recorded    mupirocin (BACTROBAN) 2 % ointment Topical (Top), 3 times daily    pantoprazole (PROTONIX) 40 mg, Oral, Daily    penciclovir (DENAVIR) 1 % cream Topical (Top), Every 2  "hours    pilocarpine (SALAGEN) 5 mg, Oral, 2 times daily    predniSONE (DELTASONE) 20 mg, Oral, Daily    REPATHA SURECLICK 140 mg, Subcutaneous, Every 14 days    rizatriptan (MAXALT-MLT) 5 mg, Oral, As needed (PRN), May repeat in 2 hours if needed      I have reviewed the PMH, social history, FamilyHx, surgical history, allergies and medications documented / confirmed by the patient at the time of this visit.    Objective:   /82   Pulse 85   Temp 97.8 °F (36.6 °C)   Resp 18   Ht 5' 2" (1.575 m)   Wt 58.7 kg (129 lb 8 oz)   LMP 09/09/2018   SpO2 98%   BMI 23.69 kg/m²   Physical Exam    General: No acute distress. Well-developed. Well-nourished.  Eyes: EOMI. Sclerae anicteric.  HENT: Normocephalic. Atraumatic. Nares patent. Moist oral mucosa.  Ears: Bilateral TMs clear. Bilateral EACs clear.  Cardiovascular: Regular rate. Regular rhythm. No murmurs. No rubs. No gallops. Normal S1, S2.  Respiratory: Normal respiratory effort. Clear to auscultation bilaterally. No rales. No rhonchi. No wheezing.  Abdomen: Soft. Non-tender. Non-distended. Normoactive bowel sounds.  Musculoskeletal: No  obvious deformity.  Extremities: No lower extremity edema.  Neurological: Alert & oriented x3. No slurred speech. Normal gait.  Psychiatric: Normal mood. Normal affect. Good insight. Good judgment.  Skin: Warm. Dry. No rash. Scattered papules on bilateral arms and legs, right leg around knee oozing crusting coalescence of papules with erythema.         Assessment:     1. Poison ivy dermatitis      MDM:   Acute condition with systemic symptoms requiring unique testing, interpretation of testing, medication management and review of previous medical records.     Visit today included increased complexity associated with the care of the episodic problem see above assessment addressed and managing the longitudinal care of the patient due to the serious and/or complex managed problem(s) see above.    I have reviewed and summarized " old records.  I have performed thorough medication reconciliation today and discussed risk and benefits of medications.  I have reviewed labs and discussed with patient.  All questions were answered.    I have signed for the following orders AND/OR meds.  No orders of the defined types were placed in this encounter.    Medications Ordered This Encounter   Medications    dexAMETHasone injection 8 mg    diphenhydramine-calamine (CALAMINE MEDICATED) 1-8 % Lotn     Sig: Apply topically 2 (two) times daily as needed (itching).     Dispense:  117 mL     Refill:  0    hydrOXYzine HCL (ATARAX) 25 MG tablet     Sig: Take 1 tablet (25 mg total) by mouth 3 (three) times daily.     Dispense:  30 tablet     Refill:  0    mupirocin (BACTROBAN) 2 % ointment     Sig: Apply topically 3 (three) times daily.     Dispense:  15 g     Refill:  0    predniSONE (DELTASONE) 20 MG tablet     Sig: Take 1 tablet (20 mg total) by mouth once daily.     Dispense:  5 tablet     Refill:  0        Follow up if symptoms worsen or fail to improve.  Future Appointments       Date Provider Specialty Appt Notes    7/8/2025 Stanley Mary MD Cardiology 6months    12/5/2025  Lab labs    12/8/2025 Yvette Crockett DO Family Medicine f/u    1/15/2026 Reinier French MD Rheumatology One year fu.//dt            If no improvement in symptoms or symptoms worsen, advised to call/follow-up at clinic or go to ER. Patient voiced understanding and all questions/concerns were addressed.     DISCLAIMER: This note was compiled by using a speech recognition dictation system and therefore please be aware that typographical / speech recognition errors can and do occur.  Please contact me if you see any errors specifically.     This note was generated with the assistance of ambient listening technology. Verbal consent was obtained by the patient and accompanying visitor(s) for the recording of patient appointment to facilitate this note. I attest to having reviewed and  edited the generated note for accuracy, though some syntax or spelling errors may persist. Please contact the author of this note for any clarification.      Yvette Crockett DO, [place]    Office: [phone]   [id number] [place] [address]  FAX: [phone]

## 2025-07-08 ENCOUNTER — OFFICE VISIT (OUTPATIENT)
Dept: CARDIOLOGY | Facility: CLINIC | Age: 55
End: 2025-07-08
Payer: COMMERCIAL

## 2025-07-08 VITALS
RESPIRATION RATE: 16 BRPM | SYSTOLIC BLOOD PRESSURE: 121 MMHG | OXYGEN SATURATION: 99 % | WEIGHT: 127.88 LBS | HEIGHT: 62 IN | DIASTOLIC BLOOD PRESSURE: 86 MMHG | BODY MASS INDEX: 23.53 KG/M2 | HEART RATE: 87 BPM

## 2025-07-08 DIAGNOSIS — E78.01 FAMILIAL HYPERCHOLESTEREMIA: ICD-10-CM

## 2025-07-08 DIAGNOSIS — M35.9 UNDIFFERENTIATED CONNECTIVE TISSUE DISEASE: ICD-10-CM

## 2025-07-08 DIAGNOSIS — R00.2 PALPITATIONS: ICD-10-CM

## 2025-07-08 DIAGNOSIS — R60.0 LOCALIZED EDEMA: ICD-10-CM

## 2025-07-08 DIAGNOSIS — R06.00 DYSPNEA, UNSPECIFIED TYPE: ICD-10-CM

## 2025-07-08 DIAGNOSIS — R07.89 OTHER CHEST PAIN: ICD-10-CM

## 2025-07-08 DIAGNOSIS — K21.9 GASTROESOPHAGEAL REFLUX DISEASE, UNSPECIFIED WHETHER ESOPHAGITIS PRESENT: ICD-10-CM

## 2025-07-08 DIAGNOSIS — M34.9 SCLERODERMA: ICD-10-CM

## 2025-07-08 DIAGNOSIS — Z78.9 STATIN INTOLERANCE: Primary | ICD-10-CM

## 2025-07-08 DIAGNOSIS — I65.23 ATHEROSCLEROSIS OF BOTH CAROTID ARTERIES: ICD-10-CM

## 2025-07-08 DIAGNOSIS — E03.2 IATROGENIC HYPOTHYROIDISM: ICD-10-CM

## 2025-07-08 DIAGNOSIS — E78.2 MIXED HYPERLIPIDEMIA: ICD-10-CM

## 2025-07-08 DIAGNOSIS — M19.90 UNDIFFERENTIATED INFLAMMATORY ARTHRITIS: ICD-10-CM

## 2025-07-08 DIAGNOSIS — R06.09 OTHER FORM OF DYSPNEA: ICD-10-CM

## 2025-07-08 DIAGNOSIS — R07.9 CHEST PAIN, UNSPECIFIED TYPE: ICD-10-CM

## 2025-07-08 PROCEDURE — 3008F BODY MASS INDEX DOCD: CPT | Mod: CPTII,S$GLB,, | Performed by: INTERNAL MEDICINE

## 2025-07-08 PROCEDURE — G2211 COMPLEX E/M VISIT ADD ON: HCPCS | Mod: S$GLB,,, | Performed by: INTERNAL MEDICINE

## 2025-07-08 PROCEDURE — 3074F SYST BP LT 130 MM HG: CPT | Mod: CPTII,S$GLB,, | Performed by: INTERNAL MEDICINE

## 2025-07-08 PROCEDURE — 1160F RVW MEDS BY RX/DR IN RCRD: CPT | Mod: CPTII,S$GLB,, | Performed by: INTERNAL MEDICINE

## 2025-07-08 PROCEDURE — 99214 OFFICE O/P EST MOD 30 MIN: CPT | Mod: S$GLB,,, | Performed by: INTERNAL MEDICINE

## 2025-07-08 PROCEDURE — 3079F DIAST BP 80-89 MM HG: CPT | Mod: CPTII,S$GLB,, | Performed by: INTERNAL MEDICINE

## 2025-07-08 PROCEDURE — 99999 PR PBB SHADOW E&M-EST. PATIENT-LVL IV: CPT | Mod: PBBFAC,,, | Performed by: INTERNAL MEDICINE

## 2025-07-08 PROCEDURE — 1159F MED LIST DOCD IN RCRD: CPT | Mod: CPTII,S$GLB,, | Performed by: INTERNAL MEDICINE

## 2025-07-08 NOTE — PROGRESS NOTES
Subjective:   Patient ID:  Sonia Melo is a 55 y.o. female who presents for cardiac consult of No chief complaint on file.    The patient came in today for cardiac consult of No chief complaint on file.      Sonia Melo is a 55 y.o. female pt with carotid artery disease, connective tissue disorder/scleroderma, HLD, hypothyroidism presents for follow up CV eval.       3/12/24  Lipids remain elevated Oct 2023 - Tc 268, HDL 87, , Tg 85.   BP and Hr stable. BMI 24 - 135 lbs    1/8/25  Carotids with mild disease 6/2024  BP and HR stable. BMI 24 - 133 lbs   She could not tolerate Zetia due to muscle pain.   She is back on PPI, saw Dr. Haq.     7/8/25  BP and HR stable. BMI 23 - 171 lbs   Has not been back to GI yet.         FH - CAD     Interpretation Summary  Show Result Comparison     There is 20-39% right Internal Carotid Stenosis.    There is 20-39% left Internal Carotid Stenosis.        Results for orders placed during the hospital encounter of 03/22/23    Echo    Interpretation Summary  · The left ventricle is normal in size with normal systolic function.  · The estimated ejection fraction is 60%.  · Normal left ventricular diastolic function.  · Normal right ventricular size with normal right ventricular systolic function.  · Normal central venous pressure (3 mmHg).  · The estimated PA systolic pressure is 20 mmHg.      Results for orders placed during the hospital encounter of 03/22/23    Exercise Stress - EKG    Interpretation Summary    The patient exercised for 7 minutes 30 seconds on a Michael protocol, corresponding to a functional capacity of 7 METS, achieving a peak heart rate of 160 bpm, which is 99 % of the age predicted maximum heart rate.    The ECG portion of the study is negative for ischemia.    The patient reported no chest pain during the stress test.    The blood pressure response to stress was normal.    There were no arrhythmias during stress.    Short-segment stenosis of the  left ICA origin with stenosis felt to be less than 50%.  Correlate with carotid ultrasound as clinically indicated.  Otherwise unremarkable CTA head/neck.     Electronically signed by: Cosme Toth MD  Date:                                            10/27/2022  Time:                                           11:42    Conclusion    Triphasic waveforms B LE.  Normal COOKIE B LE.  Normal study.    Conclusion    There is no evidence of a right lower extremity DVT.  There is no evidence of a left lower extremity DVT.       HOLTER    TEST DESCRIPTION   PREDOMINANT RHYTHM  1. Sinus rhythm with heart rates varying between 51 and 145 bpm with an average of 86 bpm.     VENTRICULAR ARRHYTHMIAS  1. There were very rare PVCs recorded totalling 2 and averaging less than 1 per hour.   2. There were no episodes of ventricular tachycardia.    SUPRA VENTRICULAR ARRHYTHMIAS  1. There were very rare PACs recorded totalling 4 and averaging less than 1 per hour.   2. There were no episodes of sustained supraventricular tachycardia.    CONCLUSIONS     1 - Normal left ventricular systolic function (EF 60-65%).     2 - Normal left ventricular diastolic function.     3 - Normal right ventricular systolic function .     4 - The estimated PA systolic pressure is 15 mmHg.     5 - No wall motion abnormalities.     6 - Trivial mitral regurgitation.     7 - Trivial to mild tricuspid regurgitation.     8 - Anterior mitral valve leaflet prolapse - mild.       This document has been electronically    SIGNED BY: Stanley Mary MD On: 08/16/2019 13:24    ECG stress  EKG Conclusions:    1. The EKG portion of this study is negative for ischemia at a moderate workload, and peak heart rate of 169 bpm (103% of predicted).   2. Blood pressure response to exercise was normal (Presenting BP: 131/95 Peak BP: 173/91).   3. No significant arrhythmias were present.   4. There were no symptoms of chest discomfort or significant dyspnea throughout the protocol.    5. The Taylor treadmill score was 6 suggesting a low probability for future cardiovascular events.        This document has been electronically    SIGNED BY: Jeremias Huber MD On: 08/05/2019 16:08    Past Medical History:   Diagnosis Date    [facility]ix [facility]    Cervical spondylolysis     DDD (degenerative disc disease), lumbosacral     Fibromyalgia     GERD (gastroesophageal reflux disease)     Gluten-sensitive enteropathy     Hyperlipidemia     Hyperthyroidism     Iatrogenic hypothyroidism     Photosensitivity 06/13/2019    Spondylosis     Undifferentiated connective tissue disease        Past Surgical History:   Procedure Laterality Date    AUGMENTATION OF BREAST Bilateral 2009    silicone    BREAST IMPLANTS      BREAST SURGERY Bilateral 2009    silicone    CERVICAL FUSION  01/10/2018    ACDF    COLONOSCOPY N/A 10/13/2017    Procedure: COLONOSCOPY;  Surgeon: Hugh Luciano MD;  Location: [facility];  Service: Endoscopy;  Laterality: N/A;    EAR MASTOIDECTOMY W/ COCHLEAR IMPLANT W/ LANDMARK      ESOPHAGOGASTRODUODENOSCOPY N/A 01/29/2020    Procedure: EGD (ESOPHAGOGASTRODUODENOSCOPY);  Surgeon: Tisha Mendosa MD;  Location: [facility];  Service: Endoscopy;  Laterality: N/A;    EYE SURGERY      SPINE SURGERY  Jan 2018    ACDF    STAPEDES SURGERY      sMart Stapedes Piston (Safe to 3T magnet)    UPPER GASTROINTESTINAL ENDOSCOPY         Social History     Tobacco Use    Smoking status: Never    Smokeless tobacco: Never   Substance Use Topics    Alcohol use: Yes     Alcohol/week: 3.0 standard drinks of alcohol     Types: 1 Glasses of wine, 1 Shots of liquor, 1 Drinks containing 0.5 oz of alcohol per week     Comment: socially     Drug use: No       Family History   Problem Relation Name Age of Onset    Hypertension Mother Zoila Mason     Heart disease Mother Zoila Mason     Hypertension Father Oseas Mason     Heart disease Father Wapello Mason      Hearing loss Father Oseas Mason     Cancer Maternal Uncle Hamilton Elkins         stomach cancer    Stomach cancer Maternal Uncle Hamilton Elkins     Celiac disease Maternal Aunt Lona Story     Cancer Maternal Aunt Lona Story     Breast cancer Paternal Aunt      Cirrhosis Neg Hx      Colon cancer Neg Hx      Colon polyps Neg Hx      Crohn's disease Neg Hx      Cystic fibrosis Neg Hx      Esophageal cancer Neg Hx      Hemochromatosis Neg Hx      Inflammatory bowel disease Neg Hx      Irritable bowel syndrome Neg Hx      Liver cancer Neg Hx      Liver disease Neg Hx      Rectal cancer Neg Hx      Ulcerative colitis Neg Hx      Marin's disease Neg Hx      Lymphoma Neg Hx      Tuberculosis Neg Hx      Scleroderma Neg Hx      Rheum arthritis Neg Hx      Multiple sclerosis Neg Hx      Melanoma Neg Hx      Lupus Neg Hx      Psoriasis Neg Hx      Skin cancer Neg Hx         Patient's Medications   New Prescriptions    No medications on file   Previous Medications    ASPIRIN (ECOTRIN) 81 MG EC TABLET    Take 1 tablet (81 mg total) by mouth once daily.    DIPHENHYDRAMINE-CALAMINE (CALAMINE MEDICATED) 1-8 % LOTN    Apply topically 2 (two) times daily as needed (itching).    EVOLOCUMAB (REPATHA SURECLICK) 140 MG/ML PNIJ    Inject 1 mL (140 mg total) into the skin every 14 (fourteen) days.    HYDROXYZINE HCL (ATARAX) 25 MG TABLET    Take 1 tablet (25 mg total) by mouth 3 (three) times daily.    LEVOTHYROXINE (SYNTHROID) 100 MCG TABLET    Take 1 tablet (100 mcg total) by mouth before breakfast.    MULTIVIT WITH MINERALS/LUTEIN (MULTIVITAMIN 50 PLUS ORAL)    multivitamin Take No date recorded No form recorded No frequency recorded No route recorded No set duration recorded No set duration amount recorded active No dosage strength recorded No dosage strength units of measure recorded    MUPIROCIN (BACTROBAN) 2 % OINTMENT    Apply topically 3 (three) times daily.    PANTOPRAZOLE (PROTONIX) 40 MG TABLET    Take 1 tablet (40 mg total) by mouth  "once daily.    PENCICLOVIR (DENAVIR) 1 % CREAM    Apply topically every 2 (two) hours.    PILOCARPINE (SALAGEN) 5 MG TAB    Take 1 tablet (5 mg total) by mouth 2 (two) times daily.    PREDNISONE (DELTASONE) 20 MG TABLET    Take 1 tablet (20 mg total) by mouth once daily.    RIZATRIPTAN (MAXALT-MLT) 5 MG DISINTEGRATING TABLET    Take 1 tablet (5 mg total) by mouth as needed (for Headache). May repeat in 2 hours if needed   Modified Medications    No medications on file   Discontinued Medications    No medications on file       Review of Systems   Constitutional:  Positive for malaise/fatigue.   HENT: Negative.     Eyes: Negative.    Respiratory:  Positive for shortness of breath.    Cardiovascular:  Positive for chest pain and palpitations.   Gastrointestinal: Negative.    Genitourinary: Negative.    Musculoskeletal: Negative.    Skin: Negative.    Neurological:  Positive for dizziness.   Endo/Heme/Allergies: Negative.    Psychiatric/Behavioral: Negative.     All 12 systems otherwise negative.      Wt Readings from Last 3 Encounters:   07/08/25 58 kg (127 lb 13.9 oz)   07/07/25 58.7 kg (129 lb 8 oz)   05/05/25 59.7 kg (131 lb 11.2 oz)     Temp Readings from Last 3 Encounters:   07/07/25 97.8 °F (36.6 °C)   05/05/25 97.6 °F (36.4 °C)   10/26/23 97.9 °F (36.6 °C) (Tympanic)     BP Readings from Last 3 Encounters:   07/08/25 121/86   07/07/25 102/82   05/05/25 (!) 142/98     Pulse Readings from Last 3 Encounters:   07/08/25 87   07/07/25 85   05/05/25 73       /86   Pulse 87   Resp 16   Ht 5' 2" (1.575 m)   Wt 58 kg (127 lb 13.9 oz)   LMP 09/09/2018   SpO2 99%   BMI 23.39 kg/m²     Objective:   Physical Exam  Constitutional:       General: She is not in acute distress.     Appearance: She is well-developed. She is not diaphoretic.   HENT:      Head: Normocephalic and atraumatic.      Mouth/Throat:      Pharynx: No oropharyngeal exudate.   Eyes:      General: No scleral icterus.        Right eye: No " discharge.         Left eye: No discharge.   Pulmonary:      Effort: Pulmonary effort is normal. No respiratory distress.   Skin:     Coloration: Skin is not pale.      Findings: No erythema or rash.   Neurological:      Mental Status: She is alert and oriented to person, place, and time.   Psychiatric:         Behavior: Behavior normal.         Thought Content: Thought content normal.         Judgment: Judgment normal.         Lab Results   Component Value Date     01/15/2025    K 4.7 01/15/2025     01/15/2025    CO2 27 01/15/2025    BUN 15 01/15/2025    CREATININE 0.8 01/15/2025     01/15/2025    HGBA1C 5.4 12/10/2024    MG 2.1 01/15/2025    AST 18 01/15/2025    ALT 12 01/15/2025    ALBUMIN 4.2 01/15/2025    PROT 8.0 01/15/2025    BILITOT 0.6 01/15/2025    WBC 6.12 12/10/2024    HGB 12.2 12/10/2024    HCT 38.2 12/10/2024    MCV 95 12/10/2024     12/10/2024    INR 0.9 09/09/2022    TSH 0.880 12/10/2024    CHOL 276 (H) 12/10/2024    HDL 83 (H) 12/10/2024    LDLCALC 172.4 (H) 12/10/2024    TRIG 103 12/10/2024     Assessment:      1. Statin intolerance    2. Atherosclerosis of both carotid arteries    3. Mixed hyperlipidemia    4. Undifferentiated inflammatory arthritis    5. Other chest pain    6. Chest pain, unspecified type    7. Dyspnea, unspecified type    8. Undifferentiated connective tissue disease    9. Gastroesophageal reflux disease, unspecified whether esophagitis present    10. Palpitations    11. Localized edema    12. Scleroderma    13. Iatrogenic hypothyroidism    14. Other form of dyspnea    15. Familial hypercholesteremia          Plan:   1. Chest pain, BRAXTON - atypical  -ECHO 3/2023 with normal bi V function and valves, PASP 20 mmHg.   -ECG stress test 3/2023 neg for ischemia.   - prior workup neg  - discussed noncardiac etiologies   -Your total calcium score is 0. Very little coronary heart disease risk     2. HLD , FH  -  cont low jeffrey diet   - Lipids remain elevated Oct  2023 - Tc 268, HDL 87, , Tg 85.   - could not tolerate lipitor, change to prava 20 - has stopped taking it   - may need non statin tx - start Zetia - had side effects - muscle pain   - did not Nexletol   -has been approved Repatha- has not started yet - will do soon - repeat labs in 3 months     3. Palpitations  - Holter - neg    4. Scleroderma/TERESA  - f/u with rheum    5. Hypothyroidism, TSH 1.65 --> 1.59  - cont Synthroid    6. GERD/esoph dysphagia  - f/u GI, cont PPI   - is gluten sensitive     7. Leg pain/numbness, possible Raynauds  - leg u/s venous and arterial us - negative 2/2022  - started low dose Norvasc 2.5 mg - has not taken recently     8. Carotid artery disease  - less than 50% Oct 2022  - has seen vasc sx=-  - Carotids with mild disease 6/2024-  - repeat carotid u/s     Visit today included increased complexity associated with the care of the episodic problem chest pain addressed and managing the longitudinal care of the patient due to the serious and/or complex managed problem(s) .      Thank you for allowing me to participate in this patient's care. Please do not hesitate to contact me with any questions or concerns. Consult note has been forwarded to the referral physician.

## 2025-08-10 ENCOUNTER — PATIENT MESSAGE (OUTPATIENT)
Dept: FAMILY MEDICINE | Facility: CLINIC | Age: 55
End: 2025-08-10
Payer: COMMERCIAL

## 2025-08-10 DIAGNOSIS — E03.2 IATROGENIC HYPOTHYROIDISM: ICD-10-CM

## 2025-08-10 RX ORDER — LEVOTHYROXINE SODIUM 100 UG/1
TABLET ORAL
Refills: 0 | OUTPATIENT
Start: 2025-08-10

## 2025-08-11 DIAGNOSIS — E03.2 IATROGENIC HYPOTHYROIDISM: ICD-10-CM

## 2025-08-11 RX ORDER — LEVOTHYROXINE SODIUM 100 UG/1
100 TABLET ORAL
Qty: 90 TABLET | Refills: 3 | Status: SHIPPED | OUTPATIENT
Start: 2025-08-11 | End: 2026-08-11